# Patient Record
Sex: FEMALE | Race: WHITE | HISPANIC OR LATINO | Employment: OTHER | URBAN - METROPOLITAN AREA
[De-identification: names, ages, dates, MRNs, and addresses within clinical notes are randomized per-mention and may not be internally consistent; named-entity substitution may affect disease eponyms.]

---

## 2017-01-03 ENCOUNTER — TELEPHONE (OUTPATIENT)
Dept: INTERNAL MEDICINE | Facility: CLINIC | Age: 82
End: 2017-01-03

## 2017-01-03 ENCOUNTER — PATIENT MESSAGE (OUTPATIENT)
Dept: INTERNAL MEDICINE | Facility: CLINIC | Age: 82
End: 2017-01-03

## 2017-01-08 ENCOUNTER — PATIENT MESSAGE (OUTPATIENT)
Dept: INTERNAL MEDICINE | Facility: CLINIC | Age: 82
End: 2017-01-08

## 2017-01-09 ENCOUNTER — TELEPHONE (OUTPATIENT)
Dept: INTERNAL MEDICINE | Facility: CLINIC | Age: 82
End: 2017-01-09

## 2017-01-09 DIAGNOSIS — N18.4 CKD (CHRONIC KIDNEY DISEASE) STAGE 4, GFR 15-29 ML/MIN: ICD-10-CM

## 2017-01-09 DIAGNOSIS — D64.9 ANEMIA, UNSPECIFIED TYPE: Primary | ICD-10-CM

## 2017-01-09 DIAGNOSIS — R05.9 COUGH IN ADULT: ICD-10-CM

## 2017-01-09 NOTE — TELEPHONE ENCOUNTER
Spoke to patient on the phone.  Sick since 12-, nearly two weeks,  Worried she might have pneumonia.  Some sputum   She thinks she might be getting better.  No fever at any point.  Could be viral.  She has a BAD cough, but worrisome is the bubbles.    PLEASE CALL PATIENT TO SCHEDULE cxr AND LABS. i THINK SHE WANTS TO CHECK THIS TOMORROW 01- ABOUT 9AM.   I have a mandatory meeting tomorrow.I cannot see where she can be worked in for me to see tomorrow, maybe you can?  or maybe appointment with me weds or thurs?

## 2017-01-10 ENCOUNTER — PATIENT MESSAGE (OUTPATIENT)
Dept: INTERNAL MEDICINE | Facility: CLINIC | Age: 82
End: 2017-01-10

## 2017-01-10 ENCOUNTER — HOSPITAL ENCOUNTER (OUTPATIENT)
Dept: RADIOLOGY | Facility: HOSPITAL | Age: 82
Discharge: HOME | End: 2017-01-10
Attending: INTERNAL MEDICINE
Payer: MEDICARE

## 2017-01-10 ENCOUNTER — OFFICE VISIT (OUTPATIENT)
Dept: INTERNAL MEDICINE | Facility: CLINIC | Age: 82
End: 2017-01-10
Payer: MEDICARE

## 2017-01-10 VITALS
DIASTOLIC BLOOD PRESSURE: 78 MMHG | BODY MASS INDEX: 19.58 KG/M2 | WEIGHT: 117.5 LBS | SYSTOLIC BLOOD PRESSURE: 116 MMHG | HEIGHT: 65 IN | HEART RATE: 79 BPM

## 2017-01-10 DIAGNOSIS — N18.4 CKD (CHRONIC KIDNEY DISEASE) STAGE 4, GFR 15-29 ML/MIN: ICD-10-CM

## 2017-01-10 DIAGNOSIS — R09.81 NASAL CONGESTION: Primary | ICD-10-CM

## 2017-01-10 DIAGNOSIS — R05.9 COUGH: ICD-10-CM

## 2017-01-10 DIAGNOSIS — R05.9 COUGH IN ADULT: ICD-10-CM

## 2017-01-10 DIAGNOSIS — R74.8 ELEVATED LIVER ENZYMES: ICD-10-CM

## 2017-01-10 DIAGNOSIS — R09.81 SINUS CONGESTION: ICD-10-CM

## 2017-01-10 DIAGNOSIS — D64.9 ANEMIA, UNSPECIFIED TYPE: ICD-10-CM

## 2017-01-10 PROCEDURE — 3078F DIAST BP <80 MM HG: CPT | Mod: S$GLB,,, | Performed by: INTERNAL MEDICINE

## 2017-01-10 PROCEDURE — 1159F MED LIST DOCD IN RCRD: CPT | Mod: S$GLB,,, | Performed by: INTERNAL MEDICINE

## 2017-01-10 PROCEDURE — 1160F RVW MEDS BY RX/DR IN RCRD: CPT | Mod: S$GLB,,, | Performed by: INTERNAL MEDICINE

## 2017-01-10 PROCEDURE — 71020 XR CHEST PA AND LATERAL: CPT | Mod: 26,,, | Performed by: RADIOLOGY

## 2017-01-10 PROCEDURE — 3074F SYST BP LT 130 MM HG: CPT | Mod: S$GLB,,, | Performed by: INTERNAL MEDICINE

## 2017-01-10 PROCEDURE — 99999 PR PBB SHADOW E&M-EST. PATIENT-LVL III: CPT | Mod: PBBFAC,,, | Performed by: INTERNAL MEDICINE

## 2017-01-10 PROCEDURE — 1126F AMNT PAIN NOTED NONE PRSNT: CPT | Mod: S$GLB,,, | Performed by: INTERNAL MEDICINE

## 2017-01-10 PROCEDURE — 1157F ADVNC CARE PLAN IN RCRD: CPT | Mod: S$GLB,,, | Performed by: INTERNAL MEDICINE

## 2017-01-10 PROCEDURE — 99214 OFFICE O/P EST MOD 30 MIN: CPT | Mod: 25,S$GLB,, | Performed by: INTERNAL MEDICINE

## 2017-01-10 RX ORDER — FLUTICASONE PROPIONATE 50 MCG
2 SPRAY, SUSPENSION (ML) NASAL DAILY
Qty: 16 G | Refills: 12 | Status: SHIPPED | OUTPATIENT
Start: 2017-01-10 | End: 2017-02-09

## 2017-01-10 RX ORDER — CODEINE PHOSPHATE AND GUAIFENESIN 10; 100 MG/5ML; MG/5ML
5 SOLUTION ORAL 3 TIMES DAILY PRN
Qty: 180 ML | Refills: 0 | Status: SHIPPED | OUTPATIENT
Start: 2017-01-10 | End: 2017-01-20

## 2017-01-10 NOTE — MR AVS SNAPSHOT
[facility] Internal Medicine  [address]  [facility] [address]  Phone: [phone]  Fax: [phone]                  Karli Hameed   1/10/2017 11:00 AM   Office Visit    Description:  Female : 1931   Provider:  Katherine Hutchinson MD   Department:  [facility] - Internal Medicine           Reason for Visit     Cough           Diagnoses this Visit        Comments    Nasal congestion    -  Primary     Sinus congestion         Cough                To Do List           Future Appointments        Provider Department Dept Phone    1/10/2017 11:00 AM Katherine Hutchinson MD [facility] Internal Medicine [phone]    2017 11:20 AM Luis M Alonzo MD [facility] Nephrology [phone]    3/7/2017 8:20 AM Katherine Hutchinson MD [facility] Internal Medicine [phone]      Goals (5 Years of Data)     None       These Medications        Disp Refills Start End    fluticasone (FLONASE) 50 mcg/actuation nasal spray 16 g 12 1/10/2017 2017    2 sprays by Each Nare route once daily. - Each Nare    Pharmacy: [facility] - [facility] [address] Ph #: [phone]       guaifenesin-codeine 100-10 mg/5 ml (TUSSI-ORGANIDIN NR)  mg/5 mL syrup 180 mL 0 1/10/2017 2017    Take 5 mLs by mouth 3 (three) times daily as needed for Cough. - Oral    Pharmacy: [facility] - [facility] [address] Ph #: [phone]         [facility]     [facility] -  Assistance  Registered nurses in the [facility] On Call Center provide clinical advisement, health education, appointment booking, and other advisory services.  Call for this free service at [phone].             Medications           Message regarding Medications     Verify the changes and/or additions to your medication regime listed below are the same as discussed with your clinician today.  If any of these changes or additions are incorrect, please  "notify your healthcare provider.        START taking these NEW medications        Refills    fluticasone (FLONASE) 50 mcg/actuation nasal spray 12    Si sprays by Each Nare route once daily.    Class: Normal    Route: Each Nare    guaifenesin-codeine 100-10 mg/5 ml (TUSSI-ORGANIDIN NR)  mg/5 mL syrup 0    Sig: Take 5 mLs by mouth 3 (three) times daily as needed for Cough.    Class: Print    Route: Oral           Verify that the below list of medications is an accurate representation of the medications you are currently taking.  If none reported, the list may be blank. If incorrect, please contact your healthcare provider. Carry this list with you in case of emergency.           Current Medications     acetaminophen (TYLENOL) 325 MG tablet Take by mouth. 1 Tablet Oral .  Tylenol.To take as needed for arthritis pain.    amlodipine (NORVASC) 5 MG tablet Take 1 tablet (5 mg total) by mouth once daily.    cholecalciferol, vitamin D3, 1,000 unit capsule Take 2 capsules (2,000 Units total) by mouth once daily.    fluticasone (FLONASE) 50 mcg/actuation nasal spray 2 sprays by Each Nare route once daily.    guaifenesin-codeine 100-10 mg/5 ml (TUSSI-ORGANIDIN NR)  mg/5 mL syrup Take 5 mLs by mouth 3 (three) times daily as needed for Cough.    losartan (COZAAR) 50 MG tablet Take 1 tablet by mouth once daily.    magnesium hydroxide (MICHAEL CHEWS) 311 MG Chew Take 1 tablet (311 mg total) by mouth daily as needed.    naphazoline (NAPHCON) 0.012 % ophthalmic solution Inject 1 drop into the eye Use as needed.           Clinical Reference Information           Vital Signs - Last Recorded  Most recent update: 1/10/2017  9:17 AM by Javon Nava MA    BP Pulse Ht Wt BMI    116/78 79 5' 4.8" (1.646 m) 53.3 kg (117 lb 8.1 oz) 19.67 kg/m2      Blood Pressure          Most Recent Value    BP  116/78      Allergies as of 1/10/2017     Advil [Ibuprofen]    Parafon Forte    Calcitriol (Bulk)    Lisinopril    "   Immunizations Administered on Date of Encounter - 1/10/2017     None

## 2017-01-11 ENCOUNTER — PATIENT MESSAGE (OUTPATIENT)
Dept: INTERNAL MEDICINE | Facility: CLINIC | Age: 82
End: 2017-01-11

## 2017-01-11 NOTE — TELEPHONE ENCOUNTER
There was nothing available on 01/13, I scheduled her for 01/18 @ 2:45pm. Appointment letter mailed.

## 2017-01-12 NOTE — PROGRESS NOTES
Subjective:       Patient ID: Karli Hameed is a 85 y.o. female.    Chief Complaint: Cough   part to this visit she had a CBC, CMP and chest x-ray.  Her chest x-ray is clear.  She feels terrible and she looks terrible.  She is not toxic.  Her nose is very congested and she has been coughing.  She has been trying to keep her fluids up.  HPI  Review of Systems   Constitutional: Positive for activity change, fatigue and unexpected weight change. Negative for appetite change, chills and fever.   HENT: Positive for congestion, sinus pressure and voice change. Negative for hearing loss.    Eyes: Negative for visual disturbance.   Respiratory: Positive for cough. Negative for chest tightness, shortness of breath and wheezing.    Cardiovascular: Negative for chest pain, palpitations and leg swelling.   Gastrointestinal: Negative for abdominal pain, constipation, nausea and vomiting.   Genitourinary: Negative for dysuria, frequency and urgency.   Musculoskeletal: Negative for arthralgias, back pain, gait problem, joint swelling and myalgias.   Skin: Negative for rash.   Neurological: Negative for light-headedness and headaches.   Psychiatric/Behavioral: Negative for dysphoric mood and sleep disturbance. The patient is not nervous/anxious.        Objective:      Physical Exam   Constitutional: She is oriented to person, place, and time. She appears well-developed and well-nourished. No distress.   HENT:   Head: Normocephalic and atraumatic.   Right Ear: External ear normal.   Left Ear: External ear normal.   Nose: Nose normal.   Mouth/Throat: Oropharynx is clear and moist. No oropharyngeal exudate.   Eyes: Conjunctivae and EOM are normal. Pupils are equal, round, and reactive to light. Right eye exhibits no discharge. No scleral icterus.   Neck: Normal range of motion and full passive range of motion without pain. Neck supple. No spinous process tenderness and no muscular tenderness present. Carotid bruit is not present. No  thyromegaly present.   Cardiovascular: Normal rate, regular rhythm, S1 normal, S2 normal, normal heart sounds and intact distal pulses.  Exam reveals no gallop and no friction rub.    No murmur heard.  Pulmonary/Chest: Effort normal and breath sounds normal. No respiratory distress. She has no wheezes. She has no rales. She exhibits no tenderness.   Abdominal: Soft. Bowel sounds are normal. She exhibits no distension and no mass. There is no tenderness. There is no rebound and no guarding.   Genitourinary: Pelvic exam was performed with patient supine. Uterus is not deviated, not enlarged, not fixed and not tender. Cervix exhibits no motion tenderness, no discharge and no friability. Right adnexum displays no mass, no tenderness and no fullness. Left adnexum displays no mass, no tenderness and no fullness.   Musculoskeletal: Normal range of motion. She exhibits no edema or tenderness.   Lymphadenopathy:        Head (right side): No submental and no submandibular adenopathy present.        Head (left side): No submental and no submandibular adenopathy present.     She has no cervical adenopathy.        Right cervical: No superficial cervical, no deep cervical and no posterior cervical adenopathy present.       Left cervical: No superficial cervical, no deep cervical and no posterior cervical adenopathy present.        Right axillary: No pectoral and no lateral adenopathy present.        Left axillary: No pectoral and no lateral adenopathy present.       Right: No supraclavicular adenopathy present.        Left: No supraclavicular adenopathy present.   Neurological: She is alert and oriented to person, place, and time. She has normal reflexes. No cranial nerve deficit. She exhibits normal muscle tone. Coordination normal.   Skin: Skin is warm and dry. No rash noted.   Psychiatric: She has a normal mood and affect. Her behavior is normal. Her mood appears not anxious. Her speech is not rapid and/or pressured. She is  not agitated. She does not exhibit a depressed mood.   Normal behavior, thought content, insight and judgement.       Assessment:       1. Nasal congestion    2. Sinus congestion    3. Cough        Plan:   Karli was seen today for cough.    Diagnoses and all orders for this visit:    Nasal congestion    Sinus congestion    Cough    Other orders.  Await blood test results.  Follow closely.  -     fluticasone (FLONASE) 50 mcg/actuation nasal spray; 2 sprays by Each Nare route once daily.  -     guaifenesin-codeine 100-10 mg/5 ml (TUSSI-ORGANIDIN NR)  mg/5 mL syrup; Take 5 mLs by mouth 3 (three) times daily as needed for Cough.

## 2017-01-13 ENCOUNTER — OFFICE VISIT (OUTPATIENT)
Dept: NEPHROLOGY | Facility: CLINIC | Age: 82
End: 2017-01-13
Payer: MEDICARE

## 2017-01-13 VITALS
WEIGHT: 117.31 LBS | OXYGEN SATURATION: 96 % | HEIGHT: 64 IN | DIASTOLIC BLOOD PRESSURE: 80 MMHG | BODY MASS INDEX: 20.03 KG/M2 | HEART RATE: 76 BPM | SYSTOLIC BLOOD PRESSURE: 120 MMHG

## 2017-01-13 DIAGNOSIS — N39.0 UTI (URINARY TRACT INFECTION), UNCOMPLICATED: ICD-10-CM

## 2017-01-13 DIAGNOSIS — N17.8 ACUTE KIDNEY FAILURE NEC: Primary | ICD-10-CM

## 2017-01-13 PROCEDURE — 3074F SYST BP LT 130 MM HG: CPT | Mod: S$GLB,,, | Performed by: INTERNAL MEDICINE

## 2017-01-13 PROCEDURE — 1159F MED LIST DOCD IN RCRD: CPT | Mod: S$GLB,,, | Performed by: INTERNAL MEDICINE

## 2017-01-13 PROCEDURE — 99214 OFFICE O/P EST MOD 30 MIN: CPT | Mod: S$GLB,,, | Performed by: INTERNAL MEDICINE

## 2017-01-13 PROCEDURE — 1126F AMNT PAIN NOTED NONE PRSNT: CPT | Mod: S$GLB,,, | Performed by: INTERNAL MEDICINE

## 2017-01-13 PROCEDURE — 3079F DIAST BP 80-89 MM HG: CPT | Mod: S$GLB,,, | Performed by: INTERNAL MEDICINE

## 2017-01-13 PROCEDURE — 99499 UNLISTED E&M SERVICE: CPT | Mod: S$GLB,,, | Performed by: INTERNAL MEDICINE

## 2017-01-13 PROCEDURE — 1157F ADVNC CARE PLAN IN RCRD: CPT | Mod: S$GLB,,, | Performed by: INTERNAL MEDICINE

## 2017-01-13 PROCEDURE — 1160F RVW MEDS BY RX/DR IN RCRD: CPT | Mod: S$GLB,,, | Performed by: INTERNAL MEDICINE

## 2017-01-13 PROCEDURE — 99999 PR PBB SHADOW E&M-EST. PATIENT-LVL III: CPT | Mod: PBBFAC,,, | Performed by: INTERNAL MEDICINE

## 2017-01-13 NOTE — PROGRESS NOTES
Subjective:       Patient ID: Karli Hameed is a 85 y.o. White female who presents for follow-up evaluation of Chronic Kidney Disease    HPI    Ms. Hameed is an 85 year old woman with past medical history of hypertension, polycystic kidney disease presenting for follow up of chronic kidney disease.  Patient reports recent cough/cold/congestions.  She reports adequate fluid intake, denies any NSAID use.  She otherwise denies any fever, chest pain, shortness of breath, abdominal pain, diarrhea, dysuria/hematuria.     Review of Systems   Constitutional: Negative for appetite change, fatigue and fever.   HENT: Positive for congestion.    Respiratory: Positive for cough. Negative for chest tightness and shortness of breath.    Cardiovascular: Negative for chest pain and leg swelling.   Gastrointestinal: Negative for abdominal pain, constipation, diarrhea, nausea and vomiting.   Genitourinary: Negative for difficulty urinating, dysuria, flank pain, frequency, hematuria and urgency.   Musculoskeletal: Negative for arthralgias, joint swelling and myalgias.   Skin: Negative for rash and wound.   Neurological: Negative for dizziness, weakness and light-headedness.   All other systems reviewed and are negative.      Objective:      Physical Exam   Constitutional: She appears well-developed and well-nourished.   Cardiovascular: Normal rate, regular rhythm and normal heart sounds.  Exam reveals no gallop and no friction rub.    No murmur heard.  Pulmonary/Chest: Effort normal and breath sounds normal. No respiratory distress. She has no wheezes. She has no rales.   Abdominal: Soft. Bowel sounds are normal. There is no tenderness.   Musculoskeletal: She exhibits no edema.   Neurological: She is alert.   Skin: Skin is warm and dry. No rash noted. No erythema.   Psychiatric: She has a normal mood and affect.       Assessment:       1. Acute kidney failure NEC    2. UTI (urinary tract infection), uncomplicated        Plan:       Ms. Hameed is an 85 year old woman with past medical history of hypertension, polycystic kidney disease presenting for follow up of chronic kidney disease stage III.  Patient creatinine has been trending up since her last visit, possible acute kidney injury due to UTI, v. secondary to recent URI, will repeat renal panel, further recommendations pending results.  Encouraged fluid intake, stressed importance of blood pressure control to prevent any further progression of kidney disease, patient voiced understanding.   - Anemia: Hgb at goal, no indication for erythropoetin therapy  - Bone/mineral metabolism: patient with secondary hyperparathyroidism, PTH at goal for stage CKD    Return to clinic in 2-3 months pending renal panel 1.28.17, then with renal/heme panel, iron/TIBC/ferritin, urinalysis/culture, urine protein/creatinine ratio prior to next visit

## 2017-01-26 DIAGNOSIS — N18.4 CHRONIC KIDNEY DISEASE, STAGE IV (SEVERE): Primary | ICD-10-CM

## 2017-01-28 ENCOUNTER — HOSPITAL ENCOUNTER (OUTPATIENT)
Dept: RADIOLOGY | Facility: HOSPITAL | Age: 82
Discharge: HOME | End: 2017-01-28
Attending: INTERNAL MEDICINE
Payer: MEDICARE

## 2017-01-28 DIAGNOSIS — R74.8 ELEVATED LIVER ENZYMES: ICD-10-CM

## 2017-01-28 PROCEDURE — 76700 US EXAM ABDOM COMPLETE: CPT | Mod: 26,,, | Performed by: RADIOLOGY

## 2017-01-28 PROCEDURE — 76700 US EXAM ABDOM COMPLETE: CPT | Mod: TC

## 2017-01-30 ENCOUNTER — TELEPHONE (OUTPATIENT)
Dept: INTERNAL MEDICINE | Facility: CLINIC | Age: 82
End: 2017-01-30

## 2017-02-03 ENCOUNTER — LAB VISIT (OUTPATIENT)
Dept: LAB | Facility: HOSPITAL | Age: 82
End: 2017-02-03
Attending: INTERNAL MEDICINE
Payer: MEDICARE

## 2017-02-03 DIAGNOSIS — N18.4 CHRONIC KIDNEY DISEASE, STAGE IV (SEVERE): ICD-10-CM

## 2017-02-03 LAB
ALBUMIN SERPL BCP-MCNC: 3.8 G/DL
ANION GAP SERPL CALC-SCNC: 12 MMOL/L
BUN SERPL-MCNC: 42 MG/DL
CALCIUM SERPL-MCNC: 8.7 MG/DL
CHLORIDE SERPL-SCNC: 111 MMOL/L
CO2 SERPL-SCNC: 20 MMOL/L
CREAT SERPL-MCNC: 2.5 MG/DL
EST. GFR  (AFRICAN AMERICAN): 19.6 ML/MIN/1.73 M^2
EST. GFR  (NON AFRICAN AMERICAN): 17 ML/MIN/1.73 M^2
GLUCOSE SERPL-MCNC: 117 MG/DL
PHOSPHATE SERPL-MCNC: 3.9 MG/DL
POTASSIUM SERPL-SCNC: 3.7 MMOL/L
SODIUM SERPL-SCNC: 143 MMOL/L

## 2017-02-03 PROCEDURE — 80069 RENAL FUNCTION PANEL: CPT

## 2017-02-03 PROCEDURE — 36415 COLL VENOUS BLD VENIPUNCTURE: CPT

## 2017-02-22 ENCOUNTER — OFFICE VISIT (OUTPATIENT)
Dept: OPHTHALMOLOGY | Facility: CLINIC | Age: 82
End: 2017-02-22
Payer: MEDICARE

## 2017-02-22 DIAGNOSIS — H47.012 ISCHEMIC OPTIC NEUROPATHY, LEFT: Primary | ICD-10-CM

## 2017-02-22 DIAGNOSIS — Z96.1 PSEUDOPHAKIA OF BOTH EYES: ICD-10-CM

## 2017-02-22 PROCEDURE — 92014 COMPRE OPH EXAM EST PT 1/>: CPT | Mod: S$GLB,,,

## 2017-02-22 PROCEDURE — 99999 PR PBB SHADOW E&M-EST. PATIENT-LVL II: CPT | Mod: PBBFAC,,,

## 2017-02-22 NOTE — PROGRESS NOTES
HPI     Concerns About Ocular Health    Additional comments: overdue exam           Comments   DLS Dr Sahni 5/15/15  Broken glasses  Eyes itch burn and water, using naphcon prn  No flashes or floaters         s/p phaco w/IOL OS 4/9/15  s/p Phaco w/IOL OD February 12, 2015          H/o ischemic optic neuropathy OS             Last edited by Poly Massey on 2/22/2017  9:09 AM. (History)            Assessment /Plan     For exam results, see Encounter Report.    There are no diagnoses linked to this encounter.  I have reviewed the patient history,review of systems,and findings as recorded by the technician and resident and accept.  AION OS is old and stable. Needs new lens post sgy.

## 2017-02-22 NOTE — MR AVS SNAPSHOT
[facility] - Ophthalmology  [address]  [facility] [address]  Phone: [phone]  Fax: [phone]                  Karli Hameed   2017 9:15 AM   Office Visit    Description:  Female : 1931   Provider:  All Martinez MD   Department:  [facility] - Ophthalmology           Reason for Visit     Concerns About Ocular Health           Diagnoses this Visit        Comments    Ischemic optic neuropathy, left    -  Primary     Pseudophakia of both eyes                To Do List           Future Appointments        Provider Department Dept Phone    3/7/2017 8:20 AM Katherine Hutchinson MD [facility] - Internal Medicine [phone]    4/10/2017 9:15 AM LAB, SPECIMEN [facility] [phone]    4/10/2017 9:30 AM LAB, APPOINTMENT [facility] [phone]    2017 8:30 AM Jacob Max MD [facility] - Rheumatology [phone]      Goals (5 Years of Data)     None      Follow-Up and Disposition     Return in about 1 year (around 2018).      [facility] On Call     [facility] On [facility] Nurse Care Line -  Assistance  Registered nurses in the [facility] On Call Center provide clinical advisement, health education, appointment booking, and other advisory services.  Call for this free service at [phone].             Medications           Message regarding Medications     Verify the changes and/or additions to your medication regime listed below are the same as discussed with your clinician today.  If any of these changes or additions are incorrect, please notify your healthcare provider.             Verify that the below list of medications is an accurate representation of the medications you are currently taking.  If none reported, the list may be blank. If incorrect, please contact your healthcare provider. Carry this list with you in case of emergency.           Current Medications     acetaminophen (TYLENOL) 325 MG tablet Take  by mouth. 1 Tablet Oral .  Tylenol.To take as needed for arthritis pain.    amlodipine (NORVASC) 5 MG tablet Take 1 tablet (5 mg total) by mouth once daily.    cholecalciferol, vitamin D3, 1,000 unit capsule Take 2 capsules (2,000 Units total) by mouth once daily.    losartan (COZAAR) 50 MG tablet Take 1 tablet by mouth once daily.    magnesium hydroxide (MICHAEL CHEWS) 311 MG Chew Take 1 tablet (311 mg total) by mouth daily as needed.    naphazoline (NAPHCON) 0.012 % ophthalmic solution Inject 1 drop into the eye Use as needed.           Clinical Reference Information           Allergies as of 2/22/2017     Advil [Ibuprofen]    Parafon Forte    Calcitriol (Bulk)    Lisinopril      Immunizations Administered on Date of Encounter - 2/22/2017     None      Instructions    Get new left lens and repair frame if possible.       Language Assistance Services     ATTENTION: Language assistance services are available, free of charge. Please call [phone].      ATENCIÓN: Si habla jaimee, tiene a varma disposición servicios gratuitos de asistencia lingüística. Llame al [phone].     JOS Ý: N?u b?n nói Ti?ng Vi?t, có các d?ch v? h? tr? ngôn ng? mi?n phí dành cho b?n. G?i s? [phone].         [facility] complies with applicable Federal civil rights laws and does not discriminate on the basis of race, color, national origin, age, disability, or sex.

## 2017-03-03 DIAGNOSIS — M81.0 SENILE OSTEOPOROSIS: Primary | ICD-10-CM

## 2017-03-07 ENCOUNTER — OFFICE VISIT (OUTPATIENT)
Dept: INTERNAL MEDICINE | Facility: CLINIC | Age: 82
End: 2017-03-07
Payer: MEDICARE

## 2017-03-07 VITALS
WEIGHT: 116.38 LBS | BODY MASS INDEX: 19.87 KG/M2 | HEART RATE: 97 BPM | HEIGHT: 64 IN | DIASTOLIC BLOOD PRESSURE: 83 MMHG | SYSTOLIC BLOOD PRESSURE: 139 MMHG

## 2017-03-07 DIAGNOSIS — E21.3 HYPERPARATHYROIDISM: ICD-10-CM

## 2017-03-07 DIAGNOSIS — S52.202A CLOSED FRACTURE OF LEFT RADIUS AND ULNA, INITIAL ENCOUNTER: ICD-10-CM

## 2017-03-07 DIAGNOSIS — I10 ESSENTIAL HYPERTENSION: ICD-10-CM

## 2017-03-07 DIAGNOSIS — S52.92XA CLOSED FRACTURE OF LEFT RADIUS AND ULNA, INITIAL ENCOUNTER: ICD-10-CM

## 2017-03-07 DIAGNOSIS — N64.59 NIPPLE PROBLEM: ICD-10-CM

## 2017-03-07 DIAGNOSIS — N18.4 CKD (CHRONIC KIDNEY DISEASE) STAGE 4, GFR 15-29 ML/MIN: Primary | ICD-10-CM

## 2017-03-07 DIAGNOSIS — R74.8 ELEVATED LIVER ENZYMES: ICD-10-CM

## 2017-03-07 DIAGNOSIS — Z12.31 ENCOUNTER FOR SCREENING MAMMOGRAM FOR MALIGNANT NEOPLASM OF BREAST: ICD-10-CM

## 2017-03-07 PROCEDURE — 1159F MED LIST DOCD IN RCRD: CPT | Mod: S$GLB,,, | Performed by: INTERNAL MEDICINE

## 2017-03-07 PROCEDURE — 1157F ADVNC CARE PLAN IN RCRD: CPT | Mod: S$GLB,,, | Performed by: INTERNAL MEDICINE

## 2017-03-07 PROCEDURE — 99499 UNLISTED E&M SERVICE: CPT | Mod: S$GLB,,, | Performed by: INTERNAL MEDICINE

## 2017-03-07 PROCEDURE — 1126F AMNT PAIN NOTED NONE PRSNT: CPT | Mod: S$GLB,,, | Performed by: INTERNAL MEDICINE

## 2017-03-07 PROCEDURE — 99999 PR PBB SHADOW E&M-EST. PATIENT-LVL III: CPT | Mod: PBBFAC,,, | Performed by: INTERNAL MEDICINE

## 2017-03-07 PROCEDURE — 99214 OFFICE O/P EST MOD 30 MIN: CPT | Mod: S$GLB,,, | Performed by: INTERNAL MEDICINE

## 2017-03-07 PROCEDURE — 3075F SYST BP GE 130 - 139MM HG: CPT | Mod: S$GLB,,, | Performed by: INTERNAL MEDICINE

## 2017-03-07 PROCEDURE — 3079F DIAST BP 80-89 MM HG: CPT | Mod: S$GLB,,, | Performed by: INTERNAL MEDICINE

## 2017-03-07 PROCEDURE — 1160F RVW MEDS BY RX/DR IN RCRD: CPT | Mod: S$GLB,,, | Performed by: INTERNAL MEDICINE

## 2017-03-07 RX ORDER — AMLODIPINE BESYLATE 5 MG/1
5 TABLET ORAL DAILY
Qty: 90 TABLET | Refills: 3 | Status: SHIPPED | OUTPATIENT
Start: 2017-03-07 | End: 2018-02-26 | Stop reason: SDUPTHER

## 2017-03-07 NOTE — MR AVS SNAPSHOT
[facility] - Internal Medicine  [address]  [facility] [address]  Phone: [phone]  Fax: [phone]                  Karli Hameed   3/7/2017 8:20 AM   Office Visit    Description:  Female : 1931   Provider:  Katherine Hutchinson MD   Department:  [facility] - Internal Medicine           Reason for Visit     Follow-up           Diagnoses this Visit        Comments    CKD (chronic kidney disease) stage 4, GFR 15-29 ml/min    -  Primary     Hyperparathyroidism         Closed fracture of left radius and ulna, initial encounter         Nipple problem         Encounter for screening mammogram for malignant neoplasm of breast         Elevated liver enzymes         Essential hypertension                To Do List           Future Appointments        Provider Department Dept Phone    3/21/2017 8:30 AM [id number] DX [facility] [phone]    3/21/2017 9:20 AM [facility] [id number] US [facility] [phone]    4/10/2017 9:15 AM LAB, SPECIMEN [facility] [phone]    4/10/2017 9:30 AM LAB, APPOINTMENT [facility] [phone]    2017 8:30 AM EPO, INJECTION [facility] [phone]      Goals (5 Years of Data)     None      Follow-Up and Disposition     Return in about 3 months (around 2017).       These Medications        Disp Refills Start End    amlodipine (NORVASC) 5 MG tablet 90 tablet 3 3/7/2017 3/7/2018    Take 1 tablet (5 mg total) by mouth once daily. - Oral    Pharmacy: [facility] - [address] Ph #: [phone]         [facility] On Call     [facility] On Call Nurse Care Line -  Assistance  Registered nurses in the [facility] On Call Center provide clinical advisement, health education, appointment booking, and other advisory services.  Call for this free service at [phone].             Medications          "  Message regarding Medications     Verify the changes and/or additions to your medication regime listed below are the same as discussed with your clinician today.  If any of these changes or additions are incorrect, please notify your healthcare provider.             Verify that the below list of medications is an accurate representation of the medications you are currently taking.  If none reported, the list may be blank. If incorrect, please contact your healthcare provider. Carry this list with you in case of emergency.           Current Medications     acetaminophen (TYLENOL) 325 MG tablet Take by mouth. 1 Tablet Oral .  Tylenol.To take as needed for arthritis pain.    amlodipine (NORVASC) 5 MG tablet Take 1 tablet (5 mg total) by mouth once daily.    cholecalciferol, vitamin D3, 1,000 unit capsule Take 2 capsules (2,000 Units total) by mouth once daily.    losartan (COZAAR) 50 MG tablet Take 1 tablet by mouth once daily.    magnesium hydroxide (MICHAEL CHEWS) 311 MG Chew Take 1 tablet (311 mg total) by mouth daily as needed.    naphazoline (NAPHCON) 0.012 % ophthalmic solution Inject 1 drop into the eye Use as needed.           Clinical Reference Information           Your Vitals Were     BP Pulse Height Weight BMI    139/83 97 5' 4" (1.626 m) 52.8 kg (116 lb 6.5 oz) 19.98 kg/m2      Blood Pressure          Most Recent Value    BP  139/83      Allergies as of 3/7/2017     Advil [Ibuprofen]    Parafon Forte    Calcitriol (Bulk)    Lisinopril      Immunizations Administered on Date of Encounter - 3/7/2017     None      Orders Placed During Today's Visit     Future Labs/Procedures Expected by Expires    Mammo Digital Diagnostic Bilat with CAD  3/7/2017 6/5/2017    US Breast Left Complete  3/7/2017 5/7/2018    Hepatic function panel  4/10/2017 5/6/2018      Language Assistance Services     ATTENTION: Language assistance services are available, free of charge. Please call [phone].      ATENCIÓN: Si radha " español, tiene a varma disposición servicios gratuitos de asistencia lingüística. Barbara al [phone].     JOS Ý: N?u b?n nói Ti?ng Vi?t, có các d?ch v? h? tr? ngôn ng? mi?n phí dành cho b?n. G?i s? [phone].         [facility] complies with applicable Federal civil rights laws and does not discriminate on the basis of race, color, national origin, age, disability, or sex.

## 2017-03-12 NOTE — PROGRESS NOTES
Subjective:       Patient ID: Karli Hameed is a 85 y.o. female.    Chief Complaint: Follow-up   except for seasonal ALLERGIES with sneezing and clear watery drip with cough she is doing well.  HPI  Review of Systems   Constitutional: Negative for activity change, appetite change, chills, fatigue, fever and unexpected weight change.   HENT: Positive for postnasal drip and sneezing. Negative for hearing loss.    Eyes: Negative for visual disturbance.   Respiratory: Positive for cough. Negative for chest tightness, shortness of breath and wheezing.    Cardiovascular: Negative for chest pain, palpitations and leg swelling.   Gastrointestinal: Negative for abdominal pain, constipation, nausea and vomiting.   Genitourinary: Negative for dysuria, frequency and urgency.   Musculoskeletal: Negative for arthralgias, back pain, gait problem, joint swelling and myalgias.   Skin: Negative for rash.   Neurological: Negative for light-headedness and headaches.   Psychiatric/Behavioral: Negative for dysphoric mood and sleep disturbance. The patient is not nervous/anxious.        Objective:      Physical Exam   Constitutional: She is oriented to person, place, and time. She appears well-developed and well-nourished. No distress.   HENT:   Head: Normocephalic and atraumatic.   Right Ear: External ear normal.   Left Ear: External ear normal.   Nose: Nose normal.   Mouth/Throat: Oropharynx is clear and moist. No oropharyngeal exudate.   Eyes: Conjunctivae and EOM are normal. Pupils are equal, round, and reactive to light. Right eye exhibits no discharge. No scleral icterus.   Neck: Normal range of motion and full passive range of motion without pain. Neck supple. No spinous process tenderness and no muscular tenderness present. Carotid bruit is not present. No thyromegaly present.   Cardiovascular: Normal rate, regular rhythm, S1 normal, S2 normal, normal heart sounds and intact distal pulses.  Exam reveals no gallop and no friction  rub.    No murmur heard.  Pulmonary/Chest: Effort normal and breath sounds normal. No respiratory distress. She has no wheezes. She has no rales. She exhibits no tenderness.   Abdominal: Soft. Bowel sounds are normal. She exhibits no distension and no mass. There is no tenderness. There is no rebound and no guarding.   Genitourinary: Pelvic exam was performed with patient supine. Uterus is not deviated, not enlarged, not fixed and not tender. Cervix exhibits no motion tenderness, no discharge and no friability. Right adnexum displays no mass, no tenderness and no fullness. Left adnexum displays no mass, no tenderness and no fullness.   Genitourinary Comments: Breasts: there are no masses, tenderness, nipple discharge, suspicious skin changes or axillary lymph nodes felt.  The left nipple remains inverted with no palpable mass or skin changes that are suspicious.   Musculoskeletal: Normal range of motion. She exhibits no edema or tenderness.   Lymphadenopathy:        Head (right side): No submental and no submandibular adenopathy present.        Head (left side): No submental and no submandibular adenopathy present.     She has no cervical adenopathy.        Right cervical: No superficial cervical, no deep cervical and no posterior cervical adenopathy present.       Left cervical: No superficial cervical, no deep cervical and no posterior cervical adenopathy present.        Right axillary: No pectoral and no lateral adenopathy present.        Left axillary: No pectoral and no lateral adenopathy present.       Right: No supraclavicular adenopathy present.        Left: No supraclavicular adenopathy present.   Neurological: She is alert and oriented to person, place, and time. She has normal reflexes. No cranial nerve deficit. She exhibits normal muscle tone. Coordination normal.   Skin: Skin is warm and dry. No rash noted.   Psychiatric: She has a normal mood and affect. Her behavior is normal. Her mood appears not  anxious. Her speech is not rapid and/or pressured. She is not agitated. She does not exhibit a depressed mood.   Normal behavior, thought content, insight and judgement.       Assessment:       1. CKD (chronic kidney disease) stage 4, GFR 15-29 ml/min    2. Hyperparathyroidism    3. Closed fracture of left radius and ulna, initial encounter    4. Nipple problem, left only    5. Encounter for screening mammogram for malignant neoplasm of breast    6. Elevated liver enzymes    7. Essential hypertension        Plan:   Karli was seen today for follow-up.    Diagnoses and all orders for this visit:    CKD (chronic kidney disease) stage 4, GFR 15-29 ml/min.  No symptoms of uremia.    Hyperparathyroidism.  Followed in nephrology.    Closed fracture of left radius and ulna, initial encounter.  Happy with functional result and remaining active.    Nipple problem, left only  -     Mammo Digital Diagnostic Bilat with CAD; Future  -     US Breast Left Complete; Future    Encounter for screening mammogram for malignant neoplasm of breast  -     Mammo Digital Diagnostic Bilat with CAD; Future  -     US Breast Left Complete; Future    Elevated liver enzymes.  Her recent ultrasound in January was normal.  The liver function tests will be repeated.  -     Hepatic function panel; Future    Essential hypertension.  She reports that her blood pressure is doing well at home she is monitoring regularly.  It is usually around 130/80.    Other orders  -     amlodipine (NORVASC) 5 MG tablet; Take 1 tablet (5 mg total) by mouth once daily.    Return in about 3 months (around 6/7/2017).

## 2017-03-21 ENCOUNTER — HOSPITAL ENCOUNTER (OUTPATIENT)
Dept: RADIOLOGY | Facility: HOSPITAL | Age: 82
Discharge: HOME | End: 2017-03-21
Attending: INTERNAL MEDICINE
Payer: MEDICARE

## 2017-03-21 DIAGNOSIS — Z12.31 ENCOUNTER FOR SCREENING MAMMOGRAM FOR MALIGNANT NEOPLASM OF BREAST: ICD-10-CM

## 2017-03-21 DIAGNOSIS — N64.59 NIPPLE PROBLEM: ICD-10-CM

## 2017-03-21 PROCEDURE — 77062 BREAST TOMOSYNTHESIS BI: CPT | Mod: 26,,, | Performed by: RADIOLOGY

## 2017-03-21 PROCEDURE — 76642 ULTRASOUND BREAST LIMITED: CPT | Mod: 26,50,, | Performed by: RADIOLOGY

## 2017-03-21 PROCEDURE — 77066 DX MAMMO INCL CAD BI: CPT | Mod: TC

## 2017-03-21 PROCEDURE — 77066 DX MAMMO INCL CAD BI: CPT | Mod: 26,,, | Performed by: RADIOLOGY

## 2017-03-21 PROCEDURE — 76642 ULTRASOUND BREAST LIMITED: CPT | Mod: TC,50

## 2017-04-05 RX ORDER — LOSARTAN POTASSIUM 50 MG/1
TABLET ORAL
Qty: 90 TABLET | Refills: 3 | Status: SHIPPED | OUTPATIENT
Start: 2017-04-05 | End: 2018-04-16 | Stop reason: SDUPTHER

## 2017-04-10 ENCOUNTER — LAB VISIT (OUTPATIENT)
Dept: LAB | Facility: HOSPITAL | Age: 82
End: 2017-04-10
Attending: INTERNAL MEDICINE
Payer: MEDICARE

## 2017-04-10 DIAGNOSIS — R74.8 ELEVATED LIVER ENZYMES: ICD-10-CM

## 2017-04-10 DIAGNOSIS — N17.8 ACUTE KIDNEY FAILURE NEC: ICD-10-CM

## 2017-04-10 LAB
ALBUMIN SERPL BCP-MCNC: 3.7 G/DL
ALBUMIN SERPL BCP-MCNC: 3.7 G/DL
ALP SERPL-CCNC: 31 U/L
ALT SERPL W/O P-5'-P-CCNC: 10 U/L
ANION GAP SERPL CALC-SCNC: 13 MMOL/L
AST SERPL-CCNC: 22 U/L
BILIRUB DIRECT SERPL-MCNC: 0.2 MG/DL
BILIRUB SERPL-MCNC: 0.5 MG/DL
BUN SERPL-MCNC: 50 MG/DL
CALCIUM SERPL-MCNC: 8.8 MG/DL
CHLORIDE SERPL-SCNC: 109 MMOL/L
CO2 SERPL-SCNC: 22 MMOL/L
CREAT SERPL-MCNC: 2.6 MG/DL
EST. GFR  (AFRICAN AMERICAN): 18.7 ML/MIN/1.73 M^2
EST. GFR  (NON AFRICAN AMERICAN): 16.2 ML/MIN/1.73 M^2
GLUCOSE SERPL-MCNC: 99 MG/DL
PHOSPHATE SERPL-MCNC: 4.1 MG/DL
POTASSIUM SERPL-SCNC: 4.4 MMOL/L
PROT SERPL-MCNC: 6.9 G/DL
SODIUM SERPL-SCNC: 144 MMOL/L

## 2017-04-10 PROCEDURE — 80069 RENAL FUNCTION PANEL: CPT

## 2017-04-10 PROCEDURE — 80076 HEPATIC FUNCTION PANEL: CPT

## 2017-04-10 PROCEDURE — 36415 COLL VENOUS BLD VENIPUNCTURE: CPT

## 2017-04-27 ENCOUNTER — INFUSION (OUTPATIENT)
Dept: INFECTIOUS DISEASES | Facility: HOSPITAL | Age: 82
End: 2017-04-27
Attending: INTERNAL MEDICINE
Payer: MEDICARE

## 2017-04-27 VITALS — WEIGHT: 116 LBS | BODY MASS INDEX: 19.81 KG/M2 | HEIGHT: 64 IN

## 2017-04-27 DIAGNOSIS — M81.0 SENILE OSTEOPOROSIS: Primary | ICD-10-CM

## 2017-04-27 PROCEDURE — 63600175 PHARM REV CODE 636 W HCPCS: Performed by: INTERNAL MEDICINE

## 2017-04-27 PROCEDURE — 96401 CHEMO ANTI-NEOPL SQ/IM: CPT

## 2017-04-27 RX ADMIN — DENOSUMAB 60 MG: 60 INJECTION SUBCUTANEOUS at 08:04

## 2017-05-08 ENCOUNTER — TELEPHONE (OUTPATIENT)
Dept: NEPHROLOGY | Facility: CLINIC | Age: 82
End: 2017-05-08

## 2017-05-08 DIAGNOSIS — N18.4 CHRONIC KIDNEY DISEASE, STAGE IV (SEVERE): Primary | ICD-10-CM

## 2017-05-09 ENCOUNTER — PATIENT MESSAGE (OUTPATIENT)
Dept: INTERNAL MEDICINE | Facility: CLINIC | Age: 82
End: 2017-05-09

## 2017-05-18 ENCOUNTER — LAB VISIT (OUTPATIENT)
Dept: LAB | Facility: HOSPITAL | Age: 82
End: 2017-05-18
Attending: INTERNAL MEDICINE
Payer: MEDICARE

## 2017-05-18 ENCOUNTER — OFFICE VISIT (OUTPATIENT)
Dept: RHEUMATOLOGY | Facility: CLINIC | Age: 82
End: 2017-05-18
Payer: MEDICARE

## 2017-05-18 VITALS
HEART RATE: 86 BPM | SYSTOLIC BLOOD PRESSURE: 176 MMHG | WEIGHT: 115.38 LBS | BODY MASS INDEX: 19.22 KG/M2 | DIASTOLIC BLOOD PRESSURE: 93 MMHG | HEIGHT: 65 IN

## 2017-05-18 DIAGNOSIS — N18.4 CHRONIC KIDNEY DISEASE, STAGE IV (SEVERE): ICD-10-CM

## 2017-05-18 DIAGNOSIS — M17.11 PRIMARY OSTEOARTHRITIS OF RIGHT KNEE: ICD-10-CM

## 2017-05-18 DIAGNOSIS — M11.20 CHONDROCALCINOSIS ARTICULARIS: ICD-10-CM

## 2017-05-18 DIAGNOSIS — M81.0 OSTEOPOROSIS, UNSPECIFIED OSTEOPOROSIS TYPE, UNSPECIFIED PATHOLOGICAL FRACTURE PRESENCE: Primary | ICD-10-CM

## 2017-05-18 DIAGNOSIS — S52.202A CLOSED FRACTURE OF LEFT RADIUS AND ULNA, INITIAL ENCOUNTER: ICD-10-CM

## 2017-05-18 DIAGNOSIS — I10 ESSENTIAL HYPERTENSION: ICD-10-CM

## 2017-05-18 DIAGNOSIS — M19.042 OSTEOARTHRITIS OF BOTH HANDS, UNSPECIFIED OSTEOARTHRITIS TYPE: ICD-10-CM

## 2017-05-18 DIAGNOSIS — M19.041 OSTEOARTHRITIS OF BOTH HANDS, UNSPECIFIED OSTEOARTHRITIS TYPE: ICD-10-CM

## 2017-05-18 DIAGNOSIS — M75.01 ADHESIVE CAPSULITIS OF BOTH SHOULDERS: ICD-10-CM

## 2017-05-18 DIAGNOSIS — M75.02 ADHESIVE CAPSULITIS OF BOTH SHOULDERS: ICD-10-CM

## 2017-05-18 DIAGNOSIS — M85.80 OSTEOPENIA, UNSPECIFIED LOCATION: ICD-10-CM

## 2017-05-18 DIAGNOSIS — N18.4 CKD (CHRONIC KIDNEY DISEASE) STAGE 4, GFR 15-29 ML/MIN: ICD-10-CM

## 2017-05-18 DIAGNOSIS — S52.92XA CLOSED FRACTURE OF LEFT RADIUS AND ULNA, INITIAL ENCOUNTER: ICD-10-CM

## 2017-05-18 DIAGNOSIS — M75.41 SHOULDER IMPINGEMENT SYNDROME, RIGHT: ICD-10-CM

## 2017-05-18 DIAGNOSIS — E21.3 HYPERPARATHYROIDISM: ICD-10-CM

## 2017-05-18 LAB
25(OH)D3+25(OH)D2 SERPL-MCNC: 57 NG/ML
ALBUMIN SERPL BCP-MCNC: 3.9 G/DL
ANION GAP SERPL CALC-SCNC: 12 MMOL/L
BASOPHILS # BLD AUTO: 0.01 K/UL
BASOPHILS NFR BLD: 0.2 %
BUN SERPL-MCNC: 48 MG/DL
CALCIUM SERPL-MCNC: 9 MG/DL
CHLORIDE SERPL-SCNC: 109 MMOL/L
CO2 SERPL-SCNC: 23 MMOL/L
CREAT SERPL-MCNC: 2.8 MG/DL
DIFFERENTIAL METHOD: ABNORMAL
EOSINOPHIL # BLD AUTO: 0.1 K/UL
EOSINOPHIL NFR BLD: 1 %
ERYTHROCYTE [DISTWIDTH] IN BLOOD BY AUTOMATED COUNT: 13.8 %
EST. GFR  (AFRICAN AMERICAN): 17.1 ML/MIN/1.73 M^2
EST. GFR  (NON AFRICAN AMERICAN): 14.8 ML/MIN/1.73 M^2
FERRITIN SERPL-MCNC: 27 NG/ML
GLUCOSE SERPL-MCNC: 86 MG/DL
HCT VFR BLD AUTO: 36.6 %
HGB BLD-MCNC: 11.7 G/DL
IRON SERPL-MCNC: 110 UG/DL
LYMPHOCYTES # BLD AUTO: 1.2 K/UL
LYMPHOCYTES NFR BLD: 23.3 %
MCH RBC QN AUTO: 29.6 PG
MCHC RBC AUTO-ENTMCNC: 32 %
MCV RBC AUTO: 93 FL
MONOCYTES # BLD AUTO: 0.4 K/UL
MONOCYTES NFR BLD: 6.9 %
NEUTROPHILS # BLD AUTO: 3.5 K/UL
NEUTROPHILS NFR BLD: 68.4 %
PHOSPHATE SERPL-MCNC: 3.9 MG/DL
PLATELET # BLD AUTO: 140 K/UL
PMV BLD AUTO: 10.9 FL
POTASSIUM SERPL-SCNC: 4.3 MMOL/L
PTH-INTACT SERPL-MCNC: 380 PG/ML
RBC # BLD AUTO: 3.95 M/UL
SATURATED IRON: 30 %
SODIUM SERPL-SCNC: 144 MMOL/L
TOTAL IRON BINDING CAPACITY: 367 UG/DL
TRANSFERRIN SERPL-MCNC: 248 MG/DL
WBC # BLD AUTO: 5.1 K/UL

## 2017-05-18 PROCEDURE — 80069 RENAL FUNCTION PANEL: CPT

## 2017-05-18 PROCEDURE — 82306 VITAMIN D 25 HYDROXY: CPT

## 2017-05-18 PROCEDURE — 1160F RVW MEDS BY RX/DR IN RCRD: CPT | Mod: S$GLB,,, | Performed by: INTERNAL MEDICINE

## 2017-05-18 PROCEDURE — 83540 ASSAY OF IRON: CPT

## 2017-05-18 PROCEDURE — 1159F MED LIST DOCD IN RCRD: CPT | Mod: S$GLB,,, | Performed by: INTERNAL MEDICINE

## 2017-05-18 PROCEDURE — 99499 UNLISTED E&M SERVICE: CPT | Mod: S$GLB,,, | Performed by: INTERNAL MEDICINE

## 2017-05-18 PROCEDURE — 3077F SYST BP >= 140 MM HG: CPT | Mod: S$GLB,,, | Performed by: INTERNAL MEDICINE

## 2017-05-18 PROCEDURE — 36415 COLL VENOUS BLD VENIPUNCTURE: CPT

## 2017-05-18 PROCEDURE — 3080F DIAST BP >= 90 MM HG: CPT | Mod: S$GLB,,, | Performed by: INTERNAL MEDICINE

## 2017-05-18 PROCEDURE — 82728 ASSAY OF FERRITIN: CPT

## 2017-05-18 PROCEDURE — 83970 ASSAY OF PARATHORMONE: CPT

## 2017-05-18 PROCEDURE — 1126F AMNT PAIN NOTED NONE PRSNT: CPT | Mod: S$GLB,,, | Performed by: INTERNAL MEDICINE

## 2017-05-18 PROCEDURE — 85025 COMPLETE CBC W/AUTO DIFF WBC: CPT

## 2017-05-18 PROCEDURE — 99213 OFFICE O/P EST LOW 20 MIN: CPT | Mod: S$GLB,,, | Performed by: INTERNAL MEDICINE

## 2017-05-18 PROCEDURE — 99999 PR PBB SHADOW E&M-EST. PATIENT-LVL III: CPT | Mod: PBBFAC,,, | Performed by: INTERNAL MEDICINE

## 2017-05-18 ASSESSMENT — ROUTINE ASSESSMENT OF PATIENT INDEX DATA (RAPID3)
FATIGUE SCORE: 3
PATIENT GLOBAL ASSESSMENT SCORE: 3.5
PSYCHOLOGICAL DISTRESS SCORE: 0
MDHAQ FUNCTION SCORE: .1
AM STIFFNESS SCORE: 0, NO
PAIN SCORE: 3
TOTAL RAPID3 SCORE: 2.28

## 2017-05-18 NOTE — PROGRESS NOTES
Subjective:        Patient ID: Karli Hameed is a 85 y.o. female.     Chief Complaint: OA bilateral hands, knees. CPPD knees. Bilateral shoulder impingement syndrome, osteoporosis - denosumab therapy follow up     HPI   84 yo F with history of bilateral shoulder impingement L>R, bilateral OA of hands, stage IV CKD with secondary hyperparathyroidism, pseudogout and osteoporosis here today for follow up. She received her #4 denosumab treatment in April. She continues to exercise daily and stay active- she walks every morning with her dog. States her bilateral shoulder pain has improved. History of fracturing her left wrist. She has not had any CPPD knee flare ups, but states her knees are painful on and off and notes some swelling in both knees occasionally- she props them up on pillows at night which seems to help. Her hands also continue to bother her but she states she is using the Coban tape which helps a lot. She wears her wrist brace occasionally when the weather changes and her wrist hurts. Maintaining adequate calcium through diet. Continues to take vitamin D3 2,000U daily.      Review of Systems   Constitutional: Negative for fatigue, fever and unexpected weight change.   HENT: Negative for mouth sores and trouble swallowing. Negative for dry mouth. Negative for alopecia   Eyes: Negative for redness. Negative for dry eyes   Respiratory: Negative for cough and shortness of breath.   Cardiovascular: Negative for chest pain.   Gastrointestinal: Negative for constipation and diarrhea.   Genitourinary: Negative for dysuria and genital sores.   Musculoskeletal: Positive for arthralgias and back pain. Negative for joint swelling, myalgias and neck pain.   Skin: Negative for color change and rash.   Neurological: Negative for weakness, numbness and headaches.   Hematological: Negative for adenopathy. Does not bruise/bleed easily.      Current Outpatient Prescriptions on File Prior to Visit   Medication Sig Dispense  Refill    acetaminophen (TYLENOL) 325 MG tablet Take by mouth. 1 Tablet Oral .  Tylenol.To take as needed for arthritis pain.      amlodipine (NORVASC) 5 MG tablet Take 1 tablet (5 mg total) by mouth once daily. 90 tablet 3    cholecalciferol, vitamin D3, 1,000 unit capsule Take 2 capsules (2,000 Units total) by mouth once daily.  0    losartan (COZAAR) 50 MG tablet TAKE 1 TABLET ONE TIME DAILY 90 tablet 3    magnesium hydroxide (MICHAEL CHEWS) 311 MG Chew Take 1 tablet (311 mg total) by mouth daily as needed. 30 tablet 0    naphazoline (NAPHCON) 0.012 % ophthalmic solution Inject 1 drop into the eye Use as needed.       No current facility-administered medications on file prior to visit.           Objective:      Vitals:    05/18/17 0805   BP: (!) 176/93   Pulse: 86   States her BP is always high in the Dr office    Physical Exam   Constitutional: She is oriented to person, place, and time and well-developed, well-nourished, and in no distress. No distress.   HENT:   Head: Normocephalic and atraumatic.   Mouth/Throat: Oropharynx is clear and moist.   Eyes: Conjunctivae are normal.   Neck: Normal range of motion.   Cardiovascular: Normal rate, regular rhythm, normal heart sounds and intact distal pulses. Exam reveals no gallop and no friction rub.   No murmur heard.  Pulmonary/Chest: Effort normal and breath sounds normal. No respiratory distress. She has no wheezes.   Abdominal: Soft. She exhibits no distension. There is no tenderness.      Right Side Rheumatological Exam   Shoulder Exam   Tenderness Location: no tenderness     Range of Motion   The patient has normal right shoulder ROM.     Knee Exam   Range of Motion   The patient has normal right knee ROM.  Patellofemoral Crepitus: positive  Effusion: negative  Warmth: negative     Hip Exam   Tenderness Location: no tenderness     Range of Motion   The patient has normal right hip ROM.     Elbow/Wrist Exam   Tenderness Location: no elbow tenderness and no  wrist tenderness     Range of Motion   Elbow   The patient has normal right elbow ROM.     Range of Motion   Wrist   The patient has normal right wrist ROM.     Muscle Strength (0-5 scale):  Deltoid: 5  Biceps: 5/5   Triceps: 5  : 5/5   Iliopsoas: 5  Quadriceps: 5   Distal Lower Extremity: 5     Left Side Rheumatological Exam      Shoulder Exam   Tenderness Location: no tenderness     Range of Motion   The patient has normal left shoulder ROM.     Knee Exam      Range of Motion   The patient has normal left knee ROM.    Patellofemoral Crepitus: positive  Effusion: negative  Warmth: negative     Hip Exam   Tenderness Location: no tenderness     Range of Motion   The patient has normal left hip ROM.     Elbow/Wrist Exam   Tenderness Location: no elbow tenderness and no wrist tenderness     Range of Motion   Elbow   The patient has normal left elbow ROM.     Range of Motion   Wrist   The patient has normal left wrist ROM.     Muscle Strength (0-5 scale):  Deltoid: 5  Biceps: 5/5   Triceps: 5  : 5/5   Iliopsoas: 5  Quadriceps: 5   Distal Lower Extremity: 5     Neurological: She is alert and oriented to person, place, and time.   Skin: Skin is warm and dry. No rash noted.     Psychiatric: Mood and affect normal.   Musculoskeletal: Normal range of motion. She exhibits no edema but TTP of 2nd and 5th DIP. Enlarged R 2nd DIP and PIP, 5th DIP, 1st CMC. Enlarged L 5th DIP. No swelling noted. Minimally decreased L wrist ROM.       Results for NIKI TOLENTINO (MRN [id number]) as of 5/18/2017 08:12   Ref. Range 4/10/2017 08:07 4/10/2017 08:07   Sodium Latest Ref Range: 136 - 145 mmol/L 144    Potassium Latest Ref Range: 3.5 - 5.1 mmol/L 4.4    Chloride Latest Ref Range: 95 - 110 mmol/L 109    CO2 Latest Ref Range: 23 - 29 mmol/L 22 (L)    Anion Gap Latest Ref Range: 8 - 16 mmol/L 13    BUN, Bld Latest Ref Range: 8 - 23 mg/dL 50 (H)    Creatinine Latest Ref Range: 0.5 - 1.4 mg/dL 2.6 (H)    eGFR if non   Latest Ref Range: >60 mL/min/1.73 m^2 16.2 (A)    eGFR if African American Latest Ref Range: >60 mL/min/1.73 m^2 18.7 (A)    Glucose Latest Ref Range: 70 - 110 mg/dL 99    Calcium Latest Ref Range: 8.7 - 10.5 mg/dL 8.8    Phosphorus Latest Ref Range: 2.7 - 4.5 mg/dL 4.1    Alkaline Phosphatase Latest Ref Range: 55 - 135 U/L  31 (L)   Total Protein Latest Ref Range: 6.0 - 8.4 g/dL  6.9   Albumin Latest Ref Range: 3.5 - 5.2 g/dL 3.7 3.7   Total Bilirubin Latest Ref Range: 0.1 - 1.0 mg/dL  0.5   Bilirubin, Direct Latest Ref Range: 0.1 - 0.3 mg/dL  0.2   AST Latest Ref Range: 10 - 40 U/L  22   ALT Latest Ref Range: 10 - 44 U/L  10         Assessment:       1. Senile osteoporosis    2. Hyperparathyroidism    3. Arthritis of both knees    4. Chondrocalcinosis articularis    5. Chronic left shoulder pain    6. Chronic right-sided low back pain without sciatica    7. Osteoarthritis of carpometacarpal joint of thumb, unspecified laterality, unspecified osteoarthritis type    8. Primary osteoarthritis of right knee    9. CKD (chronic kidney disease) stage 4, GFR 15-29 ml/min          Plan:   1. Received #4 denosumab 60mg on 4/17. Tolerated well. #5 denosumab treatment in 6 months  2. Continue regular exercise  3. Cont Futuro wrist brace and Coban tape  4. Continue vitamin D 2000 units daily  5. OT referral for hand and wrist OA and strengthening  6. Vit D and CMP in October for Denosumab monitoring  7. Continue FU with PCP and Nephrology    RTC 6 months

## 2017-05-18 NOTE — MR AVS SNAPSHOT
[facility] - Rheumatology  [address] [address]  Phone: [phone]  Fax: [phone]                  Karli Hameed   2017 8:30 AM   Office Visit    Description:  Female : 1931   Provider:  Jacob Max MD   Department:  [facility]           Diagnoses this Visit        Comments    Osteoporosis, unspecified osteoporosis type, unspecified pathological fracture presence    -  Primary     CKD (chronic kidney disease) stage 4, GFR 15-29 ml/min                To Do List           Future Appointments        Provider Department Dept Phone    2017 9:30 AM LAB, APPOINTMENT [place] [facility] [phone]    2017 10:00 AM Luis M Alonzo MD [facility] - Nephrology [phone]    2017 9:00 AM Katherine Hutchinson MD [facility] - Internal Medicine [phone]      Goals (5 Years of Data)     None      [facility]     [facility] Nurse Care Line -  Assistance  Unless otherwise directed by your provider, please contact [facility], our nurse care line that is available for  assistance.     Registered nurses in the [facility] provide: appointment scheduling, clinical advisement, health education, and other advisory services.  Call: [phone] (toll free)               Medications           Message regarding Medications     Verify the changes and/or additions to your medication regime listed below are the same as discussed with your clinician today.  If any of these changes or additions are incorrect, please notify your healthcare provider.             Verify that the below list of medications is an accurate representation of the medications you are currently taking.  If none reported, the list may be blank. If incorrect, please contact your healthcare provider. Carry this list with you in case of emergency.           Current Medications     acetaminophen (TYLENOL) 325 MG tablet Take by mouth. 1  "Tablet Oral .  Tylenol.To take as needed for arthritis pain.    amlodipine (NORVASC) 5 MG tablet Take 1 tablet (5 mg total) by mouth once daily.    cholecalciferol, vitamin D3, 1,000 unit capsule Take 2 capsules (2,000 Units total) by mouth once daily.    losartan (COZAAR) 50 MG tablet TAKE 1 TABLET ONE TIME DAILY    magnesium hydroxide (MICHAEL CHEWS) 311 MG Chew Take 1 tablet (311 mg total) by mouth daily as needed.    naphazoline (NAPHCON) 0.012 % ophthalmic solution Inject 1 drop into the eye Use as needed.           Clinical Reference Information           Your Vitals Were     BP Pulse Height Weight BMI    176/93 86 5' 4.8" (1.646 m) 52.3 kg (115 lb 6.4 oz) 19.32 kg/m2      Blood Pressure          Most Recent Value    BP  (!)  176/93      Allergies as of 5/18/2017     Advil [Ibuprofen]    Parafon Forte    Calcitriol (Bulk)    Lisinopril      Immunizations Administered on Date of Encounter - 5/18/2017     None      Orders Placed During Today's Visit      Normal Orders This Visit    Ambulatory Referral to Physical/Occupational Therapy     Future Labs/Procedures Expected by Expires    COMPREHENSIVE METABOLIC PANEL  10/10/2017 7/17/2018    Vitamin D  10/10/2017 7/17/2018      Language Assistance Services     ATTENTION: Language assistance services are available, free of charge. Please call [phone].      ATENCIÓN: Si radha hermosillo, tiene a varma disposición servicios gratuitos de asistencia lingüística. Llame al [phone].     CHÚ Ý: N?u b?n nói Ti?ng Vi?t, có các d?ch v? h? tr? ngôn ng? mi?n phí dành cho b?n. G?i s? [phone].         [facility] complies with applicable Federal civil rights laws and does not discriminate on the basis of race, color, national origin, age, disability, or sex.        "

## 2017-05-18 NOTE — PROGRESS NOTES
I have personally taken the history and examined the patient and agree with the resident,s note as stated above     Exam 176/93(didn't 2nd a BP pill this am yet takes at 10 am)  Heberden's nodes dominguez right index, Ron's node dominguez right index, cmc squaring on right. Knees with bilateral bony enlargement, no effusion, warmth or erythema. Mild crepitus, flex limited 120 d.     Left> right shoulder impingement syndrome with secondary adhesive capsulitis. Left>right improved with OT initially, now hep  Chondrocalcinosis, h/o acute CPPD right knee 2015(crystal +), no recurrence  OA right knee, index pips and index and little dips, left wrist post wrist fracture  Low bone mass > NOF FRAX therapeutic threshold, on denosumab  CKD stage 4, secondary hyperparathyroidism.  Low back pain     Had denosumab #4  4/27/17  60mg sc  # 5 will be due 10/27/17  Continue HEP for shoulders and knees  Cont vitamin D 2000 units daily  Coban tape to affected Heberden's and Ron's  Futuro wrist brace left prn  Continue BP monitor, has appt with Dr. Alonzo in Nephrology tomorrow.  RTC 6 months with cmp, vitamin D just prior to  Next densomab 10/27/17. Call if acute CPPD

## 2017-05-19 ENCOUNTER — OFFICE VISIT (OUTPATIENT)
Dept: NEPHROLOGY | Facility: CLINIC | Age: 82
End: 2017-05-19
Payer: MEDICARE

## 2017-05-19 VITALS
HEART RATE: 88 BPM | SYSTOLIC BLOOD PRESSURE: 172 MMHG | DIASTOLIC BLOOD PRESSURE: 90 MMHG | HEIGHT: 64 IN | WEIGHT: 115.75 LBS | BODY MASS INDEX: 19.76 KG/M2 | OXYGEN SATURATION: 100 %

## 2017-05-19 DIAGNOSIS — N17.9 ACUTE KIDNEY INJURY (NONTRAUMATIC): Primary | ICD-10-CM

## 2017-05-19 DIAGNOSIS — N18.4 CHRONIC KIDNEY DISEASE, STAGE IV (SEVERE): ICD-10-CM

## 2017-05-19 PROCEDURE — 99499 UNLISTED E&M SERVICE: CPT | Mod: S$GLB,,, | Performed by: INTERNAL MEDICINE

## 2017-05-19 PROCEDURE — 99999 PR PBB SHADOW E&M-EST. PATIENT-LVL III: CPT | Mod: PBBFAC,,, | Performed by: INTERNAL MEDICINE

## 2017-05-19 PROCEDURE — 99213 OFFICE O/P EST LOW 20 MIN: CPT | Mod: S$GLB,,, | Performed by: INTERNAL MEDICINE

## 2017-05-19 PROCEDURE — 1160F RVW MEDS BY RX/DR IN RCRD: CPT | Mod: S$GLB,,, | Performed by: INTERNAL MEDICINE

## 2017-05-19 PROCEDURE — 3077F SYST BP >= 140 MM HG: CPT | Mod: S$GLB,,, | Performed by: INTERNAL MEDICINE

## 2017-05-19 PROCEDURE — 3080F DIAST BP >= 90 MM HG: CPT | Mod: S$GLB,,, | Performed by: INTERNAL MEDICINE

## 2017-05-19 PROCEDURE — 1126F AMNT PAIN NOTED NONE PRSNT: CPT | Mod: S$GLB,,, | Performed by: INTERNAL MEDICINE

## 2017-05-19 PROCEDURE — 1159F MED LIST DOCD IN RCRD: CPT | Mod: S$GLB,,, | Performed by: INTERNAL MEDICINE

## 2017-05-24 NOTE — PROGRESS NOTES
Subjective:       Patient ID: Karli Hameed is a 85 y.o. White female who presents for follow-up evaluation of Chronic Kidney Disease    HPI    Ms. Hameed is an 85 year old woman with past medical history of hypertension, polycystic kidney disease presenting for follow up of chronic kidney disease.  Patient reports adequate fluid intake, denies any NSAID use.  She otherwise denies any fever, chest pain, shortness of breath, abdominal pain, diarrhea, dysuria/hematuria.     Review of Systems   Constitutional: Negative for appetite change, fatigue and fever.   HENT: Negative for congestion.    Respiratory: Negative for cough, chest tightness and shortness of breath.    Cardiovascular: Negative for chest pain and leg swelling.   Gastrointestinal: Negative for abdominal pain, constipation, diarrhea, nausea and vomiting.   Genitourinary: Negative for difficulty urinating, dysuria, flank pain, frequency, hematuria and urgency.   Musculoskeletal: Negative for arthralgias, joint swelling and myalgias.   Skin: Negative for rash and wound.   Neurological: Negative for dizziness, weakness and light-headedness.   All other systems reviewed and are negative.      Objective:      Physical Exam   Constitutional: She appears well-developed and well-nourished.   Cardiovascular: Normal rate, regular rhythm and normal heart sounds.  Exam reveals no gallop and no friction rub.    No murmur heard.  Pulmonary/Chest: Effort normal and breath sounds normal. No respiratory distress. She has no wheezes. She has no rales.   Abdominal: Soft. Bowel sounds are normal. There is no tenderness.   Musculoskeletal: She exhibits no edema.   Neurological: She is alert.   Skin: Skin is warm and dry. No rash noted. No erythema.   Psychiatric: She has a normal mood and affect.       Assessment:       1. Acute kidney injury (nontraumatic)    2. Chronic kidney disease, stage IV (severe)        Plan:      Ms. Hameed is an 85 year old woman with past  medical history of hypertension, polycystic kidney disease presenting for follow up of chronic kidney disease stage III.  Patient creatinine initially improved since her last visit, previous acute kidney injury due to UTI, v. secondary to recent URI, again elevated, will repeat renal panel, further recommendations pending results.  Encouraged fluid intake, stressed importance of blood pressure control to prevent any further progression of kidney disease, patient voiced understanding.   - Anemia: Hgb at goal, no indication for erythropoetin therapy  - Bone/mineral metabolism: patient with secondary hyperparathyroidism, PTH at goal for stage CKD    Return to clinic in 2-3 months pending renal panel 6.7.17, then with renal/heme panel, iron/TIBC/ferritin, urinalysis/culture, urine protein/creatinine ratio prior to next visit

## 2017-06-05 ENCOUNTER — CLINICAL SUPPORT (OUTPATIENT)
Dept: REHABILITATION | Facility: HOSPITAL | Age: 82
End: 2017-06-05
Attending: STUDENT IN AN ORGANIZED HEALTH CARE EDUCATION/TRAINING PROGRAM
Payer: MEDICARE

## 2017-06-05 DIAGNOSIS — M79.641 BILATERAL HAND PAIN: Primary | ICD-10-CM

## 2017-06-05 DIAGNOSIS — M79.642 BILATERAL HAND PAIN: Primary | ICD-10-CM

## 2017-06-05 PROCEDURE — G8988 SELF CARE GOAL STATUS: HCPCS | Mod: CJ

## 2017-06-05 PROCEDURE — 97018 PARAFFIN BATH THERAPY: CPT

## 2017-06-05 PROCEDURE — 97110 THERAPEUTIC EXERCISES: CPT

## 2017-06-05 PROCEDURE — G8987 SELF CARE CURRENT STATUS: HCPCS | Mod: CJ

## 2017-06-05 PROCEDURE — 97165 OT EVAL LOW COMPLEX 30 MIN: CPT

## 2017-06-05 NOTE — PROGRESS NOTES
OCCUPATIONAL THERAPY INITIAL EVALUATION       DATE : 06/05/2017    Name: Karli Hameed  Referring Physician: gómez gamez   Allergies:   Review of patient's allergies indicates:   Allergen Reactions    Advil [ibuprofen] Nausea Only    Parafon forte      Other reaction(s): Hallucinations    Calcitriol (bulk) Nausea Only    Lisinopril      cough     Medical history:   Past Medical History:   Diagnosis Date    Allergy     Anxiety     Arthritis     Back pain     Basal cell carcinoma 6/2011    R nasal ala, excised via Mohs    Chronic kidney disease, stage II (mild) 8/25/2012    cyst since 1975    Flank pain 8/21/2012    HTN (hypertension) 8/21/2012    Inflammatory bowel disease     Joint pain     Kidney stone     left     Lactose disaccharidase deficiency     Senile osteoporosis 4/11/2016    Small bowel obstruction, 06-, resolved witjhout surgery 7/1/2014    Trace cataracts     Ulcer     hx    Urinary tract infection      Medication:    Current Outpatient Prescriptions on File Prior to Visit   Medication Sig Dispense Refill    acetaminophen (TYLENOL) 325 MG tablet Take by mouth. 1 Tablet Oral .  Tylenol.To take as needed for arthritis pain.      amlodipine (NORVASC) 5 MG tablet Take 1 tablet (5 mg total) by mouth once daily. 90 tablet 3    cholecalciferol, vitamin D3, 1,000 unit capsule Take 2 capsules (2,000 Units total) by mouth once daily.  0    losartan (COZAAR) 50 MG tablet TAKE 1 TABLET ONE TIME DAILY 90 tablet 3    magnesium hydroxide (MICHAEL CHEWS) 311 MG Chew Take 1 tablet (311 mg total) by mouth daily as needed. 30 tablet 0    naphazoline (NAPHCON) 0.012 % ophthalmic solution Inject 1 drop into the eye Use as needed.       No current facility-administered medications on file prior to visit.      Diagnosis:  Encounter Diagnosis   Name Primary?    Bilateral hand pain Yes     Occupational Profile level: low    Orders: Occupational Therapy evaluate and  treat    Precautions stated on order: none      Evaluation Date: 6/5/17  Visit #: 1    Plan of care Expiration: 7/30/17    SUBJECTIVE   Patient states:  Pt reports that she has some stiffness in her fingers , mostly when she wakes .   Pts goals for therapy:  To learn what to do to help her with hand pain   Pain Scale: Karli rates pain on a scale of 0-10 to be 3 at worst; 3 currently; 3 at best .  Onset: gradual  Date of Surgery:  None   Chief complaint:  Stiffness if fingers when she wakes and when she goes to sleep .  Radicular symptoms:  None   Aggravating factors:   None   Easing factors:  none  Prior Therapy:    History of Present Illness: hands feel sore at the end of the day   Prior functional status: limited with gripping at night it feels sore   Current functional status:  Active , just pain in hands   Psychosocial skills: lives alone very independent   Occupation:  Retired                        Job description includes:  Retired walks her dog , does exercises every day   Patients  structured exercise routine: see above   Exercise routine prior to onset: walks daily and goes to the gym   ADLS: Pt has a decrease ability to perform ADL's such as none     Imaging:   MRI: none          Xray: OA in hands         Hand Dominance: right                                   OBJECTIVE     Cognitive status : : no deficits     Posture Alignment :normal   Joint integrity: normal , right hand index finger has ulnar deviation To dip joint .   Skin integrity: normal   Edema: none     Sensation:   Light Touch: Intact       Proprioception:   Intact    Upper Extremity Range of Motion   Joint Evaluation AROM PROM    Left / Right  Left  / Right    Shoulder flex 0-180     Shoulder Abd 0-180     Shoulder ER 0-90     Shoulder IR 0-90     Shoulder Extension 0-80     Shoulder Horizontal adduction 0-90     Elbow flex/ext 0-150 WNL WNL   Wrist flex 0-80 WNL WNL   Wrist ext 0-70 WNL WNL   Supination 0-80 WNL WNL   Pronation 0-80 WNL WNL    UD WNL WNl   RD WNL WNL        left   AROM  Index  Middle  Ring  Small   MP 0/85 0/80 0/85 0/85   PIp 0/100 0/100 0/100 0/100   DIP  0/50 0/50 0/50 0/50          DUNCAN  235 230 235 235       Right   AROM  Index  Middle  Ring  Small   MP 0/80 0/90 0/90 0/90   PIp 0/80 0/80 0/90 0/90   DIP  0/25 0/40 0/40 0/40          DUNCAN  185 210 220 220     EDEMA: none   CM     SCAR: none       Upper Extremity Strength  (R) UE  (L) UE                                                    Wrist flexion: 4/5 Wrist flexion: 4/5   Wrist extension: 4/5 Wrist extension: 4/5    Strength - second setting 40#,40 50#,# : 40#,40#,40#                                   Treatment    Treatment time: 60  OT Evaluation Completed? Yes  Discussed Plan of Care with patient: Yes    Karli received 55 minutes of therapeutic exercises.paraffin, education       Karli received 1 eval    Written Home Exercises Provided:   Karli demo good understanding of the education provided. Patient demo good return demo of skill of exercises.     Treatment Received :        1. Pt performed and was provided with a written copy of exercises to perform as tolerated, including:   paraffin X 10 minutes , instruction how to use at home , care for hands , exercises make fisting , thumb opposition and energy conservation task for arthritis in hands .     2. Pt was provided educational information, including range of motion, strengthening   3. Pt educated to use ice for 10- 20 minutes to decrease swelling  and/ or pain as needed.       FOTO - Outcomes  And G Codes     FOTO survey     FOTO:   Self Care DATE SCORE GCODE MODIFIER   INITIAL 6/5/17      67  /100     5TH /100 10TH /100     Discharge           FOTO Goal :  modifier [id number]    Interpretation of FOTO Modifiers    TEST SCORE  Modifier  Impairment Limitation Restriction    0%  CH  0 % impaired, limited or restricted    1-19%  CI  @ least 1% but less than 20% impaired, limited or  restricted    20-39%  CJ  @ least 20%<40% impaired, limited or restricted    40-59%  CK  @ least 40%<60% impaired, limited or restricted    60-79%  CL  @ least 60% <80% impaired, limited or restricted    80-99%  CM  @ least 80%<100% impaired limited or restricted    100%  CN  100% impaired, limited or restricted       ASSESSMENT    Pt present to occupational therapy with an OT diagnosis of     Encounter Diagnosis   Name Primary?    Bilateral hand pain Yes       CLINICAL DECISION MAKING:    Analysis of data from eval:      Problem focused assessment, 1-3 performance deficits noted increased pain  Stiffness to fingers mainly at night         History Examination Decision Making Complexity Score   Occupational Profile: low year old , lives at home , pt has family that can assist pt was most task                             Medical and Therapy History:   Expanded                  Performance Deficits     Physical:  Decreased ability to perform task such as feeding/ cutting , use of fasteners , dressing         Cognitive:   WNL        Psychosocial:    WNl        Modification of task during  evaluation     *Modification/task , no assistance required        Level of complexity is low,     based on PMHX, co morbidities , data from assessments and functional level of assistance required with task and clinical presentation directly impacting  His/ her plan of care                Pt presents with the impairments as stated below in the impairments/problems list. Pt prognosis is Good..     Karli will benefit from skilled outpatient occupational therapy to address the above stated deficits, provide pt/family education and to maximize pt's level of independence in the home and community environment.     Discussed Plan of Care with patient: Yes    Medical necessity is demonstrated by the following IMPAIRMENTS/PROBLEMS:    2. pain in  joint / limb  Right / left hands   3. Decreased flexibility        Pt's spiritual, cultural and  educational needs considered and pt agreeable to plan of care and goals as stated below:     Anticipated Barriers for Occupational  therapy: anticipated none    GOALS: 6 weeks. Pt agrees with goals set.  1. Independent with HEP.  2.   3. Decreased pain to   0/10   With gripping toothbrush or water bottle   4. Pt to report that with use of home paraffin that the pain in hands is 0/10 after paraffin night use.    5.     PLAN   Outpatient occupational therapy 1-  2 time weekly to include:   pt ed, hep, therapeutic exercises,paraffin use   Treatment Certification Period:   6/5/17    To 8/30/17               .   Cont OT for  6 weeks.     I certify the need for these services furnished under this plan of treatment and while under my Care.    _________________________________,          _________________________  Physician        Date

## 2017-06-07 ENCOUNTER — LAB VISIT (OUTPATIENT)
Dept: LAB | Facility: HOSPITAL | Age: 82
End: 2017-06-07
Attending: INTERNAL MEDICINE
Payer: MEDICARE

## 2017-06-07 ENCOUNTER — TELEPHONE (OUTPATIENT)
Dept: INTERNAL MEDICINE | Facility: CLINIC | Age: 82
End: 2017-06-07

## 2017-06-07 ENCOUNTER — OFFICE VISIT (OUTPATIENT)
Dept: INTERNAL MEDICINE | Facility: CLINIC | Age: 82
End: 2017-06-07
Payer: MEDICARE

## 2017-06-07 VITALS
BODY MASS INDEX: 19.72 KG/M2 | HEART RATE: 66 BPM | SYSTOLIC BLOOD PRESSURE: 144 MMHG | HEIGHT: 64 IN | DIASTOLIC BLOOD PRESSURE: 70 MMHG | WEIGHT: 115.5 LBS

## 2017-06-07 DIAGNOSIS — N18.4 CKD (CHRONIC KIDNEY DISEASE) STAGE 4, GFR 15-29 ML/MIN: ICD-10-CM

## 2017-06-07 DIAGNOSIS — K40.90 INGUINAL HERNIA OF RIGHT SIDE WITHOUT OBSTRUCTION OR GANGRENE: Primary | ICD-10-CM

## 2017-06-07 DIAGNOSIS — I77.819 ECTATIC AORTA: ICD-10-CM

## 2017-06-07 DIAGNOSIS — N17.9 ACUTE KIDNEY INJURY (NONTRAUMATIC): ICD-10-CM

## 2017-06-07 DIAGNOSIS — I70.0 AORTIC ATHEROSCLEROSIS: ICD-10-CM

## 2017-06-07 DIAGNOSIS — I77.1 TORTUOUS AORTA: ICD-10-CM

## 2017-06-07 LAB
ALBUMIN SERPL BCP-MCNC: 3.7 G/DL
ANION GAP SERPL CALC-SCNC: 10 MMOL/L
BUN SERPL-MCNC: 48 MG/DL
CALCIUM SERPL-MCNC: 8.7 MG/DL
CHLORIDE SERPL-SCNC: 110 MMOL/L
CO2 SERPL-SCNC: 22 MMOL/L
CREAT SERPL-MCNC: 2.6 MG/DL
EST. GFR  (AFRICAN AMERICAN): 18.7 ML/MIN/1.73 M^2
EST. GFR  (NON AFRICAN AMERICAN): 16.2 ML/MIN/1.73 M^2
GLUCOSE SERPL-MCNC: 103 MG/DL
PHOSPHATE SERPL-MCNC: 3.8 MG/DL
POTASSIUM SERPL-SCNC: 4.5 MMOL/L
SODIUM SERPL-SCNC: 142 MMOL/L

## 2017-06-07 PROCEDURE — 1159F MED LIST DOCD IN RCRD: CPT | Mod: S$GLB,,, | Performed by: INTERNAL MEDICINE

## 2017-06-07 PROCEDURE — 1125F AMNT PAIN NOTED PAIN PRSNT: CPT | Mod: S$GLB,,, | Performed by: INTERNAL MEDICINE

## 2017-06-07 PROCEDURE — 99999 PR PBB SHADOW E&M-EST. PATIENT-LVL IV: CPT | Mod: PBBFAC,,, | Performed by: INTERNAL MEDICINE

## 2017-06-07 PROCEDURE — 99214 OFFICE O/P EST MOD 30 MIN: CPT | Mod: S$GLB,,, | Performed by: INTERNAL MEDICINE

## 2017-06-07 PROCEDURE — 99499 UNLISTED E&M SERVICE: CPT | Mod: S$GLB,,, | Performed by: INTERNAL MEDICINE

## 2017-06-07 NOTE — PATIENT INSTRUCTIONS
Hemodialysis    The job of the kidneys is to remove waste and extra water from the body. The kidneys also balance levels of minerals in the body, called electrolytes. Kidneys are essential for the health of the body. People with severe kidney disease are not able to perform these functions. This may be due to a temporary or a permanent kidney condition. Hemodialysis is a treatment that takes over the essential functions of the kidney until you recover from kidney disease, or get a kidney transplant. If you have end stage renal disease and are not eligible for a transplant, you will need to have hemodialysis for the rest of your life. Peritoneal dialysis is another type of dialysis but is not covered in this article.  Hemodialysis requires access to a strong blood flow. There are 3 ways to do this.   · Dialysis catheter. This is a plastic tube put into a large blood vessel, usually for temporary access. If there is no other option, it may be used permanently.  · AV fistula. Through a minor surgical procedure, an artery in your arm is joined directly to a vein. This creates an arteriovenous fistula or AV fistula. The pressure of the arterial blood flow slowly expands the size of the attached vein. It may take up to 4 to 6 weeks for the fistula to enlarge enough to be ready for dialysis. The AV fistula is the access of choice. This is because there are typically fewer problems and side effects. It also lasts longer than the other options.  · AV graft. This is an artificial implant that joins an artery and vein in people with small veins. It can also be used when an AV fistula did not work. It can be used for dialysis a few weeks after the surgery.  A connecting tube carries blood from the access point in the body to the dialysis machine where special filters called dialyzers filter and process the blood. The blood is then returned to the body through a separate tube and needle. This takes 3 to 4 hours and is usually  done 3 times a week. Home dialysis is an option for some patients.  Hemodialysis is lifesaving for people with kidney failure, but there are possible side effects. These include infection, low blood pressure, bleeding, electrolyte imbalance, anemia, and heart disease.  Home care  The following guidelines will help you care for yourself at home:  · Take any medicines as directed.  · Follow the special diet for kidney failure that your healthcare provider gave you.  · Dont wear any clothing or jewelry that could put pressure on the access site.  · Dont lie on the access site while sleeping.  · If the access is in your arm, have blood pressure readings and blood samples taken from the other arm.  · Check the access site after dialysis for swelling, bleeding, or signs of infection.  · If you have an AV fistula or graft, check the site regularly to be sure you can always feel the vibration (called the thrill) of blood flowing from artery to vein. Dont put lotions or other products on the access site.  · If you have an external dialysis catheter, avoid physical activities that could pull on the catheter.  Follow-up care  Follow up with your healthcare provider, or as advised.  Call 911  Call 911 if any of these occur:  · External catheter starts bleeding or opens to air  · Severe weakness, dizziness, fainting, drowsiness, or confusion  · Chest pain or shortness of breath  When to seek medical advice  Call your healthcare provider right away if any of these occur:  · Color of the blood in the external tubing changes from bright red to dark red  · You dont feel the thrill (vibration) in an AV fistula or graft  · Nausea or vomiting  · Unexpected weight gain or swelling in the legs, ankles, or around the eyes  · Decreased or absent urine output if you previously made urine  · Fever of 100.4ºF (38ºC) or higher, or as directed by your healthcare provider  Date Last Reviewed: 10/1/2016  © 1181-3404 [employer],  [employer]. [address] [place] [address]. All rights reserved. This information is not intended as a substitute for professional medical care. Always follow your healthcare professional's instructions.        Hemodialysis    Hemodialysis is a type of treatment for kidney failure (also called end-stage kidney disease or ESRD). It uses a machine that holds a filter called a dialyzer. As blood flows through the dialyzer, waste is removed and fluid and chemicals are balanced. Hemodialysis treatments are usually done at a special dialysis center. In some cases, treatments may be done at home. As the kidney failure is getting worse, your doctor may advise you to have an access placed by a surgeon into one of your arms ahead of time. This access may take several weeks to mature before it can be used for hemodialysis.  How hemodialysis is done  Two needles are inserted into a blood vessel (called an arteriovenous fistula or AV fistula) or arteriovenous graft (or AV graft), usually in your arm. Each needle is attached to a tube. One tube carries your blood into the dialyzer, where it is cleaned. Clean blood returns to your body through a second tube and needle. If this treatment has to be done as an emergency, a plastic tube (caltheter) is inserted into a large vein, typically in the neck or groin. This catheter helps carry blood to and from the dialysis machine.  Your experience  · Hemodialysis usually takes about 3 to 5 hours. It is usually done 3 times a week.  · Youll have a regular schedule for your hemodialysis. Many centers have evening and weekend hours as well as weekday hours to help you continue working.  · A trained nurse or technician connects you to the dialysis machine. He or she watches for problems and makes sure you are comfortable.  · During treatment, only a small amount of blood (about 1 cup) is out of your body at any one time.  · During or after your first few treatments, you may have a  "headache, muscle cramps, or feel nauseated. These should decrease as your body gets used to the treatments.  · Some people are able to learn to use a dialysis at home.  Problems to watch for  Call your nurse or dialysis technician if you have any of these symptoms during or after treatment:  · Chest pain  · Bleeding from the needle site  · Shortness of breath  · Fever or chills  · Headache or lightheadedness  · Nausea or vomiting  · Itching  · Muscle cramps  · Pain, warm or redness at your access site  · Inability to feel yoor fistulaur blood flow and pulse at your AV graft   Date Last Reviewed: 12/1/2014 © 2000-2016 [employer]. [address] [place] [address], [place] [address]. All rights reserved. This information is not intended as a substitute for professional medical care. Always follow your healthcare professional's instructions.        Peritoneal Dialysis (PD)  Peritoneal dialysis (PD) is a way to cleanse the blood to treat kidney failure. It uses a natural membrane inside your body and a special solution (dialysate). The solution needs to be changed several times a day. This can be done as part of your home or work routine. Or it can be done at night by a machine. Dialysate is put inside your belly (abdomen) through a plastic tube (catheter). This catheter is surgically placed into your abdomen. It takes the catheter about 3 to 4 weeks to "mature" and be available for use. Your doctor may tell you to have this catheter put in place before your kidneys fail completely.   How PD works  PD uses the natural lining inside your abdomen called the peritoneal membrane. The abdomen is filled with dialysate through the catheter and allowed to remain (dwell) for 3 to 5 hours. The membrane and dialysate then work to clean the blood. The dialysate needs to be changed every few hours. This is called an exchange.    Your experience  · PD is done at home.  · A nurse or technician will teach you how to do PD " exchanges and care for the PD tube.  · Your risk for infection in your abdomen (peritonitis) is high with this treatment unless you take much care.   · You will need to weigh yourself every day.   · You will need to follow a special diet.   There are 2 ways to do exchanges:  · Continuous ambulatory peritoneal dialysis (CAPD). With this, you do your own exchanges 3 to 5 times per day , each with a dwell time of about 4 to 6 hours (you can have a longer dwell time overnight). Each exchange takes about 30 to 40 minutes each.  · Cyclic peritoneal dialysis (CCPD). This uses a machine called a cycler. The cycler does most of your exchanges at night while you sleep. You have a long dwell time during the day. You may also do manual daytime exchanges, which take 30 to 40 minutes each.  Call your health care provider   Contact your health care provider right away if you have any of the following:  · Fever of 100.4°F (38°C) or higher  · Chills  · Dialysate that is cloudy or bloody when it drains from your body  · Pain in your abdomen or around your catheter  · Warm, red, or draining skin around your catheter  · Blocked flow into or out of your catheter  · Part of your belly appears to be bulging out. This is called a hernia.    Date Last Reviewed: 12/1/2014  © 7560-7208 [employer] [address] [place] [address]. All rights reserved. This information is not intended as a substitute for professional medical care. Always follow your healthcare professional's instructions.        PD Catheter Access: Placing the Catheter  Your kidneys filter and remove waste from your blood. When they fail, this work must be done some other way. Peritoneal dialysis (PD) is a treatment that can take over when your kidneys stop working. The peritoneum is the membrane lining the inside of the abdomen (belly). PD uses the lining of your abdomen as a filter for your blood. Before PD can be done, an opening into this lining (an  "access) must be made. The access for PD is a soft tube called a catheter placed into your abdomen.    Placing the catheter  · A nurse or anesthesiologist gives you medication so you dont feel pain during surgery.  · A small opening is made just below your navel. The catheter is placed through this opening.  · One end of the catheter sits in your abdomen. A few inches of the other end comes out an exit site in your skin. This end is clamped off and capped when its not being used.  · Typically, a portion of the catheter goes through a "tunnel" made underneath the skin before it enters your abdomen. This "tunnel" helps prevent infections from entering the abdomen, and it anchors the catheter to keep it from falling out.  Date Last Reviewed: 12/2/2014 © 2000-2016 [employer]. [address] [place] [address], [place] [address]. All rights reserved. This information is not intended as a substitute for professional medical care. Always follow your healthcare professional's instructions.        What Is a Hernia?    A hernia is when an organ or tissue pushes through a weak area in the belly (abdominal) wall. This weak area may be present at birth. Or it may be caused by abdominal strain over time. If not treated, a hernia can get worse with time and physical stress.  When a bulge forms  When there is a weak area in the abdominal wall, an organ or tissue can push outward. This often causes a bulge that you can see under your skin. The bulge may get bigger when you stand up. It may go away when you lie down. You may also feel some pressure or mild pain when lifting, coughing, urinating, or doing other activities.  Types of hernias  The type of hernia you have depends on its location. Most hernias form in the groin at or near the internal ring. This is the entrance to a canal between the abdomen and groin. Hernias can also occur in the abdomen, thigh, or genitals.  · An incisional hernia occurs at the site of a previous " surgical incision.  · An umbilical hernia occurs at the navel.  · An indirect inguinal hernia occurs in the groin at the internal ring.  · A direct inguinal hernia occurs in the groin near the internal ring.  · A femoral hernia occurs just below the groin.  · An epigastric hernia occurs in the upper abdomen at the midline.  Diagnosis  In most cases, your healthcare provider can diagnose a hernia by doing a physical exam.  In some cases it might not be clear why you have a swelling in the belly wall. Then your provider may order an imaging test such as an ultrasound. This can help with the diagnosis.  Surgery  A hernia will not heal on its own. Surgery is needed to fix the weak spot in the abdominal wall. If not treated, a hernia can get larger. It can also cause serious health problems. The good news is that hernia surgery can be done quickly and safely. In some cases, you can go home the same day as your surgery.   When to call your provider  Call your healthcare provider right away if the swelling around your hernia becomes larger, firmer, or more painful. These may be signs that your intestines are stuck in the abdominal wall. This is an emergency. The hernia must be repaired right away to avoid serious problems.  Date Last Reviewed: 7/1/2016  © 3117-7171 [employer], [employer]. [address] [place] [address], [place] [address]. All rights reserved. This information is not intended as a substitute for professional medical care. Always follow your healthcare professional's instructions.

## 2017-06-08 NOTE — PROGRESS NOTES
Subjective:       Patient ID: Karli Hameed is a 85 y.o. female.    Chief Complaint: Follow-up  She is very concerned about a lump that appears low on the right side of her abdomen and it is sometimes very painful.  She has a previous history of small bowel obstruction.      Her kidney function has deteriorated.  She is seeing her nephrologist regularly.  We have discussed hemodialysis and peritoneal dialysis in the past and we discussed this again today.  She is not on any particular diet.  She has a healthy diet in general.  She has never had a problem with her potassium being elevated. Her blood pressure is under good control.  It is higher in the office than it is when she checks it at home.  She monitors her blood pressure closely.  She has not noticed any swelling in her legs or anywhere in her body.  She has no uremic symptoms.  Her energy level is good, her appetite is good, she's had no weight loss, she walks her dog every day and she also does a television fitness program called sit and be fit.  She has an active social life and keeps up with her friends and neighbors.    Her right knee is doing okay but her hands are very painful.  She has arthritis involving small joints in her fingers both right and left hands.  HPI  Review of Systems   Constitutional: Negative for activity change, appetite change, chills, fatigue, fever and unexpected weight change.   HENT: Negative for hearing loss.    Eyes: Negative for visual disturbance.   Respiratory: Negative for cough, chest tightness, shortness of breath and wheezing.    Cardiovascular: Negative for chest pain, palpitations and leg swelling.   Gastrointestinal: Positive for abdominal pain. Negative for constipation, nausea and vomiting.   Genitourinary: Negative for dysuria, frequency and urgency.   Musculoskeletal: Negative for arthralgias, back pain, gait problem, joint swelling and myalgias.   Skin: Negative for rash.   Neurological: Negative for  light-headedness and headaches.   Psychiatric/Behavioral: Negative for dysphoric mood and sleep disturbance. The patient is not nervous/anxious.        Objective:      Physical Exam   Constitutional: She is oriented to person, place, and time. She appears well-developed and well-nourished. No distress.   HENT:   Head: Normocephalic and atraumatic.   Right Ear: External ear normal.   Left Ear: External ear normal.   Nose: Nose normal.   Mouth/Throat: Oropharynx is clear and moist. No oropharyngeal exudate.   Eyes: Conjunctivae and EOM are normal. Pupils are equal, round, and reactive to light. Right eye exhibits no discharge. No scleral icterus.   Neck: Normal range of motion and full passive range of motion without pain. Neck supple. No spinous process tenderness and no muscular tenderness present. Carotid bruit is not present. No thyromegaly present.   Cardiovascular: Normal rate, regular rhythm, S1 normal, S2 normal, normal heart sounds and intact distal pulses.  Exam reveals no gallop and no friction rub.    No murmur heard.  Pulmonary/Chest: Effort normal and breath sounds normal. No respiratory distress. She has no wheezes. She has no rales. She exhibits no tenderness.   Abdominal: Soft. Bowel sounds are normal. She exhibits no distension and no mass. There is no tenderness. There is no rebound and no guarding.   There appears to be a direct inguinal hernia.  It is reducible. There are no overlying skin changes.   Genitourinary: Pelvic exam was performed with patient supine. Uterus is not deviated, not enlarged, not fixed and not tender. Cervix exhibits no motion tenderness, no discharge and no friability. Right adnexum displays no mass, no tenderness and no fullness. Left adnexum displays no mass, no tenderness and no fullness.   Musculoskeletal: Normal range of motion. She exhibits no edema or tenderness.   Lymphadenopathy:        Head (right side): No submental and no submandibular adenopathy present.         Head (left side): No submental and no submandibular adenopathy present.     She has no cervical adenopathy.        Right cervical: No superficial cervical, no deep cervical and no posterior cervical adenopathy present.       Left cervical: No superficial cervical, no deep cervical and no posterior cervical adenopathy present.        Right axillary: No pectoral and no lateral adenopathy present.        Left axillary: No pectoral and no lateral adenopathy present.       Right: No supraclavicular adenopathy present.        Left: No supraclavicular adenopathy present.   Neurological: She is alert and oriented to person, place, and time. She has normal reflexes. No cranial nerve deficit. She exhibits normal muscle tone. Coordination normal.   Skin: Skin is warm and dry. No rash noted.   Psychiatric: She has a normal mood and affect. Her behavior is normal. Her mood appears not anxious. Her speech is not rapid and/or pressured. She is not agitated. She does not exhibit a depressed mood.   Normal behavior, thought content, insight and judgement.       Assessment:       1. Inguinal hernia of right side without obstruction or gangrene    2. Aortic atherosclerosis    3. CKD (chronic kidney disease) stage 4, GFR 15-29 ml/min    4. Tortuous aorta    5. Ectatic aorta        Plan:   Karli was seen today for follow-up.    Diagnoses and all orders for this visit:    Inguinal hernia of right side without obstruction or gangrene.  She requests surgical consultation.  She has seen Dr. Justice in the past.  She said she has had a small bowel obstruction, she would like to do everything possible to avoid a recurrent episode.  -     US Abdomen Limited; Future  -     Ambulatory Referral to General Surgery    Aortic atherosclerosisTortuous aorta, ectatic aorta.  Good blood pressure control.  Aspirin intolerant.    CKD (chronic kidney disease) stage 4, GFR 15-29 ml/min.  She would benefit from additional information, as above.

## 2017-06-08 NOTE — PROGRESS NOTES
Patient, Karli Hameed (MRN #[id number]), presented with a recent estimated Glumerular Filtration Rate (eGFR) less than 15 consistent with the definition of chronic kidney disease stage 5 (ICD10 - N18.5).    eGFR if non    Date Value Ref Range Status   06/07/2017 16.2 (A) >60 mL/min/1.73 m^2 Final     Comment:     Calculation used to obtain the estimated glomerular filtration  rate (eGFR) is the CKD-EPI equation. Since race is unknown   in our information system, the eGFR values for   -American and Non--American patients are given   for each creatinine result.         The patient's chronic kidney disease stage 5 was monitored, evaluated, addressed and/or treated. This addendum to the medical record is made on 06/08/2017.

## 2017-06-09 NOTE — TELEPHONE ENCOUNTER
Spoke with GI scheduling, states that patient would need to call to be removed from contact list. Spoke with patient, informed of above information. Patient verbalized understanding. GI # provided.

## 2017-06-13 ENCOUNTER — CLINICAL SUPPORT (OUTPATIENT)
Dept: REHABILITATION | Facility: HOSPITAL | Age: 82
End: 2017-06-13
Attending: STUDENT IN AN ORGANIZED HEALTH CARE EDUCATION/TRAINING PROGRAM
Payer: MEDICARE

## 2017-06-13 DIAGNOSIS — M79.642 BILATERAL HAND PAIN: Primary | ICD-10-CM

## 2017-06-13 DIAGNOSIS — M79.641 BILATERAL HAND PAIN: Primary | ICD-10-CM

## 2017-06-13 PROCEDURE — 97110 THERAPEUTIC EXERCISES: CPT

## 2017-06-13 PROCEDURE — 97018 PARAFFIN BATH THERAPY: CPT

## 2017-06-13 PROCEDURE — G8988 SELF CARE GOAL STATUS: HCPCS | Mod: CJ

## 2017-06-13 PROCEDURE — G8989 SELF CARE D/C STATUS: HCPCS | Mod: CJ

## 2017-06-13 NOTE — PROGRESS NOTES
OCCUPATIONAL THERAPY note / discharge       DATE : 06/13/2017    Name: Karli Hameed  Referring Physician: gómez gamez   Allergies:   Review of patient's allergies indicates:   Allergen Reactions    Advil [ibuprofen] Nausea Only    Parafon forte      Other reaction(s): Hallucinations    Calcitriol (bulk) Nausea Only    Lisinopril      cough     Medical history:   Past Medical History:   Diagnosis Date    Allergy     Anxiety     Arthritis     Back pain     Basal cell carcinoma 6/2011    R nasal ala, excised via Mohs    Chronic kidney disease, stage II (mild) 8/25/2012    cyst since 1975    Flank pain 8/21/2012    HTN (hypertension) 8/21/2012    Inflammatory bowel disease     Joint pain     Kidney stone     left     Lactose disaccharidase deficiency     Senile osteoporosis 4/11/2016    Small bowel obstruction, 06-, resolved witjhout surgery 7/1/2014    Trace cataracts     Ulcer     hx    Urinary tract infection      Medication:    Current Outpatient Prescriptions on File Prior to Visit   Medication Sig Dispense Refill    acetaminophen (TYLENOL) 325 MG tablet Take by mouth. 1 Tablet Oral .  Tylenol.To take as needed for arthritis pain.      amlodipine (NORVASC) 5 MG tablet Take 1 tablet (5 mg total) by mouth once daily. 90 tablet 3    cholecalciferol, vitamin D3, 1,000 unit capsule Take 2 capsules (2,000 Units total) by mouth once daily.  0    losartan (COZAAR) 50 MG tablet TAKE 1 TABLET ONE TIME DAILY 90 tablet 3    magnesium hydroxide (MICHAEL CHEWS) 311 MG Chew Take 1 tablet (311 mg total) by mouth daily as needed. 30 tablet 0    naphazoline (NAPHCON) 0.012 % ophthalmic solution Inject 1 drop into the eye Use as needed.       No current facility-administered medications on file prior to visit.      Diagnosis: hand arthritis   No diagnosis found.  Occupational Profile level: low    Orders: Occupational Therapy evaluate and treat    Precautions stated on order: none       Evaluation Date: 6/5/17  Visit #: 2    Plan of care Expiration: 7/30/17    SUBJECTIVE   Patient states:  Pt reports that she has purchased a paraffin wax unit and that her hands are feeling so much better . .   Pts goals for therapy:  To learn what to do to help her with hand pain   Pain Scale: Karli rates pain on a scale of 0-10 to be 3 at worst; 3 currently; 3 at best .  Onset: gradual  Date of Surgery:  None   Chief complaint:  Stiffness if fingers when she wakes and when she goes to sleep .  Radicular symptoms:  None   Aggravating factors:   None   Easing factors:  none  Prior Therapy:    History of Present Illness: hands feel sore at the end of the day   Prior functional status: limited with gripping at night it feels sore   Current functional status:  Active , just pain in hands   Psychosocial skills: lives alone very independent   Occupation:  Retired                        Job description includes:  Retired walks her dog , does exercises every day   Patients  structured exercise routine: see above   Exercise routine prior to onset: walks daily and goes to the gym   ADLS: Pt has a decrease ability to perform ADL's such as none     Imaging:   MRI: none          Xray: OA in hands         Hand Dominance: right                                   OBJECTIVE     Cognitive status : : no deficits     Posture Alignment :normal   Joint integrity: normal , right hand index finger has ulnar deviation To dip joint .   Skin integrity: normal   Edema: none     Sensation:   Light Touch: Intact       Proprioception:   Intact    Upper Extremity Range of Motion   Joint Evaluation AROM PROM    Left / Right  Left  / Right    Shoulder flex 0-180     Shoulder Abd 0-180     Shoulder ER 0-90     Shoulder IR 0-90     Shoulder Extension 0-80     Shoulder Horizontal adduction 0-90     Elbow flex/ext 0-150 WNL WNL   Wrist flex 0-80 WNL WNL   Wrist ext 0-70 WNL WNL   Supination 0-80 WNL WNL   Pronation 0-80 WNL WNL   UD WNL WNl   RD WNL  WNL        left   AROM  Index  Middle  Ring  Small   MP 0/85 0/80 0/85 0/85   PIp 0/100 0/100 0/100 0/100   DIP  0/50 0/50 0/50 0/50          DUNCAN  235 230 235 235       Right   AROM  Index  Middle  Ring  Small   MP 0/80 0/90 0/90 0/90   PIp 0/80 0/80 0/90 0/90   DIP  0/25 0/40 0/40 0/40          DUNCAN  185 210 220 220     EDEMA: none   CM     SCAR: none       Upper Extremity Strength  (R) UE  (L) UE                                                    Wrist flexion: 4/5 Wrist flexion: 4/5   Wrist extension: 4/5 Wrist extension: 4/5    Strength - second setting 40#,40 50#,# : 40#,40#,40#                                   Treatment          1.  Pt purchased her paraffin wax unit and that she can tell a difference already.      paraffin X 10 minutes , instruction how to use at home , care for hands , exercises make fisting , thumb opposition and energy conservation task for arthritis in hands .   Provided an educational booklet about do and  don'ts of arthritis . Pt demonstrates independence with joint protection and paraffin , there ex . ,    2. Pt was provided educational information, including range of motion, strengthening   3. Pt educated to use ice for 10- 20 minutes to decrease swelling  and/ or pain as needed.       FOTO - Outcomes  And G Codes     FOTO survey     FOTO:   Self Care DATE SCORE GCODE MODIFIER   INITIAL 6/5/17      67  /100     5TH /100 10TH /100     Discharge   6/13/17 55/100        FOTO Goal : cj modifier 8994    Interpretation of FOTO Modifiers    TEST SCORE  Modifier  Impairment Limitation Restriction    0%  CH  0 % impaired, limited or restricted    1-19%  CI  @ least 1% but less than 20% impaired, limited or restricted    20-39%  CJ  @ least 20%<40% impaired, limited or restricted    40-59%  CK  @ least 40%<60% impaired, limited or restricted    60-79%  CL  @ least 60% <80% impaired, limited or restricted    80-99%  CM  @ least 80%<100% impaired limited  or restricted    100%  CN  100% impaired, limited or restricted       ASSESSMENT    Pt present to occupational therapy with an OT diagnosis of     No diagnosis found.       Pt presents with the impairments as stated below in the impairments/problems list. Pt prognosis is Good..     Karli will benefit from skilled outpatient occupational therapy to address the above stated deficits, provide pt/family education and to maximize pt's level of independence in the home and community environment.     Discussed Plan of Care with patient: Yes    Medical necessity is demonstrated by the following IMPAIRMENTS/PROBLEMS:    2. pain in  joint / limb  Right / left hands   3. Decreased flexibility        Pt's spiritual, cultural and educational needs considered and pt agreeable to plan of care and goals as stated below:     Anticipated Barriers for Occupational  therapy: anticipated none    GOALS: 6 weeks. Pt agrees with goals set.  1. Independent with HEP. Goal met   2. Decreased pain to   0/10   With gripping toothbrush or water bottle , goal met   3. Pt to report that with use of home paraffin that the pain in hands is 0/10 after paraffin night use.  Goal met    4.     PLAN    Pt to be d/c  From  OT at this time

## 2017-06-20 ENCOUNTER — HOSPITAL ENCOUNTER (OUTPATIENT)
Dept: RADIOLOGY | Facility: HOSPITAL | Age: 82
Discharge: HOME | End: 2017-06-20
Attending: INTERNAL MEDICINE
Payer: MEDICARE

## 2017-06-20 ENCOUNTER — TELEPHONE (OUTPATIENT)
Dept: INTERNAL MEDICINE | Facility: CLINIC | Age: 82
End: 2017-06-20

## 2017-06-20 DIAGNOSIS — K40.90 INGUINAL HERNIA OF RIGHT SIDE WITHOUT OBSTRUCTION OR GANGRENE: ICD-10-CM

## 2017-06-20 PROCEDURE — 76705 ECHO EXAM OF ABDOMEN: CPT | Mod: 26,,, | Performed by: RADIOLOGY

## 2017-06-20 PROCEDURE — 76705 ECHO EXAM OF ABDOMEN: CPT | Mod: TC

## 2017-06-21 NOTE — TELEPHONE ENCOUNTER
Dear Dr. Alonzo,  Ms. Huff has a right inguinal hernia, which is causing discomfort, occassionally significant discomfort  and she has had a SBO in the past.   A have requested a surgical consult from Dr. Wm Justice.  Her kidney function is quite low, as you know.  Sincerely, Dr. Katherine Hutchinson

## 2017-06-27 ENCOUNTER — OFFICE VISIT (OUTPATIENT)
Dept: SURGERY | Facility: CLINIC | Age: 82
End: 2017-06-27
Payer: MEDICARE

## 2017-06-27 VITALS — BODY MASS INDEX: 20.98 KG/M2 | HEIGHT: 62 IN | TEMPERATURE: 98 F | WEIGHT: 114 LBS

## 2017-06-27 DIAGNOSIS — K40.90 INGUINAL HERNIA, RIGHT: ICD-10-CM

## 2017-06-27 PROCEDURE — 99499 UNLISTED E&M SERVICE: CPT | Mod: S$GLB,,, | Performed by: SURGERY

## 2017-06-27 PROCEDURE — 99213 OFFICE O/P EST LOW 20 MIN: CPT | Mod: S$GLB,,, | Performed by: SURGERY

## 2017-06-27 PROCEDURE — 1159F MED LIST DOCD IN RCRD: CPT | Mod: S$GLB,,, | Performed by: SURGERY

## 2017-06-27 PROCEDURE — 99999 PR PBB SHADOW E&M-EST. PATIENT-LVL III: CPT | Mod: PBBFAC,,, | Performed by: SURGERY

## 2017-06-27 PROCEDURE — 1126F AMNT PAIN NOTED NONE PRSNT: CPT | Mod: S$GLB,,, | Performed by: SURGERY

## 2017-06-27 NOTE — LETTER
[facility] - General Surgery  [address]  [place] [address]  Phone: [phone] June 27, 2017      Katherine Hutchinson MD  [address]  [place] [address]    Patient: Karli Hameed   MR Number: [id number]   YOB: 1931   Date of Visit: 6/27/2017     Dear Dr. Hutchinson:    Thank you for referring Karli Hameed to me for evaluation. Below are the relevant portions of my assessment and plan of care.    Patient presents with [facility].     PLAN:   - Open repair with mesh, 23 hour stay, anesthesia clearance (has stage 4 renal failure).    If you have questions, please do not hesitate to call me. I look forward to following Karli along with you.    Sincerely,      Lamont Justice MD   Section Head - General, Laparoscopic, Bariatric  Acute Care and Oncologic Surgery   - Surgical Weight Loss Program  [facility]    WSR/hcr    CC  Luis M Alonzo MD

## 2017-06-27 NOTE — PROGRESS NOTES
History & Physical    SUBJECTIVE:     History of Present Illness:  Patient is a 85 y.o. female referred from Dr. Hutchinson for right inguinal hernia as diagnosed on US. States 8 months ago she experienced lower abdominal pain all across her abdomen. Notices a painful right bulged. Pain randomly occurs. Nothing alleviates the pain and nothing makes it worse or provokes the pain. Pain sometimes lasts hours or just a few minutes. Pain is not reproducible. Patient has a history of SBO 2 years ago. Denies history of hernias. Denies nausea, vomiting, diarrhea, constipation or change in appetite.     Chief Complaint   Patient presents with    Consult     [facility]       Review of patient's allergies indicates:   Allergen Reactions    Advil [ibuprofen] Nausea Only    Parafon forte      Other reaction(s): Hallucinations    Calcitriol (bulk) Nausea Only    Lisinopril      cough       Current Outpatient Prescriptions   Medication Sig Dispense Refill    acetaminophen (TYLENOL) 325 MG tablet Take by mouth. 1 Tablet Oral .  Tylenol.To take as needed for arthritis pain.      amlodipine (NORVASC) 5 MG tablet Take 1 tablet (5 mg total) by mouth once daily. 90 tablet 3    cholecalciferol, vitamin D3, 1,000 unit capsule Take 2 capsules (2,000 Units total) by mouth once daily.  0    losartan (COZAAR) 50 MG tablet TAKE 1 TABLET ONE TIME DAILY 90 tablet 3    magnesium hydroxide (MICHAEL CHEWS) 311 MG Chew Take 1 tablet (311 mg total) by mouth daily as needed. 30 tablet 0    naphazoline (NAPHCON) 0.012 % ophthalmic solution Inject 1 drop into the eye Use as needed.       No current facility-administered medications for this visit.        Past Medical History:   Diagnosis Date    Allergy     Anxiety     Arthritis     Back pain     Basal cell carcinoma 6/2011    R nasal ala, excised via Mohs    Chronic kidney disease, stage II (mild) 8/25/2012    cyst since 1975    Flank pain 8/21/2012    HTN (hypertension) 8/21/2012     Inflammatory bowel disease     Joint pain     Kidney stone     left     Lactose disaccharidase deficiency     Senile osteoporosis 4/11/2016    Small bowel obstruction, 06-, resolved witjhout surgery 7/1/2014    Trace cataracts     Ulcer     hx    Urinary tract infection      Past Surgical History:   Procedure Laterality Date    ABDOMINAL SURGERY      APPENDECTOMY      CATARACT EXTRACTION W/  INTRAOCULAR LENS IMPLANT Right 02/12/2015    Dr. Sahni    CHOLECYSTECTOMY      EYE SURGERY      HYSTERECTOMY      KIDNEY SURGERY      drained cyst x 2    lysis of abdominal adhesions      OOPHORECTOMY      TONSILLECTOMY, ADENOIDECTOMY       Family History   Problem Relation Age of Onset    Cancer Father     Cataracts Father     Kidney disease Father     Cancer Paternal Grandmother     No Known Problems Mother     No Known Problems Sister     No Known Problems Brother     No Known Problems Maternal Aunt     No Known Problems Maternal Uncle     No Known Problems Paternal Aunt     No Known Problems Paternal Uncle     No Known Problems Maternal Grandmother     No Known Problems Maternal Grandfather     No Known Problems Paternal Grandfather     Cirrhosis Neg Hx     Celiac disease Neg Hx     Colon polyps Neg Hx     Crohn's disease Neg Hx     Esophageal cancer Neg Hx     Inflammatory bowel disease Neg Hx     Liver cancer Neg Hx     Liver disease Neg Hx     Rectal cancer Neg Hx     Stomach cancer Neg Hx     Ulcerative colitis Neg Hx     Amblyopia Neg Hx     Blindness Neg Hx     Diabetes Neg Hx     Glaucoma Neg Hx     Hypertension Neg Hx     Macular degeneration Neg Hx     Retinal detachment Neg Hx     Strabismus Neg Hx     Stroke Neg Hx     Thyroid disease Neg Hx     Melanoma Neg Hx      Social History   Substance Use Topics    Smoking status: Never Smoker    Smokeless tobacco: Never Used    Alcohol use Yes      Comment: social        Review of Systems:  Review of  "Systems   Constitutional: Negative for activity change, chills, fatigue and fever.   Respiratory: Negative for cough and shortness of breath.    Cardiovascular: Negative for chest pain.   Gastrointestinal: Positive for abdominal pain. Negative for constipation, diarrhea, nausea and vomiting.   Neurological: Negative for seizures and headaches.   Hematological: Does not bruise/bleed easily.       OBJECTIVE:     Vital Signs (Most Recent)  Temp: 98.3 °F (36.8 °C) (06/27/17 0921)  5' 2" (1.575 m)  51.7 kg (114 lb)     Physical Exam:  Physical Exam   Constitutional: She is oriented to person, place, and time. She appears well-developed and well-nourished.   Cardiovascular: Normal rate, regular rhythm and normal heart sounds.    Pulmonary/Chest: Effort normal and breath sounds normal.   Abdominal: Soft. Bowel sounds are normal. There is no rigidity and no guarding.       Neurological: She is alert and oriented to person, place, and time.   Skin: Skin is warm and dry.   Nursing note and vitals reviewed.      Laboratory  CBC: Reviewed  BMP: Reviewed    Diagnostic Results:  US: Reviewed    ASSESSMENT/PLAN:     [facility]    PLAN:Plan     Open repair with mesh, 23 hour stay, anesthesia clearance (has stage 4 renal failure).       "

## 2017-06-27 NOTE — MEDICAL/APP STUDENT
Subjective:       Patient ID: Karli Hameed is a 85 y.o. female.    Chief Complaint: Consult ([facility])    HPI Pt. Referred from Dr. Hutchinson for right inguinal hernia as diagnosed on US. States 8 months ago she experienced lower abdominal pain all across her abdomen. Notices a painful right bulged. Pain randomly occurs. Nothing alleviates the pain and nothing makes it worse or provokes the pain. Pain sometimes lasts hours or just a few minutes. Pain is not reproducible. Patient has a history of SBO 2 years ago. Denies history of hernias.     Review of Systems   Constitutional: Negative for appetite change, chills, fatigue and fever.   Respiratory: Negative for cough and shortness of breath.    Cardiovascular: Negative for chest pain.   Gastrointestinal: Positive for abdominal pain. Negative for constipation, diarrhea, nausea and vomiting.   Neurological: Negative for seizures and headaches.   Hematological: Does not bruise/bleed easily.       Objective:      Physical Exam   Constitutional: She is oriented to person, place, and time. She appears well-developed and well-nourished.   Cardiovascular: Normal rate, regular rhythm and normal heart sounds.    Pulmonary/Chest: Effort normal and breath sounds normal.   Abdominal: Soft. Bowel sounds are normal.   Neurological: She is alert and oriented to person, place, and time.   Skin: Skin is warm and dry.   Nursing note and vitals reviewed.      Assessment:       [facility]   Plan:     To OR for removal. Obtain consents.

## 2017-06-27 NOTE — Clinical Note
June 27, 2017      Katherine Hutchinson MD  [address]  [place] [address]           [facility] - General Surgery  [address]  [place] [address]  Phone: [phone]          Patient: Karli Hameed   MR Number: [id number]   YOB: 1931   Date of Visit: 6/27/2017       Dear Dr. Katherine Htuchinson:    Thank you for referring Karli Hameed to me for evaluation. Attached you will find relevant portions of my assessment and plan of care.    If you have questions, please do not hesitate to call me. I look forward to following Karli Hameed along with you.    Sincerely,    Lamont Justice MD    Enclosure  CC:  No Recipients    If you would like to receive this communication electronically, please contact [email or web address] or [phone] to request more information on [facility] access.    For providers and/or their staff who would like to refer a patient to [facility], please contact us through our one-stop-shop provider referral line, [facility] , at [phone].    If you feel you have received this communication in error or would no longer like to receive these types of communications, please e-mail [email or web address]

## 2017-07-07 ENCOUNTER — ANESTHESIA EVENT (OUTPATIENT)
Dept: SURGERY | Facility: HOSPITAL | Age: 82
End: 2017-07-07
Payer: MEDICARE

## 2017-07-07 ENCOUNTER — HOSPITAL ENCOUNTER (OUTPATIENT)
Dept: PREADMISSION TESTING | Facility: HOSPITAL | Age: 82
Discharge: HOME | End: 2017-07-07
Attending: ANESTHESIOLOGY
Payer: MEDICARE

## 2017-07-07 VITALS
DIASTOLIC BLOOD PRESSURE: 87 MMHG | HEART RATE: 91 BPM | WEIGHT: 114.63 LBS | SYSTOLIC BLOOD PRESSURE: 166 MMHG | HEIGHT: 64 IN | OXYGEN SATURATION: 99 % | TEMPERATURE: 99 F | RESPIRATION RATE: 20 BRPM | BODY MASS INDEX: 19.57 KG/M2

## 2017-07-07 NOTE — DISCHARGE INSTRUCTIONS
Your surgery has been scheduled for:__________________________________________    You should report to:  ____[facility], located on the [place] side of the first floor of the           [facility] ([phone])  ____[facility], located on the [facility] side of the            Second floor of the [facility] ([phone])  ____3rd Floor [facility] located on the [facility] side of the [facility] [phone]  Please Note   - Tell your doctor if you take Aspirin, products containing Aspirin, herbal medications  or blood thinners, such as Coumadin, Ticlid, or Plavix.  (Consult your provider regarding holding or stopping before surgery).  - Arrange for someone to drive you home following surgery.  You will not be allowed to leave the surgical facility alone or drive yourself home following sedation and anesthesia.  Before Surgery  - Stop taking all herbal medications 14days prior to surgery  - No Motrin/Advil (Ibuprofen) 7 days before surgery  - No Aleve (Naproxen) 7 days before surgery  - Stop Taking Asprin, products containing Asprin _____days before surgery  - Stop taking blood thinners_______days before surgery  - Refrain from drinking alcoholic beverages for 24hours before and after surgery  - Stop or limit smoking _________days before surgery  Night before Surgery  - DO NOT EAT OR DRINK ANYTHING AFTER MIDNIGHT, INCLUDING GUM, HARD CANDY, MINTS, OR CHEWING TOBACCO.  - Take a shower or bath (shower is recommended).  Bathe with Hibiclens soap or an antibacterial soap from the neck down.  If not supplied by your surgeon, hibiclens soap will need to be purchased over the counter in pharmacy.  Rinse soap off thoroughly.  - Shampoo your hair with your regular shampoo  The Day of Surgery  - Take another bath or shower with hibiclens or any antibacterial soap, to reduce the chance of infection.  - Take heart and blood  pressure medications with a small sip of water, as advised by the perioperative team.  - Do not take fluid pills  - You may brush your teeth and rinse your mouth, but do not swall any additional water.   - Do not apply perfumes, powder, body lotions or deodorant on the day of surgery.  - Nail polish should be removed.  - Do not wear makeup or moisturizer  - Wear comfortable clothes, such as a button front shirt and loose fitting pants.  - Leave all jewelry, including body piercings, and valuables at home.    - Bring any devices you will neeed after surgery such as crutches or canes.  - If you have sleep apnea, please bring your CPAP machine  In the event that your physical condition changes including the onset of a cold or respiratory illness, or if you have to delay or cancel your surgery, please notify your surgeon.Anesthesia: General Anesthesia  Youre due to have surgery. During surgery, youll be given medication called anesthesia. (It is also called anesthetic.) This will keep you comfortable and pain-free. Your anesthesia provider will use general anesthesia. This sheet tells you more about it.  What is general anesthesia?     You are watched continuously during your procedure by the anesthesia provider   General anesthesia puts you into a state like deep sleep. It goes into the bloodstream (IV anesthetics), into the lungs (gas anesthetics), or both. You feel nothing during the procedure. You will not remember it. During the procedure, the anesthesia provider monitors you continuously. He or she checks your heart rate and rhythm, blood pressure, breathing, and blood oxygen.  · IV Anesthetics. IV anesthetics are given through an IV line in your arm. Theyre often given first. This is so you are asleep before a gas anesthetic is started. Some kinds of IV anesthetics relieve pain. Others relax you. Your doctor will decide which kind is best in your case.  · Gas Anesthetics. Gas anesthetics are breathed into the  lungs. They are often used to keep you asleep. They can be given through a facemask or a tube placed in your larynx or trachea (breathing tube).  ? If you have a facemask, your anesthesia provider will most likely place it over your nose and mouth while youre still awake. Youll breathe oxygen through the mask as your IV anesthetic is started. Gas anesthetic may be added through the mask.  ? If you have a tube in the larynx or trachea, it will be inserted into your throat after youre asleep.  Anesthesia tools and medications  You will likely have:  · IV anesthetics. These are put into an IV line into your bloodstream.  · Gas anesthetics. You breathe these anesthetics into your lungs, where they pass into your bloodstream.  · Pulse oximeter. This is a small clip that is attached to the end of your finger. This measures your blood oxygen level.  · Electrocardiography leads (electrodes). These are small sticky pads that are placed on your chest. They record your heart rate and rhythm.  · Blood pressure cuff. This reads your blood pressure.  Risks and possible complications  General anesthesia has some risks. These include:  · Breathing problems  · Nausea and vomiting  · Sore throat or hoarseness (usually temporary)  · Allergic reaction to the anesthetic  · Irregular heartbeat (rare)  · Cardiac arrest (rare)   Anesthesia safety  · Follow all instructions you are given for how long not to eat or drink before your procedure.  · Be sure your doctor knows what medications and drugs you take. This includes over-the-counter medications, herbs, supplements, alcohol or other drugs. You will be asked when those were last taken.  · Have an adult family member or friend drive you home after the procedure.  · For the first 24 hours after your surgery:  ? Do not drive or use heavy equipment.  ? Have a trusted family member or spouse make important decisions or sign documents.  ? Avoid alcohol.  ? Have a responsible adult stay with  you. He or she can watch for problems and help keep you safe.  Date Last Reviewed: 10/16/2014  © 0638-5887 [employer]. [address] [place] [address], [place] [address]. All rights reserved. This information is not intended as a substitute for professional medical care. Always follow your healthcare professional's instructions.

## 2017-07-07 NOTE — ANESTHESIA PREPROCEDURE EVALUATION
Pre-operative evaluation for Procedure(s) (LRB):  REPAIR-HERNIA-INGUINAL-INITIAL-RIGHT-OPEN w/ mesh (Right)    Karli Hameed is a 85 y.o. female with hx of HTN, CKD4, thrombocytopenia, IBD, PUD, hx of multiple abdominal surgeries, and now with right inguinal hernia for which she presents for the above procedure.     LDA:   N/A    Prev airway:   None on file     Drips:   N/A    Patient Active Problem List   Diagnosis    Nipple problem, left only    Nuclear sclerosis - Both Eyes    Ischemic optic neuropathy - Left Eye    GERD (gastroesophageal reflux disease)    Low back pain    History of skin cancer    Hyperuricemia     Anemia    Lacrimal gland inflammation, right    Arthritis of both knees    Osteoarthritis of right knee    Osteoarthritis of hands, bilateral    CKD (chronic kidney disease) stage 4, GFR 15-29 ml/min    Senile nuclear sclerosis    Inconclusive mammogram due to dense breasts    Hyperparathyroidism    Closed fracture of left radius and ulna, displaced 10-    Range of motion deficit    Chondrocalcinosis articularis    Shoulder impingement syndrome    Adhesive capsulitis of both shoulders    Pain of left upper extremity    Aortic atherosclerosis    Thrombocytopenia    Left shoulder pain    Essential hypertension    Renal cysts, congenital, bilateral    Cough in adult    Elevated liver enzymes    Encounter for screening mammogram for malignant neoplasm of breast    Osteopenia    Bilateral hand pain    Tortuous aorta    Ectatic aorta    Inguinal hernia, right       Review of patient's allergies indicates:   Allergen Reactions    Advil [ibuprofen] Nausea Only    Parafon forte      Other reaction(s): Hallucinations    Calcitriol (bulk) Nausea Only    Lisinopril      cough        No current facility-administered medications on file prior to encounter.      Current Outpatient Prescriptions on File Prior to Encounter   Medication Sig Dispense Refill     acetaminophen (TYLENOL) 325 MG tablet Take by mouth. 1 Tablet Oral .  Tylenol.To take as needed for arthritis pain.      amlodipine (NORVASC) 5 MG tablet Take 1 tablet (5 mg total) by mouth once daily. (Patient taking differently: Take 5 mg by mouth every morning. ) 90 tablet 3    cholecalciferol, vitamin D3, 1,000 unit capsule Take 2 capsules (2,000 Units total) by mouth once daily.  0    losartan (COZAAR) 50 MG tablet TAKE 1 TABLET ONE TIME DAILY 90 tablet 3    magnesium hydroxide (MICHAEL CHEWS) 311 MG Chew Take 1 tablet (311 mg total) by mouth daily as needed. 30 tablet 0    naphazoline (NAPHCON) 0.012 % ophthalmic solution Inject 1 drop into the eye Use as needed.         Past Surgical History:   Procedure Laterality Date    ABDOMINAL SURGERY      APPENDECTOMY      CATARACT EXTRACTION W/  INTRAOCULAR LENS IMPLANT Right 02/12/2015    Dr. Sahni    CHOLECYSTECTOMY      EYE SURGERY      HYSTERECTOMY      KIDNEY SURGERY      drained cyst x 2    lysis of abdominal adhesions      OOPHORECTOMY      TONSILLECTOMY, ADENOIDECTOMY         Social History     Social History    Marital status: Single     Spouse name: N/A    Number of children: N/A    Years of education: N/A     Occupational History    Not on file.     Social History Main Topics    Smoking status: Never Smoker    Smokeless tobacco: Never Used    Alcohol use Yes      Comment: social    Drug use: No    Sexual activity: No     Other Topics Concern    Are You Pregnant Or Think You May Be? No    Breast-Feeding No     Social History Narrative    Lives alone.    Great neighbors.    Many friends.    Walks the neighborhood daily with her dog.    Active social life.    Has her own home, takes care of everything and all of her affairs.          Vital Signs Range (Last 24H):         CBC: No results for input(s): WBC, RBC, HGB, HCT, PLT, MCV, MCH, MCHC in the last 72 hours.    CMP: No results for input(s): NA, K, CL, CO2, BUN, CREATININE, GLU,  MG, PHOS, CALCIUM, ALBUMIN, PROT, ALKPHOS, ALT, AST, BILITOT in the last 72 hours.    INR  No results for input(s): INR, PROTIME, APTT in the last 72 hours.    Invalid input(s): PT        Diagnostic Studies:  17 Abdominal US  Ultrasound of both inguinal areas was performed.  There is a right inguinal hernia.  No evidence of a left inguinal hernia.    EK14  Normal sinus rhythm with sinus arrhythmia  Cannot exclude Inferior Infarct - Age indeterminate, or probably old  Abnormal ECG  Since previous tracing : rate is faster, and no specific ST-T changes are no longer present.  Confirmed by fellow Adam GARCIA (Unofficial Report), Sil ([id number]) on 2014 10:19:13 AM  Confirmed by Elizabeth Taylor MD ([id number]) on 2014 10:46:05 AM      2D Echo:  12    1 - Normal left ventricular function (EF 55%).     2 - Diastolic dysfunction.       Anesthesia Evaluation    I have reviewed the Patient Summary Reports.     I have reviewed the Medications.     Review of Systems  Anesthesia Hx:  No problems with previous Anesthesia  History of prior surgery of interest to airway management or planning: Previous anesthesia: MAC  PHACO 2015 with MAC.  Procedure performed at an [facility]. Denies Family Hx of Anesthesia complications.   Denies Personal Hx of Anesthesia complications.   Social:  No Alcohol Use, Non-Smoker    Hematology/Oncology:     Oncology Normal    -- Anemia: Hematology Comments: Thrombocytopenia    EENT/Dental:   Reading glasses   Cardiovascular:   Hypertension  Functional Capacity good / => 4 METS, climbs stairs in home, walk dog 1 mile daily, sweeps, mops daily; denies CP, SOB    Pulmonary:   Denies Asthma.  Denies Sleep Apnea.    Renal/:   Chronic Renal Disease (CKD 4 followed by nephrology, Dr Alonzo; polycystic kidney ds), CRI renal calculi    Hepatic/GI:   GERD (no medication), well controlled    Musculoskeletal:   Arthritis     Neurological:   Denies CVA. Denies Seizures.  Pain ,  onset is acute , location of right lower abdomen , quality of aching/dull, sharp , severity is a 6    Endocrine:   Denies Diabetes.  Parathyroid Disease, Hyperparathyroidism    Psych:   anxiety          Physical Exam  General:  Well nourished    Airway/Jaw/Neck:  Airway Findings: Mouth Opening: Normal Tongue: Normal  General Airway Assessment: Adult  Mallampati: IV  TM Distance: 4 - 6 cm  Jaw/Neck Findings:     Neck ROM: Extension Decreased, Mild      Dental:  Dental Findings: In tact    Chest/Lungs:  Chest/Lungs Findings: Clear to auscultation, Normal Respiratory Rate     Heart/Vascular:  Heart Findings: Rate: Normal  Rhythm: Regular Rhythm  Sounds: Normal        Mental Status:  Mental Status Findings:  Cooperative, Alert and Oriented       Pt was seen in POC 7/7/17; findings discussed with Dr Webb/Zandra Quiros RN    ADDENDUM 7/18/17 0825: cleared by nephrologist, Dr Alonzo from renal viewpoint (received in basket message)/Zandra Quiros RN        Anesthesia Plan  Type of Anesthesia, risks & benefits discussed:  Anesthesia Type:  general  Patient's Preference:   Intra-op Monitoring Plan: standard ASA monitors  Intra-op Monitoring Plan Comments:   Post Op Pain Control Plan: multimodal analgesia  Post Op Pain Control Plan Comments:   Induction:   IV  Beta Blocker:  Patient is not currently on a Beta-Blocker (No further documentation required).       Informed Consent: Patient understands risks and agrees with Anesthesia plan.  Questions answered. Anesthesia consent signed with patient.  ASA Score: 2     Day of Surgery Review of History & Physical:    H&P update referred to the surgeon.         Ready For Surgery From Anesthesia Perspective.

## 2017-07-11 ENCOUNTER — PATIENT MESSAGE (OUTPATIENT)
Dept: INTERNAL MEDICINE | Facility: CLINIC | Age: 82
End: 2017-07-11

## 2017-07-14 ENCOUNTER — PATIENT MESSAGE (OUTPATIENT)
Dept: INTERNAL MEDICINE | Facility: CLINIC | Age: 82
End: 2017-07-14

## 2017-07-18 ENCOUNTER — TELEPHONE (OUTPATIENT)
Dept: NEPHROLOGY | Facility: CLINIC | Age: 82
End: 2017-07-18

## 2017-07-18 ENCOUNTER — TELEPHONE (OUTPATIENT)
Dept: SURGERY | Facility: CLINIC | Age: 82
End: 2017-07-18

## 2017-07-18 NOTE — TELEPHONE ENCOUNTER
----- Message from Luis M Alonzo MD sent at 7/17/2017  6:47 PM CDT -----  Patient clear from a renal standpoint to undergo procedure per Dr. Justice (would give IVF's perioperatively, would avoid any/all NSAID's).  Thank you.     ----- Message -----  From: Kristan Felton LPN  Sent: 7/10/2017  10:14 AM  To: Luis M Alonzo MD        ----- Message -----  From: Yahaira Josue MA  Sent: 7/7/2017   1:03 PM  To: Kristan Felton LPN        ----- Message -----  From: Zandra Quiros RN  Sent: 7/7/2017  10:08 AM  To: Clinton HANNON Staff    Pt is having right inguinal hernia repair with Dr Justice on 7/19/17. Anesthesia is requesting a renal statement of optimization and any perioperative recommendation for this surgery.    Thank you,  Zandra Quiros RN  Clinical Coordinator  Pre-op Center  Ext. [phone]  7/7/17 1013

## 2017-07-19 ENCOUNTER — HOSPITAL ENCOUNTER (OUTPATIENT)
Facility: HOSPITAL | Age: 82
Discharge: HOME | End: 2017-07-20
Attending: SURGERY | Admitting: SURGERY
Payer: MEDICARE

## 2017-07-19 ENCOUNTER — SURGERY (OUTPATIENT)
Age: 82
End: 2017-07-19

## 2017-07-19 ENCOUNTER — ANESTHESIA (OUTPATIENT)
Dept: SURGERY | Facility: HOSPITAL | Age: 82
End: 2017-07-19
Payer: MEDICARE

## 2017-07-19 DIAGNOSIS — K40.90 INGUINAL HERNIA UNILATERAL, NON-RECURRENT: ICD-10-CM

## 2017-07-19 DIAGNOSIS — K40.90 INGUINAL HERNIA, RIGHT: Primary | ICD-10-CM

## 2017-07-19 PROCEDURE — 27000221 HC OXYGEN, UP TO 24 HOURS

## 2017-07-19 PROCEDURE — 63600175 PHARM REV CODE 636 W HCPCS: Performed by: SURGERY

## 2017-07-19 PROCEDURE — 36000707: Performed by: SURGERY

## 2017-07-19 PROCEDURE — 63600175 PHARM REV CODE 636 W HCPCS

## 2017-07-19 PROCEDURE — 71000039 HC RECOVERY, EACH ADD'L HOUR: Performed by: SURGERY

## 2017-07-19 PROCEDURE — 71000033 HC RECOVERY, INTIAL HOUR: Performed by: SURGERY

## 2017-07-19 PROCEDURE — C1781 MESH (IMPLANTABLE): HCPCS | Performed by: SURGERY

## 2017-07-19 PROCEDURE — 25000003 PHARM REV CODE 250: Performed by: SURGERY

## 2017-07-19 PROCEDURE — 37000009 HC ANESTHESIA EA ADD 15 MINS: Performed by: SURGERY

## 2017-07-19 PROCEDURE — 63600175 PHARM REV CODE 636 W HCPCS: Performed by: STUDENT IN AN ORGANIZED HEALTH CARE EDUCATION/TRAINING PROGRAM

## 2017-07-19 PROCEDURE — 36000706: Performed by: SURGERY

## 2017-07-19 PROCEDURE — 94761 N-INVAS EAR/PLS OXIMETRY MLT: CPT

## 2017-07-19 PROCEDURE — D9220A PRA ANESTHESIA: Mod: ,,, | Performed by: ANESTHESIOLOGY

## 2017-07-19 PROCEDURE — 49505 PRP I/HERN INIT REDUC >5 YR: CPT | Mod: GC,,, | Performed by: SURGERY

## 2017-07-19 PROCEDURE — 25000003 PHARM REV CODE 250: Performed by: STUDENT IN AN ORGANIZED HEALTH CARE EDUCATION/TRAINING PROGRAM

## 2017-07-19 PROCEDURE — 37000008 HC ANESTHESIA 1ST 15 MINUTES: Performed by: SURGERY

## 2017-07-19 DEVICE — MESH PROLENE 3INX6IN 6/BX: Type: IMPLANTABLE DEVICE | Site: INGUINAL | Status: FUNCTIONAL

## 2017-07-19 RX ORDER — FENTANYL CITRATE 50 UG/ML
25 INJECTION, SOLUTION INTRAMUSCULAR; INTRAVENOUS EVERY 5 MIN PRN
Status: COMPLETED | OUTPATIENT
Start: 2017-07-19 | End: 2017-07-19

## 2017-07-19 RX ORDER — CISATRACURIUM BESYLATE 10 MG/ML
INJECTION, SOLUTION INTRAVENOUS
Status: DISCONTINUED | OUTPATIENT
Start: 2017-07-19 | End: 2017-07-19

## 2017-07-19 RX ORDER — HYDROCODONE BITARTRATE AND ACETAMINOPHEN 5; 325 MG/1; MG/1
1 TABLET ORAL EVERY 4 HOURS PRN
Status: DISCONTINUED | OUTPATIENT
Start: 2017-07-19 | End: 2017-07-20 | Stop reason: HOSPADM

## 2017-07-19 RX ORDER — HYDRALAZINE HYDROCHLORIDE 25 MG/1
25 TABLET, FILM COATED ORAL EVERY 8 HOURS PRN
Status: DISCONTINUED | OUTPATIENT
Start: 2017-07-19 | End: 2017-07-19

## 2017-07-19 RX ORDER — SODIUM CHLORIDE 0.9 % (FLUSH) 0.9 %
3 SYRINGE (ML) INJECTION
Status: DISCONTINUED | OUTPATIENT
Start: 2017-07-19 | End: 2017-07-19 | Stop reason: HOSPADM

## 2017-07-19 RX ORDER — NEOSTIGMINE METHYLSULFATE 1 MG/ML
INJECTION, SOLUTION INTRAVENOUS
Status: DISCONTINUED | OUTPATIENT
Start: 2017-07-19 | End: 2017-07-19

## 2017-07-19 RX ORDER — LORAZEPAM 2 MG/ML
0.5 INJECTION INTRAMUSCULAR ONCE
Status: COMPLETED | OUTPATIENT
Start: 2017-07-19 | End: 2017-07-19

## 2017-07-19 RX ORDER — LIDOCAINE HYDROCHLORIDE 10 MG/ML
1 INJECTION, SOLUTION EPIDURAL; INFILTRATION; INTRACAUDAL; PERINEURAL ONCE
Status: COMPLETED | OUTPATIENT
Start: 2017-07-19 | End: 2017-07-19

## 2017-07-19 RX ORDER — PROPOFOL 10 MG/ML
VIAL (ML) INTRAVENOUS
Status: DISCONTINUED | OUTPATIENT
Start: 2017-07-19 | End: 2017-07-19

## 2017-07-19 RX ORDER — HYDROCODONE BITARTRATE AND ACETAMINOPHEN 10; 325 MG/1; MG/1
1 TABLET ORAL EVERY 4 HOURS PRN
Status: DISCONTINUED | OUTPATIENT
Start: 2017-07-19 | End: 2017-07-20 | Stop reason: HOSPADM

## 2017-07-19 RX ORDER — SODIUM CHLORIDE 9 MG/ML
INJECTION, SOLUTION INTRAVENOUS CONTINUOUS
Status: DISCONTINUED | OUTPATIENT
Start: 2017-07-19 | End: 2017-07-19

## 2017-07-19 RX ORDER — LABETALOL HYDROCHLORIDE 5 MG/ML
10 INJECTION, SOLUTION INTRAVENOUS EVERY 4 HOURS PRN
Status: DISCONTINUED | OUTPATIENT
Start: 2017-07-19 | End: 2017-07-20 | Stop reason: HOSPADM

## 2017-07-19 RX ORDER — LORAZEPAM 2 MG/ML
INJECTION INTRAMUSCULAR
Status: COMPLETED
Start: 2017-07-19 | End: 2017-07-19

## 2017-07-19 RX ORDER — PROMETHAZINE HYDROCHLORIDE 25 MG/ML
6.25 INJECTION, SOLUTION INTRAMUSCULAR; INTRAVENOUS
Status: DISCONTINUED | OUTPATIENT
Start: 2017-07-19 | End: 2017-07-19

## 2017-07-19 RX ORDER — HYDROCODONE BITARTRATE AND ACETAMINOPHEN 5; 325 MG/1; MG/1
1-2 TABLET ORAL EVERY 4 HOURS PRN
Qty: 41 TABLET | Refills: 0 | Status: SHIPPED | OUTPATIENT
Start: 2017-07-19 | End: 2018-08-22

## 2017-07-19 RX ORDER — PHENYLEPHRINE HYDROCHLORIDE 10 MG/ML
INJECTION INTRAVENOUS
Status: DISCONTINUED | OUTPATIENT
Start: 2017-07-19 | End: 2017-07-19

## 2017-07-19 RX ORDER — ONDANSETRON 2 MG/ML
4 INJECTION INTRAMUSCULAR; INTRAVENOUS DAILY PRN
Status: DISCONTINUED | OUTPATIENT
Start: 2017-07-19 | End: 2017-07-19 | Stop reason: HOSPADM

## 2017-07-19 RX ORDER — LIDOCAINE HCL/PF 100 MG/5ML
SYRINGE (ML) INTRAVENOUS
Status: DISCONTINUED | OUTPATIENT
Start: 2017-07-19 | End: 2017-07-19

## 2017-07-19 RX ORDER — AMLODIPINE BESYLATE 5 MG/1
5 TABLET ORAL EVERY MORNING
Status: DISCONTINUED | OUTPATIENT
Start: 2017-07-20 | End: 2017-07-20 | Stop reason: HOSPADM

## 2017-07-19 RX ORDER — FENTANYL CITRATE 50 UG/ML
INJECTION, SOLUTION INTRAMUSCULAR; INTRAVENOUS
Status: DISCONTINUED | OUTPATIENT
Start: 2017-07-19 | End: 2017-07-19

## 2017-07-19 RX ORDER — GLYCOPYRROLATE 0.2 MG/ML
INJECTION INTRAMUSCULAR; INTRAVENOUS
Status: DISCONTINUED | OUTPATIENT
Start: 2017-07-19 | End: 2017-07-19

## 2017-07-19 RX ORDER — HYDRALAZINE HYDROCHLORIDE 25 MG/1
50 TABLET, FILM COATED ORAL EVERY 8 HOURS PRN
Status: DISCONTINUED | OUTPATIENT
Start: 2017-07-19 | End: 2017-07-20 | Stop reason: HOSPADM

## 2017-07-19 RX ORDER — LOSARTAN POTASSIUM 50 MG/1
50 TABLET ORAL DAILY
Status: DISCONTINUED | OUTPATIENT
Start: 2017-07-19 | End: 2017-07-20 | Stop reason: HOSPADM

## 2017-07-19 RX ORDER — ACETAMINOPHEN 10 MG/ML
INJECTION, SOLUTION INTRAVENOUS
Status: DISCONTINUED | OUTPATIENT
Start: 2017-07-19 | End: 2017-07-19

## 2017-07-19 RX ORDER — ONDANSETRON 2 MG/ML
4 INJECTION INTRAMUSCULAR; INTRAVENOUS EVERY 12 HOURS PRN
Status: DISCONTINUED | OUTPATIENT
Start: 2017-07-19 | End: 2017-07-20 | Stop reason: HOSPADM

## 2017-07-19 RX ORDER — ONDANSETRON HYDROCHLORIDE 2 MG/ML
INJECTION, SOLUTION INTRAMUSCULAR; INTRAVENOUS
Status: DISCONTINUED | OUTPATIENT
Start: 2017-07-19 | End: 2017-07-19

## 2017-07-19 RX ORDER — BACITRACIN 50000 [IU]/1
INJECTION, POWDER, FOR SOLUTION INTRAMUSCULAR
Status: DISCONTINUED | OUTPATIENT
Start: 2017-07-19 | End: 2017-07-19

## 2017-07-19 RX ORDER — BUPIVACAINE HYDROCHLORIDE 2.5 MG/ML
INJECTION, SOLUTION EPIDURAL; INFILTRATION; INTRACAUDAL
Status: DISCONTINUED | OUTPATIENT
Start: 2017-07-19 | End: 2017-07-19

## 2017-07-19 RX ADMIN — ONDANSETRON 4 MG: 2 INJECTION INTRAMUSCULAR; INTRAVENOUS at 03:07

## 2017-07-19 RX ADMIN — HYDRALAZINE HYDROCHLORIDE 25 MG: 25 TABLET, FILM COATED ORAL at 10:07

## 2017-07-19 RX ADMIN — FENTANYL CITRATE 25 MCG: 50 INJECTION INTRAMUSCULAR; INTRAVENOUS at 09:07

## 2017-07-19 RX ADMIN — Medication 2 G: at 08:07

## 2017-07-19 RX ADMIN — LIDOCAINE HYDROCHLORIDE 60 MG: 20 INJECTION, SOLUTION INTRAVENOUS at 08:07

## 2017-07-19 RX ADMIN — LORAZEPAM 0.5 MG: 2 INJECTION INTRAMUSCULAR at 11:07

## 2017-07-19 RX ADMIN — PHENYLEPHRINE HYDROCHLORIDE 100 MCG: 10 INJECTION INTRAVENOUS at 08:07

## 2017-07-19 RX ADMIN — PROMETHAZINE HYDROCHLORIDE 6.25 MG: 25 INJECTION INTRAMUSCULAR; INTRAVENOUS at 10:07

## 2017-07-19 RX ADMIN — NEOSTIGMINE METHYLSULFATE 5 MG: 1 INJECTION INTRAVENOUS at 08:07

## 2017-07-19 RX ADMIN — HYDROCODONE BITARTRATE AND ACETAMINOPHEN 1 TABLET: 10; 325 TABLET ORAL at 09:07

## 2017-07-19 RX ADMIN — SODIUM CHLORIDE: 0.9 INJECTION, SOLUTION INTRAVENOUS at 09:07

## 2017-07-19 RX ADMIN — SODIUM CHLORIDE: 0.9 INJECTION, SOLUTION INTRAVENOUS at 07:07

## 2017-07-19 RX ADMIN — LOSARTAN POTASSIUM 50 MG: 50 TABLET, FILM COATED ORAL at 09:07

## 2017-07-19 RX ADMIN — FENTANYL CITRATE 100 MCG: 50 INJECTION, SOLUTION INTRAMUSCULAR; INTRAVENOUS at 08:07

## 2017-07-19 RX ADMIN — LORAZEPAM 0.5 MG: 2 INJECTION INTRAMUSCULAR; INTRAVENOUS at 11:07

## 2017-07-19 RX ADMIN — HYDRALAZINE HYDROCHLORIDE 50 MG: 25 TABLET, FILM COATED ORAL at 04:07

## 2017-07-19 RX ADMIN — LIDOCAINE HYDROCHLORIDE 10 MG: 10 INJECTION, SOLUTION EPIDURAL; INFILTRATION; INTRACAUDAL; PERINEURAL at 07:07

## 2017-07-19 RX ADMIN — CISATRACURIUM BESYLATE 10 MG: 10 INJECTION INTRAVENOUS at 08:07

## 2017-07-19 RX ADMIN — PROPOFOL 90 MG: 10 INJECTION, EMULSION INTRAVENOUS at 08:07

## 2017-07-19 RX ADMIN — ACETAMINOPHEN 1000 MG: 10 INJECTION, SOLUTION INTRAVENOUS at 08:07

## 2017-07-19 RX ADMIN — BACITRACIN 50000 UNITS: 50000 INJECTION, POWDER, LYOPHILIZED, FOR SOLUTION INTRAMUSCULAR at 08:07

## 2017-07-19 RX ADMIN — BUPIVACAINE HYDROCHLORIDE 10 ML: 2.5 INJECTION, SOLUTION EPIDURAL; INFILTRATION; INTRACAUDAL; PERINEURAL at 08:07

## 2017-07-19 RX ADMIN — ONDANSETRON 4 MG: 2 INJECTION INTRAMUSCULAR; INTRAVENOUS at 09:07

## 2017-07-19 RX ADMIN — GLYCOPYRROLATE 0.6 MG: 0.2 INJECTION, SOLUTION INTRAMUSCULAR; INTRAVENOUS at 08:07

## 2017-07-19 RX ADMIN — ONDANSETRON 4 MG: 2 INJECTION, SOLUTION INTRAMUSCULAR; INTRAVENOUS at 08:07

## 2017-07-19 NOTE — OP NOTE
[facility]  Surgery Department  Operative Note    SUMMARY     Date of Procedure: 7/19/2017     Procedure: Procedure(s) (LRB):  REPAIR-HERNIA-INGUINAL-INITIAL-RIGHT-OPEN w/ mesh (Right)     Surgeon(s) and Role:     * Salvatore Vargas MD - Resident - Assisting     * Lamont Justice MD - Primary        Pre-Operative Diagnosis: Inguinal hernia, right [K40.90]    Post-Operative Diagnosis: Post-Op Diagnosis Codes:     * Inguinal hernia, right [K40.90]    Anesthesia: General    Technical Procedures Used: After informed consent, the pt was brought to the OR, placed supine on the table where GETA was induced.  The Right groin was prepped and draped in the usual fashion.  Local anesthetic was iniltrated into the dermis and an ilioinguinal nerve block was performed.  A transverse incision was made 2cm above the pubic tubercle.  Deep tissues were divided using electrocautery until the external oblique was visualized.  This was opened sharply in parallel with its fibers down to the opening of the external ring.  The ilioinguinal nerve was identified and care was taken to preserve the nerve.  The contents of the inguinal canal were encircled bluntly noting no bowel.  The round ligament was divided and a chord lipoma was removed from the internal ring.  The internal ring was imbricated.  A piece of prolene mesh was chosen and cut to fit the canal.  It was secured with two, 0 prolene sutures at the pubic tubercle and the mesh was secured to the shelving edge of the inguinal ligament inferiorly and the conjoint tendon medially in a running fashion.  The mesh was placed in an onlay fashion over the internal ring imbrication stitch and both prolene tails were secured together completing the repair.  The ilioinguinal nerve was inspected, noting it to be free from the repair.  The wound was irrigated.  The external oblique was reapproximated with a running 2-0 vicryl, oliver's fascia was closed with a running  3-0 vicryl, and skin was closed with a running 4-0 moncryl.  Dressings were applied. All instrument and sponge counts were correct.  Pt was transported to PACU in stable condition without apparent complication    Description of the Findings of the Procedure: Indirect hernia with chord lipoma, repaired with mesh    Significant Surgical Tasks Conducted by the Assistant(s), if Applicable: Assit    Complications: No    Estimated Blood Loss (EBL): minimal           Implants:   Implant Name Type Inv. Item Serial No.  Lot No. LRB No. Used   MESH PROLENE [id number] 6/BX - [id number]   MESH PROLENE [id number] 6/BX   [employer] [id number] Right 1       Specimens:   Specimen (12h ago through future)    None                  Condition: Good    Disposition: PACU - hemodynamically stable.    Attestation: I was present and scrubbed for the entire procedure.

## 2017-07-19 NOTE — PLAN OF CARE
Problem: Patient Care Overview  Goal: Plan of Care Review  Outcome: Ongoing (interventions implemented as appropriate)  Patient AAOx4. Safety maintained. Bed locked in low with 2 side rails up. Call light within reach. RLQ dressing clean, dry, and intact. PRN Zofran given for nausea. IV fluids infusing at 125 ml/hr. Patient voiding per bedpan.

## 2017-07-19 NOTE — PROGRESS NOTES
Patient arrived to OBS # 4 via stretcher from [facility]. Patient ambulated well from stretcher to bed. Patient placed on bedpan where she voided 400 ml of clear, yellow urine. IV fluids infusing at 125 ml/hr to LFA IV site. RLQ dressing clean, dry, and intact. No complaints of pain. Bed locked in low with 2 side rails up. Call light within reach.

## 2017-07-19 NOTE — BRIEF OP NOTE
[facility]  Brief Operative Note    SUMMARY     Surgery Date: 7/19/2017     Surgeon(s) and Role:     * Salvatore Vargas MD - Resident - Assisting     * Lamont Justice MD - Primary        Pre-op Diagnosis:  Inguinal hernia, right [K40.90]    Post-op Diagnosis:  Post-Op Diagnosis Codes:     * Inguinal hernia, right [K40.90]    Procedure(s) (LRB):  REPAIR-HERNIA-INGUINAL-INITIAL-RIGHT-OPEN w/ mesh (Right)    Anesthesia: General    Description of Procedure: Op right inguinal hernia repaired with mesh    Description of the findings of the procedure: Indirect hernia    Estimated Blood Loss: Minimal         Specimens:   Specimen (12h ago through future)    None

## 2017-07-19 NOTE — NURSING TRANSFER
Nursing Transfer Note      7/19/2017     Transfer :obs 4    Transfer via pct    Transfer wit cardiac monitor per tele tech    Transported by pct    Medicines sent:no    Chart send with patient:yes    Notified: friend roverto    Patient reassessed at:7/19/17@1232hrs

## 2017-07-19 NOTE — INTERVAL H&P NOTE
The patient has been examined and the H&P has been reviewed:    I concur with the findings and no changes have occurred since H&P was written.    Anesthesia/Surgery risks, benefits and alternative options discussed and understood by patient/family.          Active Hospital Problems    Diagnosis  POA    Inguinal hernia unilateral, non-recurrent [K40.90]  Yes      Resolved Hospital Problems    Diagnosis Date Resolved POA   No resolved problems to display.

## 2017-07-19 NOTE — H&P (VIEW-ONLY)
History & Physical    SUBJECTIVE:     History of Present Illness:  Patient is a 85 y.o. female referred from Dr. Hutchinson for right inguinal hernia as diagnosed on US. States 8 months ago she experienced lower abdominal pain all across her abdomen. Notices a painful right bulged. Pain randomly occurs. Nothing alleviates the pain and nothing makes it worse or provokes the pain. Pain sometimes lasts hours or just a few minutes. Pain is not reproducible. Patient has a history of SBO 2 years ago. Denies history of hernias. Denies nausea, vomiting, diarrhea, constipation or change in appetite.     Chief Complaint   Patient presents with    Consult     [facility]       Review of patient's allergies indicates:   Allergen Reactions    Advil [ibuprofen] Nausea Only    Parafon forte      Other reaction(s): Hallucinations    Calcitriol (bulk) Nausea Only    Lisinopril      cough       Current Outpatient Prescriptions   Medication Sig Dispense Refill    acetaminophen (TYLENOL) 325 MG tablet Take by mouth. 1 Tablet Oral .  Tylenol.To take as needed for arthritis pain.      amlodipine (NORVASC) 5 MG tablet Take 1 tablet (5 mg total) by mouth once daily. 90 tablet 3    cholecalciferol, vitamin D3, 1,000 unit capsule Take 2 capsules (2,000 Units total) by mouth once daily.  0    losartan (COZAAR) 50 MG tablet TAKE 1 TABLET ONE TIME DAILY 90 tablet 3    magnesium hydroxide (MICHAEL CHEWS) 311 MG Chew Take 1 tablet (311 mg total) by mouth daily as needed. 30 tablet 0    naphazoline (NAPHCON) 0.012 % ophthalmic solution Inject 1 drop into the eye Use as needed.       No current facility-administered medications for this visit.        Past Medical History:   Diagnosis Date    Allergy     Anxiety     Arthritis     Back pain     Basal cell carcinoma 6/2011    R nasal ala, excised via Mohs    Chronic kidney disease, stage II (mild) 8/25/2012    cyst since 1975    Flank pain 8/21/2012    HTN (hypertension) 8/21/2012     Inflammatory bowel disease     Joint pain     Kidney stone     left     Lactose disaccharidase deficiency     Senile osteoporosis 4/11/2016    Small bowel obstruction, 06-, resolved witjhout surgery 7/1/2014    Trace cataracts     Ulcer     hx    Urinary tract infection      Past Surgical History:   Procedure Laterality Date    ABDOMINAL SURGERY      APPENDECTOMY      CATARACT EXTRACTION W/  INTRAOCULAR LENS IMPLANT Right 02/12/2015    Dr. Sahni    CHOLECYSTECTOMY      EYE SURGERY      HYSTERECTOMY      KIDNEY SURGERY      drained cyst x 2    lysis of abdominal adhesions      OOPHORECTOMY      TONSILLECTOMY, ADENOIDECTOMY       Family History   Problem Relation Age of Onset    Cancer Father     Cataracts Father     Kidney disease Father     Cancer Paternal Grandmother     No Known Problems Mother     No Known Problems Sister     No Known Problems Brother     No Known Problems Maternal Aunt     No Known Problems Maternal Uncle     No Known Problems Paternal Aunt     No Known Problems Paternal Uncle     No Known Problems Maternal Grandmother     No Known Problems Maternal Grandfather     No Known Problems Paternal Grandfather     Cirrhosis Neg Hx     Celiac disease Neg Hx     Colon polyps Neg Hx     Crohn's disease Neg Hx     Esophageal cancer Neg Hx     Inflammatory bowel disease Neg Hx     Liver cancer Neg Hx     Liver disease Neg Hx     Rectal cancer Neg Hx     Stomach cancer Neg Hx     Ulcerative colitis Neg Hx     Amblyopia Neg Hx     Blindness Neg Hx     Diabetes Neg Hx     Glaucoma Neg Hx     Hypertension Neg Hx     Macular degeneration Neg Hx     Retinal detachment Neg Hx     Strabismus Neg Hx     Stroke Neg Hx     Thyroid disease Neg Hx     Melanoma Neg Hx      Social History   Substance Use Topics    Smoking status: Never Smoker    Smokeless tobacco: Never Used    Alcohol use Yes      Comment: social        Review of Systems:  Review of  "Systems   Constitutional: Negative for activity change, chills, fatigue and fever.   Respiratory: Negative for cough and shortness of breath.    Cardiovascular: Negative for chest pain.   Gastrointestinal: Positive for abdominal pain. Negative for constipation, diarrhea, nausea and vomiting.   Neurological: Negative for seizures and headaches.   Hematological: Does not bruise/bleed easily.       OBJECTIVE:     Vital Signs (Most Recent)  Temp: 98.3 °F (36.8 °C) (06/27/17 0921)  5' 2" (1.575 m)  51.7 kg (114 lb)     Physical Exam:  Physical Exam   Constitutional: She is oriented to person, place, and time. She appears well-developed and well-nourished.   Cardiovascular: Normal rate, regular rhythm and normal heart sounds.    Pulmonary/Chest: Effort normal and breath sounds normal.   Abdominal: Soft. Bowel sounds are normal. There is no rigidity and no guarding.       Neurological: She is alert and oriented to person, place, and time.   Skin: Skin is warm and dry.   Nursing note and vitals reviewed.      Laboratory  CBC: Reviewed  BMP: Reviewed    Diagnostic Results:  US: Reviewed    ASSESSMENT/PLAN:     [facility]    PLAN:Plan     Open repair with mesh, 23 hour stay, anesthesia clearance (has stage 4 renal failure).       "

## 2017-07-19 NOTE — TRANSFER OF CARE
"Anesthesia Transfer of Care Note    Patient: Karli Hameed    Procedure(s) Performed: Procedure(s) (LRB):  REPAIR-HERNIA-INGUINAL-INITIAL-RIGHT-OPEN w/ mesh (Right)    Patient location: PACU    Anesthesia Type: general    Transport from OR: Transported from OR on 6-10 L/min O2 by face mask with adequate spontaneous ventilation    Post pain: adequate analgesia    Post assessment: no apparent anesthetic complications    Post vital signs: stable    Level of consciousness: awake    Nausea/Vomiting: no nausea/vomiting    Complications: none    Transfer of care protocol was followed      Last vitals:   Visit Vitals  BP (!) 146/73 (BP Location: Left arm, Patient Position: Lying, BP Method: Automatic)   Pulse 87   Temp 36.7 °C (98 °F) (Oral)   Resp 16   Ht 5' 4" (1.626 m)   Wt 50.8 kg (112 lb)   SpO2 99%   Breastfeeding? No   BMI 19.22 kg/m²     "

## 2017-07-19 NOTE — PROGRESS NOTES
Per telemetry patient sustained a 7 beat run of v-tach. /92 HR 85 SpO2 97%. Called and spoke to the MD on call. He stated that he will be placing an order for IV labetalol. Will carry out new orders.

## 2017-07-19 NOTE — BRIEF OP NOTE
Operative Note       Surgery Date: 7/19/2017     Surgeon(s) and Role:     * Salvatore Vargas MD - Resident - Assisting     * Lamont Justice MD - Primary    Pre-op Diagnosis:  Inguinal hernia, right [K40.90]    Post-op Diagnosis:  Inguinal hernia, right [K40.90]    Procedure(s) (LRB):  REPAIR-HERNIA-INGUINAL-INITIAL-RIGHT-OPEN w/ mesh (Right)    Anesthesia: General    Procedure in Detail/Findings:  Lipoma of round ligament.     Estimated Blood Loss: Minimal           Specimens     None        Implants:   Implant Name Type Inv. Item Serial No.  Lot No. LRB No. Used   MESH PROLENE [id number] 6/BX - [id number]   MESH PROLENE [id number] 6/BX   [employer] [id number] Right 1              Disposition: PACU - hemodynamically stable.           Condition: Good    Attestation:  I was present and scrubbed for the entire procedure.

## 2017-07-20 VITALS
BODY MASS INDEX: 19.12 KG/M2 | WEIGHT: 112 LBS | TEMPERATURE: 97 F | DIASTOLIC BLOOD PRESSURE: 92 MMHG | SYSTOLIC BLOOD PRESSURE: 143 MMHG | HEIGHT: 64 IN | RESPIRATION RATE: 18 BRPM | OXYGEN SATURATION: 98 % | HEART RATE: 90 BPM

## 2017-07-20 PROCEDURE — 25000003 PHARM REV CODE 250: Performed by: STUDENT IN AN ORGANIZED HEALTH CARE EDUCATION/TRAINING PROGRAM

## 2017-07-20 RX ORDER — DOCUSATE SODIUM 100 MG/1
100 CAPSULE, LIQUID FILLED ORAL 2 TIMES DAILY
Refills: 0 | COMMUNITY
Start: 2017-07-20 | End: 2019-06-26

## 2017-07-20 RX ADMIN — LOSARTAN POTASSIUM 50 MG: 50 TABLET, FILM COATED ORAL at 09:07

## 2017-07-20 RX ADMIN — AMLODIPINE BESYLATE 5 MG: 5 TABLET ORAL at 05:07

## 2017-07-20 NOTE — DISCHARGE SUMMARY
[facility]  General Surgery  Discharge Summary      Patient Name: Karli Hameed  MRN: [id number]  Admission Date: 7/19/2017  Hospital Length of Stay: 0 days  Discharge Date and Time:  07/20/2017 7:25 AM  Attending Physician: Lamont Justice MD   Discharging Provider: Salvatore Vargas MD  Primary Care Provider: Katherine Linn MD     HPI: Elective inguinal hernia    Procedure(s) (LRB):  REPAIR-HERNIA-INGUINAL-INITIAL-RIGHT-OPEN w/ mesh (Right)     Hospital Course: Uneventful.  Kept overnight for obs.  Doing well this morning.  Pain controlled.  tolerating diet    Consults:     Significant Diagnostic Studies: n/a    Pending Diagnostic Studies:     None        Final Active Diagnoses:    Diagnosis Date Noted POA    PRINCIPAL PROBLEM:  Inguinal hernia unilateral, non-recurrent [K40.90] 07/19/2017 Yes      Problems Resolved During this Admission:    Diagnosis Date Noted Date Resolved POA      Discharged Condition: good    Disposition: Home or Self Care    Follow Up:  Follow-up Information     Lamont Justice MD. Schedule an appointment as soon as possible for a visit in 2 weeks.    Specialties:  General Surgery, Bariatrics  Contact information:  [address] [place] [id number]  [phone]                 Patient Instructions:     Diet general     Lifting restrictions   Order Comments: No more than 10 lbs.  Ok to shower tomorrow.  Ok to remove outer dressing tomorrow.  Steri strips stay on for 2 weeks.  No soaking for 2 weeks     Call MD for:  increased confusion or weakness     Call MD for:  persistent dizziness, light-headedness, or visual disturbances     Call MD for:  worsening rash     Call MD for:  severe persistent headache     Call MD for:  difficulty breathing or increased cough     Call MD for:  redness, tenderness, or signs of infection (pain, swelling, redness, odor or green/yellow discharge around incision site)     Call MD for:  severe uncontrolled pain     Call MD  for:  persistent nausea and vomiting or diarrhea     Call MD for:  temperature >100.4     Remove dressing in 24 hours       Medications:  Reconciled Home Medications:   Current Discharge Medication List      START taking these medications    Details   hydrocodone-acetaminophen 5-325mg (NORCO) 5-325 mg per tablet Take 1-2 tablets by mouth every 4 (four) hours as needed for Pain.  Qty: 41 tablet, Refills: 0         CONTINUE these medications which have NOT CHANGED    Details   acetaminophen (TYLENOL) 325 MG tablet Take by mouth. 1 Tablet Oral .  Tylenol.To take as needed for arthritis pain.      amlodipine (NORVASC) 5 MG tablet Take 1 tablet (5 mg total) by mouth once daily.  Qty: 90 tablet, Refills: 3      losartan (COZAAR) 50 MG tablet TAKE 1 TABLET ONE TIME DAILY  Qty: 90 tablet, Refills: 3      cholecalciferol, vitamin D3, 1,000 unit capsule Take 2 capsules (2,000 Units total) by mouth once daily.  Refills: 0    Associated Diagnoses: Hyperparathyroidism      magnesium hydroxide (MICHAEL CHEWS) 311 MG Chew Take 1 tablet (311 mg total) by mouth daily as needed.  Qty: 30 tablet, Refills: 0      naphazoline (NAPHCON) 0.012 % ophthalmic solution Inject 1 drop into the eye Use as needed.             Salvatore Vargas MD  General Surgery  [facility]

## 2017-07-20 NOTE — PLAN OF CARE
Problem: Patient Care Overview  Goal: Plan of Care Review  Outcome: Ongoing (interventions implemented as appropriate)  Rounding every 2 hours and call light within reach to reduce risk of falls. Maintain intact dressing to right lower quadrant. Explain all medications and procedures to patient to reduce anxiety level. Administer nausea medications per MD order to reduce nausea and vomiting.

## 2017-07-20 NOTE — PLAN OF CARE
Problem: Patient Care Overview  Goal: Plan of Care Review  Outcome: Ongoing (interventions implemented as appropriate)  Pt aaox3, vss, no distress noted.  Safety precautions maintained, pt remains free of falls.  No bleeding noted from incision.  Pt denies n/v.  Call light within reach.  Discharge today.

## 2017-07-20 NOTE — ANESTHESIA POSTPROCEDURE EVALUATION
"Anesthesia Post Evaluation    Patient: Karli Hameed    Procedure(s) Performed: Procedure(s) (LRB):  REPAIR-HERNIA-INGUINAL-INITIAL-RIGHT-OPEN w/ mesh (Right)    Final Anesthesia Type: general  Patient location during evaluation: PACU  Patient participation: Yes- Able to Participate  Level of consciousness: awake and alert and oriented  Pain management: adequate  Airway patency: patent  PONV status at discharge: No PONV  Anesthetic complications: no      Cardiovascular status: blood pressure returned to baseline and hemodynamically stable  Respiratory status: unassisted  Hydration status: euvolemic  Follow-up not needed.        Visit Vitals  /73 (BP Location: Left arm, Patient Position: Lying, BP Method: Automatic)   Pulse 80   Temp 36.9 °C (98.5 °F) (Oral)   Resp 18   Ht 5' 4" (1.626 m)   Wt 50.8 kg (112 lb)   SpO2 97%   Breastfeeding? No   BMI 19.22 kg/m²       Pain/Marguerite Score: Pain Assessment Performed: Yes (7/20/2017  6:00 AM)  Presence of Pain: non-verbal indicators absent (7/20/2017  6:00 AM)  Pain Rating Prior to Med Admin: 4 (7/19/2017  9:50 AM)  Marguerite Score: 10 (7/19/2017 12:32 PM)      "

## 2017-07-20 NOTE — SUBJECTIVE & OBJECTIVE
Interval History: No events overnight.  Some nausea.  Doing well this morning    Medications:  Continuous Infusions:   Scheduled Meds:   amlodipine  5 mg Oral QAM    losartan  50 mg Oral Daily     PRN Meds:hydrALAZINE, hydrocodone-acetaminophen 10-325mg, hydrocodone-acetaminophen 5-325mg, labetalol, ondansetron, promethazine (PHENERGAN) IVPB     Review of patient's allergies indicates:   Allergen Reactions    Advil [ibuprofen] Nausea Only    Parafon forte      Other reaction(s): Hallucinations    Calcitriol (bulk) Nausea Only    Lisinopril      cough     Objective:     Vital Signs (Most Recent):  Temp: 98.5 °F (36.9 °C) (07/20/17 0410)  Pulse: 80 (07/20/17 0700)  Resp: 18 (07/20/17 0410)  BP: 124/73 (07/20/17 0410)  SpO2: 97 % (07/20/17 0410) Vital Signs (24h Range):  Temp:  [97.3 °F (36.3 °C)-98.5 °F (36.9 °C)] 98.5 °F (36.9 °C)  Pulse:  [64-97] 80  Resp:  [11-19] 18  SpO2:  [96 %-100 %] 97 %  BP: (113-196)/() 124/73     Weight: 50.8 kg (112 lb)  Body mass index is 19.22 kg/m².    Intake/Output - Last 3 Shifts       07/18 0700 - 07/19 0659 07/19 0700 - 07/20 0659 07/20 0700 - 07/21 0659    IV Piggyback  50     Total Intake(mL/kg)  50 (1)     Urine (mL/kg/hr)  1245 (1)     Stool  0 (0)     Total Output   1245      Net   -1195             Urine Occurrence  2 x     Stool Occurrence  0 x           Physical Exam   Gen: NAD  CV: RRR  Pulm: Unlabored  Groin: Soft, minimal TTP.  Dressing CDI

## 2017-07-20 NOTE — PROGRESS NOTES
Pt discharged from unit.  Pt aaox3, vss, no s/s of distress noted.  Pt denies pain.  Discharge instructions given to pt and she verbalized understanding.  Home meds and f/u appts reviewed as well.  IV x 2 removed w/ both catheters intact, no redness or swelling noted to area.  Pt left unit via w/c and was accompanied by friend.

## 2017-07-20 NOTE — PROGRESS NOTES
[facility]  General Surgery  Progress Note    Subjective:     History of Present Illness:  Elective right inguinal hernia, open    Post-Op Info:  Procedure(s) (LRB):  REPAIR-HERNIA-INGUINAL-INITIAL-RIGHT-OPEN w/ mesh (Right)   1 Day Post-Op     Interval History: No events overnight.  Some nausea.  Doing well this morning    Medications:  Continuous Infusions:   Scheduled Meds:   amlodipine  5 mg Oral QAM    losartan  50 mg Oral Daily     PRN Meds:hydrALAZINE, hydrocodone-acetaminophen 10-325mg, hydrocodone-acetaminophen 5-325mg, labetalol, ondansetron, promethazine (PHENERGAN) IVPB     Review of patient's allergies indicates:   Allergen Reactions    Advil [ibuprofen] Nausea Only    Parafon forte      Other reaction(s): Hallucinations    Calcitriol (bulk) Nausea Only    Lisinopril      cough     Objective:     Vital Signs (Most Recent):  Temp: 98.5 °F (36.9 °C) (07/20/17 0410)  Pulse: 80 (07/20/17 0700)  Resp: 18 (07/20/17 0410)  BP: 124/73 (07/20/17 0410)  SpO2: 97 % (07/20/17 0410) Vital Signs (24h Range):  Temp:  [97.3 °F (36.3 °C)-98.5 °F (36.9 °C)] 98.5 °F (36.9 °C)  Pulse:  [64-97] 80  Resp:  [11-19] 18  SpO2:  [96 %-100 %] 97 %  BP: (113-196)/() 124/73     Weight: 50.8 kg (112 lb)  Body mass index is 19.22 kg/m².    Intake/Output - Last 3 Shifts       07/18 0700 - 07/19 0659 07/19 0700 - 07/20 0659 07/20 0700 - 07/21 0659    IV Piggyback  50     Total Intake(mL/kg)  50 (1)     Urine (mL/kg/hr)  1245 (1)     Stool  0 (0)     Total Output   1245      Net   -1195             Urine Occurrence  2 x     Stool Occurrence  0 x           Physical Exam   Gen: NAD  CV: RRR  Pulm: Unlabored  Groin: Soft, minimal TTP.  Dressing CDI      Assessment/Plan:     * Inguinal hernia unilateral, non-recurrent    Doing well  Tolerating diet  Pain controlled  OOB    Stable for DC            Salvatore Vargas MD  General Surgery  [facility]

## 2017-08-03 ENCOUNTER — OFFICE VISIT (OUTPATIENT)
Dept: SURGERY | Facility: CLINIC | Age: 82
End: 2017-08-03
Payer: MEDICARE

## 2017-08-03 VITALS
HEART RATE: 88 BPM | WEIGHT: 112 LBS | BODY MASS INDEX: 19.12 KG/M2 | DIASTOLIC BLOOD PRESSURE: 84 MMHG | TEMPERATURE: 99 F | SYSTOLIC BLOOD PRESSURE: 161 MMHG | HEIGHT: 64 IN

## 2017-08-03 DIAGNOSIS — Z09 POSTOP CHECK: Primary | ICD-10-CM

## 2017-08-03 PROBLEM — K40.90 INGUINAL HERNIA, RIGHT: Status: RESOLVED | Noted: 2017-06-27 | Resolved: 2017-08-03

## 2017-08-03 PROBLEM — K40.90 INGUINAL HERNIA UNILATERAL, NON-RECURRENT: Status: RESOLVED | Noted: 2017-07-19 | Resolved: 2017-08-03

## 2017-08-03 PROCEDURE — 99024 POSTOP FOLLOW-UP VISIT: CPT | Mod: S$GLB,,, | Performed by: SURGERY

## 2017-08-03 PROCEDURE — 99999 PR PBB SHADOW E&M-EST. PATIENT-LVL III: CPT | Mod: PBBFAC,,, | Performed by: SURGERY

## 2017-08-03 NOTE — PROGRESS NOTES
"Karli Hameed is a 85 y.o. female patient.   1. Postop check      Past Medical History:   Diagnosis Date    Allergy     Anxiety     Arthritis     Back pain     Basal cell carcinoma 6/2011    R nasal ala, excised via Mohs    Chronic kidney disease, stage II (mild) 8/25/2012    cyst since 1975    Flank pain 8/21/2012    HTN (hypertension) 8/21/2012    Inflammatory bowel disease     Joint pain     Kidney stone     left     Lactose disaccharidase deficiency     Senile osteoporosis 4/11/2016    Small bowel obstruction, 06-, resolved witjhout surgery 7/1/2014    Trace cataracts     Ulcer     hx    Urinary tract infection      Past Surgical History Pertinent Negatives:   Procedure Date Noted    CORNEAL TRANSPLANT 12/12/2012    RETINAL DETACHMENT SURGERY 12/12/2012    STRABISMUS SURGERY 12/12/2012     Scheduled Meds:  Continuous Infusions:  PRN Meds:    Review of patient's allergies indicates:   Allergen Reactions    Advil [ibuprofen] Nausea Only    Parafon forte      Other reaction(s): Hallucinations    Calcitriol (bulk) Nausea Only    Lisinopril      cough     There are no hospital problems to display for this patient.    Blood pressure (!) 161/84, pulse 88, temperature 98.6 °F (37 °C), height 5' 4" (1.626 m), weight 50.8 kg (112 lb).    Subjective S/p open rih with mesh 7/19/17.  She had pain for about a week but has no pain now.  She still has some fatigue but that is improving.  She has no other complaints.  Objective Wound clear with normal postop swelling.  No hernias.   Assessment & Plan She is happy with her procedure.  Light duty one more month and follow up prn.       Lamont Justice MD  8/3/2017  "

## 2017-08-08 ENCOUNTER — DOCUMENTATION ONLY (OUTPATIENT)
Dept: REHABILITATION | Facility: HOSPITAL | Age: 82
End: 2017-08-08

## 2017-08-08 NOTE — PROGRESS NOTES
OCCUPATIONAL THERAPY note / discharge       DATE : 08/08/2017    Name: Karli Hameed  Referring Physician: gómez gamez   Allergies:   Review of patient's allergies indicates:   Allergen Reactions    Advil [ibuprofen] Nausea Only    Parafon forte      Other reaction(s): Hallucinations    Calcitriol (bulk) Nausea Only    Lisinopril      cough     Medical history:   Past Medical History:   Diagnosis Date    Allergy     Anxiety     Arthritis     Back pain     Basal cell carcinoma 6/2011    R nasal ala, excised via Mohs    Chronic kidney disease, stage II (mild) 8/25/2012    cyst since 1975    Flank pain 8/21/2012    HTN (hypertension) 8/21/2012    Inflammatory bowel disease     Joint pain     Kidney stone     left     Lactose disaccharidase deficiency     Senile osteoporosis 4/11/2016    Small bowel obstruction, 06-, resolved witjhout surgery 7/1/2014    Trace cataracts     Ulcer     hx    Urinary tract infection      Medication:    Current Outpatient Prescriptions on File Prior to Visit   Medication Sig Dispense Refill    acetaminophen (TYLENOL) 325 MG tablet Take by mouth. 1 Tablet Oral .  Tylenol.To take as needed for arthritis pain.      amlodipine (NORVASC) 5 MG tablet Take 1 tablet (5 mg total) by mouth once daily. (Patient taking differently: Take 5 mg by mouth every morning. ) 90 tablet 3    cholecalciferol, vitamin D3, 1,000 unit capsule Take 2 capsules (2,000 Units total) by mouth once daily.  0    docusate sodium (COLACE) 100 MG capsule Take 1 capsule (100 mg total) by mouth 2 (two) times daily.  0    hydrocodone-acetaminophen 5-325mg (NORCO) 5-325 mg per tablet Take 1-2 tablets by mouth every 4 (four) hours as needed for Pain. 41 tablet 0    losartan (COZAAR) 50 MG tablet TAKE 1 TABLET ONE TIME DAILY 90 tablet 3    magnesium hydroxide (MICHAEL CHEWS) 311 MG Chew Take 1 tablet (311 mg total) by mouth daily as needed. 30 tablet 0    naphazoline (NAPHCON) 0.012 %  ophthalmic solution Inject 1 drop into the eye Use as needed.       No current facility-administered medications on file prior to visit.      Diagnosis: hand arthritis   No diagnosis found.  Occupational Profile level: low    Orders: Occupational Therapy evaluate and treat    Precautions stated on order: none      Evaluation Date: 6/5/17  Visit #: 2  Pt only seen for 2 visits       Plan of care Expiration: 7/30/17    SUBJECTIVE   Patient states:  Pt reports that she has purchased a paraffin wax unit and that her hands are feeling so much better . .   Pts goals for therapy:  To learn what to do to help her with hand pain   Pain Scale: Karli rates pain on a scale of 0-10 to be 3 at worst; 3 currently; 3 at best .  Onset: gradual  Date of Surgery:  None   Chief complaint:  Stiffness if fingers when she wakes and when she goes to sleep .  Radicular symptoms:  None   Aggravating factors:   None   Easing factors:  none  Prior Therapy:    History of Present Illness: hands feel sore at the end of the day   Prior functional status: limited with gripping at night it feels sore   Current functional status:  Active , just pain in hands   Psychosocial skills: lives alone very independent   Occupation:  Retired                        Job description includes:  Retired walks her dog , does exercises every day   Patients  structured exercise routine: see above   Exercise routine prior to onset: walks daily and goes to the gym   ADLS: Pt has a decrease ability to perform ADL's such as none     Imaging:   MRI: none          Xray: OA in hands         Hand Dominance: right                                   OBJECTIVE     Cognitive status : : no deficits     Posture Alignment :normal   Joint integrity: normal , right hand index finger has ulnar deviation To dip joint .   Skin integrity: normal   Edema: none     Sensation:   Light Touch: Intact       Proprioception:   Intact    Upper Extremity Range of Motion   Joint Evaluation AROM  PROM    Left / Right  Left  / Right    Shoulder flex 0-180     Shoulder Abd 0-180     Shoulder ER 0-90     Shoulder IR 0-90     Shoulder Extension 0-80     Shoulder Horizontal adduction 0-90     Elbow flex/ext 0-150 WNL WNL   Wrist flex 0-80 WNL WNL   Wrist ext 0-70 WNL WNL   Supination 0-80 WNL WNL   Pronation 0-80 WNL WNL   UD WNL WNl   RD WNL WNL        left   AROM  Index  Middle  Ring  Small   MP 0/85 0/80 0/85 0/85   PIp 0/100 0/100 0/100 0/100   DIP  0/50 0/50 0/50 0/50          DUNCAN  235 230 235 235       Right   AROM  Index  Middle  Ring  Small   MP 0/80 0/90 0/90 0/90   PIp 0/80 0/80 0/90 0/90   DIP  0/25 0/40 0/40 0/40          DUNCAN  185 210 220 220     EDEMA: none   CM     SCAR: none       Upper Extremity Strength  (R) UE  (L) UE                                                    Wrist flexion: 4/5 Wrist flexion: 4/5   Wrist extension: 4/5 Wrist extension: 4/5    Strength - second setting 40#,40 50#,# : 40#,40#,40#                                   Treatment          1.  Pt purchased her paraffin wax unit and that she can tell a difference already.      paraffin X 10 minutes , instruction how to use at home , care for hands , exercises make fisting , thumb opposition and energy conservation task for arthritis in hands .   Provided an educational booklet about do and  don'ts of arthritis . Pt demonstrates independence with joint protection and paraffin , there ex . ,    2. Pt was provided educational information, including range of motion, strengthening   3. Pt educated to use ice for 10- 20 minutes to decrease swelling  and/ or pain as needed.       FOTO - Outcomes  And G Codes     FOTO survey     FOTO:   Self Care DATE SCORE GCODE MODIFIER   INITIAL 6/5/17      67  /100     5TH /100 10TH /100     Discharge   6/13/17 55/100        FOTO Goal :  modifier [id number]    Interpretation of FOTO Modifiers    TEST SCORE  Modifier  Impairment Limitation Restriction    0%  CH  0  % impaired, limited or restricted    1-19%  CI  @ least 1% but less than 20% impaired, limited or restricted    20-39%  CJ  @ least 20%<40% impaired, limited or restricted    40-59%  CK  @ least 40%<60% impaired, limited or restricted    60-79%  CL  @ least 60% <80% impaired, limited or restricted    80-99%  CM  @ least 80%<100% impaired limited or restricted    100%  CN  100% impaired, limited or restricted       ASSESSMENT    Pt present to occupational therapy with an OT diagnosis of     No diagnosis found.       Pt presents with the impairments as stated below in the impairments/problems list. Pt prognosis is Good..     Karli will benefit from skilled outpatient occupational therapy to address the above stated deficits, provide pt/family education and to maximize pt's level of independence in the home and community environment.     Discussed Plan of Care with patient: Yes    Medical necessity is demonstrated by the following IMPAIRMENTS/PROBLEMS:    2. pain in  joint / limb  Right / left hands   3. Decreased flexibility        Pt's spiritual, cultural and educational needs considered and pt agreeable to plan of care and goals as stated below:     Anticipated Barriers for Occupational  therapy: anticipated none    GOALS: 6 weeks. Pt agrees with goals set.  1. Independent with HEP. Goal met   2. Decreased pain to   0/10   With gripping toothbrush or water bottle , goal met   3. Pt to report that with use of home paraffin that the pain in hands is 0/10 after paraffin night use.  Goal met    4.     PLAN    Pt to be d/c  From  OT at this time

## 2017-08-14 DIAGNOSIS — N18.4 CHRONIC KIDNEY DISEASE, STAGE IV (SEVERE): Primary | ICD-10-CM

## 2017-08-20 ENCOUNTER — PATIENT MESSAGE (OUTPATIENT)
Dept: DERMATOLOGY | Facility: CLINIC | Age: 82
End: 2017-08-20

## 2017-08-20 DIAGNOSIS — L72.0 MILIA: Primary | ICD-10-CM

## 2017-08-23 RX ORDER — TRETINOIN 0.25 MG/G
GEL TOPICAL
Qty: 45 G | Refills: 3 | Status: SHIPPED | OUTPATIENT
Start: 2017-08-23 | End: 2019-07-23

## 2017-08-25 ENCOUNTER — PATIENT MESSAGE (OUTPATIENT)
Dept: DERMATOLOGY | Facility: CLINIC | Age: 82
End: 2017-08-25

## 2017-08-30 ENCOUNTER — TELEPHONE (OUTPATIENT)
Dept: DERMATOLOGY | Facility: CLINIC | Age: 82
End: 2017-08-30

## 2017-08-31 ENCOUNTER — PATIENT MESSAGE (OUTPATIENT)
Dept: DERMATOLOGY | Facility: CLINIC | Age: 82
End: 2017-08-31

## 2017-09-08 ENCOUNTER — LAB VISIT (OUTPATIENT)
Dept: LAB | Facility: HOSPITAL | Age: 82
End: 2017-09-08
Attending: INTERNAL MEDICINE
Payer: MEDICARE

## 2017-09-08 DIAGNOSIS — N18.4 CHRONIC KIDNEY DISEASE, STAGE IV (SEVERE): ICD-10-CM

## 2017-09-08 LAB
ALBUMIN SERPL BCP-MCNC: 3.6 G/DL
ANION GAP SERPL CALC-SCNC: 9 MMOL/L
BUN SERPL-MCNC: 41 MG/DL
CALCIUM SERPL-MCNC: 9 MG/DL
CHLORIDE SERPL-SCNC: 110 MMOL/L
CO2 SERPL-SCNC: 24 MMOL/L
CREAT SERPL-MCNC: 2.8 MG/DL
EST. GFR  (AFRICAN AMERICAN): 17.1 ML/MIN/1.73 M^2
EST. GFR  (NON AFRICAN AMERICAN): 14.8 ML/MIN/1.73 M^2
GLUCOSE SERPL-MCNC: 95 MG/DL
PHOSPHATE SERPL-MCNC: 3.9 MG/DL
POTASSIUM SERPL-SCNC: 4.1 MMOL/L
SODIUM SERPL-SCNC: 143 MMOL/L

## 2017-09-08 PROCEDURE — 36415 COLL VENOUS BLD VENIPUNCTURE: CPT

## 2017-09-08 PROCEDURE — 80069 RENAL FUNCTION PANEL: CPT

## 2017-09-15 ENCOUNTER — OFFICE VISIT (OUTPATIENT)
Dept: NEPHROLOGY | Facility: CLINIC | Age: 82
End: 2017-09-15
Payer: MEDICARE

## 2017-09-15 VITALS
WEIGHT: 112.88 LBS | HEART RATE: 103 BPM | BODY MASS INDEX: 19.27 KG/M2 | DIASTOLIC BLOOD PRESSURE: 90 MMHG | SYSTOLIC BLOOD PRESSURE: 160 MMHG | OXYGEN SATURATION: 98 % | HEIGHT: 64 IN

## 2017-09-15 DIAGNOSIS — N18.4 CHRONIC KIDNEY DISEASE, STAGE IV (SEVERE): Primary | ICD-10-CM

## 2017-09-15 PROCEDURE — 99999 PR PBB SHADOW E&M-EST. PATIENT-LVL III: CPT | Mod: PBBFAC,,, | Performed by: INTERNAL MEDICINE

## 2017-09-15 PROCEDURE — 1159F MED LIST DOCD IN RCRD: CPT | Mod: S$GLB,,, | Performed by: INTERNAL MEDICINE

## 2017-09-15 PROCEDURE — 3077F SYST BP >= 140 MM HG: CPT | Mod: S$GLB,,, | Performed by: INTERNAL MEDICINE

## 2017-09-15 PROCEDURE — 99499 UNLISTED E&M SERVICE: CPT | Mod: S$GLB,,, | Performed by: INTERNAL MEDICINE

## 2017-09-15 PROCEDURE — 3008F BODY MASS INDEX DOCD: CPT | Mod: S$GLB,,, | Performed by: INTERNAL MEDICINE

## 2017-09-15 PROCEDURE — 1126F AMNT PAIN NOTED NONE PRSNT: CPT | Mod: S$GLB,,, | Performed by: INTERNAL MEDICINE

## 2017-09-15 PROCEDURE — 3080F DIAST BP >= 90 MM HG: CPT | Mod: S$GLB,,, | Performed by: INTERNAL MEDICINE

## 2017-09-15 PROCEDURE — 99213 OFFICE O/P EST LOW 20 MIN: CPT | Mod: S$GLB,,, | Performed by: INTERNAL MEDICINE

## 2017-09-18 NOTE — PROGRESS NOTES
Subjective:       Patient ID: Karli Hameed is a 85 y.o. White female who presents for follow-up evaluation of Chronic Kidney Disease    HPI    Ms. Hameed is an 85 year old woman with past medical history of hypertension, polycystic kidney disease presenting for follow up of chronic kidney disease.  Patient reports adequate fluid intake, denies any NSAID use.  She otherwise denies any fever, chest pain, shortness of breath, abdominal pain, diarrhea, dysuria/hematuria.     Review of Systems   Constitutional: Negative for appetite change, fatigue and fever.   HENT: Negative for congestion.    Respiratory: Negative for cough, chest tightness and shortness of breath.    Cardiovascular: Negative for chest pain and leg swelling.   Gastrointestinal: Negative for abdominal pain, constipation, diarrhea, nausea and vomiting.   Genitourinary: Negative for difficulty urinating, dysuria, flank pain, frequency, hematuria and urgency.   Musculoskeletal: Negative for arthralgias, joint swelling and myalgias.   Skin: Negative for rash and wound.   Neurological: Negative for dizziness, weakness and light-headedness.   All other systems reviewed and are negative.      Objective:      Physical Exam   Constitutional: She appears well-developed and well-nourished.   Cardiovascular: Normal rate, regular rhythm and normal heart sounds.  Exam reveals no gallop and no friction rub.    No murmur heard.  Pulmonary/Chest: Effort normal and breath sounds normal. No respiratory distress. She has no wheezes. She has no rales.   Abdominal: Soft. Bowel sounds are normal. There is no tenderness.   Musculoskeletal: She exhibits no edema.   Neurological: She is alert.   Skin: Skin is warm and dry. No rash noted. No erythema.   Psychiatric: She has a normal mood and affect.       Assessment:       1. Chronic kidney disease, stage IV (severe)        Plan:      Ms. Hameed is an 85 year old woman with past medical history of hypertension, polycystic  kidney disease presenting for follow up of chronic kidney disease stage III.  Patient creatinine relatively stable, previous acute kidney injury due to UTI, v. secondary to recent URI, will repeat renal panel to trend, further recommendations pending results.  Encouraged fluid intake, stressed importance of blood pressure control to prevent any further progression of kidney disease, patient voiced understanding.   - Anemia: Hgb at goal, no indication for erythropoetin therapy  - Bone/mineral metabolism: patient with secondary hyperparathyroidism, PTH at goal for stage CKD    Return to clinic in 3-4 months pending CMP 10.5.17, then with renal/heme panel, iron/TIBC/ferritin, urinalysis/culture, urine protein/creatinine ratio prior to next visit

## 2017-09-23 ENCOUNTER — PATIENT MESSAGE (OUTPATIENT)
Dept: INTERNAL MEDICINE | Facility: CLINIC | Age: 82
End: 2017-09-23

## 2017-09-25 ENCOUNTER — PATIENT MESSAGE (OUTPATIENT)
Dept: INTERNAL MEDICINE | Facility: CLINIC | Age: 82
End: 2017-09-25

## 2017-09-25 RX ORDER — CLOTRIMAZOLE AND BETAMETHASONE DIPROPIONATE 10; .64 MG/G; MG/G
CREAM TOPICAL 2 TIMES DAILY
Qty: 45 G | Refills: 2 | Status: SHIPPED | OUTPATIENT
Start: 2017-09-25 | End: 2019-02-26

## 2017-10-05 ENCOUNTER — LAB VISIT (OUTPATIENT)
Dept: LAB | Facility: HOSPITAL | Age: 82
End: 2017-10-05
Attending: INTERNAL MEDICINE
Payer: MEDICARE

## 2017-10-05 ENCOUNTER — TELEPHONE (OUTPATIENT)
Dept: RHEUMATOLOGY | Facility: CLINIC | Age: 82
End: 2017-10-05

## 2017-10-05 DIAGNOSIS — N18.4 CKD (CHRONIC KIDNEY DISEASE) STAGE 4, GFR 15-29 ML/MIN: ICD-10-CM

## 2017-10-05 DIAGNOSIS — M81.0 OSTEOPOROSIS, UNSPECIFIED OSTEOPOROSIS TYPE, UNSPECIFIED PATHOLOGICAL FRACTURE PRESENCE: ICD-10-CM

## 2017-10-05 LAB
25(OH)D3+25(OH)D2 SERPL-MCNC: 48 NG/ML
ALBUMIN SERPL BCP-MCNC: 3.7 G/DL
ALP SERPL-CCNC: 37 U/L
ALT SERPL W/O P-5'-P-CCNC: 10 U/L
ANION GAP SERPL CALC-SCNC: 10 MMOL/L
AST SERPL-CCNC: 21 U/L
BILIRUB SERPL-MCNC: 0.6 MG/DL
BUN SERPL-MCNC: 48 MG/DL
CALCIUM SERPL-MCNC: 9.3 MG/DL
CHLORIDE SERPL-SCNC: 107 MMOL/L
CO2 SERPL-SCNC: 25 MMOL/L
CREAT SERPL-MCNC: 2.7 MG/DL
EST. GFR  (AFRICAN AMERICAN): 17.9 ML/MIN/1.73 M^2
EST. GFR  (NON AFRICAN AMERICAN): 15.5 ML/MIN/1.73 M^2
GLUCOSE SERPL-MCNC: 91 MG/DL
POTASSIUM SERPL-SCNC: 4.5 MMOL/L
PROT SERPL-MCNC: 7.2 G/DL
SODIUM SERPL-SCNC: 142 MMOL/L

## 2017-10-05 PROCEDURE — 36415 COLL VENOUS BLD VENIPUNCTURE: CPT

## 2017-10-05 PROCEDURE — 80053 COMPREHEN METABOLIC PANEL: CPT

## 2017-10-05 PROCEDURE — 82306 VITAMIN D 25 HYDROXY: CPT

## 2017-10-05 NOTE — TELEPHONE ENCOUNTER
----- Message from Jacob Max MD sent at 10/5/2017 11:50 AM CDT -----  Talia please tell pt the vitamin D and calcium are normal. And she can proceed with Prolia which is due 10/27/17, please be sure it's scheduled then. Thanks ARMANI

## 2017-10-30 ENCOUNTER — INFUSION (OUTPATIENT)
Dept: INFECTIOUS DISEASES | Facility: HOSPITAL | Age: 82
End: 2017-10-30
Attending: INTERNAL MEDICINE
Payer: MEDICARE

## 2017-10-30 VITALS — BODY MASS INDEX: 19.27 KG/M2 | WEIGHT: 112.88 LBS | HEIGHT: 64 IN

## 2017-10-30 DIAGNOSIS — M85.80 OSTEOPENIA, UNSPECIFIED LOCATION: Primary | ICD-10-CM

## 2017-10-30 PROCEDURE — 96372 THER/PROPH/DIAG INJ SC/IM: CPT

## 2017-10-30 PROCEDURE — 63600175 PHARM REV CODE 636 W HCPCS: Performed by: INTERNAL MEDICINE

## 2017-10-30 RX ADMIN — DENOSUMAB 60 MG: 60 INJECTION SUBCUTANEOUS at 08:10

## 2017-11-09 ENCOUNTER — PATIENT MESSAGE (OUTPATIENT)
Dept: SURGERY | Facility: CLINIC | Age: 82
End: 2017-11-09

## 2017-11-20 ENCOUNTER — OFFICE VISIT (OUTPATIENT)
Dept: RHEUMATOLOGY | Facility: CLINIC | Age: 82
End: 2017-11-20
Payer: MEDICARE

## 2017-11-20 ENCOUNTER — TELEPHONE (OUTPATIENT)
Dept: INTERNAL MEDICINE | Facility: CLINIC | Age: 82
End: 2017-11-20

## 2017-11-20 VITALS
HEIGHT: 64 IN | BODY MASS INDEX: 19.74 KG/M2 | DIASTOLIC BLOOD PRESSURE: 92 MMHG | HEART RATE: 99 BPM | WEIGHT: 115.63 LBS | SYSTOLIC BLOOD PRESSURE: 152 MMHG

## 2017-11-20 DIAGNOSIS — M81.0 AGE-RELATED OSTEOPOROSIS WITHOUT CURRENT PATHOLOGICAL FRACTURE: ICD-10-CM

## 2017-11-20 DIAGNOSIS — M11.20 CHONDROCALCINOSIS ARTICULARIS: ICD-10-CM

## 2017-11-20 DIAGNOSIS — M81.0 OSTEOPOROSIS, UNSPECIFIED OSTEOPOROSIS TYPE, UNSPECIFIED PATHOLOGICAL FRACTURE PRESENCE: ICD-10-CM

## 2017-11-20 PROCEDURE — 99213 OFFICE O/P EST LOW 20 MIN: CPT | Mod: S$GLB,,, | Performed by: INTERNAL MEDICINE

## 2017-11-20 PROCEDURE — 99499 UNLISTED E&M SERVICE: CPT | Mod: S$GLB,,, | Performed by: INTERNAL MEDICINE

## 2017-11-20 PROCEDURE — 99999 PR PBB SHADOW E&M-EST. PATIENT-LVL III: CPT | Mod: PBBFAC,,, | Performed by: INTERNAL MEDICINE

## 2017-11-20 ASSESSMENT — ROUTINE ASSESSMENT OF PATIENT INDEX DATA (RAPID3)
PSYCHOLOGICAL DISTRESS SCORE: 1.1
TOTAL RAPID3 SCORE: 1.33
MDHAQ FUNCTION SCORE: 0
PATIENT GLOBAL ASSESSMENT SCORE: 0
PAIN SCORE: 4
FATIGUE SCORE: 4
AM STIFFNESS SCORE: 0, NO

## 2017-11-20 NOTE — PROGRESS NOTES
"Subjective:       Patient ID: Karli Hameed is a 86 y.o. female.    Chief Complaint: Osteoporosis, chondrocalcinosis/OA knees    HPI Had right inguinal hernia repair 7/19 by , next day developed bilateral knee pain and swelling, no transportation, iced had to ambulate with walker. Knees improved now. Avoids kneeling in [employer]. Has occ hand, leg and neck cramps. Occ left shoulder pain. Walks dog every day. Had flu shot.  Review of Systems   Constitutional: Negative for appetite change, fatigue, fever and unexpected weight change.   HENT: Negative for mouth sores.    Eyes: Negative for visual disturbance.   Respiratory: Negative for cough, shortness of breath and wheezing.    Cardiovascular: Negative for chest pain and palpitations.   Gastrointestinal: Negative for abdominal pain, anal bleeding, blood in stool, constipation, diarrhea, nausea and vomiting.   Genitourinary: Negative for dysuria, frequency and urgency.   Musculoskeletal: Negative for arthralgias, back pain, gait problem, joint swelling, myalgias, neck pain and neck stiffness.   Skin: Negative for rash.   Neurological: Negative for weakness, numbness and headaches.   Hematological: Negative for adenopathy. Does not bruise/bleed easily.   Psychiatric/Behavioral: Negative for sleep disturbance. The patient is not nervous/anxious.          Objective:   BP (!) 152/92 (BP Location: Right arm, Patient Position: Sitting)   Pulse 99   Ht 5' 4" (1.626 m)   Wt 52.4 kg (115 lb 9.6 oz)   BMI 19.84 kg/m²      Physical Exam   Constitutional: She is oriented to person, place, and time and well-developed, well-nourished, and in no distress.   HENT:   Head: Normocephalic and atraumatic.   Mouth/Throat: Oropharynx is clear and moist.   Eyes: Conjunctivae and EOM are normal.   Neck: Normal range of motion. Neck supple. No thyromegaly present.   Cardiovascular: Normal rate, regular rhythm, normal heart sounds and intact distal pulses.  Exam reveals no " gallop and no friction rub.    No murmur heard.  Pulmonary/Chest: Breath sounds normal. She has no wheezes. She has no rales. She exhibits no tenderness.   Abdominal: Soft. She exhibits no distension and no mass. There is no tenderness.       Right Side Rheumatological Exam     Examination finds the shoulder, elbow, wrist, 1st PIP, 1st MCP, 2nd PIP, 2nd MCP, 3rd PIP, 3rd MCP, 4th PIP, 4th MCP, 5th PIP and 5th MCP normal.    The patient has an enlarged knee, 1st CMC and 2nd DIP    Left Side Rheumatological Exam     Examination finds the shoulder, elbow, wrist, 1st PIP, 1st MCP, 2nd PIP, 2nd MCP, 3rd PIP, 3rd MCP, 4th PIP, 4th MCP, 5th PIP and 5th MCP normal.    The patient has an enlarged knee, 1st CMC and 2nd DIP.      Lymphadenopathy:     She has no cervical adenopathy.   Neurological: She is alert and oriented to person, place, and time. She displays normal reflexes. Gait normal.   Nl motor strength UE and LE bilateral   Musculoskeletal: She exhibits no edema.           Assessment/Plan         Problem List Items Addressed This Visit     Chondrocalcinosis articularis    Current Assessment & Plan     S/p acute CPPD by history post right inguinal hernia repair 7/20/17, now resolved.         Age-related osteoporosis without current pathological fracture    Overview     Patient Result Comments     Viewed by Karli Hameed on 7/1/2015 11:20 AM   Written by Jacob Max MD on 6/26/2015  6:38 PM   The bone density shows osteoporosis which should be treated, most appropriately with Prolia injections twice a year, given your reduced kidney function this would really be the only option. Talia will mail you information about Prolia and will make appt to discuss. [email or web address]            Current Assessment & Plan     Received denosumab #5 10/301/7  Continue vitamin D3 2000 units daily  DXA  RTC 5 months with cmp            Other Visit Diagnoses     Osteoporosis, unspecified osteoporosis type, unspecified pathological fracture  presence        Relevant Orders    Comprehensive metabolic panel    DXA Bone Density Spine And Hip

## 2017-11-20 NOTE — TELEPHONE ENCOUNTER
----- Message from Fabian Villar sent at 11/20/2017  3:08 PM CST -----  Contact: self/ [phone] home  Type: Returning a call    Who left a message? Claudia    When did the practice call? 11/20/2017    Comments: Pt returned call and is waiting on a call back.  Please call and advise.    Thank you

## 2018-01-15 ENCOUNTER — PATIENT MESSAGE (OUTPATIENT)
Dept: NEPHROLOGY | Facility: CLINIC | Age: 83
End: 2018-01-15

## 2018-01-25 ENCOUNTER — PES CALL (OUTPATIENT)
Dept: ADMINISTRATIVE | Facility: CLINIC | Age: 83
End: 2018-01-25

## 2018-01-25 ENCOUNTER — OFFICE VISIT (OUTPATIENT)
Dept: INTERNAL MEDICINE | Facility: CLINIC | Age: 83
End: 2018-01-25
Payer: MEDICARE

## 2018-01-25 VITALS
BODY MASS INDEX: 19.46 KG/M2 | OXYGEN SATURATION: 97 % | SYSTOLIC BLOOD PRESSURE: 143 MMHG | WEIGHT: 114 LBS | HEIGHT: 64 IN | DIASTOLIC BLOOD PRESSURE: 79 MMHG | HEART RATE: 88 BPM

## 2018-01-25 DIAGNOSIS — N18.4 CKD (CHRONIC KIDNEY DISEASE) STAGE 4, GFR 15-29 ML/MIN: ICD-10-CM

## 2018-01-25 DIAGNOSIS — I77.1 TORTUOUS AORTA: ICD-10-CM

## 2018-01-25 DIAGNOSIS — I10 ESSENTIAL HYPERTENSION: ICD-10-CM

## 2018-01-25 DIAGNOSIS — D69.6 THROMBOCYTOPENIA: ICD-10-CM

## 2018-01-25 DIAGNOSIS — R25.2 MUSCLE CRAMPS: ICD-10-CM

## 2018-01-25 DIAGNOSIS — Z00.00 ANNUAL PHYSICAL EXAM: Primary | ICD-10-CM

## 2018-01-25 DIAGNOSIS — I70.0 AORTIC ATHEROSCLEROSIS: ICD-10-CM

## 2018-01-25 DIAGNOSIS — E79.0 HYPERURICEMIA WITHOUT SIGNS OF INFLAMMATORY ARTHRITIS AND TOPHACEOUS DISEASE: ICD-10-CM

## 2018-01-25 DIAGNOSIS — E21.3 HYPERPARATHYROIDISM: ICD-10-CM

## 2018-01-25 DIAGNOSIS — I77.819 ECTATIC AORTA: ICD-10-CM

## 2018-01-25 PROCEDURE — 99999 PR PBB SHADOW E&M-EST. PATIENT-LVL IV: CPT | Mod: PBBFAC,,, | Performed by: INTERNAL MEDICINE

## 2018-01-25 PROCEDURE — 99499 UNLISTED E&M SERVICE: CPT | Mod: S$GLB,,, | Performed by: INTERNAL MEDICINE

## 2018-01-25 PROCEDURE — 99397 PER PM REEVAL EST PAT 65+ YR: CPT | Mod: S$GLB,,, | Performed by: INTERNAL MEDICINE

## 2018-01-25 RX ORDER — KETOCONAZOLE 20 MG/ML
SHAMPOO, SUSPENSION TOPICAL
Qty: 120 ML | Refills: 4 | Status: SHIPPED | OUTPATIENT
Start: 2018-01-25 | End: 2019-02-26

## 2018-01-28 NOTE — PROGRESS NOTES
Subjective:       Patient ID: Karli Hameed is a 86 y.o. female.    Chief Complaint: Annual Exam (yearly check up.)   wellness  She's been having a difficult time with muscle spasms  At night and also throughout the day occasionally she gets painful cramps in her hands and in her feet, arms and legs.  It has been going on and seems to be getting worse.    She has not had any nausea, chest discomfort, itching or loss of energy.  Her energy level is good.    She's concerned about hair loss Nizoral shampoo and biotin  HPI  Review of Systems   Constitutional: Negative for activity change, appetite change, chills, fatigue, fever and unexpected weight change.   HENT: Negative for dental problem, hearing loss, tinnitus, trouble swallowing and voice change.    Eyes: Negative for visual disturbance.   Respiratory: Negative for cough, chest tightness, shortness of breath and wheezing.    Cardiovascular: Negative for chest pain, palpitations and leg swelling.   Gastrointestinal: Negative for abdominal pain, blood in stool, constipation, diarrhea and nausea.   Genitourinary: Negative for difficulty urinating, dysuria, frequency and urgency.   Musculoskeletal: Negative for arthralgias, back pain, gait problem, joint swelling, myalgias, neck pain and neck stiffness.   Skin: Negative for rash.   Neurological: Negative for dizziness, tremors, speech difficulty, weakness, light-headedness and headaches.   Psychiatric/Behavioral: Negative for dysphoric mood and sleep disturbance. The patient is not nervous/anxious.        Objective:      Physical Exam   Constitutional: She is oriented to person, place, and time. She appears well-developed and well-nourished. No distress.   HENT:   Head: Normocephalic and atraumatic.   Right Ear: External ear normal.   Left Ear: External ear normal.   Nose: Nose normal.   Mouth/Throat: Oropharynx is clear and moist. No oropharyngeal exudate.   Eyes: Conjunctivae and EOM are normal. Pupils are equal,  round, and reactive to light. Right eye exhibits no discharge. No scleral icterus.   Neck: Normal range of motion and full passive range of motion without pain. Neck supple. No spinous process tenderness and no muscular tenderness present. Carotid bruit is not present. No thyromegaly present.   Cardiovascular: Normal rate, regular rhythm, S1 normal, S2 normal, normal heart sounds and intact distal pulses.  Exam reveals no gallop and no friction rub.    No murmur heard.  Pulmonary/Chest: Effort normal and breath sounds normal. No respiratory distress. She has no wheezes. She has no rales. She exhibits no tenderness.   Abdominal: Soft. Bowel sounds are normal. She exhibits no distension and no mass. There is no tenderness. There is no rebound and no guarding.   Genitourinary: Pelvic exam was performed with patient supine. Uterus is not deviated, not enlarged, not fixed and not tender. Cervix exhibits no motion tenderness, no discharge and no friability. Right adnexum displays no mass, no tenderness and no fullness. Left adnexum displays no mass, no tenderness and no fullness.   Musculoskeletal: Normal range of motion. She exhibits no edema or tenderness.   Lymphadenopathy:        Head (right side): No submental and no submandibular adenopathy present.        Head (left side): No submental and no submandibular adenopathy present.     She has no cervical adenopathy.        Right cervical: No superficial cervical, no deep cervical and no posterior cervical adenopathy present.       Left cervical: No superficial cervical, no deep cervical and no posterior cervical adenopathy present.        Right axillary: No pectoral and no lateral adenopathy present.        Left axillary: No pectoral and no lateral adenopathy present.       Right: No supraclavicular adenopathy present.        Left: No supraclavicular adenopathy present.   Neurological: She is alert and oriented to person, place, and time. She has normal reflexes. No  cranial nerve deficit. She exhibits normal muscle tone. Coordination normal.   Skin: Skin is warm and dry. No rash noted.   Psychiatric: She has a normal mood and affect. Her behavior is normal. Her mood appears not anxious. Her speech is not rapid and/or pressured. She is not agitated. She does not exhibit a depressed mood.   Normal behavior, thought content, insight and judgement.   Nursing note and vitals reviewed.      Assessment:       1. Annual physical exam    2. Essential hypertension    3. Ectatic aorta    4. Tortuous aorta    5. Aortic atherosclerosis    6. CKD (chronic kidney disease) stage 4, GFR 15-29 ml/min    7. Thrombocytopenia    8. Hyperparathyroidism    9. Hyperuricemia     10. Muscle cramps        Plan:   Karli was seen today for annual exam.    Diagnoses and all orders for this visit:    Annual physical exam.  It is worrisome that she has developed this new problem with muscle spasms.  Hopefully this is not a sign of impending uremia.    Essential hypertension, she reports good control at home.    Ectatic aorta  Tortuous aorta  Aortic atherosclerosis.  Healthy diet and stays active    CKD (chronic kidney disease) stage 4, GFR 15-29 ml/min  -     Zinc; Future  -     Uric acid; Future  -     CBC auto differential; Future  -     PTH, intact; Future    Thrombocytopenia, monitor.    Hyperparathyroidism  -     PTH, intact; Future    Hyperuricemia   -     Uric acid; Future    Muscle cramps.  Try vitamin D 200 international units 3 times a day.  We thought about branched-chain amino acids 4 g granules 3 times a day, but she is going to ask her nephrologist about this.  -     Zinc; Future  -     PTH, intact; Future    Other orders  -     vitamin E 200 UNIT capsule; Take 1 capsule (200 Units total) by mouth 3 (three) times daily as needed (muscle cramps).  -     biotin 300 mcg Tab; Take 1 tablet by mouth once daily. For Hair loss  -     ketoconazole (NIZORAL) 2 % shampoo; Apply topically twice a  week.    Follow-up in about 4 months (around 5/25/2018).

## 2018-02-06 ENCOUNTER — PATIENT MESSAGE (OUTPATIENT)
Dept: INTERNAL MEDICINE | Facility: CLINIC | Age: 83
End: 2018-02-06

## 2018-02-06 DIAGNOSIS — R11.0 NAUSEA: Primary | ICD-10-CM

## 2018-02-06 DIAGNOSIS — N18.4 CKD (CHRONIC KIDNEY DISEASE) STAGE 4, GFR 15-29 ML/MIN: ICD-10-CM

## 2018-02-07 RX ORDER — ONDANSETRON 4 MG/1
4 TABLET, FILM COATED ORAL EVERY 12 HOURS PRN
Qty: 20 TABLET | Refills: 1 | Status: SHIPPED | OUTPATIENT
Start: 2018-02-07 | End: 2019-02-26

## 2018-02-07 NOTE — TELEPHONE ENCOUNTER
Please call patient schedule BMP today here and [facility]

## 2018-02-21 ENCOUNTER — PATIENT MESSAGE (OUTPATIENT)
Dept: INTERNAL MEDICINE | Facility: CLINIC | Age: 83
End: 2018-02-21

## 2018-02-21 DIAGNOSIS — H81.13 BENIGN PAROXYSMAL POSITIONAL VERTIGO DUE TO BILATERAL VESTIBULAR DISORDER: Primary | ICD-10-CM

## 2018-02-21 RX ORDER — DIAZEPAM 2 MG/1
2 TABLET ORAL EVERY 12 HOURS PRN
Qty: 20 TABLET | Refills: 0 | Status: SHIPPED | OUTPATIENT
Start: 2018-02-21 | End: 2018-05-01

## 2018-02-21 RX ORDER — MECLIZINE HCL 12.5 MG 12.5 MG/1
12.5 TABLET ORAL 3 TIMES DAILY PRN
Qty: 30 TABLET | Refills: 0 | Status: SHIPPED | OUTPATIENT
Start: 2018-02-21 | End: 2018-02-21 | Stop reason: CLARIF

## 2018-02-21 RX ORDER — DIAZEPAM 2 MG/1
2 TABLET ORAL EVERY 12 HOURS PRN
Qty: 20 TABLET | Refills: 0 | Status: SHIPPED | OUTPATIENT
Start: 2018-02-21 | End: 2018-02-21 | Stop reason: SDUPTHER

## 2018-02-22 ENCOUNTER — OFFICE VISIT (OUTPATIENT)
Dept: INTERNAL MEDICINE | Facility: CLINIC | Age: 83
End: 2018-02-22
Payer: MEDICARE

## 2018-02-22 ENCOUNTER — HOSPITAL ENCOUNTER (OUTPATIENT)
Dept: RADIOLOGY | Facility: HOSPITAL | Age: 83
Discharge: HOME | End: 2018-02-22
Attending: INTERNAL MEDICINE
Payer: MEDICARE

## 2018-02-22 ENCOUNTER — TELEPHONE (OUTPATIENT)
Dept: INTERNAL MEDICINE | Facility: CLINIC | Age: 83
End: 2018-02-22

## 2018-02-22 VITALS
HEART RATE: 88 BPM | SYSTOLIC BLOOD PRESSURE: 165 MMHG | BODY MASS INDEX: 19.68 KG/M2 | WEIGHT: 115.31 LBS | DIASTOLIC BLOOD PRESSURE: 90 MMHG | HEIGHT: 64 IN

## 2018-02-22 DIAGNOSIS — R63.0 ANOREXIA: ICD-10-CM

## 2018-02-22 DIAGNOSIS — R25.2 MUSCLE CRAMPS: ICD-10-CM

## 2018-02-22 DIAGNOSIS — R11.0 NAUSEA: ICD-10-CM

## 2018-02-22 DIAGNOSIS — R42 DIZZINESS: ICD-10-CM

## 2018-02-22 DIAGNOSIS — R19.8 BORBORYGMI: ICD-10-CM

## 2018-02-22 DIAGNOSIS — Z87.19 H/O SMALL BOWEL OBSTRUCTION: ICD-10-CM

## 2018-02-22 DIAGNOSIS — L85.3 DRY SKIN: ICD-10-CM

## 2018-02-22 DIAGNOSIS — L65.9 HAIR LOSS: ICD-10-CM

## 2018-02-22 DIAGNOSIS — N18.4 CKD (CHRONIC KIDNEY DISEASE) STAGE 4, GFR 15-29 ML/MIN: ICD-10-CM

## 2018-02-22 DIAGNOSIS — R42 DIZZINESS: Primary | ICD-10-CM

## 2018-02-22 DIAGNOSIS — R11.0 NAUSEA: Primary | ICD-10-CM

## 2018-02-22 PROCEDURE — 99499 UNLISTED E&M SERVICE: CPT | Mod: S$GLB,,, | Performed by: INTERNAL MEDICINE

## 2018-02-22 PROCEDURE — 99999 PR PBB SHADOW E&M-EST. PATIENT-LVL III: CPT | Mod: PBBFAC,,, | Performed by: INTERNAL MEDICINE

## 2018-02-22 PROCEDURE — 99215 OFFICE O/P EST HI 40 MIN: CPT | Mod: S$GLB,,, | Performed by: INTERNAL MEDICINE

## 2018-02-22 PROCEDURE — 74019 RADEX ABDOMEN 2 VIEWS: CPT | Mod: TC

## 2018-02-22 PROCEDURE — 1159F MED LIST DOCD IN RCRD: CPT | Mod: S$GLB,,, | Performed by: INTERNAL MEDICINE

## 2018-02-22 PROCEDURE — 3008F BODY MASS INDEX DOCD: CPT | Mod: S$GLB,,, | Performed by: INTERNAL MEDICINE

## 2018-02-22 PROCEDURE — 1126F AMNT PAIN NOTED NONE PRSNT: CPT | Mod: S$GLB,,, | Performed by: INTERNAL MEDICINE

## 2018-02-22 PROCEDURE — 74019 RADEX ABDOMEN 2 VIEWS: CPT | Mod: 26,,, | Performed by: RADIOLOGY

## 2018-02-22 NOTE — TELEPHONE ENCOUNTER
URGENT   Patient is sick, see visit dx and order list  Please schedule x-rays and labs notice STAT orders  Add to schedule  Anywhere  I have instructed the patient to come to the [facility]  ceck in have x-rays  Labs then check in for appointment

## 2018-02-23 NOTE — PROGRESS NOTES
Subjective:       Patient ID: Karli Hameed is a 86 y.o. female.    Chief Complaint: Dizziness   URGENT VISIT  This note was created with the use of Dragon dictation    This 86-year-old woman has marginal kidney function.  For several weeks she has been experiencing intermittent nausea.  Last night, she noticed that she was having audible bowel sounds.  She has not vomited and she has had a normal bowel movement today.  Despite her nausea she has never vomited, and she has not lost any weight.  Her appetite is poor for the past couple of weeks, which is quite unusual.  She has a past history of small bowel obstruction.  She underwent an inguinal hernia repair, to prevent recurrent small bowel obstruction and unfortunately she had a very prolonged, painful and difficult recovery from the inguinal hernia repair.    Additionally she is concerned about muscle cramps, dry skin and hair loss.  HPI  Review of Systems   Constitutional: Positive for appetite change. Negative for activity change, chills, fatigue, fever and unexpected weight change.   HENT: Negative for dental problem, hearing loss, tinnitus, trouble swallowing and voice change.    Eyes: Negative for visual disturbance.   Respiratory: Negative for cough, chest tightness, shortness of breath and wheezing.    Cardiovascular: Negative for chest pain, palpitations and leg swelling.   Gastrointestinal: Positive for nausea. Negative for abdominal pain, blood in stool, constipation and diarrhea.   Genitourinary: Negative for difficulty urinating, dysuria, frequency and urgency.   Musculoskeletal: Positive for myalgias. Negative for arthralgias, back pain, gait problem, joint swelling, neck pain and neck stiffness.   Skin: Negative for rash.        Dry skin and hair loss   Neurological: Positive for dizziness. Negative for tremors, speech difficulty, weakness, light-headedness and headaches.   Psychiatric/Behavioral: Negative for dysphoric mood and sleep  disturbance. The patient is not nervous/anxious.        Objective:      Physical Exam   Constitutional: She is oriented to person, place, and time. She appears well-developed and well-nourished. No distress.   HENT:   Head: Normocephalic and atraumatic.   Right Ear: External ear normal.   Left Ear: External ear normal.   Nose: Nose normal.   Mouth/Throat: Oropharynx is clear and moist. No oropharyngeal exudate.   Eyes: Conjunctivae and EOM are normal. Pupils are equal, round, and reactive to light. Right eye exhibits no discharge. No scleral icterus.   Neck: Normal range of motion and full passive range of motion without pain. Neck supple. No spinous process tenderness and no muscular tenderness present. Carotid bruit is not present. No thyromegaly present.   Cardiovascular: Normal rate, regular rhythm, S1 normal, S2 normal, normal heart sounds and intact distal pulses.  Exam reveals no gallop and no friction rub.    No murmur heard.  Pulmonary/Chest: Effort normal and breath sounds normal. No respiratory distress. She has no wheezes. She has no rales. She exhibits no tenderness.   Abdominal: Soft. Bowel sounds are normal. She exhibits no distension and no mass. There is no tenderness. There is no rebound and no guarding.   Genitourinary: Pelvic exam was performed with patient supine. Uterus is not deviated, not enlarged, not fixed and not tender. Cervix exhibits no motion tenderness, no discharge and no friability. Right adnexum displays no mass, no tenderness and no fullness. Left adnexum displays no mass, no tenderness and no fullness.   Musculoskeletal: Normal range of motion. She exhibits no edema or tenderness.   Lymphadenopathy:        Head (right side): No submental and no submandibular adenopathy present.        Head (left side): No submental and no submandibular adenopathy present.     She has no cervical adenopathy.        Right cervical: No superficial cervical, no deep cervical and no posterior cervical  adenopathy present.       Left cervical: No superficial cervical, no deep cervical and no posterior cervical adenopathy present.        Right axillary: No pectoral and no lateral adenopathy present.        Left axillary: No pectoral and no lateral adenopathy present.       Right: No supraclavicular adenopathy present.        Left: No supraclavicular adenopathy present.   Neurological: She is alert and oriented to person, place, and time. She has normal reflexes. No cranial nerve deficit. She exhibits normal muscle tone. Coordination normal.   Skin: Skin is warm and dry. No rash noted.   Psychiatric: She has a normal mood and affect. Her behavior is normal. Her mood appears not anxious. Her speech is not rapid and/or pressured. She is not agitated. She does not exhibit a depressed mood.   Normal behavior, thought content, insight and judgement.   Nursing note and vitals reviewed.      Assessment:       1. Nausea    2. Borborygmi    3. H/O small bowel obstruction    4. Dizziness    5. Anorexia    6. CKD (chronic kidney disease) stage 4, GFR 15-29 ml/min    7. Muscle cramps    8. Dry skin    9. Hair loss        Plan:   Karli was seen today for dizziness.    Diagnoses and all orders for this visit:    Nausea, very worrisome symptom.  However, she has not lost weight.    Borborygmi and H/O small bowel obstruction.  Fortunately today her flat and upright abdominal films do not show any evidence suggestive of partial small bowel obstruction.    Dizziness.  She reports that 5 years ago on this exact same date this year,  she had the onset of dizziness occurred.  She thinks is just recurrent benign positional vertigo.  She had requested a prescription for meclizine but I think that low dose diazepam 1 mg taken as needed at bedtime for vertiginous symptoms would be safer at this reduced level of her kidney function.  She is also cautioned to not take any over-the-counter Benadryl.    Anorexia    CKD (chronic kidney disease)  stage 5.  She does not have any peripheral edema.  She seems to be developing slight periorbital edema.  I do not hear a pericardial friction rub.  She has had no vomiting.  It is terribly worrisome that these recent symptoms of nausea, anorexia and  muscle cramps could be indicative of impending uremia.    Muscle cramps She's been having a difficult time with muscle spasms  At night and also throughout the day occasionally she gets painful cramps in her hands and in her feet, arms and legs.  It has been going on and seems to be getting worse.      Dry skin and Hair loss.  She has noticed a peculiar dryness occurring in her skin, with some itchiness.  Also she has noticed significant hair loss, fortunately she has been endowed by nature with an extraordinarily beautiful thick head of hair.    She will be seeing her nephrologist, Dr. Alonzo on Friday, March 9.  Her kidney function has worsened and she has developed mild hyperkalemia.  She is given lists of foods that are low in potassium and instructions that she is to build her diet exclusively from these lists.  She is very intelligent, has a healthy diet and lifestyle and is willing to forego all foods high in potassium.  Results for orders placed or performed in visit on 02/22/18   CBC auto differential   Result Value Ref Range    WBC 4.20 3.90 - 12.70 K/uL    RBC 3.64 (L) 4.00 - 5.40 M/uL    Hemoglobin 10.5 (L) 12.0 - 16.0 g/dL    Hematocrit 33.0 (L) 37.0 - 48.5 %    MCV 91 82 - 98 fL    MCH 28.8 27.0 - 31.0 pg    MCHC 31.8 (L) 32.0 - 36.0 g/dL    RDW 13.8 11.5 - 14.5 %    Platelets 145 (L) 150 - 350 K/uL    MPV 10.6 9.2 - 12.9 fL    Gran # (ANC) 2.9 1.8 - 7.7 K/uL    Lymph # 1.0 1.0 - 4.8 K/uL    Mono # 0.3 0.3 - 1.0 K/uL    Eos # 0.1 0.0 - 0.5 K/uL    Baso # 0.01 0.00 - 0.20 K/uL    nRBC 0 0 /100 WBC    Gran% 68.4 38.0 - 73.0 %    Lymph% 23.1 18.0 - 48.0 %    Mono% 6.4 4.0 - 15.0 %    Eosinophil% 1.9 0.0 - 8.0 %    Basophil% 0.2 0.0 - 1.9 %    Differential  Method Automated    Magnesium   Result Value Ref Range    Magnesium 2.1 1.6 - 2.6 mg/dL   PHOSPHORUS   Result Value Ref Range    Phosphorus 4.7 (H) 2.7 - 4.5 mg/dL   Basic metabolic panel   Result Value Ref Range    Sodium 142 136 - 145 mmol/L    Potassium 4.9 3.5 - 5.1 mmol/L    Chloride 108 95 - 110 mmol/L    CO2 23 23 - 29 mmol/L    Glucose 118 (H) 70 - 110 mg/dL    BUN, Bld 47 (H) 8 - 23 mg/dL    Creatinine 3.2 (H) 0.5 - 1.4 mg/dL    Calcium 9.1 8.7 - 10.5 mg/dL    Anion Gap 11 8 - 16 mmol/L    eGFR if African American 14.4 (A) >60 mL/min/1.73 m^2    eGFR if non African American 12.5 (A) >60 mL/min/1.73 m^2

## 2018-02-23 NOTE — PROGRESS NOTES
Patient, Karli Hameed (MRN #[id number]), presented with a recent estimated Glumerular Filtration Rate (eGFR) less than 15 consistent with the definition of chronic kidney disease stage 5 (ICD10 - N18.5).    eGFR if non    Date Value Ref Range Status   02/22/2018 12.5 (A) >60 mL/min/1.73 m^2 Final     Comment:     Calculation used to obtain the estimated glomerular filtration  rate (eGFR) is the CKD-EPI equation.          The patient's chronic kidney disease stage 5 was monitored, evaluated, addressed and/or treated. This addendum to the medical record is made on 02/23/2018.

## 2018-02-26 ENCOUNTER — TELEPHONE (OUTPATIENT)
Dept: RHEUMATOLOGY | Facility: CLINIC | Age: 83
End: 2018-02-26

## 2018-02-26 ENCOUNTER — PATIENT MESSAGE (OUTPATIENT)
Dept: NEUROSURGERY | Facility: CLINIC | Age: 83
End: 2018-02-26

## 2018-02-26 ENCOUNTER — PATIENT MESSAGE (OUTPATIENT)
Dept: INTERNAL MEDICINE | Facility: CLINIC | Age: 83
End: 2018-02-26

## 2018-02-26 RX ORDER — AMLODIPINE BESYLATE 5 MG/1
5 TABLET ORAL EVERY MORNING
Qty: 90 TABLET | Refills: 3 | Status: SHIPPED | OUTPATIENT
Start: 2018-02-26 | End: 2019-03-26 | Stop reason: SDUPTHER

## 2018-03-21 ENCOUNTER — HOSPITAL ENCOUNTER (OUTPATIENT)
Dept: RADIOLOGY | Facility: CLINIC | Age: 83
Discharge: HOME | End: 2018-03-21
Attending: INTERNAL MEDICINE
Payer: MEDICARE

## 2018-03-21 DIAGNOSIS — M81.0 OSTEOPOROSIS, UNSPECIFIED OSTEOPOROSIS TYPE, UNSPECIFIED PATHOLOGICAL FRACTURE PRESENCE: ICD-10-CM

## 2018-03-21 PROCEDURE — 77080 DXA BONE DENSITY AXIAL: CPT | Mod: TC

## 2018-03-21 PROCEDURE — 77080 DXA BONE DENSITY AXIAL: CPT | Mod: 26,,, | Performed by: INTERNAL MEDICINE

## 2018-04-06 ENCOUNTER — OFFICE VISIT (OUTPATIENT)
Dept: NEPHROLOGY | Facility: CLINIC | Age: 83
End: 2018-04-06
Payer: MEDICARE

## 2018-04-06 VITALS
HEART RATE: 88 BPM | BODY MASS INDEX: 19.5 KG/M2 | DIASTOLIC BLOOD PRESSURE: 80 MMHG | HEIGHT: 64 IN | OXYGEN SATURATION: 99 % | SYSTOLIC BLOOD PRESSURE: 180 MMHG | WEIGHT: 114.19 LBS

## 2018-04-06 DIAGNOSIS — N18.4 CHRONIC KIDNEY DISEASE, STAGE IV (SEVERE): Primary | ICD-10-CM

## 2018-04-06 PROCEDURE — 99499 UNLISTED E&M SERVICE: CPT | Mod: S$GLB,,, | Performed by: INTERNAL MEDICINE

## 2018-04-06 PROCEDURE — 99214 OFFICE O/P EST MOD 30 MIN: CPT | Mod: S$GLB,,, | Performed by: INTERNAL MEDICINE

## 2018-04-06 PROCEDURE — 99999 PR PBB SHADOW E&M-EST. PATIENT-LVL III: CPT | Mod: PBBFAC,,, | Performed by: INTERNAL MEDICINE

## 2018-04-09 ENCOUNTER — LAB VISIT (OUTPATIENT)
Dept: LAB | Facility: HOSPITAL | Age: 83
End: 2018-04-09
Attending: INTERNAL MEDICINE
Payer: MEDICARE

## 2018-04-09 DIAGNOSIS — N18.4 CKD (CHRONIC KIDNEY DISEASE) STAGE 4, GFR 15-29 ML/MIN: ICD-10-CM

## 2018-04-09 DIAGNOSIS — R25.2 MUSCLE CRAMPS: ICD-10-CM

## 2018-04-09 DIAGNOSIS — E79.0 HYPERURICEMIA WITHOUT SIGNS OF INFLAMMATORY ARTHRITIS AND TOPHACEOUS DISEASE: ICD-10-CM

## 2018-04-09 DIAGNOSIS — E21.3 HYPERPARATHYROIDISM: ICD-10-CM

## 2018-04-09 DIAGNOSIS — M81.0 OSTEOPOROSIS, UNSPECIFIED OSTEOPOROSIS TYPE, UNSPECIFIED PATHOLOGICAL FRACTURE PRESENCE: ICD-10-CM

## 2018-04-09 LAB
ALBUMIN SERPL BCP-MCNC: 4 G/DL
ALP SERPL-CCNC: 35 U/L
ALT SERPL W/O P-5'-P-CCNC: 12 U/L
ANION GAP SERPL CALC-SCNC: 10 MMOL/L
AST SERPL-CCNC: 20 U/L
BASOPHILS # BLD AUTO: 0.01 K/UL
BASOPHILS NFR BLD: 0.2 %
BILIRUB SERPL-MCNC: 0.4 MG/DL
BUN SERPL-MCNC: 54 MG/DL
CALCIUM SERPL-MCNC: 9.1 MG/DL
CHLORIDE SERPL-SCNC: 110 MMOL/L
CO2 SERPL-SCNC: 23 MMOL/L
CREAT SERPL-MCNC: 3.1 MG/DL
DIFFERENTIAL METHOD: ABNORMAL
EOSINOPHIL # BLD AUTO: 0.1 K/UL
EOSINOPHIL NFR BLD: 1.9 %
ERYTHROCYTE [DISTWIDTH] IN BLOOD BY AUTOMATED COUNT: 13.8 %
EST. GFR  (AFRICAN AMERICAN): 15 ML/MIN/1.73 M^2
EST. GFR  (NON AFRICAN AMERICAN): 13 ML/MIN/1.73 M^2
GLUCOSE SERPL-MCNC: 99 MG/DL
HCT VFR BLD AUTO: 34.2 %
HGB BLD-MCNC: 10.7 G/DL
IMM GRANULOCYTES # BLD AUTO: 0.01 K/UL
IMM GRANULOCYTES NFR BLD AUTO: 0.2 %
LYMPHOCYTES # BLD AUTO: 0.8 K/UL
LYMPHOCYTES NFR BLD: 18.9 %
MCH RBC QN AUTO: 29.3 PG
MCHC RBC AUTO-ENTMCNC: 31.3 G/DL
MCV RBC AUTO: 94 FL
MONOCYTES # BLD AUTO: 0.2 K/UL
MONOCYTES NFR BLD: 5.5 %
NEUTROPHILS # BLD AUTO: 3.1 K/UL
NEUTROPHILS NFR BLD: 73.3 %
NRBC BLD-RTO: 0 /100 WBC
PLATELET # BLD AUTO: 136 K/UL
PMV BLD AUTO: 10.6 FL
POTASSIUM SERPL-SCNC: 3.9 MMOL/L
PROT SERPL-MCNC: 7 G/DL
PTH-INTACT SERPL-MCNC: 390 PG/ML
RBC # BLD AUTO: 3.65 M/UL
SODIUM SERPL-SCNC: 143 MMOL/L
URATE SERPL-MCNC: 7.1 MG/DL
WBC # BLD AUTO: 4.19 K/UL

## 2018-04-09 PROCEDURE — 85025 COMPLETE CBC W/AUTO DIFF WBC: CPT

## 2018-04-09 PROCEDURE — 84630 ASSAY OF ZINC: CPT

## 2018-04-09 PROCEDURE — 36415 COLL VENOUS BLD VENIPUNCTURE: CPT

## 2018-04-09 PROCEDURE — 84550 ASSAY OF BLOOD/URIC ACID: CPT

## 2018-04-09 PROCEDURE — 80053 COMPREHEN METABOLIC PANEL: CPT

## 2018-04-09 PROCEDURE — 83970 ASSAY OF PARATHORMONE: CPT

## 2018-04-09 NOTE — PROGRESS NOTES
Subjective:       Patient ID: Karli Hameed is a 86 y.o. White female who presents for follow-up evaluation of Chronic Kidney Disease    HPI    Ms. Hameed is an 86 year old woman with past medical history of hypertension, polycystic kidney disease presenting for follow up of chronic kidney disease.  Patient reports adequate fluid intake, denies any NSAID use.  She otherwise denies any fever, chest pain, shortness of breath, abdominal pain, diarrhea, dysuria/hematuria.     Review of Systems   Constitutional: Negative for appetite change, fatigue and fever.   HENT: Negative for congestion.    Respiratory: Negative for cough, chest tightness and shortness of breath.    Cardiovascular: Negative for chest pain and leg swelling.   Gastrointestinal: Negative for abdominal pain, constipation, diarrhea, nausea and vomiting.   Genitourinary: Negative for difficulty urinating, dysuria, flank pain, frequency, hematuria and urgency.   Musculoskeletal: Negative for arthralgias, joint swelling and myalgias.   Skin: Negative for rash and wound.   Neurological: Negative for dizziness, weakness and light-headedness.   All other systems reviewed and are negative.      Objective:      Physical Exam   Constitutional: She appears well-developed and well-nourished.   Cardiovascular: Normal rate, regular rhythm and normal heart sounds.  Exam reveals no gallop and no friction rub.    No murmur heard.  Pulmonary/Chest: Effort normal and breath sounds normal. No respiratory distress. She has no wheezes. She has no rales.   Abdominal: Soft. Bowel sounds are normal. There is no tenderness.   Musculoskeletal: She exhibits no edema.   Neurological: She is alert.   Skin: Skin is warm and dry. No rash noted. No erythema.   Psychiatric: She has a normal mood and affect.       Assessment:       1. Chronic kidney disease, stage IV (severe)        Plan:      Ms. Hameed is an 86 year old woman with past medical history of hypertension, polycystic  kidney disease presenting for follow up of chronic kidney disease stage IV.  Patient creatinine slightly above baseline, previous acute kidney injury due to UTI, v. secondary to prior URI, will repeat renal panel to trend, further recommendations pending results.  Encouraged fluid intake, stressed importance of blood pressure control to prevent any further progression of kidney disease, patient voiced understanding.   - Anemia: Hgb at goal, no indication for erythropoetin therapy, will trend CBC/iron studies  - Bone/mineral metabolism: patient with secondary hyperparathyroidism, PTH previously at goal for stage CKD    Return to clinic in 3-4 months pending CMP 4.9.18, then with renal/heme panel, iron/TIBC/ferritin, urinalysis/culture, urine protein/creatinine ratio prior to next visit

## 2018-04-10 ENCOUNTER — PES CALL (OUTPATIENT)
Dept: ADMINISTRATIVE | Facility: CLINIC | Age: 83
End: 2018-04-10

## 2018-04-10 LAB — ZINC SERPL-MCNC: 78 UG/DL (ref 60–130)

## 2018-04-16 ENCOUNTER — PATIENT MESSAGE (OUTPATIENT)
Dept: INTERNAL MEDICINE | Facility: CLINIC | Age: 83
End: 2018-04-16

## 2018-04-17 RX ORDER — LOSARTAN POTASSIUM 50 MG/1
50 TABLET ORAL DAILY
Qty: 90 TABLET | Refills: 3 | Status: SHIPPED | OUTPATIENT
Start: 2018-04-17 | End: 2019-05-06 | Stop reason: SDUPTHER

## 2018-05-01 ENCOUNTER — INFUSION (OUTPATIENT)
Dept: INFECTIOUS DISEASES | Facility: HOSPITAL | Age: 83
End: 2018-05-01
Attending: INTERNAL MEDICINE
Payer: MEDICARE

## 2018-05-01 ENCOUNTER — OFFICE VISIT (OUTPATIENT)
Dept: RHEUMATOLOGY | Facility: CLINIC | Age: 83
End: 2018-05-01
Payer: MEDICARE

## 2018-05-01 VITALS — WEIGHT: 114.63 LBS | BODY MASS INDEX: 19.1 KG/M2 | HEIGHT: 65 IN

## 2018-05-01 VITALS
WEIGHT: 114.63 LBS | HEART RATE: 80 BPM | HEIGHT: 65 IN | BODY MASS INDEX: 19.1 KG/M2 | DIASTOLIC BLOOD PRESSURE: 108 MMHG | SYSTOLIC BLOOD PRESSURE: 179 MMHG

## 2018-05-01 DIAGNOSIS — M85.80 OSTEOPENIA, UNSPECIFIED LOCATION: ICD-10-CM

## 2018-05-01 DIAGNOSIS — M19.042 OSTEOARTHRITIS OF FINGERS OF BOTH HANDS: ICD-10-CM

## 2018-05-01 DIAGNOSIS — M85.80 OSTEOPENIA, UNSPECIFIED LOCATION: Primary | ICD-10-CM

## 2018-05-01 DIAGNOSIS — M19.041 OSTEOARTHRITIS OF FINGERS OF BOTH HANDS: ICD-10-CM

## 2018-05-01 DIAGNOSIS — D64.9 ANEMIA, UNSPECIFIED TYPE: ICD-10-CM

## 2018-05-01 DIAGNOSIS — M11.20 CHONDROCALCINOSIS ARTICULARIS: ICD-10-CM

## 2018-05-01 DIAGNOSIS — R25.2 CRAMPS, MUSCLE, GENERAL: Primary | ICD-10-CM

## 2018-05-01 DIAGNOSIS — M81.0 SENILE OSTEOPOROSIS: ICD-10-CM

## 2018-05-01 PROCEDURE — 99999 PR PBB SHADOW E&M-EST. PATIENT-LVL III: CPT | Mod: PBBFAC,,, | Performed by: INTERNAL MEDICINE

## 2018-05-01 PROCEDURE — 63600175 PHARM REV CODE 636 W HCPCS: Mod: JG | Performed by: INTERNAL MEDICINE

## 2018-05-01 PROCEDURE — 96372 THER/PROPH/DIAG INJ SC/IM: CPT

## 2018-05-01 PROCEDURE — 99213 OFFICE O/P EST LOW 20 MIN: CPT | Mod: S$GLB,,, | Performed by: INTERNAL MEDICINE

## 2018-05-01 RX ADMIN — DENOSUMAB 60 MG: 60 INJECTION SUBCUTANEOUS at 10:05

## 2018-05-01 ASSESSMENT — ROUTINE ASSESSMENT OF PATIENT INDEX DATA (RAPID3)
FATIGUE SCORE: 5
TOTAL RAPID3 SCORE: 2
MDHAQ FUNCTION SCORE: .3
PSYCHOLOGICAL DISTRESS SCORE: 1.1
AM STIFFNESS SCORE: 0, NO
PATIENT GLOBAL ASSESSMENT SCORE: 5
PAIN SCORE: 0

## 2018-05-01 NOTE — PROGRESS NOTES
I have personally taken the history and examined the patient and agree with the resident,s note as stated above Has migratory arthralgia, continue symptoms OA hands, and migratory cramps. Also itching without rash. Just due yesterday for denosumab    Cont OT hand exercises  Coban tape prn  Schedule denosumab this week  RTC 6 months

## 2018-05-01 NOTE — ASSESSMENT & PLAN NOTE
Results for NIKI TOLENTINO (MRN [id number]) as of 5/1/2018 09:19   Ref. Range 4/9/2018 08:26   Sodium Latest Ref Range: 136 - 145 mmol/L 143   Potassium Latest Ref Range: 3.5 - 5.1 mmol/L 3.9   Chloride Latest Ref Range: 95 - 110 mmol/L 110   CO2 Latest Ref Range: 23 - 29 mmol/L 23   Anion Gap Latest Ref Range: 8 - 16 mmol/L 10   BUN, Bld Latest Ref Range: 8 - 23 mg/dL 54 (H)   Creatinine Latest Ref Range: 0.5 - 1.4 mg/dL 3.1 (H)   eGFR if non African American Latest Ref Range: >60 mL/min/1.73 m^2 13.0 (A)   eGFR if African American Latest Ref Range: >60 mL/min/1.73 m^2 15.0 (A)   Glucose Latest Ref Range: 70 - 110 mg/dL 99   Calcium Latest Ref Range: 8.7 - 10.5 mg/dL 9.1     denosumab 60mg sc q 6 mo due now  RTC 6 months

## 2018-05-01 NOTE — PATIENT INSTRUCTIONS
Please apply coban to finger joints as needed for pain.     Please follow-up with Dr. Hutchinson regarding itching and cramps.     Please have labs ordered drawn (Magnesium, Iron TIBC, TSH).     Return to clinic in 6 months.

## 2018-05-01 NOTE — PLAN OF CARE
Problem: Health Knowledge, Opportunity to Enhance (Adult,NICU,[place],Obstetrics,Pediatric)  Goal: Knowledgeable about Health Subject/Topic  Patient will demonstrate the desired outcomes by discharge/transition of care.  Outcome: Ongoing (interventions implemented as appropriate)  PATIENT EDUCATED ON HAND WASHING AND INFECTION CONTROL. PATIENT VERBALIZED UNDERSTANDING

## 2018-05-01 NOTE — PROGRESS NOTES
Subjective:       Patient ID: Karli Hameed is a 86 y.o. female.    Chief Complaint: Osteoporosis, chondrocalcinosis/OA knees  HPI   Ms. Hameed is an 85 year old woman with past medical history of hypertension and chronic kidney disease, s/p acute CPPD by history post right inguinal hernia repair 7/20/17, now resolved, here for follow-up of osteoporosis, chondrocalcinosis/OA knees. Last seen in clinic 11/20/2017.  Last denosumab (#5) on 10/30/17. She was due for her next injection yesterday.     Overall, Ms. Hameed reports she is doing well- there have been no flares of her knees since last visit and they are not painful.  She does reports itching and bumps and bothersome cramps in her hands, feet,  and neck. These are ongoing for the past few years and is becoming progressively worse. Pain and cramps in legs can prevent sleep. She takes tylenol and uses a topical OTC pain reliever. Pain is deep.     She keeps active and walks her dog every day and tries to eat a healthy balanced diet. Blood pressure medicine makes her tired so she waits to take her BP medicine until she is done driving. Blood pressure is high today and she reports that she has not yet taken her BP medication.     Recent DEXA scan shows osteopenia with elevated fracture risk with 8% improvement in the hip bone density compared with 2015.   Recent CMP shows ongoing low GFR and normal blood calcium.     Review of Systems   Constitutional: Positive for fatigue. Negative for activity change, appetite change, chills, fever and unexpected weight change.   HENT: Negative for congestion, sore throat and trouble swallowing.    Eyes: Negative for photophobia and pain.   Respiratory: Negative for chest tightness, shortness of breath and wheezing.    Cardiovascular: Negative for chest pain, palpitations and leg swelling.   Gastrointestinal: Positive for diarrhea. Negative for abdominal pain, constipation, nausea and vomiting.   Musculoskeletal: Positive for  "arthralgias and myalgias.   Skin: Positive for rash.        + pruritis      Neurological: Negative for tremors, seizures, numbness and headaches.     No recent falls.       Objective:   BP (!) 179/108   Pulse 80   Ht 5' 4.8" (1.646 m)   Wt 52 kg (114 lb 9.6 oz)   BMI 19.19 kg/m²      Physical Exam   Constitutional: She is oriented to person, place, and time and well-developed, well-nourished, and in no distress.   HENT:   Head: Normocephalic and atraumatic.   Mouth/Throat: Oropharynx is clear and moist.   Eyes: Conjunctivae and EOM are normal. Pupils are equal, round, and reactive to light.   Neck: Neck supple.   Cardiovascular: Normal heart sounds and intact distal pulses.    Pulmonary/Chest: Effort normal and breath sounds normal.   Abdominal: Soft. Bowel sounds are normal.       Right Side Rheumatological Exam     Examination finds the shoulder, elbow, wrist, 1st PIP, 1st MCP, 2nd PIP, 2nd MCP, 3rd PIP, 3rd MCP, 4th PIP, 4th MCP, 5th PIP and 5th MCP normal.    The patient has an enlarged knee, 1st CMC, 2nd DIP and 5th DIP    Shoulder Exam   Tenderness Location: no tenderness  Sensation: normal    Knee Exam     Range of Motion   The patient has normal right knee ROM.  Effusion: negative  Sensation: normal    Elbow/Wrist Exam   Sensation: normal    Left Side Rheumatological Exam     Examination finds the shoulder, elbow, wrist, 1st PIP, 1st MCP, 2nd PIP, 2nd MCP, 3rd PIP, 3rd MCP, 4th PIP, 4th MCP, 5th PIP, 5th MCP, 1st CMC and 2nd DIP normal.    The patient has an enlarged knee.    Shoulder Exam   Tenderness Location: no tenderness  Sensation: normal    Knee Exam     Range of Motion   The patient has normal left knee ROM.  Effusion: negative  Sensation: normal    Elbow/Wrist Exam   Sensation: normal      Neurological: She is alert and oriented to person, place, and time. She displays normal reflexes. She exhibits normal muscle tone.   Reflex Scores:       Tricep reflexes are 1+ on the right side and 1+ on the " left side.       Bicep reflexes are 1+ on the right side and 1+ on the left side.       Patellar reflexes are 2+ on the right side and 2+ on the left side.       Achilles reflexes are 1+ on the right side and 1+ on the left side.  Skin: Skin is warm and dry.     Psychiatric: Affect normal.   Musculoskeletal: She exhibits no edema.        Assessment/Plan:     Age-related osteoporosis without current pathological fracture  -Calcium wnl.   -Continue Prolia due yesterday- to be scheduled this week.   -Continue Vit D3 2000 units daily    Bilateral OA of hands and fingers  -Continue OT hand exercises  -May use coban tape as needed to finger joints  -May continue to use night splint as needed.     Chrondrocalcinosis articularis  -s/p acute CPPD by history, now resolved. Denies recent flares to knees.     Migratory arthralgia, fatigue, and pruritis  -Likely due to CKD. Follow-up with Dr. Hutchinson.  -Labs ordered: Magnesium for cramps, Fe TIBC for fatigue, and TSH for fatigue and cramps

## 2018-05-21 PROBLEM — N18.5 STAGE 5 CHRONIC KIDNEY DISEASE NOT ON CHRONIC DIALYSIS: Status: ACTIVE | Noted: 2018-05-21

## 2018-05-21 PROBLEM — N25.81 SECONDARY HYPERPARATHYROIDISM OF RENAL ORIGIN: Status: ACTIVE | Noted: 2018-05-21

## 2018-05-22 ENCOUNTER — OFFICE VISIT (OUTPATIENT)
Dept: INTERNAL MEDICINE | Facility: CLINIC | Age: 83
End: 2018-05-22
Payer: MEDICARE

## 2018-05-22 VITALS
DIASTOLIC BLOOD PRESSURE: 80 MMHG | HEART RATE: 90 BPM | HEIGHT: 64 IN | BODY MASS INDEX: 19.39 KG/M2 | OXYGEN SATURATION: 97 % | SYSTOLIC BLOOD PRESSURE: 110 MMHG | WEIGHT: 113.56 LBS

## 2018-05-22 DIAGNOSIS — D64.9 ANEMIA, UNSPECIFIED TYPE: Primary | ICD-10-CM

## 2018-05-22 DIAGNOSIS — E79.0 HYPERURICEMIA WITHOUT SIGNS OF INFLAMMATORY ARTHRITIS AND TOPHACEOUS DISEASE: ICD-10-CM

## 2018-05-22 DIAGNOSIS — R25.2 MUSCLE CRAMPS: ICD-10-CM

## 2018-05-22 DIAGNOSIS — D69.6 THROMBOCYTOPENIA: ICD-10-CM

## 2018-05-22 DIAGNOSIS — M85.80 OSTEOPENIA, UNSPECIFIED LOCATION: ICD-10-CM

## 2018-05-22 DIAGNOSIS — N25.81 SECONDARY HYPERPARATHYROIDISM OF RENAL ORIGIN: ICD-10-CM

## 2018-05-22 DIAGNOSIS — N18.5 STAGE 5 CHRONIC KIDNEY DISEASE NOT ON CHRONIC DIALYSIS: ICD-10-CM

## 2018-05-22 DIAGNOSIS — I10 ESSENTIAL HYPERTENSION: ICD-10-CM

## 2018-05-22 PROCEDURE — 99214 OFFICE O/P EST MOD 30 MIN: CPT | Mod: S$GLB,,, | Performed by: INTERNAL MEDICINE

## 2018-05-22 PROCEDURE — 99499 UNLISTED E&M SERVICE: CPT | Mod: S$GLB,,, | Performed by: INTERNAL MEDICINE

## 2018-05-22 PROCEDURE — 99999 PR PBB SHADOW E&M-EST. PATIENT-LVL III: CPT | Mod: PBBFAC,,, | Performed by: INTERNAL MEDICINE

## 2018-05-22 NOTE — PATIENT INSTRUCTIONS
Do not take any over the counter products containing Benadyl (diphenhydramine).    Please schedule your lab one week before seeing Dr. Alonzo.

## 2018-05-25 NOTE — PROGRESS NOTES
Subjective:       Patient ID: Karli Hameed is a 86 y.o. female.    Chief Complaint: Follow-up (cramps throughout body, periodically )   She is much better than last seen 3 months ago.  She is no longer having nausea.  She has picked up a little weight.  She looks well.  She still continues to complain of awakening with bizarre muscle cramps in her lower legs and feet.    All labs are reviewed and will be monitored.  HPI  Review of Systems   Constitutional: Negative for activity change, appetite change, chills, fatigue, fever and unexpected weight change.   HENT: Negative for hearing loss.    Eyes: Negative for visual disturbance.   Respiratory: Negative for cough, chest tightness, shortness of breath and wheezing.    Cardiovascular: Negative for chest pain, palpitations and leg swelling.   Gastrointestinal: Negative for abdominal pain, constipation, nausea and vomiting.   Genitourinary: Negative for dysuria, frequency and urgency.   Musculoskeletal: Negative for arthralgias, back pain, gait problem, joint swelling and myalgias.   Skin: Negative for rash.   Neurological: Negative for light-headedness and headaches.   Psychiatric/Behavioral: Negative for dysphoric mood and sleep disturbance. The patient is not nervous/anxious.        Objective:      Physical Exam   Constitutional: She is oriented to person, place, and time. She appears well-developed and well-nourished. No distress.   HENT:   Head: Normocephalic and atraumatic.   Right Ear: External ear normal.   Left Ear: External ear normal.   Nose: Nose normal.   Mouth/Throat: Oropharynx is clear and moist. No oropharyngeal exudate.   Eyes: Conjunctivae and EOM are normal. Pupils are equal, round, and reactive to light. Right eye exhibits no discharge. No scleral icterus.   Neck: Normal range of motion and full passive range of motion without pain. Neck supple. No spinous process tenderness and no muscular tenderness present. Carotid bruit is not present. No  thyromegaly present.   Cardiovascular: Normal rate, regular rhythm, S1 normal, S2 normal, normal heart sounds and intact distal pulses.  Exam reveals no gallop and no friction rub.    No murmur heard.  Pulmonary/Chest: Effort normal and breath sounds normal. No respiratory distress. She has no wheezes. She has no rales. She exhibits no tenderness.   Abdominal: Soft. Bowel sounds are normal. She exhibits no distension and no mass. There is no tenderness. There is no rebound and no guarding.   Genitourinary: Pelvic exam was performed with patient supine. Uterus is not deviated, not enlarged, not fixed and not tender. Cervix exhibits no motion tenderness, no discharge and no friability. Right adnexum displays no mass, no tenderness and no fullness. Left adnexum displays no mass, no tenderness and no fullness.   Musculoskeletal: Normal range of motion. She exhibits no edema or tenderness.   Lymphadenopathy:        Head (right side): No submental and no submandibular adenopathy present.        Head (left side): No submental and no submandibular adenopathy present.     She has no cervical adenopathy.        Right cervical: No superficial cervical, no deep cervical and no posterior cervical adenopathy present.       Left cervical: No superficial cervical, no deep cervical and no posterior cervical adenopathy present.        Right axillary: No pectoral and no lateral adenopathy present.        Left axillary: No pectoral and no lateral adenopathy present.       Right: No supraclavicular adenopathy present.        Left: No supraclavicular adenopathy present.   Neurological: She is alert and oriented to person, place, and time. She has normal reflexes. No cranial nerve deficit. She exhibits normal muscle tone. Coordination normal.   Skin: Skin is warm and dry. No rash noted.   Psychiatric: She has a normal mood and affect. Her behavior is normal. Her mood appears not anxious. Her speech is not rapid and/or pressured. She is  not agitated. She does not exhibit a depressed mood.   Normal behavior, thought content, insight and judgement.       Assessment:       1. Anemia, unspecified type    2. Stage 5 chronic kidney disease not on chronic dialysis    3. Secondary hyperparathyroidism of renal origin    4. Osteopenia, unspecified location    5. Thrombocytopenia    6. Hyperuricemia     7. Essential hypertension    8. Muscle cramps        Plan:   Karli was seen today for follow-up.    Diagnoses and all orders for this visit:    Anemia, unspecified type.  Anemia is very minor.  Continue to monitor.  -     CBC auto differential; Future  -     Iron and TIBC; Future    Stage 5 chronic kidney disease not on chronic dialysis.  No uremic symptoms appreciated unless the muscle cramping has something to do.  Will check CMP, magnesium, PTH, TSH, CBC, iron and TIBC.  Phosphorus.  She will be seeing her nephrologist, Dr. Alonzo on June 29th.  We have discussed hemodialysis on many different occasions.  She is not a candidate for peritoneal dialysis.  She will ask her nephrologist if he thinks that hemodialysis would be more harmful than helpful in her particular situation.  -     CBC auto differential; Future  -     Comprehensive metabolic panel; Future  -     PTH, intact; Future  -     Magnesium; Future  -     PHOSPHORUS; Future  -     Iron and TIBC; Future    Secondary hyperparathyroidism of renal origin  -     PTH, intact; Future    Osteopenia, unspecified location, on active treatment with Prolia.    Thrombocytopenia  -     CBC auto differential; Future    Hyperuricemia     Essential hypertension    Muscle cramps  Do not take any over the counter products containing Benadyl (diphenhydramine).    Please schedule your lab one week before seeing Dr. Alonzo.  -     TSH; Future    Follow-up in about 3 months (around 8/22/2018).  The

## 2018-06-22 ENCOUNTER — LAB VISIT (OUTPATIENT)
Dept: LAB | Facility: HOSPITAL | Age: 83
End: 2018-06-22
Attending: INTERNAL MEDICINE
Payer: MEDICARE

## 2018-06-22 DIAGNOSIS — N25.81 SECONDARY HYPERPARATHYROIDISM OF RENAL ORIGIN: ICD-10-CM

## 2018-06-22 DIAGNOSIS — R25.2 MUSCLE CRAMPS: ICD-10-CM

## 2018-06-22 DIAGNOSIS — D69.6 THROMBOCYTOPENIA: ICD-10-CM

## 2018-06-22 DIAGNOSIS — D64.9 ANEMIA, UNSPECIFIED TYPE: ICD-10-CM

## 2018-06-22 DIAGNOSIS — N18.5 STAGE 5 CHRONIC KIDNEY DISEASE NOT ON CHRONIC DIALYSIS: ICD-10-CM

## 2018-06-22 LAB
ALBUMIN SERPL BCP-MCNC: 4 G/DL
ALP SERPL-CCNC: 32 U/L
ALT SERPL W/O P-5'-P-CCNC: 13 U/L
ANION GAP SERPL CALC-SCNC: 9 MMOL/L
AST SERPL-CCNC: 21 U/L
BASOPHILS # BLD AUTO: 0 K/UL
BASOPHILS NFR BLD: 0 %
BILIRUB SERPL-MCNC: 0.6 MG/DL
BUN SERPL-MCNC: 45 MG/DL
CALCIUM SERPL-MCNC: 9.2 MG/DL
CHLORIDE SERPL-SCNC: 110 MMOL/L
CO2 SERPL-SCNC: 25 MMOL/L
CREAT SERPL-MCNC: 3.1 MG/DL
DIFFERENTIAL METHOD: ABNORMAL
EOSINOPHIL # BLD AUTO: 0.1 K/UL
EOSINOPHIL NFR BLD: 1.7 %
ERYTHROCYTE [DISTWIDTH] IN BLOOD BY AUTOMATED COUNT: 13.5 %
EST. GFR  (AFRICAN AMERICAN): 15 ML/MIN/1.73 M^2
EST. GFR  (NON AFRICAN AMERICAN): 13 ML/MIN/1.73 M^2
GLUCOSE SERPL-MCNC: 96 MG/DL
HCT VFR BLD AUTO: 33.8 %
HGB BLD-MCNC: 10.8 G/DL
IRON SERPL-MCNC: 92 UG/DL
LYMPHOCYTES # BLD AUTO: 1 K/UL
LYMPHOCYTES NFR BLD: 21 %
MAGNESIUM SERPL-MCNC: 2.2 MG/DL
MCH RBC QN AUTO: 29.3 PG
MCHC RBC AUTO-ENTMCNC: 32 G/DL
MCV RBC AUTO: 92 FL
MONOCYTES # BLD AUTO: 0.3 K/UL
MONOCYTES NFR BLD: 5.7 %
NEUTROPHILS # BLD AUTO: 3.4 K/UL
NEUTROPHILS NFR BLD: 71.6 %
PHOSPHATE SERPL-MCNC: 4.4 MG/DL
PLATELET # BLD AUTO: 144 K/UL
PMV BLD AUTO: 10.5 FL
POTASSIUM SERPL-SCNC: 4.2 MMOL/L
PROT SERPL-MCNC: 6.8 G/DL
PTH-INTACT SERPL-MCNC: 392 PG/ML
RBC # BLD AUTO: 3.68 M/UL
SATURATED IRON: 27 %
SODIUM SERPL-SCNC: 144 MMOL/L
TOTAL IRON BINDING CAPACITY: 342 UG/DL
TRANSFERRIN SERPL-MCNC: 231 MG/DL
TSH SERPL DL<=0.005 MIU/L-ACNC: 2.17 UIU/ML
WBC # BLD AUTO: 4.71 K/UL

## 2018-06-22 PROCEDURE — 84100 ASSAY OF PHOSPHORUS: CPT

## 2018-06-22 PROCEDURE — 83540 ASSAY OF IRON: CPT

## 2018-06-22 PROCEDURE — 83970 ASSAY OF PARATHORMONE: CPT

## 2018-06-22 PROCEDURE — 80053 COMPREHEN METABOLIC PANEL: CPT

## 2018-06-22 PROCEDURE — 36415 COLL VENOUS BLD VENIPUNCTURE: CPT

## 2018-06-22 PROCEDURE — 85025 COMPLETE CBC W/AUTO DIFF WBC: CPT

## 2018-06-22 PROCEDURE — 84443 ASSAY THYROID STIM HORMONE: CPT

## 2018-06-22 PROCEDURE — 83735 ASSAY OF MAGNESIUM: CPT

## 2018-06-29 ENCOUNTER — OFFICE VISIT (OUTPATIENT)
Dept: NEPHROLOGY | Facility: CLINIC | Age: 83
End: 2018-06-29
Payer: MEDICARE

## 2018-06-29 VITALS
BODY MASS INDEX: 18.78 KG/M2 | SYSTOLIC BLOOD PRESSURE: 138 MMHG | HEART RATE: 83 BPM | HEIGHT: 64 IN | DIASTOLIC BLOOD PRESSURE: 70 MMHG | OXYGEN SATURATION: 99 % | WEIGHT: 110 LBS

## 2018-06-29 DIAGNOSIS — N18.4 CHRONIC KIDNEY DISEASE, STAGE IV (SEVERE): Primary | ICD-10-CM

## 2018-06-29 PROCEDURE — 99499 UNLISTED E&M SERVICE: CPT | Mod: S$GLB,,, | Performed by: INTERNAL MEDICINE

## 2018-06-29 PROCEDURE — 99999 PR PBB SHADOW E&M-EST. PATIENT-LVL III: CPT | Mod: PBBFAC,,, | Performed by: INTERNAL MEDICINE

## 2018-06-29 PROCEDURE — 99214 OFFICE O/P EST MOD 30 MIN: CPT | Mod: S$GLB,,, | Performed by: INTERNAL MEDICINE

## 2018-06-29 NOTE — PROGRESS NOTES
Subjective:       Patient ID: Karli Hameed is a 86 y.o. White female who presents for follow-up evaluation of Chronic Kidney Disease    HPI    Ms. Hameed is an 86 year old woman with past medical history of hypertension, polycystic kidney disease presenting for follow up of chronic kidney disease.  Patient reports adequate fluid intake, denies any NSAID use.  She otherwise denies any anorexia, fatigue, fever, chest pain, shortness of breath, abdominal pain, diarrhea, dysuria/hematuria.     Review of Systems   Constitutional: Negative for appetite change, fatigue and fever.   HENT: Negative for congestion.    Respiratory: Negative for cough, chest tightness and shortness of breath.    Cardiovascular: Negative for chest pain and leg swelling.   Gastrointestinal: Negative for abdominal pain, constipation, diarrhea, nausea and vomiting.   Genitourinary: Negative for difficulty urinating, dysuria, flank pain, frequency, hematuria and urgency.   Musculoskeletal: Negative for arthralgias, joint swelling and myalgias.   Skin: Negative for rash and wound.   Neurological: Negative for dizziness, weakness and light-headedness.   All other systems reviewed and are negative.      Objective:      Physical Exam   Constitutional: She appears well-developed and well-nourished.   Cardiovascular: Normal rate, regular rhythm and normal heart sounds.  Exam reveals no gallop and no friction rub.    No murmur heard.  Pulmonary/Chest: Effort normal and breath sounds normal. No respiratory distress. She has no wheezes. She has no rales.   Abdominal: Soft. Bowel sounds are normal. There is no tenderness.   Musculoskeletal: She exhibits no edema.   Neurological: She is alert.   Skin: Skin is warm and dry. No rash noted. No erythema.   Psychiatric: She has a normal mood and affect.       Assessment:       1. Chronic kidney disease, stage IV (severe)        Plan:      Ms. Hameed is an 86 year old woman with past medical history of  hypertension, polycystic kidney disease presenting for follow up of chronic kidney disease stage IV.  Patient creatinine slightly above baseline, though stable (previous acute kidney injury due to UTI, v. secondary to prior URI), will repeat renal panel to trend, will obtain renal US with doppler to rule out obstructive/vascular etiology, further recommendations pending results.  Encouraged fluid intake, stressed importance of blood pressure control to prevent any further progression of kidney disease, patient voiced understanding.  Discussed signs/symptoms of uremia, along with indications for renal replacement therapy; will have patient attend [facility] education on dialysis modalities (reluctant to discuss access at this time).  - Anemia: Hgb at goal, no indication for erythropoetin therapy, will trend CBC/iron studies  - Bone/mineral metabolism: patient with secondary hyperparathyroidism, PTH at goal for stage CKD    Return to clinic in 3-4 months with renal/heme panel, iron/TIBC/ferritin, urinalysis/culture, urine protein/creatinine ratio prior to next visit

## 2018-07-09 ENCOUNTER — HOSPITAL ENCOUNTER (OUTPATIENT)
Dept: RADIOLOGY | Facility: HOSPITAL | Age: 83
Discharge: HOME | End: 2018-07-09
Attending: INTERNAL MEDICINE
Payer: MEDICARE

## 2018-07-09 DIAGNOSIS — N18.4 CHRONIC KIDNEY DISEASE, STAGE IV (SEVERE): ICD-10-CM

## 2018-07-09 PROCEDURE — 93975 VASCULAR STUDY: CPT | Mod: 26,,, | Performed by: RADIOLOGY

## 2018-07-09 PROCEDURE — 76770 US EXAM ABDO BACK WALL COMP: CPT | Mod: 26,XS,, | Performed by: RADIOLOGY

## 2018-07-09 PROCEDURE — 76770 US EXAM ABDO BACK WALL COMP: CPT | Mod: TC

## 2018-08-17 ENCOUNTER — PATIENT MESSAGE (OUTPATIENT)
Dept: INTERNAL MEDICINE | Facility: CLINIC | Age: 83
End: 2018-08-17

## 2018-08-17 ENCOUNTER — TELEPHONE (OUTPATIENT)
Dept: INTERNAL MEDICINE | Facility: CLINIC | Age: 83
End: 2018-08-17

## 2018-08-17 NOTE — TELEPHONE ENCOUNTER
----- Message from Kaylan Luu sent at 8/17/2018 10:52 AM CDT -----  Contact: self /[phone]   Type: Returning a call    Who left a message?Molly    When did the practice call?Yesterday    Comments:Please advise.          Thanks

## 2018-08-22 ENCOUNTER — HOSPITAL ENCOUNTER (OUTPATIENT)
Dept: RADIOLOGY | Facility: HOSPITAL | Age: 83
Discharge: HOME | End: 2018-08-22
Attending: INTERNAL MEDICINE
Payer: MEDICARE

## 2018-08-22 ENCOUNTER — OFFICE VISIT (OUTPATIENT)
Dept: INTERNAL MEDICINE | Facility: CLINIC | Age: 83
End: 2018-08-22
Payer: MEDICARE

## 2018-08-22 VITALS
DIASTOLIC BLOOD PRESSURE: 76 MMHG | OXYGEN SATURATION: 98 % | HEIGHT: 64 IN | BODY MASS INDEX: 19.08 KG/M2 | SYSTOLIC BLOOD PRESSURE: 122 MMHG | WEIGHT: 111.75 LBS | HEART RATE: 109 BPM

## 2018-08-22 DIAGNOSIS — M75.02 ADHESIVE CAPSULITIS OF BOTH SHOULDERS: ICD-10-CM

## 2018-08-22 DIAGNOSIS — Z12.39 SCREENING FOR BREAST CANCER: ICD-10-CM

## 2018-08-22 DIAGNOSIS — R25.2 CRAMPS, MUSCLE, GENERAL: ICD-10-CM

## 2018-08-22 DIAGNOSIS — M75.01 ADHESIVE CAPSULITIS OF BOTH SHOULDERS: ICD-10-CM

## 2018-08-22 DIAGNOSIS — N64.59 NIPPLE PROBLEM: ICD-10-CM

## 2018-08-22 DIAGNOSIS — N18.5 STAGE 5 CHRONIC KIDNEY DISEASE NOT ON CHRONIC DIALYSIS: Primary | ICD-10-CM

## 2018-08-22 DIAGNOSIS — H81.13 BENIGN PAROXYSMAL POSITIONAL VERTIGO DUE TO BILATERAL VESTIBULAR DISORDER: ICD-10-CM

## 2018-08-22 DIAGNOSIS — Z12.39 BREAST CANCER SCREENING: ICD-10-CM

## 2018-08-22 PROCEDURE — 73030 X-RAY EXAM OF SHOULDER: CPT | Mod: TC,50

## 2018-08-22 PROCEDURE — 99213 OFFICE O/P EST LOW 20 MIN: CPT | Mod: S$GLB,,, | Performed by: INTERNAL MEDICINE

## 2018-08-22 PROCEDURE — 73030 X-RAY EXAM OF SHOULDER: CPT | Mod: 26,50,, | Performed by: RADIOLOGY

## 2018-08-22 PROCEDURE — 99999 PR PBB SHADOW E&M-EST. PATIENT-LVL V: CPT | Mod: PBBFAC,,, | Performed by: INTERNAL MEDICINE

## 2018-08-22 PROCEDURE — 99499 UNLISTED E&M SERVICE: CPT | Mod: S$GLB,,, | Performed by: INTERNAL MEDICINE

## 2018-08-23 ENCOUNTER — TELEPHONE (OUTPATIENT)
Dept: RHEUMATOLOGY | Facility: CLINIC | Age: 83
End: 2018-08-23

## 2018-08-23 NOTE — PROGRESS NOTES
Subjective:       Patient ID: Karli Hameed is a 86 y.o. female.    Chief Complaint: Follow-up (3 month follow up, sore throat, burning eyes.) and Shoulder Pain   She is gradually declining  She does not complain much  The generalized muscle cramps that she was having are not as severe because she has decided to sleep on the sofa in the downstairs of her home  She has a 2 story house with all of her bedrooms up stairs.    She describes times of pain throughout her entire body    She is particularly having trouble with both of her shoulders being painful and stiff    She has been monitoring her weight and blood pressure daily and her weight is stable her blood pressure has been perfect    She is following a low phosphorus diet and brought me a handout so I could share this with other patients  HPI  Review of Systems   Constitutional: Positive for fatigue. Negative for activity change, appetite change, chills, fever and unexpected weight change.   HENT: Negative for hearing loss.    Eyes: Negative for visual disturbance.   Respiratory: Negative for cough, chest tightness, shortness of breath and wheezing.    Cardiovascular: Negative for chest pain, palpitations and leg swelling.   Gastrointestinal: Negative for abdominal pain, constipation, nausea and vomiting.   Genitourinary: Negative for dysuria, frequency and urgency.   Musculoskeletal: Positive for arthralgias and myalgias. Negative for back pain, gait problem and joint swelling.   Skin: Negative for rash.   Neurological: Negative for light-headedness and headaches.   Psychiatric/Behavioral: Positive for sleep disturbance. Negative for dysphoric mood. The patient is not nervous/anxious.        Objective:      Physical Exam   Constitutional: She is oriented to person, place, and time. She appears well-nourished.   HENT:   Head: Atraumatic.   Robina orbital edema   Eyes: Conjunctivae are normal. No scleral icterus.   Neck: Neck supple.   Cardiovascular: Normal rate  and regular rhythm.   No pericardial friction rub   Pulmonary/Chest: Effort normal and breath sounds normal.   Abdominal: Soft. There is no tenderness.   Musculoskeletal: She exhibits no edema.   Lymphadenopathy:     She has no cervical adenopathy.   Neurological: She is alert and oriented to person, place, and time. She displays normal reflexes. She exhibits normal muscle tone. Coordination normal.   Skin: Skin is warm and dry.   Psychiatric: She has a normal mood and affect. Her behavior is normal.   Nursing note and vitals reviewed.      Assessment:       1. Stage 5 chronic kidney disease not on chronic dialysis    2. Nipple problem, left only    3. Benign paroxysmal positional vertigo due to bilateral vestibular disorder    4. Cramps, muscle, general    5. Breast cancer screening    6. Adhesive capsulitis of both shoulders    7. Screening for breast cancer        Plan:   Karli was seen today for follow-up and shoulder pain.    Diagnoses and all orders for this visit:    Stage 5 chronic kidney disease not on chronic dialysis.  She is such a nice lady, it is hard to see her declining.  We need to discuss advanced directives again and will do so at the next visit.  She tells me her nephrologist has not discussed dialysis with her.  I am not sure that hemodialysis would offer her an improved quality of life.  Because of her prior abdominal surgeries she might not be a candidate for in home peritoneal dialysis.      Nipple problem, left only  -     Mammo Digital Screening Bilat with Tomosynthesis CAD; Future    Benign paroxysmal positional vertigo due to bilateral vestibular disorder    Cramps, muscle, general    Breast cancer screening    Adhesive capsulitis of both shoulders  -     X-Ray Shoulder Trauma 3 view Left; Future  -     Cancel: X-Ray Shoulder Trauma 3 view Right; Future  -     X-Ray Shoulder Trauma 3 View Bilateral; Future    Screening for breast cancer  -     Mammo Digital Screening Bilat with  Tomosynthesis CAD; Future     I wonder if there is something that she could place on her skin topically to give her some shoulder pain relief

## 2018-08-23 NOTE — TELEPHONE ENCOUNTER
Talia, please schedule f/u with me with or without fellow next week for shoulder injections. Thanks ARMANI

## 2018-08-28 ENCOUNTER — HOSPITAL ENCOUNTER (OUTPATIENT)
Dept: RADIOLOGY | Facility: HOSPITAL | Age: 83
Discharge: HOME | End: 2018-08-28
Attending: INTERNAL MEDICINE
Payer: MEDICARE

## 2018-08-28 DIAGNOSIS — N64.59 NIPPLE PROBLEM: ICD-10-CM

## 2018-08-28 DIAGNOSIS — Z12.39 SCREENING FOR BREAST CANCER: ICD-10-CM

## 2018-08-28 PROCEDURE — 77067 SCR MAMMO BI INCL CAD: CPT | Mod: 26,,, | Performed by: RADIOLOGY

## 2018-08-28 PROCEDURE — 77063 BREAST TOMOSYNTHESIS BI: CPT | Mod: TC

## 2018-08-28 PROCEDURE — 77063 BREAST TOMOSYNTHESIS BI: CPT | Mod: 26,,, | Performed by: RADIOLOGY

## 2018-09-08 ENCOUNTER — PATIENT MESSAGE (OUTPATIENT)
Dept: INTERNAL MEDICINE | Facility: CLINIC | Age: 83
End: 2018-09-08

## 2018-09-10 ENCOUNTER — CLINICAL SUPPORT (OUTPATIENT)
Dept: RHEUMATOLOGY | Facility: CLINIC | Age: 83
End: 2018-09-10
Payer: MEDICARE

## 2018-09-10 VITALS
DIASTOLIC BLOOD PRESSURE: 90 MMHG | SYSTOLIC BLOOD PRESSURE: 138 MMHG | BODY MASS INDEX: 19.36 KG/M2 | HEART RATE: 77 BPM | WEIGHT: 112.81 LBS

## 2018-09-10 DIAGNOSIS — M25.511 ACUTE PAIN OF RIGHT SHOULDER: Primary | ICD-10-CM

## 2018-09-10 PROCEDURE — 20610 DRAIN/INJ JOINT/BURSA W/O US: CPT | Mod: PBBFAC | Performed by: INTERNAL MEDICINE

## 2018-09-10 PROCEDURE — 99999 PR PBB SHADOW E&M-EST. PATIENT-LVL III: CPT | Mod: PBBFAC,GC,, | Performed by: INTERNAL MEDICINE

## 2018-09-10 PROCEDURE — 99215 OFFICE O/P EST HI 40 MIN: CPT | Mod: S$PBB,25,GC, | Performed by: INTERNAL MEDICINE

## 2018-09-10 PROCEDURE — 99213 OFFICE O/P EST LOW 20 MIN: CPT | Mod: PBBFAC,25 | Performed by: INTERNAL MEDICINE

## 2018-09-10 RX ORDER — TRIAMCINOLONE ACETONIDE 40 MG/ML
40 INJECTION, SUSPENSION INTRA-ARTICULAR; INTRAMUSCULAR
Status: DISCONTINUED | OUTPATIENT
Start: 2018-09-10 | End: 2018-09-10 | Stop reason: HOSPADM

## 2018-09-10 RX ADMIN — TRIAMCINOLONE ACETONIDE 40 MG: 40 INJECTION, SUSPENSION INTRA-ARTICULAR; INTRAMUSCULAR at 03:09

## 2018-09-10 NOTE — PROGRESS NOTES
I  Have personally take the history and examined the patient and agree with fellow's note as stated above.The right shoulder injection performed by Dr. Figueroa personally observed and examination findings also confirmed. ARMANI

## 2018-09-10 NOTE — PROCEDURES
Large Joint Aspiration/Injection: R subacromial bursa  Date/Time: 9/10/2018 3:29 PM  Performed by: Janki Figueroa MD  Authorized by: Jacob Max MD     Consent Done?:  Yes (Verbal)  Indications:  Pain  Procedure site marked: Yes      Location:  Shoulder  Site:  R subacromial bursa  Prep: Patient was prepped and draped in usual sterile fashion    Needle size:  25 G  Approach:  Lateral  Medications:  40 mg triamcinolone acetonide 40 mg/mL  Patient tolerance:  Patient tolerated the procedure well with no immediate complications

## 2018-09-10 NOTE — PROGRESS NOTES
Subjective:       Patient ID: Karli Hameed is a 86 y.o. female.    Chief Complaint: No chief complaint on file.    HPI 86F with HTN,chondrocalcinosis/OA knees, s/p acute CPPD by history, Osteoporosis on Prolia ( last dose 05/01/18) presenting with R shoulder pain that started about 3 months ago which has been progressively gotten worse with difficulties with ADLs. Pt reports that there was associated swelling of the R shoulder mainly in the deltoid region which resolved spontaneously. Pt reports that bra straps aggravated the pain in the R shoulder and had to maneuver to certain positions. Xray shoulder obtained by PCP showed osteoarthritic changes     Pt denies fever, chills, CP, SOB, abd pain, changes in bowel/bladder habits, other joint pains/swelling.       Review of Systems   Constitutional: Negative for chills and fever.   HENT: Negative for mouth sores and trouble swallowing.    Eyes: Negative for visual disturbance.   Respiratory: Negative for chest tightness and shortness of breath.    Cardiovascular: Negative for chest pain.   Gastrointestinal: Negative for abdominal pain, constipation, nausea and vomiting.   Genitourinary: Negative for flank pain and hematuria.   Musculoskeletal: Positive for arthralgias and joint swelling. Negative for gait problem and neck pain.   Skin: Negative for color change and rash.   Neurological: Negative for dizziness, weakness and numbness.   Psychiatric/Behavioral: Negative for behavioral problems and sleep disturbance. The patient is not nervous/anxious.          Objective:   BP (!) 138/90   Pulse 77   Wt 51.2 kg (112 lb 12.8 oz)   BMI 19.36 kg/m²      Physical Exam   Constitutional: She is oriented to person, place, and time and well-developed, well-nourished, and in no distress.   HENT:   Head: Normocephalic and atraumatic.   Eyes: EOM are normal. Pupils are equal, round, and reactive to light.   Neck: Normal range of motion. Neck supple.   Cardiovascular: Normal  rate and regular rhythm.    Pulmonary/Chest: Effort normal and breath sounds normal.   Abdominal: Soft. Bowel sounds are normal.       Right Side Rheumatological Exam     The patient is tender to palpation of the shoulder    Shoulder Exam   Tenderness Location: subacromion and biceps tendon    Range of Motion   The patient has normal right shoulder ROM.    Crepitus: positive  Swelling: negative    Left Side Rheumatological Exam     Examination finds the shoulder normal.    Shoulder Exam   Tenderness Location: biceps tendon    Range of Motion   The patient has normal left shoulder ROM.    Crepitus: negative  Swelling: negative      Lymphadenopathy:     She has no cervical adenopathy.   Neurological: She is alert and oriented to person, place, and time.   Skin: Skin is warm and dry.     Psychiatric: Affect normal.   Musculoskeletal: She exhibits tenderness. She exhibits no edema or deformity.   + Mace  Neer sign  + impingement sign on R shoulder  R Shoulder Pain/discomfort with resisted supination         Xray shoulder  FINDINGS:  Right shoulder: There is mild DJD of the acromioclavicular joint.  No acute fractures or dislocations or osteoblastic or lytic lesions.  Soft tissues are unremarkable.    Left shoulder: There is also mild osteoarthritic changes of the acromioclavicular joint.  Glenohumeral joint demonstrates also Mild DJD.  No acute fractures or dislocations or osteoblastic or lytic lesions.          Assessment:       1. Acute pain of right shoulder            Plan:         Diagnoses and all orders for this visit:    Acute pain of right shoulder  Likely 2/2 rotator cuff tendinopathy and/vs OA. Xray shoulder shows OA changes.  S/p IA steroid injection this visit which pt tolerated well  Continue PT  Tylenol prn  -     Large Joint Aspiration/Injection: R subacromial bursa

## 2018-09-16 ENCOUNTER — PATIENT MESSAGE (OUTPATIENT)
Dept: INTERNAL MEDICINE | Facility: CLINIC | Age: 83
End: 2018-09-16

## 2018-09-17 NOTE — TELEPHONE ENCOUNTER
Dear Dr. Hutchinson:  Since Thursday I have been having problem with my head.  I feel like I have a cold, lots of sneezing, running nose, some , mucus and light headed.  (all in my head) I will appreciated if you can prescribe anything that can help me out. Please use [facility].     Thanks,     Karli Hameed             Please advise  Thank you

## 2018-09-19 ENCOUNTER — PATIENT MESSAGE (OUTPATIENT)
Dept: INTERNAL MEDICINE | Facility: CLINIC | Age: 83
End: 2018-09-19

## 2018-09-21 ENCOUNTER — PATIENT MESSAGE (OUTPATIENT)
Dept: INTERNAL MEDICINE | Facility: CLINIC | Age: 83
End: 2018-09-21

## 2018-10-10 ENCOUNTER — PES CALL (OUTPATIENT)
Dept: ADMINISTRATIVE | Facility: CLINIC | Age: 83
End: 2018-10-10

## 2018-10-12 ENCOUNTER — OFFICE VISIT (OUTPATIENT)
Dept: NEPHROLOGY | Facility: CLINIC | Age: 83
End: 2018-10-12
Payer: MEDICARE

## 2018-10-12 VITALS
SYSTOLIC BLOOD PRESSURE: 130 MMHG | WEIGHT: 111.13 LBS | BODY MASS INDEX: 18.97 KG/M2 | DIASTOLIC BLOOD PRESSURE: 80 MMHG | HEIGHT: 64 IN | HEART RATE: 96 BPM | OXYGEN SATURATION: 97 %

## 2018-10-12 DIAGNOSIS — N18.4 CHRONIC KIDNEY DISEASE, STAGE IV (SEVERE): Primary | ICD-10-CM

## 2018-10-12 PROCEDURE — 99999 PR PBB SHADOW E&M-EST. PATIENT-LVL III: CPT | Mod: PBBFAC,,, | Performed by: INTERNAL MEDICINE

## 2018-10-12 PROCEDURE — 99214 OFFICE O/P EST MOD 30 MIN: CPT | Mod: S$PBB,,, | Performed by: INTERNAL MEDICINE

## 2018-10-12 PROCEDURE — 1101F PT FALLS ASSESS-DOCD LE1/YR: CPT | Mod: CPTII,,, | Performed by: INTERNAL MEDICINE

## 2018-10-12 PROCEDURE — 99213 OFFICE O/P EST LOW 20 MIN: CPT | Mod: PBBFAC | Performed by: INTERNAL MEDICINE

## 2018-10-14 NOTE — PROGRESS NOTES
Subjective:       Patient ID: Karli Hameed is a 86 y.o. White female who presents for follow-up evaluation of Chronic Kidney Disease    HPI    Ms. Hameed is an 86 year old woman with past medical history of hypertension, polycystic kidney disease presenting for follow up of chronic kidney disease.  Patient reports adequate fluid intake, denies any NSAID use.  She otherwise denies any anorexia, fatigue, fever, chest pain, shortness of breath, abdominal pain, diarrhea, dysuria/hematuria.     Review of Systems   Constitutional: Negative for appetite change, fatigue and fever.   HENT: Negative for congestion.    Respiratory: Negative for cough, chest tightness and shortness of breath.    Cardiovascular: Negative for chest pain and leg swelling.   Gastrointestinal: Negative for abdominal pain, constipation, diarrhea, nausea and vomiting.   Genitourinary: Negative for difficulty urinating, dysuria, flank pain, frequency, hematuria and urgency.   Musculoskeletal: Negative for arthralgias, joint swelling and myalgias.   Skin: Negative for rash and wound.   Neurological: Negative for dizziness, weakness and light-headedness.   All other systems reviewed and are negative.      Objective:      Physical Exam   Constitutional: She appears well-developed and well-nourished.   Cardiovascular: Normal rate, regular rhythm and normal heart sounds. Exam reveals no gallop and no friction rub.   No murmur heard.  Pulmonary/Chest: Effort normal and breath sounds normal. No respiratory distress. She has no wheezes. She has no rales.   Abdominal: Soft. Bowel sounds are normal. There is no tenderness.   Musculoskeletal: She exhibits no edema.   Neurological: She is alert.   Skin: Skin is warm and dry. No rash noted. No erythema.   Psychiatric: She has a normal mood and affect.       Assessment:       1. Chronic kidney disease, stage IV (severe)        Plan:      Ms. Hameed is an 86 year old woman with past medical history of  hypertension, polycystic kidney disease presenting for follow up of chronic kidney disease stage IV.  Patient creatinine slightly above baseline, though stable for the past several months (previous acute kidney injury due to UTI, v. secondary to prior URI), will repeat renal panel to trend, reviewed renal US 7.18 with doppler to rule out obstructive/vascular etiology, further recommendations pending results.  Encouraged fluid intake, stressed importance of blood pressure control to prevent any further progression of kidney disease, patient voiced understanding.  Discussed signs/symptoms of uremia, along with indications for renal replacement therapy; will have patient attend \[facility]\"" education on dialysis modalities (reluctant to discuss access at this time).  - Anemia: Hgb at goal, no indication for erythropoetin therapy, will trend CBC/iron studies  - Bone/mineral metabolism: patient with secondary hyperparathyroidism, PTH at goal for stage CKD    Return to clinic in 2-4 months pending renal panel, then with renal/heme panel, iron/TIBC/ferritin, urinalysis/culture, urine protein/creatinine ratio prior to next visit

## 2018-10-15 ENCOUNTER — TELEPHONE (OUTPATIENT)
Dept: NEPHROLOGY | Facility: CLINIC | Age: 83
End: 2018-10-15

## 2018-10-15 NOTE — TELEPHONE ENCOUNTER
----- Message from Luis M Alonzo MD sent at 10/15/2018  9:13 AM CDT -----  Please call to let her know her kidney function is stable, thank you.

## 2018-10-29 ENCOUNTER — PATIENT MESSAGE (OUTPATIENT)
Dept: INTERNAL MEDICINE | Facility: CLINIC | Age: 83
End: 2018-10-29

## 2018-10-30 ENCOUNTER — PATIENT MESSAGE (OUTPATIENT)
Dept: INTERNAL MEDICINE | Facility: CLINIC | Age: 83
End: 2018-10-30

## 2018-10-31 ENCOUNTER — OFFICE VISIT (OUTPATIENT)
Dept: RHEUMATOLOGY | Facility: CLINIC | Age: 83
End: 2018-10-31
Payer: MEDICARE

## 2018-10-31 ENCOUNTER — LAB VISIT (OUTPATIENT)
Dept: LAB | Facility: HOSPITAL | Age: 83
End: 2018-10-31
Attending: INTERNAL MEDICINE
Payer: MEDICARE

## 2018-10-31 VITALS
HEART RATE: 70 BPM | HEIGHT: 64 IN | SYSTOLIC BLOOD PRESSURE: 134 MMHG | BODY MASS INDEX: 18.78 KG/M2 | DIASTOLIC BLOOD PRESSURE: 72 MMHG | WEIGHT: 110 LBS

## 2018-10-31 DIAGNOSIS — M11.20 CHONDROCALCINOSIS ARTICULARIS: ICD-10-CM

## 2018-10-31 DIAGNOSIS — N18.4 CHRONIC KIDNEY DISEASE, STAGE IV (SEVERE): ICD-10-CM

## 2018-10-31 DIAGNOSIS — M54.2 NECK PAIN: ICD-10-CM

## 2018-10-31 DIAGNOSIS — M81.0 OSTEOPOROSIS, UNSPECIFIED OSTEOPOROSIS TYPE, UNSPECIFIED PATHOLOGICAL FRACTURE PRESENCE: Primary | ICD-10-CM

## 2018-10-31 PROBLEM — R11.0 NAUSEA: Status: RESOLVED | Noted: 2018-02-22 | Resolved: 2018-10-31

## 2018-10-31 LAB
BACTERIA #/AREA URNS AUTO: NORMAL /HPF
BILIRUB UR QL STRIP: NEGATIVE
CLARITY UR REFRACT.AUTO: CLEAR
COLOR UR AUTO: YELLOW
CREAT UR-MCNC: 71 MG/DL
GLUCOSE UR QL STRIP: NEGATIVE
HGB UR QL STRIP: NEGATIVE
HYALINE CASTS UR QL AUTO: 0 /LPF
KETONES UR QL STRIP: NEGATIVE
LEUKOCYTE ESTERASE UR QL STRIP: NEGATIVE
MICROSCOPIC COMMENT: NORMAL
NITRITE UR QL STRIP: NEGATIVE
PH UR STRIP: 6 [PH] (ref 5–8)
PROT UR QL STRIP: ABNORMAL
PROT UR-MCNC: 43 MG/DL
PROT/CREAT UR: 0.61 MG/G{CREAT}
RBC #/AREA URNS AUTO: 0 /HPF (ref 0–4)
SP GR UR STRIP: 1.01 (ref 1–1.03)
URN SPEC COLLECT METH UR: ABNORMAL
WBC #/AREA URNS AUTO: 1 /HPF (ref 0–5)

## 2018-10-31 PROCEDURE — 1101F PT FALLS ASSESS-DOCD LE1/YR: CPT | Mod: CPTII,HCNC,, | Performed by: INTERNAL MEDICINE

## 2018-10-31 PROCEDURE — 99213 OFFICE O/P EST LOW 20 MIN: CPT | Mod: S$PBB,HCNC,, | Performed by: INTERNAL MEDICINE

## 2018-10-31 PROCEDURE — 99214 OFFICE O/P EST MOD 30 MIN: CPT | Mod: PBBFAC,HCNC | Performed by: INTERNAL MEDICINE

## 2018-10-31 PROCEDURE — 82570 ASSAY OF URINE CREATININE: CPT | Mod: HCNC

## 2018-10-31 PROCEDURE — 99999 PR PBB SHADOW E&M-EST. PATIENT-LVL IV: CPT | Mod: PBBFAC,HCNC,, | Performed by: INTERNAL MEDICINE

## 2018-10-31 PROCEDURE — 81001 URINALYSIS AUTO W/SCOPE: CPT | Mod: HCNC

## 2018-10-31 RX ORDER — METHYLPREDNISOLONE 4 MG/1
TABLET ORAL
Qty: 1 PACKAGE | Refills: 0 | Status: SHIPPED | OUTPATIENT
Start: 2018-10-31 | End: 2018-11-21

## 2018-10-31 ASSESSMENT — ROUTINE ASSESSMENT OF PATIENT INDEX DATA (RAPID3)
PAIN SCORE: 9
PATIENT GLOBAL ASSESSMENT SCORE: 5
AM STIFFNESS SCORE: 0, NO
FATIGUE SCORE: 5
TOTAL RAPID3 SCORE: 5.33
MDHAQ FUNCTION SCORE: .6
PSYCHOLOGICAL DISTRESS SCORE: 2.2

## 2018-10-31 NOTE — PATIENT INSTRUCTIONS
Please get thermaphore ([facility] or local pharmacy) and apply as needed for neck pain   Please use Tylenol as needed for neck pain  Please get Prolia Injection this Friday  Please continue home exercise and physical therapy

## 2018-10-31 NOTE — PROGRESS NOTES
I have personally taken the history and examined the patient and agree with the resident,s note as stated above Woke up 1 wk ago with severe neck stiffness and pain painful with all rom. No radiation, no neurologic symptoms    Exam: paracervical and midline cervical muscle tenderness, markedly limited range of motion. Neck held stiffly      Results for NIKI TOLENTINO (MRN [id number]) as of 10/31/2018 09:28   Ref. Range 6/22/2018 07:35 10/12/2018 09:50   Sodium Latest Ref Range: 136 - 145 mmol/L 144 143   Potassium Latest Ref Range: 3.5 - 5.1 mmol/L 4.2 4.3   Chloride Latest Ref Range: 95 - 110 mmol/L 110 109   CO2 Latest Ref Range: 23 - 29 mmol/L 25 25   Anion Gap Latest Ref Range: 8 - 16 mmol/L 9 9   BUN, Bld Latest Ref Range: 8 - 23 mg/dL 45 (H) 48 (H)   Creatinine Latest Ref Range: 0.5 - 1.4 mg/dL 3.1 (H) 3.1 (H)   eGFR if non African American Latest Ref Range: >60 mL/min/1.73 m^2 13 (A) 13.0 (A)   eGFR if African American Latest Ref Range: >60 mL/min/1.73 m^2 15 (A) 15.0 (A)   Glucose Latest Ref Range: 70 - 110 mg/dL 96 75   Calcium Latest Ref Range: 8.7 - 10.5 mg/dL 9.2 9.0   Phosphorus Latest Ref Range: 2.7 - 4.5 mg/dL 4.4 4.3   Magnesium Latest Ref Range: 1.6 - 2.6 mg/dL 2.2    Alkaline Phosphatase Latest Ref Range: 55 - 135 U/L 32 (L)    Total Protein Latest Ref Range: 6.0 - 8.4 g/dL 6.8    Albumin Latest Ref Range: 3.5 - 5.2 g/dL 4.0 4.1   Total Bilirubin Latest Ref Range: 0.1 - 1.0 mg/dL 0.6    AST Latest Ref Range: 10 - 40 U/L 21    ALT Latest Ref Range: 10 - 44 U/L 13    Vit D, 25-Hydroxy Latest Ref Range: 30 - 96 ng/mL  47   TSH Latest Ref Range: 0.400 - 4.000 uIU/mL 2.170    PTH Latest Ref Range: 9.0 - 77.0 pg/mL 392.0 (H)            Severe Neck pain and stiffness, strain v. Acute CPDD  Chondrocalcinosis, h/o acute CPDD right knee  Gen OA, hands and knees, AC joints, left glenohumeral  Acute right shoulder pain 9/10/18  With signs of impingement, rotator cuff tendinitis, bicipital tendinitis and received  right subacromial bursal injection. Resolved.   CKD stage 4-5  Hypertension, currently controlled      Medrol Dospak(no oral or topical NSAIDS with CKD stage 4-5  Monitor BP daily  Ref to PT for neck may cancel if resolves with Medrol  X-ray neck ? Chondrocalcinosis ? Crowned dens  Local moist heat(Thermophore)  *Shingrix when available  Denosumab 60mg sc Fri  RTC 6 months with cmp

## 2018-10-31 NOTE — PROGRESS NOTES
Subjective:       Patient ID: Karli Hameed is a 86 y.o. female.    Chief Complaint: No chief complaint on file.    HTN,chondrocalcinosis/OA knees, s/p acute CPPD by history, Osteoporosis on Prolia (last dose 05/01/18) and CKD Stage 5 w/secondary hyperparathyroidism who presents for 6 month f/u. Today pt c/o neck pain of one week's duration. She says that she suddenly woke up with it one morning, and that it has been fairly persistent since. She describes the pain as tight and achy, associated with stiffness, and says that extension and lateral bending of her neck make it worse. She has taken Tylenol and applied a heating pad, which have provided mild relief. She denies any trauma to the area and denies any radicular symptoms such as weakness, paresthesias or sensory loss.    Otherwise pt reports doing very well and denies any recent falls, traumas or fractures. Her ROM and pain in the R shoulder are greatly improved since her R shoulder injection in September.          Review of Systems   Musculoskeletal: Positive for neck pain and neck stiffness.         Objective:   There were no vitals taken for this visit.     Physical Exam   Nursing note and vitals reviewed.  Constitutional: She is oriented to person, place, and time and well-developed, well-nourished, and in no distress.   HENT:   Head: Normocephalic and atraumatic.   Eyes: Conjunctivae and EOM are normal.   Neck: Normal range of motion. Neck supple.   Cardiovascular: Normal rate.    Rhythm is irregular   Pulmonary/Chest: Effort normal and breath sounds normal.   Abdominal: Soft. Bowel sounds are normal.       Right Side Rheumatological Exam     Examination finds the shoulder, elbow, wrist, knee, 1st PIP, 1st MCP, 2nd MCP, 3rd MCP, 4th PIP, 4th MCP, 5th PIP and 5th MCP normal.    The patient has an enlarged 2nd PIP, 3rd PIP, 2nd DIP, 3rd DIP and 5th DIP    Left Side Rheumatological Exam     Examination finds the shoulder, elbow, wrist, knee, 1st PIP, 1st  MCP, 2nd PIP, 2nd MCP, 3rd PIP, 3rd MCP, 4th PIP, 4th MCP, 5th PIP and 5th MCP normal.    The patient has an enlarged 5th DIP.      Neurological: She is alert and oriented to person, place, and time. She has normal reflexes. Gait normal. GCS score is 15.   Skin: Skin is warm.     Psychiatric: Mood, memory, affect and judgment normal.   Musculoskeletal: She exhibits no edema.   Reduced ROM to lateral bend b/l, flexion and extension in neck  TTP in trapezius and cervical paraspinals  Tenderness in R AC joint  Trace fluid R knee                  HISTORY: 87 y/o female with no hx of fractures. She had surgical menopause at 50 y/o. Pts Mother had a hip fx.  Pt is taking Calcium, Vit D and Prolia. She has a hx of Hyperparathyroidism. She exercises daily and does not smoke.    TECHNIQUE: Bone Mineral Density performed using [employer] Horizon A (S/N [id number]) reveals good positioning of lumbar spine and hip.    BONE MINERAL DENSITY RESULTS:  Lumbar Spine: Lumbar bone mineral density L1-L4 is 0.928g/cm2, which is a t-score of -1.1. The z-score is .    Total Hip: The total hip bone mineral density is 0.679g/cm2.  The t-score is -2.2, and the z-score is .  Femoral neck BMD is 0.622g/cm2 and the t-score is -2.0.    COMPARISONS:  Date Location BMD T-score  06/24/15 L-spine 0.861 -1.7  Total Hip 0.663 -2.3      Impression       Osteopenia of spine and hip. FRAX calculation does support treatment for osteoporosis. Total hip BMD unchanged since 2015 and lumbar spine BMD increased 8% since 2015.    Recommendations:  1) Adequate calcium and Vitamin D therapy  2) Appropriate exercise  3) Consider continuing Prolia  4) Consider repeat BMD in 2 years       Assessment:       1. Osteoporosis, unspecified osteoporosis type, unspecified pathological fracture presence    2. Chondrocalcinosis articularis      Neck pain likely myofascial in nature, but given history of chondrocalcinosis and CPPD reasonable to get x-ray of c-spine  Cr 3.1 (near  baseline) BUN 48 eGFR 13  Mild anemia (11.3/35.2) with normal iron studies  Plan:   1. Prolia injection 11/2/18  2. Continue Vit D3 2,000 units daily  3. DEXA around 3/21/2020  4. Shingrix  5. C-Spine x-ray AP and lateral   6. PT referral for neck pain  7. Medrol dose pack  8. Recommended Thermophore and Tylenol PRN for neck pain    RTC in 6 months with CMP    Raffi Thrasher MD  PGY-1 [employer] [facility]

## 2018-11-02 ENCOUNTER — HOSPITAL ENCOUNTER (OUTPATIENT)
Dept: RADIOLOGY | Facility: HOSPITAL | Age: 83
Discharge: HOME | End: 2018-11-02
Attending: STUDENT IN AN ORGANIZED HEALTH CARE EDUCATION/TRAINING PROGRAM
Payer: MEDICARE

## 2018-11-02 ENCOUNTER — TELEPHONE (OUTPATIENT)
Dept: RHEUMATOLOGY | Facility: CLINIC | Age: 83
End: 2018-11-02

## 2018-11-02 ENCOUNTER — INFUSION (OUTPATIENT)
Dept: INFECTIOUS DISEASES | Facility: HOSPITAL | Age: 83
End: 2018-11-02
Attending: INTERNAL MEDICINE
Payer: MEDICARE

## 2018-11-02 VITALS — BODY MASS INDEX: 18.78 KG/M2 | WEIGHT: 110 LBS | HEIGHT: 64 IN

## 2018-11-02 DIAGNOSIS — M11.20 CHONDROCALCINOSIS ARTICULARIS: ICD-10-CM

## 2018-11-02 DIAGNOSIS — M85.80 OSTEOPENIA, UNSPECIFIED LOCATION: Primary | ICD-10-CM

## 2018-11-02 PROCEDURE — 72040 X-RAY EXAM NECK SPINE 2-3 VW: CPT | Mod: TC,HCNC

## 2018-11-02 PROCEDURE — 72040 X-RAY EXAM NECK SPINE 2-3 VW: CPT | Mod: 26,HCNC,, | Performed by: RADIOLOGY

## 2018-11-02 PROCEDURE — 96372 THER/PROPH/DIAG INJ SC/IM: CPT | Mod: HCNC

## 2018-11-02 PROCEDURE — 63600175 PHARM REV CODE 636 W HCPCS: Mod: JG,HCNC | Performed by: INTERNAL MEDICINE

## 2018-11-02 RX ADMIN — DENOSUMAB 60 MG: 60 INJECTION SUBCUTANEOUS at 08:11

## 2018-11-13 ENCOUNTER — CLINICAL SUPPORT (OUTPATIENT)
Dept: REHABILITATION | Facility: HOSPITAL | Age: 83
End: 2018-11-13
Attending: INTERNAL MEDICINE
Payer: MEDICARE

## 2018-11-13 DIAGNOSIS — R29.898 DECREASED ROM OF NECK: ICD-10-CM

## 2018-11-13 DIAGNOSIS — M54.2 NECK PAIN: Primary | ICD-10-CM

## 2018-11-13 PROCEDURE — G8979 MOBILITY GOAL STATUS: HCPCS | Mod: CJ,HCNC

## 2018-11-13 PROCEDURE — G8978 MOBILITY CURRENT STATUS: HCPCS | Mod: CJ,HCNC

## 2018-11-13 PROCEDURE — 97140 MANUAL THERAPY 1/> REGIONS: CPT | Mod: HCNC

## 2018-11-13 PROCEDURE — 97161 PT EVAL LOW COMPLEX 20 MIN: CPT | Mod: HCNC

## 2018-11-13 NOTE — PLAN OF CARE
[facility]   PATIENT EVALUATION    Name: Karli Hameed  Clinic Number: [id number]    Diagnosis:   Encounter Diagnoses   Name Primary?    Neck pain Yes    Decreased ROM of neck      Physician: Jacob Max MD  Treatment Orders: PT Eval and Treat    History     Past Medical History:   Diagnosis Date    Allergy     Anxiety     Arthritis     Back pain     Basal cell carcinoma 6/2011    R nasal ala, excised via Mohs    Chronic kidney disease, stage II (mild) 8/25/2012    cyst since 1975    Flank pain 8/21/2012    HTN (hypertension) 8/21/2012    Inflammatory bowel disease     Joint pain     Kidney stone     left     Lactose disaccharidase deficiency     Senile osteoporosis 4/11/2016    Small bowel obstruction, 06-, resolved witjhout surgery 7/1/2014    Trace cataracts     Ulcer     hx    Urinary tract infection      Current Outpatient Medications   Medication Sig    acetaminophen (TYLENOL) 325 MG tablet Take by mouth. 1 Tablet Oral .  Tylenol.To take as needed for arthritis pain.    amLODIPine (NORVASC) 5 MG tablet Take 1 tablet (5 mg total) by mouth every morning.    cholecalciferol, vitamin D3, 1,000 unit capsule Take 2 capsules (2,000 Units total) by mouth once daily.    clotrimazole-betamethasone 1-0.05% (LOTRISONE) cream Apply topically 2 (two) times daily.    docusate sodium (COLACE) 100 MG capsule Take 1 capsule (100 mg total) by mouth 2 (two) times daily.    influenza (FLUZONE HIGH-DOSE) 180 mcg/0.5 mL vaccine Inject into the muscle.    ketoconazole (NIZORAL) 2 % shampoo Apply topically twice a week.    losartan (COZAAR) 50 MG tablet Take 1 tablet (50 mg total) by mouth once daily.    methylPREDNISolone (MEDROL DOSEPACK) 4 mg tablet use as directed    naphazoline (NAPHCON) 0.012 % ophthalmic solution Inject 1 drop into the eye Use as needed.    ondansetron (ZOFRAN) 4 MG tablet Take 1 tablet (4 mg total) by mouth every 12 (twelve) hours  "as needed for Nausea (try half tablet first).    tretinoin (RETIN-A) 0.025 % gel Apply small amount to entire face qhs. Wash off qam and apply sunscreen.     No current facility-administered medications for this visit.      Review of patient's allergies indicates:   Allergen Reactions    Advil [ibuprofen] Nausea Only    Parafon forte      Other reaction(s): Hallucinations    Calcitriol (bulk) Nausea Only    Lisinopril      cough       Precautions: standard    Evaluation Date: 11/13/18  Start Time: 903  Stop Time: 950  Visit # authorized: 1/20  Authorization period: 12/31/18  Plan of care expiration: 1/1/19  MD referral: osteoporosis    Hx of present illness: About a month ago pain started in L shoulder and it traveled to both shoulders and neck. Her rheumatologist gave her some pills which helped until the cold weather came. On July 25, 2018, she woke up with R shoulder/arm black and blue which she told her doctors. Pt has had neck pain that comes and goes in the past. Pt has been to OT for her wrist fracture.    X-ray c-spine 10/31/18: "Cervical vertebra are intact with good alignment.  The  degenerative changes are present at the disc spaces with narrowing and marginal osteophytes throughout the cervical disc spaces with  relative sparing at   C4-5.  Prevertebral soft tissues are unremarkable.  Visualized thorax shows aortic atherosclerosis."  Subjective     Karli THOMAS Hameed states she has L sided neck and shoulder pain that feels tight. She reports increased pain when looking over the left shoulder and driving. Pt reports she used to have headaches but since her pills she doesn't have headache. Pt reports no issues lifting or cleaning the house. Pt reports her pain woke her up 2 nights ago and she is having trouble finding a comfortable position to sleep. Pt sleeps on either side or her back. She uses 3 pillows, but only one behind her head.    Exercise: walks every day 1 mile with her dog    Pain:  Location: " "L sided neck and shoulder  Description: sore, stiff, achey  Activities Which Increase Pain: see above  Activities Which Decrease Pain: heating pad, tylenol  Pain Scale: 7/10 now 7/10 at worst 0/10 at best      Vertebral artery symptoms: denies    Red flags:  Pt. denies bowel/bladder symptoms. Recent weight loss? denies Constant/Night pain that is unchanging with change of position? confirms PMH of CA? denies      Physical Therapy Goals: feel better    Objective     Observation: Pt ambulated into clinic with no AD or gait abnormalities    Posture: rounded shoulders, depressed L scapula    Cervical Spine Special Tests:  Compression: negative  Distraction: positive, decreased pain  Spurling's A: negative  Deep Cervical Flexors: weak    Sensation: Dermatomes: Intact     Cervical ROM: (measured in degrees)    Degrees   Extension 40   Flexion 70   Right Rotation 44   Left Rotation 50   Right SB 15   Left SB 15     Shoulder Active/ Passive ROM: (measured in degrees)   Shoulder Right UE Left UE   Flexion WNL WNL   Abduction WNL WNL   ER WNL WNL   IR WNL WNL       Upper Extremity Strength: (grading 1-5 out of 5)      Right UE Left UE   Shoulder Flexion: 4-/5 4+/5   Shoulder Abduction: 4-/5 4-/5   Shoulder ER: 4/5 4+/5   Shoulder IR: 5/5 5/5   Elbow Flexion: 5/5 5/5   Elbow Extension: 4/5 4+/5       Palpation for condition: increased TTP B levator scap insertion, L cervical extensors and suboccipitals    Joint Mobility:  Decreased L PA joint mobility C3-C7    PT reviewed FOTO scores for Karli Hameed on 11/13/18  FOTO score: 39% limited    Functional Limitations Reports - G Codes  Category: mobility  Tool: FOTO      Current ():    Goal ():       TREATMENT:  Therapeutic exercise: Karli received therapeutic exercises to develop strength and endurance, flexibility for 5 minutes including:     Chin tucks 20x5"  Upper trap stretch B x30"  Levator scap stretch x30" B    Manual therapy: Karli  received the following " manual therapy techniques x 15 min    STM  L suboccipitals, cervical paraspinals  R first rib release  B levator scap contract-release  Suboccipital release with manual distraction    Pt. Education: Instructed pt. regarding: HEP including proper form/technique; body mechanics, and posture re-education. Pt demonstrated and verbalized good understanding of the education provided. Karli demonstrated good return demonstration of activities.  · No cultural, environmental, or spiritual barriers identified to treatment or learning.      Assessment   Patient is a 86 y.o. female referred to outpatient physical therapy who presents with a PT diagnosis of neck pain demonstrating joint dysfunction and functional limitation as described below. Level of complexity is low;  based on patient's past medical history including the below co-morbidities and personal factors; functional limitations, and clinical presentation directly impacting his/her plan of care. Pt demonstrates excellent rehab potential. Pt will benefit from physcial therapy services in order to maximize pain free functional mobility. The following goals were discussed with the patient and patient is in agreement with them as to be addressed in the treatment plan. Pt was given a HEP consisting of chin tucks, levator scap and upper trap stretch. Pt verbally understood the instructions as they were given and demonstrated proper form and technique during therapy. Pt was advised to perform these exercises free of pain, and to stop performing them if pain occurs.         History  Co-morbidities and personal factors that may impact the plan of care Examination  Body Structures and Functions, activity limitations and participation restrictions that may impact the plan of care Clinical Presentation   Decision Making/ Complexity Score   Co-morbidities:   Osteoporosis, CKD stage 5          Personal Factors:   age Body Regions: neck, B shoulders    Body Systems:  musculoskeletal      Activity limitations: turning head      Participation Restrictions: driving, sleeping       stable   low       Medical necessity is demonstrated by the following IMPAIRMENTS/PROBLEM LIST:   1) Pain limiting function   2) Postural dysfunction   3) Longus colli/suboccipital tightness   4) Upper trapezius/levator scapula tightness   5) Longus colli/scapula stabilizer weakness    6) Decreased cervical ROM    7) Decreased cervical joint mobility   8) Decreased UT/levator scapula/scalenes soft tissue extensibility   9) Lack of HEP      GOALS:   Short Term Goals: 2 weeks  1. Patient will be proficient and compliant in HEP  2. Decrease neck pain at worst to no greater than 5/10.  3. Pt will report sleeping through the night without waking up due to neck pain.    Long Term Goals: 7 weeks  1. Decrease neck pain at worst to no greater than 3/10  2. Improve B cervical rotation to >/=60 degrees in order to improve safety while driving.  3. Pt will be </= 31% limited in mobility according to FOTO.      Plan     Pt will be treated by physical therapy 2 times a week for 7 weeks for pt. education, HEP, therapeutic exercises, neuromuscular re-education, joint/soft tissue mobilizations, and modalities prn to achieve established goals. Karli may at times be seen by a PTA as part of the Rehab Team.     I certify the need for these services furnished under this plan of treatment and while under my care.  ______________________________ Physician/Referring Practitioner  Date of Signature      Criselda Pickett, PT, DPT

## 2018-11-13 NOTE — PROGRESS NOTES
[facility]   PATIENT EVALUATION    Name: Karli Hameed  Clinic Number: [id number]    Diagnosis:   Encounter Diagnoses   Name Primary?    Neck pain Yes    Decreased ROM of neck      Physician: Jacob Max MD  Treatment Orders: PT Eval and Treat    History     Past Medical History:   Diagnosis Date    Allergy     Anxiety     Arthritis     Back pain     Basal cell carcinoma 6/2011    R nasal ala, excised via Mohs    Chronic kidney disease, stage II (mild) 8/25/2012    cyst since 1975    Flank pain 8/21/2012    HTN (hypertension) 8/21/2012    Inflammatory bowel disease     Joint pain     Kidney stone     left     Lactose disaccharidase deficiency     Senile osteoporosis 4/11/2016    Small bowel obstruction, 06-, resolved witjhout surgery 7/1/2014    Trace cataracts     Ulcer     hx    Urinary tract infection      Current Outpatient Medications   Medication Sig    acetaminophen (TYLENOL) 325 MG tablet Take by mouth. 1 Tablet Oral .  Tylenol.To take as needed for arthritis pain.    amLODIPine (NORVASC) 5 MG tablet Take 1 tablet (5 mg total) by mouth every morning.    cholecalciferol, vitamin D3, 1,000 unit capsule Take 2 capsules (2,000 Units total) by mouth once daily.    clotrimazole-betamethasone 1-0.05% (LOTRISONE) cream Apply topically 2 (two) times daily.    docusate sodium (COLACE) 100 MG capsule Take 1 capsule (100 mg total) by mouth 2 (two) times daily.    influenza (FLUZONE HIGH-DOSE) 180 mcg/0.5 mL vaccine Inject into the muscle.    ketoconazole (NIZORAL) 2 % shampoo Apply topically twice a week.    losartan (COZAAR) 50 MG tablet Take 1 tablet (50 mg total) by mouth once daily.    methylPREDNISolone (MEDROL DOSEPACK) 4 mg tablet use as directed    naphazoline (NAPHCON) 0.012 % ophthalmic solution Inject 1 drop into the eye Use as needed.    ondansetron (ZOFRAN) 4 MG tablet Take 1 tablet (4 mg total) by mouth every 12 (twelve) hours  "as needed for Nausea (try half tablet first).    tretinoin (RETIN-A) 0.025 % gel Apply small amount to entire face qhs. Wash off qam and apply sunscreen.     No current facility-administered medications for this visit.      Review of patient's allergies indicates:   Allergen Reactions    Advil [ibuprofen] Nausea Only    Parafon forte      Other reaction(s): Hallucinations    Calcitriol (bulk) Nausea Only    Lisinopril      cough       Precautions: standard    Evaluation Date: 11/13/18  Start Time: 903  Stop Time: 950  Visit # authorized: 1/20  Authorization period: 12/31/18  Plan of care expiration: 1/1/19  MD referral: osteoporosis    Hx of present illness: About a month ago pain started in L shoulder and it traveled to both shoulders and neck. Her rheumatologist gave her some pills which helped until the cold weather came. On July 25, 2018, she woke up with R shoulder/arm black and blue which she told her doctors. Pt has had neck pain that comes and goes in the past. Pt has been to OT for her wrist fracture.    X-ray c-spine 10/31/18: "Cervical vertebra are intact with good alignment.  The  degenerative changes are present at the disc spaces with narrowing and marginal osteophytes throughout the cervical disc spaces with  relative sparing at   C4-5.  Prevertebral soft tissues are unremarkable.  Visualized thorax shows aortic atherosclerosis."  Subjective     Karli THOMAS Hameed states she has L sided neck and shoulder pain that feels tight. She reports increased pain when looking over the left shoulder and driving. Pt reports she used to have headaches but since her pills she doesn't have headache. Pt reports no issues lifting or cleaning the house. Pt reports her pain woke her up 2 nights ago and she is having trouble finding a comfortable position to sleep. Pt sleeps on either side or her back. She uses 3 pillows, but only one behind her head.    Exercise: walks every day 1 mile with her dog    Pain:  Location: " "L sided neck and shoulder  Description: sore, stiff, achey  Activities Which Increase Pain: see above  Activities Which Decrease Pain: heating pad, tylenol  Pain Scale: 7/10 now 7/10 at worst 0/10 at best      Vertebral artery symptoms: denies    Red flags:  Pt. denies bowel/bladder symptoms. Recent weight loss? denies Constant/Night pain that is unchanging with change of position? confirms PMH of CA? denies      Physical Therapy Goals: feel better    Objective     Observation: Pt ambulated into clinic with no AD or gait abnormalities    Posture: rounded shoulders, depressed L scapula    Cervical Spine Special Tests:  Compression: negative  Distraction: positive, decreased pain  Spurling's A: negative  Deep Cervical Flexors: weak    Sensation: Dermatomes: Intact     Cervical ROM: (measured in degrees)    Degrees   Extension 40   Flexion 70   Right Rotation 44   Left Rotation 50   Right SB 15   Left SB 15     Shoulder Active/ Passive ROM: (measured in degrees)   Shoulder Right UE Left UE   Flexion WNL WNL   Abduction WNL WNL   ER WNL WNL   IR WNL WNL       Upper Extremity Strength: (grading 1-5 out of 5)      Right UE Left UE   Shoulder Flexion: 4-/5 4+/5   Shoulder Abduction: 4-/5 4-/5   Shoulder ER: 4/5 4+/5   Shoulder IR: 5/5 5/5   Elbow Flexion: 5/5 5/5   Elbow Extension: 4/5 4+/5       Palpation for condition: increased TTP B levator scap insertion, L cervical extensors and suboccipitals    Joint Mobility:  Decreased L PA joint mobility C3-C7    PT reviewed FOTO scores for Karli Hameed on 11/13/18  FOTO score: 39% limited    Functional Limitations Reports - G Codes  Category: mobility  Tool: FOTO      Current ():    Goal ():       TREATMENT:  Therapeutic exercise: Karli received therapeutic exercises to develop strength and endurance, flexibility for 5 minutes including:     Chin tucks 20x5"  Upper trap stretch B x30"  Levator scap stretch x30" B    Manual therapy: Karli  received the following " manual therapy techniques x 15 min    STM  L suboccipitals, cervical paraspinals  R first rib release  B levator scap contract-release  Suboccipital release with manual distraction    Pt. Education: Instructed pt. regarding: HEP including proper form/technique; body mechanics, and posture re-education. Pt demonstrated and verbalized good understanding of the education provided. Karli demonstrated good return demonstration of activities.  · No cultural, environmental, or spiritual barriers identified to treatment or learning.      Assessment   Patient is a 86 y.o. female referred to outpatient physical therapy who presents with a PT diagnosis of neck pain demonstrating joint dysfunction and functional limitation as described below. Level of complexity is low;  based on patient's past medical history including the below co-morbidities and personal factors; functional limitations, and clinical presentation directly impacting his/her plan of care. Pt demonstrates excellent rehab potential. Pt will benefit from physcial therapy services in order to maximize pain free functional mobility. The following goals were discussed with the patient and patient is in agreement with them as to be addressed in the treatment plan. Pt was given a HEP consisting of chin tucks, levator scap and upper trap stretch. Pt verbally understood the instructions as they were given and demonstrated proper form and technique during therapy. Pt was advised to perform these exercises free of pain, and to stop performing them if pain occurs.         History  Co-morbidities and personal factors that may impact the plan of care Examination  Body Structures and Functions, activity limitations and participation restrictions that may impact the plan of care Clinical Presentation   Decision Making/ Complexity Score   Co-morbidities:   Osteoporosis, CKD stage 5          Personal Factors:   age Body Regions: neck, B shoulders    Body Systems:  musculoskeletal      Activity limitations: turning head      Participation Restrictions: driving, sleeping       stable   low       Medical necessity is demonstrated by the following IMPAIRMENTS/PROBLEM LIST:   1) Pain limiting function   2) Postural dysfunction   3) Longus colli/suboccipital tightness   4) Upper trapezius/levator scapula tightness   5) Longus colli/scapula stabilizer weakness    6) Decreased cervical ROM    7) Decreased cervical joint mobility   8) Decreased UT/levator scapula/scalenes soft tissue extensibility   9) Lack of HEP      GOALS:   Short Term Goals: 2 weeks  1. Patient will be proficient and compliant in HEP  2. Decrease neck pain at worst to no greater than 5/10.  3. Pt will report sleeping through the night without waking up due to neck pain.    Long Term Goals: 7 weeks  1. Decrease neck pain at worst to no greater than 3/10  2. Improve B cervical rotation to >/=60 degrees in order to improve safety while driving.  3. Pt will be </= 31% limited in mobility according to FOTO.      Plan     Pt will be treated by physical therapy 2 times a week for 7 weeks for pt. education, HEP, therapeutic exercises, neuromuscular re-education, joint/soft tissue mobilizations, and modalities prn to achieve established goals. Karli may at times be seen by a PTA as part of the Rehab Team.     I certify the need for these services furnished under this plan of treatment and while under my care.  ______________________________ Physician/Referring Practitioner  Date of Signature      Criselda Pickett, PT, DPT

## 2018-11-19 ENCOUNTER — CLINICAL SUPPORT (OUTPATIENT)
Dept: REHABILITATION | Facility: HOSPITAL | Age: 83
End: 2018-11-19
Attending: INTERNAL MEDICINE
Payer: MEDICARE

## 2018-11-19 DIAGNOSIS — M54.2 NECK PAIN: Primary | ICD-10-CM

## 2018-11-19 DIAGNOSIS — R29.898 DECREASED ROM OF NECK: ICD-10-CM

## 2018-11-19 PROCEDURE — 97110 THERAPEUTIC EXERCISES: CPT | Mod: HCNC

## 2018-11-19 NOTE — PROGRESS NOTES
"                                                  Physical Therapy Daily Note     Date: 11/19/2018  Name: Karli Hameed  Clinic Number: [id number]  Diagnosis:   Encounter Diagnoses   Name Primary?    Decreased ROM of neck     Neck pain Yes     Physician: Jacob Max MD    Precautions: standard    Evaluation Date: 11/13/18  Start Time: 930  Stop Time: 1024  Visit # authorized: 2/20  Authorization period: 12/31/18  Plan of care expiration: 1/1/19  MD referral: osteoporosis    Hx of present illness: About a month ago pain started in L shoulder and it traveled to both shoulders and neck. Her rheumatologist gave her some pills which helped until the cold weather came. On July 25, 2018, she woke up with R shoulder/arm black and blue which she told her doctors. Pt has had neck pain that comes and goes in the past. Pt has been to OT for her wrist fracture.    X-ray c-spine 10/31/18: "Cervical vertebra are intact with good alignment.  The  degenerative changes are present at the disc spaces with narrowing and marginal osteophytes throughout the cervical disc spaces with  relative sparing at   C4-5.  Prevertebral soft tissues are unremarkable.  Visualized thorax shows aortic atherosclerosis."    Subjective     Pt reports 7/10 neck pain pre-tx-she thinks she slept wrong and got a kink in her neck. Reports that she has done the exercises provided for HEP.    Objective     Patient received individual therapy to increase strength, endurance, ROM, flexibility, posture and core stabilization with activities as follows:     Therapeutic exercise: Karli received therapeutic exercises to develop strength and endurance, flexibility for 9 minutes including:     UBE 2'/2'  Chin tucks 20x5"  Upper trap stretch B 2x30"  Levator scap stretch 2x30" B  Supine wand flexion 2x10  Supine wand serratus punch 3x10  Supine horizontal abduction OTB 2x10    Manual therapy: Karli  received the following manual therapy techniques x 18 min    STM  " L suboccipitals, cervical paraspinals with TPR  R first rib release  R levator scap contract-release  Suboccipital release with manual distraction      Written Home Exercises Provided: no new exercises provided this session  Pt demo good understanding of the education provided. Karli demonstrated good return demonstration of activities.     Education provided: RAMIRO Calvillo verbalized good understanding of education provided.   No spiritual or educational barriers to learning identified.    Assessment     Pt tolerated tx well. Added various therex above to improve postural stability and strength. Pt required max verbal and tactile cues with all new exercises. Pt presented with increased muscle tightness cervical paraspinals, levator scap, and suboccipitals R>L. After manual therapy, patient with decreased muscle tightness and decreased reports of stiffness and pain. Pt will continue to benefit from skilled PT in order to decrease pain, increase cervical ROM and deep cervical flexor strength, improve postural stability, and improve functional mobility.    Medical necessity is demonstrated by the following IMPAIRMENTS/PROBLEM LIST:   1) Pain limiting function   2) Postural dysfunction   3) Longus colli/suboccipital tightness   4) Upper trapezius/levator scapula tightness   5) Longus colli/scapula stabilizer weakness    6) Decreased cervical ROM    7) Decreased cervical joint mobility   8) Decreased UT/levator scapula/scalenes soft tissue extensibility   9) Lack of HEP      GOALS:   Short Term Goals: 2 weeks  1. Patient will be proficient and compliant in HEP  2. Decrease neck pain at worst to no greater than 5/10.  3. Pt will report sleeping through the night without waking up due to neck pain.    Long Term Goals: 7 weeks  1. Decrease neck pain at worst to no greater than 3/10  2. Improve B cervical rotation to >/=60 degrees in order to improve safety while driving.  3. Pt will be </= 31% limited in mobility according  to FOTO.       Plan   Continue with established Plan of Care towards PT goals.      Therapist: Criselda Pickett, PT, DPT

## 2018-11-26 ENCOUNTER — CLINICAL SUPPORT (OUTPATIENT)
Dept: REHABILITATION | Facility: HOSPITAL | Age: 83
End: 2018-11-26
Attending: INTERNAL MEDICINE
Payer: MEDICARE

## 2018-11-26 DIAGNOSIS — M54.2 NECK PAIN: Primary | ICD-10-CM

## 2018-11-26 DIAGNOSIS — R29.898 DECREASED ROM OF NECK: ICD-10-CM

## 2018-11-26 PROCEDURE — 97140 MANUAL THERAPY 1/> REGIONS: CPT

## 2018-11-26 PROCEDURE — 97110 THERAPEUTIC EXERCISES: CPT

## 2018-11-26 NOTE — PROGRESS NOTES
"                                                  Physical Therapy Daily Note     Date: 11/26/2018  Name: Karli Hameed  Clinic Number: [id number]  Diagnosis:   Encounter Diagnoses   Name Primary?    Decreased ROM of neck     Neck pain Yes     Physician: Jacob Max MD    Precautions: standard    Evaluation Date: 11/13/18  Start Time: 932  Stop Time: 1022  Visit # authorized: 3/20  Authorization period: 12/31/18  Plan of care expiration: 1/1/19  MD referral: osteoporosis    Hx of present illness: About a month ago pain started in L shoulder and it traveled to both shoulders and neck. Her rheumatologist gave her some pills which helped until the cold weather came. On July 25, 2018, she woke up with R shoulder/arm black and blue which she told her doctors. Pt has had neck pain that comes and goes in the past. Pt has been to OT for her wrist fracture.    X-ray c-spine 10/31/18: "Cervical vertebra are intact with good alignment.  The  degenerative changes are present at the disc spaces with narrowing and marginal osteophytes throughout the cervical disc spaces with  relative sparing at   C4-5.  Prevertebral soft tissues are unremarkable.  Visualized thorax shows aortic atherosclerosis."    Subjective     Pt reports 4/10 neck pain pre-tx. Reports she is sleeping with a really low pillow so she doesn't wake up with a kink in her neck anymore.    Objective     Patient received individual therapy to increase strength, endurance, ROM, flexibility, posture and core stabilization with activities as follows:     Therapeutic exercise: Karli received therapeutic exercises to develop strength and endurance, flexibility for 23 minutes including:     UBE 2'/2'  Chin tucks 20x5"  Upper trap stretch B 3x30"  Levator scap stretch 3x30" B  Supine wand flexion 2x10  Supine wand serratus punch 3x10  Supine horizontal abduction OTB 2x10  Supine ER 2x10 OTB  Standing shoulder extension 3x10 OTB  Rows OTB 3x10    Manual therapy: Karli "  received the following manual therapy techniques x 11 min    STM  L suboccipitals, cervical paraspinals with TPR  R first rib release  R levator scap contract-release  Suboccipital release with manual distraction      Written Home Exercises Provided: no new exercises provided this session  Pt demo good understanding of the education provided. Karli demonstrated good return demonstration of activities.     Education provided: RAMIRO Calvillo verbalized good understanding of education provided.   No spiritual or educational barriers to learning identified.    Assessment     Pt tolerated tx well. Added rows and standing shoulder extension to progress UE strength. Pt required verbal cues to keep shoulders away from ears. Pt with increased muscle tightness and TTP R cervical paraspinals, suboccipitals, and levator scap insertion-decreased with manual therapy techniques. Pt making good progress toward goals. Pt will continue to benefit from skilled PT in order to decrease pain, increase cervical ROM and deep cervical flexor strength, improve postural stability, and improve functional mobility.    Medical necessity is demonstrated by the following IMPAIRMENTS/PROBLEM LIST:   1) Pain limiting function   2) Postural dysfunction   3) Longus colli/suboccipital tightness   4) Upper trapezius/levator scapula tightness   5) Longus colli/scapula stabilizer weakness    6) Decreased cervical ROM    7) Decreased cervical joint mobility   8) Decreased UT/levator scapula/scalenes soft tissue extensibility   9) Lack of HEP      GOALS:   Short Term Goals: 2 weeks  1. Patient will be proficient and compliant in HEP  2. Decrease neck pain at worst to no greater than 5/10.  3. Pt will report sleeping through the night without waking up due to neck pain.    Long Term Goals: 7 weeks  1. Decrease neck pain at worst to no greater than 3/10  2. Improve B cervical rotation to >/=60 degrees in order to improve safety while driving.  3. Pt will be </=  31% limited in mobility according to FOTO.       Plan   Continue with established Plan of Care towards PT goals.      Therapist: Criselda Pickett, PT, DPT

## 2018-11-27 ENCOUNTER — OFFICE VISIT (OUTPATIENT)
Dept: INTERNAL MEDICINE | Facility: CLINIC | Age: 83
End: 2018-11-27
Payer: MEDICARE

## 2018-11-27 VITALS
WEIGHT: 112.19 LBS | OXYGEN SATURATION: 99 % | DIASTOLIC BLOOD PRESSURE: 64 MMHG | SYSTOLIC BLOOD PRESSURE: 110 MMHG | HEART RATE: 109 BPM | HEIGHT: 64 IN | BODY MASS INDEX: 19.15 KG/M2

## 2018-11-27 DIAGNOSIS — N18.4 STAGE 4 CHRONIC KIDNEY DISEASE: Primary | ICD-10-CM

## 2018-11-27 DIAGNOSIS — I10 ESSENTIAL HYPERTENSION: ICD-10-CM

## 2018-11-27 DIAGNOSIS — H25.13 NUCLEAR SCLEROSIS OF BOTH EYES: ICD-10-CM

## 2018-11-27 PROCEDURE — 1101F PT FALLS ASSESS-DOCD LE1/YR: CPT | Mod: CPTII,S$GLB,, | Performed by: INTERNAL MEDICINE

## 2018-11-27 PROCEDURE — 99999 PR PBB SHADOW E&M-EST. PATIENT-LVL III: CPT | Mod: PBBFAC,,, | Performed by: INTERNAL MEDICINE

## 2018-11-27 PROCEDURE — 99214 OFFICE O/P EST MOD 30 MIN: CPT | Mod: S$GLB,,, | Performed by: INTERNAL MEDICINE

## 2018-12-01 PROBLEM — N18.4 STAGE 4 CHRONIC KIDNEY DISEASE: Status: ACTIVE | Noted: 2018-05-21

## 2018-12-01 NOTE — PROGRESS NOTES
Subjective:       Patient ID: Karli Hameed is a 87 y.o. female.    Chief Complaint: Follow-up  This dictation was performed using using M[employer].    Since last seen in August, the main challenge that she has been dealing with is pain in her neck and both shoulders.  Dr. Max gave her a cortisone shot in her right shoulder and she has started physical therapy.  Yesterday she had her 3rd physical therapy visit and it is helping.  She is also doing the exercises at home.  She is very motivated to improve.  She is back driving.  When this all came on, she was so stiff and painful that she could not drive.    She has [employer].  Talked about CAB services.      We talked about advanced directives.  She has a trusted friend who has helped her but we do not have any records in her epic chart and she is not registered with [facility].    Immunizations, recommend shingles.  Recommend routine eye exam.    She might consider looking at independent living apartments in [facility] like [facility].  HPI  Review of Systems   Constitutional: Negative for activity change, appetite change, chills, fatigue, fever and unexpected weight change.   HENT: Negative for hearing loss.    Eyes: Negative for visual disturbance.   Respiratory: Negative for cough, chest tightness, shortness of breath and wheezing.    Cardiovascular: Negative for chest pain, palpitations and leg swelling.   Gastrointestinal: Negative for abdominal pain, constipation, nausea and vomiting.   Genitourinary: Negative for dysuria, frequency and urgency.   Musculoskeletal: Positive for arthralgias, neck pain and neck stiffness. Negative for back pain, gait problem, joint swelling and myalgias.   Skin: Negative for rash.   Neurological: Negative for light-headedness and headaches.   Psychiatric/Behavioral: Negative for dysphoric mood and sleep disturbance. The patient is not nervous/anxious.        Objective:      Physical Exam   Constitutional: She is  oriented to person, place, and time. She appears well-developed and well-nourished. No distress.   HENT:   Head: Normocephalic and atraumatic.   Right Ear: External ear normal.   Left Ear: External ear normal.   Nose: Nose normal.   Mouth/Throat: Oropharynx is clear and moist. No oropharyngeal exudate.   Eyes: Conjunctivae and EOM are normal. Pupils are equal, round, and reactive to light. Right eye exhibits no discharge. No scleral icterus.   Neck: Normal range of motion and full passive range of motion without pain. Neck supple. No spinous process tenderness and no muscular tenderness present. Carotid bruit is not present. No thyromegaly present.   Cardiovascular: Normal rate, regular rhythm, S1 normal, S2 normal, normal heart sounds and intact distal pulses. Exam reveals no gallop and no friction rub.   No murmur heard.  Pulmonary/Chest: Effort normal and breath sounds normal. No respiratory distress. She has no wheezes. She has no rales. She exhibits no tenderness.   Abdominal: Soft. Bowel sounds are normal. She exhibits no distension and no mass. There is no tenderness. There is no rebound and no guarding.   Genitourinary: Pelvic exam was performed with patient supine. Uterus is not deviated, not enlarged, not fixed and not tender. Cervix exhibits no motion tenderness, no discharge and no friability. Right adnexum displays no mass, no tenderness and no fullness. Left adnexum displays no mass, no tenderness and no fullness.   Musculoskeletal: Normal range of motion. She exhibits no edema or tenderness.   Lymphadenopathy:        Head (right side): No submental and no submandibular adenopathy present.        Head (left side): No submental and no submandibular adenopathy present.     She has no cervical adenopathy.        Right cervical: No superficial cervical, no deep cervical and no posterior cervical adenopathy present.       Left cervical: No superficial cervical, no deep cervical and no posterior cervical  adenopathy present.        Right axillary: No pectoral and no lateral adenopathy present.        Left axillary: No pectoral and no lateral adenopathy present.       Right: No supraclavicular adenopathy present.        Left: No supraclavicular adenopathy present.   Neurological: She is alert and oriented to person, place, and time. She has normal reflexes. No cranial nerve deficit. She exhibits normal muscle tone. Coordination normal.   Skin: Skin is warm and dry. No rash noted.   Psychiatric: She has a normal mood and affect. Her behavior is normal. Her mood appears not anxious. Her speech is not rapid and/or pressured. She is not agitated. She does not exhibit a depressed mood.   Normal behavior, thought content, insight and judgement.   Nursing note and vitals reviewed.      Assessment:       1. Stage 4 chronic kidney disease    2. Nuclear sclerosis of both eyes    3. Essential hypertension        Plan:   Karli was seen today for follow-up.    Diagnoses and all orders for this visit:    Stage 4 chronic kidney disease    Nuclear sclerosis of both eyes  -     Ambulatory referral to Optometry    Essential hypertension    Follow-up in about 3 months (around 2/27/2019).

## 2018-12-03 ENCOUNTER — CLINICAL SUPPORT (OUTPATIENT)
Dept: REHABILITATION | Facility: HOSPITAL | Age: 83
End: 2018-12-03
Attending: INTERNAL MEDICINE
Payer: MEDICARE

## 2018-12-03 DIAGNOSIS — R29.898 DECREASED ROM OF NECK: ICD-10-CM

## 2018-12-03 DIAGNOSIS — M54.2 NECK PAIN: ICD-10-CM

## 2018-12-03 PROCEDURE — 97140 MANUAL THERAPY 1/> REGIONS: CPT

## 2018-12-03 PROCEDURE — 97110 THERAPEUTIC EXERCISES: CPT

## 2018-12-03 NOTE — PROGRESS NOTES
"                                                  Physical Therapy Daily Note     Date: 12/03/2018  Name: Karli Hameed  Clinic Number: [id number]  Diagnosis:   Encounter Diagnoses   Name Primary?    Decreased ROM of neck     Neck pain      Physician: Jacob Max MD    Precautions: standard    Evaluation Date: 11/13/18  Start Time: 0930  Stop Time: 1020  Visit # authorized: 4/20  Authorization period: 12/31/18  Plan of care expiration: 1/1/19  MD referral: osteoporosis    Hx of present illness: About a month ago pain started in L shoulder and it traveled to both shoulders and neck. Her rheumatologist gave her some pills which helped until the cold weather came. On July 25, 2018, she woke up with R shoulder/arm black and blue which she told her doctors. Pt has had neck pain that comes and goes in the past. Pt has been to OT for her wrist fracture.    X-ray c-spine 10/31/18: "Cervical vertebra are intact with good alignment.  The  degenerative changes are present at the disc spaces with narrowing and marginal osteophytes throughout the cervical disc spaces with  relative sparing at   C4-5.  Prevertebral soft tissues are unremarkable.  Visualized thorax shows aortic atherosclerosis."    Subjective     Pt reports 2/10 neck pain pre-tx.  Relays that last 2 times of doing arm bike have increased pain level and requests no UBE today.    Objective     Patient received individual therapy to increase strength, endurance, ROM, flexibility, posture and core stabilization with activities as follows:     Therapeutic exercise: Karli received therapeutic exercises to develop strength and endurance, flexibility for 35 minutes including:     UBE 2'/2' NP  Chin tucks 20x5"  Upper trap stretch B 3x30"  Levator scap stretch 3x30" B  Supine wand flexion 2x10  Supine wand serratus punch 3x10  Supine horizontal abduction OTB 2x10  Supine ER 2x10 OTB  Standing shoulder extension 3x10 OTB  Rows OTB 3x10    Manual therapy: Karli  " received the following manual therapy techniques x 15 min    STM  L suboccipitals, cervical paraspinals with TPR  R first rib release  R levator scap contract-release  Suboccipital release with manual distraction      Written Home Exercises Provided: no new exercises provided this session  Pt demo good understanding of the education provided. Karli demonstrated good return demonstration of activities.     Education provided: RAMIRO Calvillo verbalized good understanding of education provided.   No spiritual or educational barriers to learning identified.    Assessment     Pt tolerated tx well.  Pt required verbal cues to keep shoulders away from ears. Pt with increased muscle tightness and TTP R cervical paraspinals, suboccipitals, and levator scap insertion-decreased with manual therapy techniques. Pt making good progress toward goals. Pt will continue to benefit from skilled PT in order to decrease pain, increase cervical ROM and deep cervical flexor strength, improve postural stability, and improve functional mobility.    Medical necessity is demonstrated by the following IMPAIRMENTS/PROBLEM LIST:   1) Pain limiting function   2) Postural dysfunction   3) Longus colli/suboccipital tightness   4) Upper trapezius/levator scapula tightness   5) Longus colli/scapula stabilizer weakness    6) Decreased cervical ROM    7) Decreased cervical joint mobility   8) Decreased UT/levator scapula/scalenes soft tissue extensibility   9) Lack of HEP      GOALS:   Short Term Goals: 2 weeks  1. Patient will be proficient and compliant in HEP  2. Decrease neck pain at worst to no greater than 5/10.  3. Pt will report sleeping through the night without waking up due to neck pain.    Long Term Goals: 7 weeks  1. Decrease neck pain at worst to no greater than 3/10  2. Improve B cervical rotation to >/=60 degrees in order to improve safety while driving.  3. Pt will be </= 31% limited in mobility according to FOTO.       Plan   Continue  with established Plan of Care towards PT goals.      Therapist: Rosy Da Silva, PTA

## 2018-12-17 ENCOUNTER — CLINICAL SUPPORT (OUTPATIENT)
Dept: REHABILITATION | Facility: HOSPITAL | Age: 83
End: 2018-12-17
Attending: INTERNAL MEDICINE
Payer: MEDICARE

## 2018-12-17 DIAGNOSIS — R29.898 DECREASED ROM OF NECK: ICD-10-CM

## 2018-12-17 DIAGNOSIS — M54.2 NECK PAIN: ICD-10-CM

## 2018-12-17 PROCEDURE — 97140 MANUAL THERAPY 1/> REGIONS: CPT | Mod: HCNC

## 2018-12-17 PROCEDURE — 97110 THERAPEUTIC EXERCISES: CPT | Mod: HCNC

## 2018-12-17 NOTE — PROGRESS NOTES
"                                                  Physical Therapy Daily Note     Date: 12/17/2018  Name: Karli Hameed  Clinic Number: [id number]  Diagnosis:   Encounter Diagnoses   Name Primary?    Decreased ROM of neck     Neck pain      Physician: Jacob Max MD    Precautions: standard    Evaluation Date: 11/13/18  Start Time: 0825  Stop Time: 0920  Visit # authorized: 5/20  Authorization period: 12/31/18  Plan of care expiration: 1/1/19  MD referral: osteoporosis    Hx of present illness: About a month ago pain started in L shoulder and it traveled to both shoulders and neck. Her rheumatologist gave her some pills which helped until the cold weather came. On July 25, 2018, she woke up with R shoulder/arm black and blue which she told her doctors. Pt has had neck pain that comes and goes in the past. Pt has been to OT for her wrist fracture.    X-ray c-spine 10/31/18: "Cervical vertebra are intact with good alignment.  The  degenerative changes are present at the disc spaces with narrowing and marginal osteophytes throughout the cervical disc spaces with  relative sparing at   C4-5.  Prevertebral soft tissues are unremarkable.  Visualized thorax shows aortic atherosclerosis."    Subjective     Pt reports 5/10 neck pain into R arm.  Pt relays she is compliant with HEP.  Missed therapy last week 2/2 insurance issues.    Objective     Patient received individual therapy to increase strength, endurance, ROM, flexibility, posture and core stabilization with activities as follows:     Therapeutic exercise: Karli received therapeutic exercises to develop strength and endurance, flexibility for 35 minutes including:     UBE 2'/2' NP  Chin tucks 20x5"  Upper trap stretch B 3x30"  Levator scap stretch 3x30" B  Supine wand flexion 2x10  Supine wand serratus punch 3x10  Supine horizontal abduction OTB 2x10  Supine ER 2x10 OTB  Standing shoulder extension 3x10 OTB  Rows OTB 3x10    Manual therapy: Karli  received the " following manual therapy techniques x 10 min    STM  L suboccipitals, cervical paraspinals   R first rib release NP  R levator scap contract-release  Suboccipital release with manual distraction    Heat pack x 10 min to cervical area      Written Home Exercises Provided: no new exercises provided this session  Pt demo good understanding of the education provided. Karli demonstrated good return demonstration of activities.     Education provided: RAMIRO Calvillo verbalized good understanding of education provided.   No spiritual or educational barriers to learning identified.    Assessment     Pt tolerated tx well.  No new therex added this date 2/2 pt not having been in therapy for 2 weeks.  Pt required verbal cues to keep shoulders away from ears. Pt making good progress toward goals. Pt will continue to benefit from skilled PT in order to decrease pain, increase cervical ROM and deep cervical flexor strength, improve postural stability, and improve functional mobility.    Medical necessity is demonstrated by the following IMPAIRMENTS/PROBLEM LIST:   1) Pain limiting function   2) Postural dysfunction   3) Longus colli/suboccipital tightness   4) Upper trapezius/levator scapula tightness   5) Longus colli/scapula stabilizer weakness    6) Decreased cervical ROM    7) Decreased cervical joint mobility   8) Decreased UT/levator scapula/scalenes soft tissue extensibility   9) Lack of HEP      GOALS:   Short Term Goals: 2 weeks  1. Patient will be proficient and compliant in HEP  2. Decrease neck pain at worst to no greater than 5/10.  3. Pt will report sleeping through the night without waking up due to neck pain.    Long Term Goals: 7 weeks  1. Decrease neck pain at worst to no greater than 3/10  2. Improve B cervical rotation to >/=60 degrees in order to improve safety while driving.  3. Pt will be </= 31% limited in mobility according to FOTO.       Plan   Continue with established Plan of Care towards PT  goals.      Therapist: Rosy D aSilva PTA

## 2018-12-19 ENCOUNTER — PATIENT MESSAGE (OUTPATIENT)
Dept: OPTOMETRY | Facility: CLINIC | Age: 83
End: 2018-12-19

## 2018-12-19 ENCOUNTER — OFFICE VISIT (OUTPATIENT)
Dept: OPTOMETRY | Facility: CLINIC | Age: 83
End: 2018-12-19
Payer: MEDICARE

## 2018-12-19 DIAGNOSIS — H52.223 REGULAR ASTIGMATISM WITH PRESBYOPIA, BILATERAL: ICD-10-CM

## 2018-12-19 DIAGNOSIS — Z96.1 PSEUDOPHAKIA OF BOTH EYES: ICD-10-CM

## 2018-12-19 DIAGNOSIS — H52.4 REGULAR ASTIGMATISM WITH PRESBYOPIA, BILATERAL: ICD-10-CM

## 2018-12-19 DIAGNOSIS — H47.20 OPTIC DISC ATROPHY, LEFT: ICD-10-CM

## 2018-12-19 DIAGNOSIS — H04.123 BILATERAL DRY EYES: Primary | ICD-10-CM

## 2018-12-19 PROCEDURE — 92014 COMPRE OPH EXAM EST PT 1/>: CPT | Mod: HCNC,S$GLB,, | Performed by: OPTOMETRIST

## 2018-12-19 PROCEDURE — 99999 PR PBB SHADOW E&M-EST. PATIENT-LVL III: CPT | Mod: PBBFAC,HCNC,, | Performed by: OPTOMETRIST

## 2018-12-19 PROCEDURE — 92015 DETERMINE REFRACTIVE STATE: CPT | Mod: HCNC,S$GLB,, | Performed by: OPTOMETRIST

## 2018-12-19 NOTE — LETTER
December 19, 2018      Katherine Hutchinson MD  [address]  [facility] [address]           [facility]  [address]  [facility] [address]  Phone: [phone]          Patient: Karli Hameed   MR Number: [id number]   YOB: 1931   Date of Visit: 12/19/2018       Dear Dr. Katherine Hutchinson:    Thank you for referring Karli Hameed to me for evaluation. Attached you will find relevant portions of my assessment and plan of care.    If you have questions, please do not hesitate to call me. I look forward to following Karli Hameed along with you.    Sincerely,    Joie Villegas, OD    Enclosure  CC:  No Recipients    If you would like to receive this communication electronically, please contact [email or web address] or [phone] to request more information on [employer] access.    For providers and/or their staff who would like to refer a patient to [facility], please contact us through our one-stop-shop provider referral line, [facility] , at [phone].    If you feel you have received this communication in error or would no longer like to receive these types of communications, please e-mail [email or web address]

## 2018-12-19 NOTE — Clinical Note
Dear Dr. Hutchinson,Thank you for referring Ms. Hameed for an eye examination. She is doing well; just has some dry eyes for which I recommended artificial tears. I will check her again in 1 year. Please let me know if you have any questions.Sincerely,Joie Villegas, JOSAFAT

## 2019-01-16 ENCOUNTER — TELEPHONE (OUTPATIENT)
Dept: NEPHROLOGY | Facility: CLINIC | Age: 84
End: 2019-01-16

## 2019-01-16 NOTE — TELEPHONE ENCOUNTER
----- Message from Luis M Alonzo MD sent at 1/16/2019  2:50 PM CST -----  Please make sure has EP appt, can see when I'm on ESRD, thank you.     appt scheduled and mailed

## 2019-02-08 ENCOUNTER — OFFICE VISIT (OUTPATIENT)
Dept: NEPHROLOGY | Facility: CLINIC | Age: 84
End: 2019-02-08
Payer: MEDICARE

## 2019-02-08 VITALS
SYSTOLIC BLOOD PRESSURE: 150 MMHG | DIASTOLIC BLOOD PRESSURE: 80 MMHG | BODY MASS INDEX: 19 KG/M2 | OXYGEN SATURATION: 99 % | WEIGHT: 111.31 LBS | HEART RATE: 93 BPM | HEIGHT: 64 IN

## 2019-02-08 DIAGNOSIS — N18.4 CHRONIC KIDNEY DISEASE, STAGE IV (SEVERE): Primary | ICD-10-CM

## 2019-02-08 PROCEDURE — 99214 PR OFFICE/OUTPT VISIT, EST, LEVL IV, 30-39 MIN: ICD-10-PCS | Mod: HCNC,S$GLB,, | Performed by: INTERNAL MEDICINE

## 2019-02-08 PROCEDURE — 99214 OFFICE O/P EST MOD 30 MIN: CPT | Mod: HCNC,S$GLB,, | Performed by: INTERNAL MEDICINE

## 2019-02-08 PROCEDURE — 99999 PR PBB SHADOW E&M-EST. PATIENT-LVL III: ICD-10-PCS | Mod: PBBFAC,HCNC,, | Performed by: INTERNAL MEDICINE

## 2019-02-08 PROCEDURE — 99999 PR PBB SHADOW E&M-EST. PATIENT-LVL III: CPT | Mod: PBBFAC,HCNC,, | Performed by: INTERNAL MEDICINE

## 2019-02-08 PROCEDURE — 1101F PR PT FALLS ASSESS DOC 0-1 FALLS W/OUT INJ PAST YR: ICD-10-PCS | Mod: HCNC,CPTII,S$GLB, | Performed by: INTERNAL MEDICINE

## 2019-02-08 PROCEDURE — 1101F PT FALLS ASSESS-DOCD LE1/YR: CPT | Mod: HCNC,CPTII,S$GLB, | Performed by: INTERNAL MEDICINE

## 2019-02-14 NOTE — PROGRESS NOTES
Subjective:       Patient ID: Karli Hameed is a 87 y.o. White female who presents for follow-up evaluation of Chronic Kidney Disease    HPI    Ms. Hameed is an 87 year old woman with past medical history of hypertension, polycystic kidney disease presenting for follow up of chronic kidney disease.  Patient reports adequate fluid intake, denies any NSAID use.  She otherwise denies any anorexia, fatigue, fever, chest pain, shortness of breath, abdominal pain, diarrhea, dysuria/hematuria.     Review of Systems   Constitutional: Negative for appetite change, fatigue and fever.   HENT: Negative for congestion.    Respiratory: Negative for cough, chest tightness and shortness of breath.    Cardiovascular: Negative for chest pain and leg swelling.   Gastrointestinal: Negative for abdominal pain, constipation, diarrhea, nausea and vomiting.   Genitourinary: Negative for difficulty urinating, dysuria, flank pain, frequency, hematuria and urgency.   Musculoskeletal: Negative for arthralgias, joint swelling and myalgias.   Skin: Negative for rash and wound.   Neurological: Negative for dizziness, weakness and light-headedness.   All other systems reviewed and are negative.      Objective:      Physical Exam   Constitutional: She appears well-developed and well-nourished.   Cardiovascular: Normal rate, regular rhythm and normal heart sounds. Exam reveals no gallop and no friction rub.   No murmur heard.  Pulmonary/Chest: Effort normal and breath sounds normal. No respiratory distress. She has no wheezes. She has no rales.   Abdominal: Soft. Bowel sounds are normal. There is no tenderness.   Musculoskeletal: She exhibits no edema.   Neurological: She is alert.   Skin: Skin is warm and dry. No rash noted. No erythema.   Psychiatric: She has a normal mood and affect.       Assessment:       1. Chronic kidney disease, stage IV (severe)        Plan:      Ms. Hameed is an 87 year old woman with past medical history of  hypertension, polycystic kidney disease presenting for follow up of chronic kidney disease stage IV.  Patient creatinine slightly above baseline, though stable since February 2018 (previous acute kidney injury due to UTI, v. secondary to prior URI), will repeat renal panel to trend, reviewed renal US 7.18 with doppler to rule out obstructive/vascular etiology, further recommendations pending results.  Encouraged fluid intake, stressed importance of blood pressure control to prevent any further progression of kidney disease, patient voiced understanding.  Discussed signs/symptoms of uremia, along with indications for renal replacement therapy; will have patient attend [facility] education on dialysis modalities (reluctant to discuss access at this time).  - Anemia: Hgb at goal, no indication for erythropoetin therapy, will trend CBC/iron studies  - Bone/mineral metabolism: patient with secondary hyperparathyroidism, PTH previously at goal for stage CKD, will trend    Return to clinic in 2-4 months pending renal panel next month, then with renal/heme panel, iron/TIBC/ferritin, urinalysis/culture, urine protein/creatinine ratio prior to next visit

## 2019-02-26 ENCOUNTER — OFFICE VISIT (OUTPATIENT)
Dept: INTERNAL MEDICINE | Facility: CLINIC | Age: 84
End: 2019-02-26
Payer: MEDICARE

## 2019-02-26 VITALS
DIASTOLIC BLOOD PRESSURE: 80 MMHG | HEIGHT: 64 IN | BODY MASS INDEX: 19.23 KG/M2 | OXYGEN SATURATION: 97 % | WEIGHT: 112.63 LBS | SYSTOLIC BLOOD PRESSURE: 130 MMHG | HEART RATE: 100 BPM

## 2019-02-26 DIAGNOSIS — I10 ESSENTIAL HYPERTENSION: ICD-10-CM

## 2019-02-26 DIAGNOSIS — I70.0 AORTIC ATHEROSCLEROSIS: ICD-10-CM

## 2019-02-26 DIAGNOSIS — N18.5 STAGE 5 CHRONIC KIDNEY DISEASE NOT ON CHRONIC DIALYSIS: Primary | ICD-10-CM

## 2019-02-26 DIAGNOSIS — R25.2 CRAMPS, MUSCLE, GENERAL: ICD-10-CM

## 2019-02-26 DIAGNOSIS — Z13.83 SCREENING FOR RESPIRATORY CONDITION: ICD-10-CM

## 2019-02-26 DIAGNOSIS — N25.81 SECONDARY HYPERPARATHYROIDISM OF RENAL ORIGIN: ICD-10-CM

## 2019-02-26 PROBLEM — N18.4 STAGE 4 CHRONIC KIDNEY DISEASE: Status: RESOLVED | Noted: 2018-05-21 | Resolved: 2019-02-26

## 2019-02-26 PROBLEM — R05.9 COUGH IN ADULT: Status: RESOLVED | Noted: 2017-01-09 | Resolved: 2019-02-26

## 2019-02-26 PROBLEM — M54.2 NECK PAIN: Status: RESOLVED | Noted: 2018-10-31 | Resolved: 2019-02-26

## 2019-02-26 PROBLEM — R29.898 DECREASED ROM OF NECK: Status: RESOLVED | Noted: 2018-11-13 | Resolved: 2019-02-26

## 2019-02-26 LAB
POST FEV1 FVC: NORMAL
POST FEV1: NORMAL
POST FVC: NORMAL
PRE FEV1 FVC: 77.53
PRE FEV1: 1.9
PRE FVC: 2.45
PREDICTED FEV1: 106
PREDICTED FVC: 104

## 2019-02-26 PROCEDURE — 99214 PR OFFICE/OUTPT VISIT, EST, LEVL IV, 30-39 MIN: ICD-10-PCS | Mod: HCNC,S$GLB,, | Performed by: INTERNAL MEDICINE

## 2019-02-26 PROCEDURE — 1101F PT FALLS ASSESS-DOCD LE1/YR: CPT | Mod: HCNC,CPTII,S$GLB, | Performed by: INTERNAL MEDICINE

## 2019-02-26 PROCEDURE — 99999 PR PBB SHADOW E&M-EST. PATIENT-LVL III: ICD-10-PCS | Mod: PBBFAC,HCNC,, | Performed by: INTERNAL MEDICINE

## 2019-02-26 PROCEDURE — 1101F PR PT FALLS ASSESS DOC 0-1 FALLS W/OUT INJ PAST YR: ICD-10-PCS | Mod: HCNC,CPTII,S$GLB, | Performed by: INTERNAL MEDICINE

## 2019-02-26 PROCEDURE — 99214 OFFICE O/P EST MOD 30 MIN: CPT | Mod: HCNC,S$GLB,, | Performed by: INTERNAL MEDICINE

## 2019-02-26 PROCEDURE — 99999 PR PBB SHADOW E&M-EST. PATIENT-LVL III: CPT | Mod: PBBFAC,HCNC,, | Performed by: INTERNAL MEDICINE

## 2019-02-27 NOTE — PROGRESS NOTES
Subjective:       Patient ID: Karli Hameed is a 87 y.o. female.    Chief Complaint: Follow-up (3 month f/u)   This very pleasant woman presents to the office today  I did not order spirometry, it is an initiative that identified her, it was performed and normal.    She does not have any uremic symptoms although the muscle cramps that she complains of could be.  Her muscle cramps are not as severe as they have been in the past.    Overall, she is getting along just fine.      Of note, her dog (who is a neighborhood favorite), is not doing well, is elderly and has live disease.  HPI  Review of Systems   Constitutional: Negative for activity change, appetite change, chills, fatigue, fever and unexpected weight change.   HENT: Negative for hearing loss.    Eyes: Negative for visual disturbance.   Respiratory: Negative for cough, chest tightness, shortness of breath and wheezing.    Cardiovascular: Negative for chest pain, palpitations and leg swelling.   Gastrointestinal: Negative for abdominal pain, constipation, nausea and vomiting.   Genitourinary: Negative for dysuria, frequency and urgency.   Musculoskeletal: Positive for myalgias. Negative for arthralgias, back pain, gait problem and joint swelling.   Skin: Negative for rash.   Neurological: Negative for light-headedness and headaches.   Psychiatric/Behavioral: Negative for dysphoric mood and sleep disturbance. The patient is not nervous/anxious.        Objective:      Physical Exam   Constitutional: She appears well-nourished.   HENT:   Head: Atraumatic.   Eyes: Conjunctivae are normal. No scleral icterus.   Neck: Neck supple.   Cardiovascular: Normal rate, regular rhythm and normal heart sounds. Exam reveals no gallop and no friction rub.   No murmur heard.  Pulmonary/Chest: Effort normal and breath sounds normal.   Abdominal: Soft. Bowel sounds are normal. She exhibits no distension and no mass. There is no tenderness. There is no rebound and no guarding.    Musculoskeletal: She exhibits no edema.   Lymphadenopathy:     She has no cervical adenopathy.   Neurological: She is alert.   Skin: Skin is warm and dry.   Psychiatric: She has a normal mood and affect. Her behavior is normal.   Nursing note and vitals reviewed.      Assessment:       1. Stage 5 chronic kidney disease not on chronic dialysis    2. Screening for respiratory condition    3. Cramps, muscle, general    4. Essential hypertension    5. Secondary hyperparathyroidism of renal origin    6. Aortic atherosclerosis        Plan:   Karli was seen today for follow-up and abdominal pain.    Diagnoses and all orders for this visit:    Stage 5 chronic kidney disease not on chronic dialysis    Screening for respiratory condition  -     Mobile Spirometry With/Without Bronchodilator - [facility] Only    Cramps, muscle, general    Essential hypertension    Secondary hyperparathyroidism of renal origin    Aortic atherosclerosis      Medication List with Changes/Refills   New Medications    VARICELLA-ZOSTER GE-AS01B, PF, (SHINGRIX, PF,) 50 MCG/0.5 ML INJECTION    Inject into the muscle.   Current Medications    ACETAMINOPHEN (TYLENOL) 325 MG TABLET    Take by mouth. 1 Tablet Oral .  Tylenol.To take as needed for arthritis pain.    AMLODIPINE (NORVASC) 5 MG TABLET    Take 1 tablet (5 mg total) by mouth every morning.    CHOLECALCIFEROL, VITAMIN D3, 1,000 UNIT CAPSULE    Take 2 capsules (2,000 Units total) by mouth once daily.    DOCUSATE SODIUM (COLACE) 100 MG CAPSULE    Take 1 capsule (100 mg total) by mouth 2 (two) times daily.    INFLUENZA (FLUZONE HIGH-DOSE) 180 MCG/0.5 ML VACCINE    Inject into the muscle.    LOSARTAN (COZAAR) 50 MG TABLET    Take 1 tablet (50 mg total) by mouth once daily.    NAPHAZOLINE (NAPHCON) 0.012 % OPHTHALMIC SOLUTION    Inject 1 drop into the eye Use as needed.    TRETINOIN (RETIN-A) 0.025 % GEL    Apply small amount to entire face qhs. Wash off qam and apply sunscreen.   Discontinued  Medications    CLOTRIMAZOLE-BETAMETHASONE 1-0.05% (LOTRISONE) CREAM    Apply topically 2 (two) times daily.    KETOCONAZOLE (NIZORAL) 2 % SHAMPOO    Apply topically twice a week.    ONDANSETRON (ZOFRAN) 4 MG TABLET    Take 1 tablet (4 mg total) by mouth every 12 (twelve) hours as needed for Nausea (try half tablet first).     Follow-up in about 6 months (around 8/26/2019).

## 2019-03-26 ENCOUNTER — PATIENT MESSAGE (OUTPATIENT)
Dept: INTERNAL MEDICINE | Facility: CLINIC | Age: 84
End: 2019-03-26

## 2019-03-26 RX ORDER — AMLODIPINE BESYLATE 5 MG/1
5 TABLET ORAL EVERY MORNING
Qty: 90 TABLET | Refills: 3 | Status: SHIPPED | OUTPATIENT
Start: 2019-03-26 | End: 2020-03-11 | Stop reason: SDUPTHER

## 2019-03-29 ENCOUNTER — LAB VISIT (OUTPATIENT)
Dept: LAB | Facility: HOSPITAL | Age: 84
End: 2019-03-29
Attending: INTERNAL MEDICINE
Payer: MEDICARE

## 2019-03-29 DIAGNOSIS — M81.0 OSTEOPOROSIS, UNSPECIFIED OSTEOPOROSIS TYPE, UNSPECIFIED PATHOLOGICAL FRACTURE PRESENCE: ICD-10-CM

## 2019-03-29 DIAGNOSIS — N18.4 CHRONIC KIDNEY DISEASE, STAGE IV (SEVERE): ICD-10-CM

## 2019-03-29 LAB
ALBUMIN SERPL BCP-MCNC: 3.9 G/DL (ref 3.5–5.2)
ALBUMIN SERPL BCP-MCNC: 3.9 G/DL (ref 3.5–5.2)
ALP SERPL-CCNC: 34 U/L (ref 55–135)
ALT SERPL W/O P-5'-P-CCNC: 10 U/L (ref 10–44)
ANION GAP SERPL CALC-SCNC: 13 MMOL/L (ref 8–16)
ANION GAP SERPL CALC-SCNC: 13 MMOL/L (ref 8–16)
AST SERPL-CCNC: 20 U/L (ref 10–40)
BASOPHILS # BLD AUTO: 0.02 K/UL (ref 0–0.2)
BASOPHILS NFR BLD: 0.4 % (ref 0–1.9)
BILIRUB SERPL-MCNC: 0.4 MG/DL (ref 0.1–1)
BUN SERPL-MCNC: 59 MG/DL (ref 8–23)
BUN SERPL-MCNC: 59 MG/DL (ref 8–23)
CALCIUM SERPL-MCNC: 9 MG/DL (ref 8.7–10.5)
CALCIUM SERPL-MCNC: 9 MG/DL (ref 8.7–10.5)
CHLORIDE SERPL-SCNC: 108 MMOL/L (ref 95–110)
CHLORIDE SERPL-SCNC: 108 MMOL/L (ref 95–110)
CO2 SERPL-SCNC: 21 MMOL/L (ref 23–29)
CO2 SERPL-SCNC: 21 MMOL/L (ref 23–29)
CREAT SERPL-MCNC: 3.7 MG/DL (ref 0.5–1.4)
CREAT SERPL-MCNC: 3.7 MG/DL (ref 0.5–1.4)
DIFFERENTIAL METHOD: ABNORMAL
EOSINOPHIL # BLD AUTO: 0.1 K/UL (ref 0–0.5)
EOSINOPHIL NFR BLD: 1.4 % (ref 0–8)
ERYTHROCYTE [DISTWIDTH] IN BLOOD BY AUTOMATED COUNT: 13.6 % (ref 11.5–14.5)
EST. GFR  (AFRICAN AMERICAN): 12 ML/MIN/1.73 M^2
EST. GFR  (AFRICAN AMERICAN): 12 ML/MIN/1.73 M^2
EST. GFR  (NON AFRICAN AMERICAN): 10.4 ML/MIN/1.73 M^2
EST. GFR  (NON AFRICAN AMERICAN): 10.4 ML/MIN/1.73 M^2
FERRITIN SERPL-MCNC: 27 NG/ML (ref 20–300)
GLUCOSE SERPL-MCNC: 86 MG/DL (ref 70–110)
GLUCOSE SERPL-MCNC: 86 MG/DL (ref 70–110)
HCT VFR BLD AUTO: 33.6 % (ref 37–48.5)
HGB BLD-MCNC: 10.5 G/DL (ref 12–16)
IMM GRANULOCYTES # BLD AUTO: 0.01 K/UL (ref 0–0.04)
IMM GRANULOCYTES NFR BLD AUTO: 0.2 % (ref 0–0.5)
IRON SERPL-MCNC: 65 UG/DL (ref 30–160)
LYMPHOCYTES # BLD AUTO: 0.7 K/UL (ref 1–4.8)
LYMPHOCYTES NFR BLD: 14.4 % (ref 18–48)
MAGNESIUM SERPL-MCNC: 2.3 MG/DL (ref 1.6–2.6)
MCH RBC QN AUTO: 29.7 PG (ref 27–31)
MCHC RBC AUTO-ENTMCNC: 31.3 G/DL (ref 32–36)
MCV RBC AUTO: 95 FL (ref 82–98)
MONOCYTES # BLD AUTO: 0.4 K/UL (ref 0.3–1)
MONOCYTES NFR BLD: 7.3 % (ref 4–15)
NEUTROPHILS # BLD AUTO: 3.9 K/UL (ref 1.8–7.7)
NEUTROPHILS NFR BLD: 76.3 % (ref 38–73)
NRBC BLD-RTO: 0 /100 WBC
PHOSPHATE SERPL-MCNC: 5.2 MG/DL (ref 2.7–4.5)
PLATELET # BLD AUTO: 135 K/UL (ref 150–350)
PMV BLD AUTO: 10.8 FL (ref 9.2–12.9)
POTASSIUM SERPL-SCNC: 4.4 MMOL/L (ref 3.5–5.1)
POTASSIUM SERPL-SCNC: 4.4 MMOL/L (ref 3.5–5.1)
PROT SERPL-MCNC: 6.7 G/DL (ref 6–8.4)
PTH-INTACT SERPL-MCNC: 600 PG/ML (ref 9–77)
RBC # BLD AUTO: 3.54 M/UL (ref 4–5.4)
SATURATED IRON: 20 % (ref 20–50)
SODIUM SERPL-SCNC: 142 MMOL/L (ref 136–145)
SODIUM SERPL-SCNC: 142 MMOL/L (ref 136–145)
TOTAL IRON BINDING CAPACITY: 329 UG/DL (ref 250–450)
TRANSFERRIN SERPL-MCNC: 222 MG/DL (ref 200–375)
WBC # BLD AUTO: 5.08 K/UL (ref 3.9–12.7)

## 2019-03-29 PROCEDURE — 80069 RENAL FUNCTION PANEL: CPT | Mod: HCNC

## 2019-03-29 PROCEDURE — 80053 COMPREHEN METABOLIC PANEL: CPT | Mod: HCNC

## 2019-03-29 PROCEDURE — 83970 ASSAY OF PARATHORMONE: CPT | Mod: HCNC

## 2019-03-29 PROCEDURE — 83735 ASSAY OF MAGNESIUM: CPT | Mod: HCNC

## 2019-03-29 PROCEDURE — 83540 ASSAY OF IRON: CPT | Mod: HCNC

## 2019-03-29 PROCEDURE — 36415 COLL VENOUS BLD VENIPUNCTURE: CPT | Mod: HCNC

## 2019-03-29 PROCEDURE — 82728 ASSAY OF FERRITIN: CPT | Mod: HCNC

## 2019-03-29 PROCEDURE — 85025 COMPLETE CBC W/AUTO DIFF WBC: CPT | Mod: HCNC

## 2019-04-15 ENCOUNTER — PATIENT MESSAGE (OUTPATIENT)
Dept: INTERNAL MEDICINE | Facility: CLINIC | Age: 84
End: 2019-04-15

## 2019-04-15 NOTE — TELEPHONE ENCOUNTER
Sent: 4/15/2019  7:58 AM CDT       To: Katherine Hutchinson MD  Subject: Non-Urgent Medical    Dear Dr. Hutchinson:                                           It have been about four days that I noticed I was passing spotty blood from the rectum, it bother me..      a little.  Probably is a hemorrhoidal pop out.  Please, if you advice me  to get medicine, please call  [facility] in [place]. I gave the phone number to your nurse last time I coming to see you.    Thanks you so much for your help  Karli Hameed

## 2019-04-16 ENCOUNTER — OFFICE VISIT (OUTPATIENT)
Dept: INTERNAL MEDICINE | Facility: CLINIC | Age: 84
End: 2019-04-16
Payer: MEDICARE

## 2019-04-16 VITALS
HEART RATE: 106 BPM | DIASTOLIC BLOOD PRESSURE: 72 MMHG | WEIGHT: 111.56 LBS | BODY MASS INDEX: 19.04 KG/M2 | HEIGHT: 64 IN | SYSTOLIC BLOOD PRESSURE: 138 MMHG

## 2019-04-16 DIAGNOSIS — K62.5 BRBPR (BRIGHT RED BLOOD PER RECTUM): Primary | ICD-10-CM

## 2019-04-16 PROCEDURE — 99213 PR OFFICE/OUTPT VISIT, EST, LEVL III, 20-29 MIN: ICD-10-PCS | Mod: HCNC,S$GLB,, | Performed by: PHYSICIAN ASSISTANT

## 2019-04-16 PROCEDURE — 99213 OFFICE O/P EST LOW 20 MIN: CPT | Mod: HCNC,S$GLB,, | Performed by: PHYSICIAN ASSISTANT

## 2019-04-16 PROCEDURE — 1101F PT FALLS ASSESS-DOCD LE1/YR: CPT | Mod: HCNC,CPTII,S$GLB, | Performed by: PHYSICIAN ASSISTANT

## 2019-04-16 PROCEDURE — 99999 PR PBB SHADOW E&M-EST. PATIENT-LVL IV: ICD-10-PCS | Mod: PBBFAC,HCNC,, | Performed by: PHYSICIAN ASSISTANT

## 2019-04-16 PROCEDURE — 99999 PR PBB SHADOW E&M-EST. PATIENT-LVL IV: CPT | Mod: PBBFAC,HCNC,, | Performed by: PHYSICIAN ASSISTANT

## 2019-04-16 PROCEDURE — 1101F PR PT FALLS ASSESS DOC 0-1 FALLS W/OUT INJ PAST YR: ICD-10-PCS | Mod: HCNC,CPTII,S$GLB, | Performed by: PHYSICIAN ASSISTANT

## 2019-04-16 NOTE — PROGRESS NOTES
Subjective:       Patient ID: Karli Hameed is a 87 y.o. female.        Chief Complaint: Rectal Bleeding and Hemorrhoids    Karli Hameed is an established patient of Katherine Linn MD here today for urgent care visit.    She notes she was doing a lot of coughing and sneezing for two weeks that has since resolved.  4 days ago she had a bowel movement and then afterwards she had a few drops of blood on her underwear x 3 days after bowel movement.  No large amount of blood.  Blood was bright red and minimal.  She feels that she may have a hemorrhoid.  The bleeding has spontaneously resolved for the past 24 hours.  She had a normal bowel movement this morning with no bleeding.  No black tarry stool or blood in the stool.  No abdominal pain.      HTN: taking amlodipine and losartan, BP is 138/72 today.    Vitamin d deficiency: taking 1000 IU daily.    She tells me she has been a patient at [facility] for 50 years!         Review of Systems   Constitutional: Negative for chills, diaphoresis, fatigue and fever.   HENT: Negative for congestion and sore throat.    Eyes: Negative for visual disturbance.   Respiratory: Negative for cough, chest tightness and shortness of breath.    Cardiovascular: Negative for chest pain, palpitations and leg swelling.   Gastrointestinal: Negative for abdominal pain, blood in stool, constipation, diarrhea, nausea and vomiting.        Rectal bleeding   Genitourinary: Negative for dysuria, frequency, hematuria and urgency.   Musculoskeletal: Negative for arthralgias and back pain.   Skin: Negative for rash.   Neurological: Negative for dizziness, syncope, weakness and headaches.   Psychiatric/Behavioral: Negative for dysphoric mood and sleep disturbance. The patient is not nervous/anxious.        Objective:      Physical Exam   Constitutional: She appears well-developed and well-nourished.   HENT:   Head: Normocephalic.   Right Ear: External ear normal.   Left Ear: External ear normal.  "  Mouth/Throat: Oropharynx is clear and moist.   Eyes: Pupils are equal, round, and reactive to light.   Cardiovascular: Normal rate, regular rhythm and normal heart sounds. Exam reveals no gallop and no friction rub.   No murmur heard.  Pulmonary/Chest: Effort normal and breath sounds normal. No respiratory distress.   Abdominal: Soft. Normal appearance. There is no tenderness.   Genitourinary:   Genitourinary Comments: No external hemorrhoid or bleeding   Musculoskeletal: She exhibits no edema.   Neurological: She is alert.   Skin: Skin is warm and dry.   Psychiatric: She has a normal mood and affect.   Nursing note and vitals reviewed.      Assessment:       1. BRBPR (bright red blood per rectum)        Plan:       Karli was seen today for rectal bleeding and hemorrhoids.    Diagnoses and all orders for this visit:    BRBPR (bright red blood per rectum)  -     hydrocortisone-pramoxine (PROCTOFOAM-HS) rectal foam; Place 1 applicator rectally 2 (two) times daily.  -     Ambulatory consult to Colorectal Surgery    A few days of BRBPR, just a few drops, will start empiric tx for hemorrhoid as above.  Refer to CRS if recurrence.    Pt has been given instructions populated from [employer] database and has verbalized understanding of the after visit summary and information contained wherein.    Follow up with a primary care provider. May go to ER for acute shortness of breath, lightheadedness, fever, or any other emergent complaints or changes in condition.    "This note will be shared with the patient"    Future Appointments   Date Time Provider Department Center   4/24/2019  8:10 AM Thania Fischer NP [facility] COLON [facility]   5/3/2019  8:00 AM EPO, INJECTION [facility] ID INF [facility]   5/3/2019  9:00 AM Jacob Max MD [facility] RHEUM [facility]               "

## 2019-04-16 NOTE — PATIENT INSTRUCTIONS
Treating Hemorrhoids: Self-Care    Follow your healthcare providers advice about caring for your hemorrhoids at home. Some treatments help relieve symptoms right away. Others involve making changes in your diet and exercise habits. These can help ease constipation and prevent hemorrhoid symptoms from coming back.  Relieving symptoms  Your healthcare provider may prescribe anti-inflammatory medicine to help ease your symptoms. The following tips will also help relieve pain and swelling.  · Take sitz baths. Taking a sitz bath means sitting in a few inches of warm bath water. Soaking for 10 minutes twice a day can provide welcome relief from painful hemorrhoids. It can also help the area stay clean.  · Develop good bowel habits. Use the bathroom when you need to. Dont ignore the urge to move your bowels. This can lead to constipation, hard stools, and straining. Also, dont read while on the toilet. Sit only as long as needed. Wipe gently with soft, unscented toilet tissue or baby wipes.  · Use ice packs. Placing an ice pack on a thrombosed external hemorrhoid can help relieve pain right away. It will also help reduce the blood clot. Use the ice for 15 to 20 minutes at a time. Keep a cloth between the ice and your skin to prevent skin damage.  · Use other measures. Laxatives and enemas can help ease constipation. But use them only on your healthcare providers advice. For symptom relief, try using cotton pads soaked in witch hazel. These are available at most drugstores. Over-the-counter hemorrhoid ointments and petroleum jelly can also provide relief.  Add fiber to your diet  Adding fiber to your diet can help relieve constipation by making stools softer and easier to pass. To increase your fiber intake, your healthcare provider may recommend a bulking agent, such as psyllium. This is a high-fiber supplement available at most grocery stores and drugstores. Eating more fiber-rich foods will also help. There are two  types of fiber:  · Insoluble fiber is the main ingredient in bulking agents. Its also found in foods such as wheat bran, whole-grain breads, fresh fruits, and vegetables.  · Soluble fiber is found in foods such as oat bran. Although soluble fiber is good for you, it may not ease constipation as much as foods high in insoluble fiber.  Drink more water  Along with a high-fiber diet, drinking more water can help ease constipation. This is because insoluble fiber absorbs water, making stools soft and bulky. Be sure to drink plenty of water throughout the day. Drinking fruit juices, such as prune juice or apple juice, can also help prevent constipation.  Get more exercise  Regular exercise aids digestion and helps prevent constipation. Its also great for your health. So talk with your healthcare provider about starting an exercise program. Low-impact activities, such as swimming or walking, are good places to start. Take it easy at first. And remember to drink plenty of water when you exercise.  High-fiber foods  High-fiber foods offer many benefits. By making your stools softer, they help heal and prevent swollen hemorrhoids. They may also help reduce the risk of colon and rectal cancer. Best of all, theyre usually low in calories and taste great. Here are some examples of fiber-rich foods.  · Whole grains, such as wheat bran, corn bran, and brown rice.  · Vegetables, especially carrots, broccoli, cabbage, and peas.  · Fruits, such as apples, bananas, raisins, peaches, and pears.  · Nuts and legumes, especially peanuts, lentils, and kidney beans.  Easy ways to add fiber  The tips below offer some simple ways to add more high-fiber foods to your meals.  · Start your day with a high-fiber breakfast. Eat a wheat bran cereal along with a sliced banana. Or, try peanut butter on whole-wheat toast.  · Eat carrot sticks for snacks. Theyre easy to prepare, taste great, and are low in calories.  · Use whole-grain breads  instead of white bread for sandwiches.  · Eat fruits for treats. Try an apple and some raisins instead of a candy bar.   Date Last Reviewed: 7/1/2016  © 9874-3332 [employer]. [address] [place] [address], [place] [address]. All rights reserved. This information is not intended as a substitute for professional medical care. Always follow your healthcare professional's instructions.

## 2019-04-18 ENCOUNTER — TELEPHONE (OUTPATIENT)
Dept: INTERNAL MEDICINE | Facility: CLINIC | Age: 84
End: 2019-04-18

## 2019-04-18 RX ORDER — HYDROCORTISONE 25 MG/G
CREAM TOPICAL 2 TIMES DAILY
Status: CANCELLED | OUTPATIENT
Start: 2019-04-18

## 2019-04-18 RX ORDER — HYDROCORTISONE 25 MG/G
CREAM TOPICAL 2 TIMES DAILY
Qty: 20 G | Refills: 0 | Status: SHIPPED | OUTPATIENT
Start: 2019-04-18 | End: 2019-07-23

## 2019-04-18 NOTE — TELEPHONE ENCOUNTER
----- Message from Kimberlee Felipe sent at 4/18/2019 12:41 PM CDT -----  Contact: Ranjan [phone]  Prescription Alternative Needed:     The pharmacy needs alternative on the following RX:    hydrocortisone-pramoxine (PROCTOFOAM-HS) rectal foam    Reason: Drug not covered.    Pharmacy: [facility] [address] [place] [address] [facility]    Please advise.    Thank You

## 2019-04-23 ENCOUNTER — PATIENT MESSAGE (OUTPATIENT)
Dept: RHEUMATOLOGY | Facility: CLINIC | Age: 84
End: 2019-04-23

## 2019-05-02 ENCOUNTER — TELEPHONE (OUTPATIENT)
Dept: RHEUMATOLOGY | Facility: CLINIC | Age: 84
End: 2019-05-02

## 2019-05-02 DIAGNOSIS — M85.80 OSTEOPENIA, UNSPECIFIED LOCATION: Primary | ICD-10-CM

## 2019-05-03 ENCOUNTER — INFUSION (OUTPATIENT)
Dept: INFECTIOUS DISEASES | Facility: HOSPITAL | Age: 84
End: 2019-05-03
Attending: INTERNAL MEDICINE
Payer: MEDICARE

## 2019-05-03 VITALS — BODY MASS INDEX: 19.04 KG/M2 | WEIGHT: 111.56 LBS | HEIGHT: 64 IN

## 2019-05-03 DIAGNOSIS — M85.80 OSTEOPENIA, UNSPECIFIED LOCATION: Primary | ICD-10-CM

## 2019-05-03 PROCEDURE — 96372 THER/PROPH/DIAG INJ SC/IM: CPT | Mod: HCNC

## 2019-05-03 PROCEDURE — 63600175 PHARM REV CODE 636 W HCPCS: Mod: HCNC,JG | Performed by: INTERNAL MEDICINE

## 2019-05-03 RX ADMIN — DENOSUMAB 60 MG: 60 INJECTION SUBCUTANEOUS at 08:05

## 2019-05-06 ENCOUNTER — PATIENT MESSAGE (OUTPATIENT)
Dept: INTERNAL MEDICINE | Facility: CLINIC | Age: 84
End: 2019-05-06

## 2019-05-06 RX ORDER — LOSARTAN POTASSIUM 50 MG/1
50 TABLET ORAL DAILY
Qty: 90 TABLET | Refills: 3 | Status: SHIPPED | OUTPATIENT
Start: 2019-05-06 | End: 2020-03-11 | Stop reason: SDUPTHER

## 2019-05-29 ENCOUNTER — TELEPHONE (OUTPATIENT)
Dept: INTERNAL MEDICINE | Facility: CLINIC | Age: 84
End: 2019-05-29

## 2019-05-29 ENCOUNTER — PATIENT MESSAGE (OUTPATIENT)
Dept: INTERNAL MEDICINE | Facility: CLINIC | Age: 84
End: 2019-05-29

## 2019-05-29 DIAGNOSIS — H81.13 BENIGN PAROXYSMAL POSITIONAL VERTIGO DUE TO BILATERAL VESTIBULAR DISORDER: Primary | ICD-10-CM

## 2019-05-29 RX ORDER — DIAZEPAM 2 MG/1
2 TABLET ORAL NIGHTLY PRN
Qty: 20 TABLET | Refills: 0 | Status: SHIPPED | OUTPATIENT
Start: 2019-05-29 | End: 2019-07-23

## 2019-05-29 NOTE — TELEPHONE ENCOUNTER
Fwd'd message to PCP.    Dear Dr. Hutchinson:  Since May 8th. I have been having vertigo.  I been taking Diazepam that you prescribed 2/21/2018. It's not working. Will you call [employer] if is possible and get for me, meclizine 25 mg. generic. I don't need 90 pills.  The last time I got Meclizine was 3/5/2013. I took one and felt better. Although still I have 29 pills left.  There too Old!     Please advice.

## 2019-05-29 NOTE — TELEPHONE ENCOUNTER
DANIEL  I spoke to the patient on the phone.  She was in the [employer] parlor on May 8th, when she tipped her head back to get her hair washed she suddenly developed vertigo.  Now she is bothered with vertigo on and off during the day and mostly at night when she is getting in her out of bed and in the morning getting in her out of bed.  It has been pretty miserable.  She is not safe to take meclizine.  Valium a 2 mg tablet or half of a 2 mg tablet taken at bedtime is safe and may help.  I have also placed an order to physical therapy to see if they can work with her to abolished this vertigo.

## 2019-05-30 NOTE — TELEPHONE ENCOUNTER
Pt states she is doing better and she confirmed she will not take the Meclizine but will only take 1/2 tablet on Valium 2 mg at bedtime.  Pt stated Physical therapy has been arranged to start on 06/21/19.

## 2019-06-11 ENCOUNTER — PATIENT MESSAGE (OUTPATIENT)
Dept: INTERNAL MEDICINE | Facility: CLINIC | Age: 84
End: 2019-06-11

## 2019-06-11 DIAGNOSIS — K62.5 RECTAL BLEEDING: Primary | ICD-10-CM

## 2019-06-12 ENCOUNTER — OFFICE VISIT (OUTPATIENT)
Dept: SURGERY | Facility: CLINIC | Age: 84
End: 2019-06-12
Payer: MEDICARE

## 2019-06-12 ENCOUNTER — TELEPHONE (OUTPATIENT)
Dept: ENDOSCOPY | Facility: HOSPITAL | Age: 84
End: 2019-06-12

## 2019-06-12 VITALS — WEIGHT: 110.25 LBS | BODY MASS INDEX: 18.82 KG/M2 | HEIGHT: 64 IN

## 2019-06-12 DIAGNOSIS — Z12.11 SPECIAL SCREENING FOR MALIGNANT NEOPLASMS, COLON: Primary | ICD-10-CM

## 2019-06-12 DIAGNOSIS — K62.89 RECTAL MASS: Primary | ICD-10-CM

## 2019-06-12 PROCEDURE — 99204 PR OFFICE/OUTPT VISIT, NEW, LEVL IV, 45-59 MIN: ICD-10-PCS | Mod: HCNC,S$GLB,, | Performed by: NURSE PRACTITIONER

## 2019-06-12 PROCEDURE — 1101F PT FALLS ASSESS-DOCD LE1/YR: CPT | Mod: HCNC,CPTII,S$GLB, | Performed by: NURSE PRACTITIONER

## 2019-06-12 PROCEDURE — 99204 OFFICE O/P NEW MOD 45 MIN: CPT | Mod: HCNC,S$GLB,, | Performed by: NURSE PRACTITIONER

## 2019-06-12 PROCEDURE — 1101F PR PT FALLS ASSESS DOC 0-1 FALLS W/OUT INJ PAST YR: ICD-10-PCS | Mod: HCNC,CPTII,S$GLB, | Performed by: NURSE PRACTITIONER

## 2019-06-12 PROCEDURE — 99999 PR PBB SHADOW E&M-EST. PATIENT-LVL III: ICD-10-PCS | Mod: PBBFAC,,, | Performed by: NURSE PRACTITIONER

## 2019-06-12 PROCEDURE — 99999 PR PBB SHADOW E&M-EST. PATIENT-LVL III: CPT | Mod: PBBFAC,,, | Performed by: NURSE PRACTITIONER

## 2019-06-12 RX ORDER — POLYETHYLENE GLYCOL 3350, SODIUM SULFATE ANHYDROUS, SODIUM BICARBONATE, SODIUM CHLORIDE, POTASSIUM CHLORIDE 236; 22.74; 6.74; 5.86; 2.97 G/4L; G/4L; G/4L; G/4L; G/4L
4 POWDER, FOR SOLUTION ORAL ONCE
Qty: 4000 ML | Refills: 0 | Status: SHIPPED | OUTPATIENT
Start: 2019-06-12 | End: 2019-06-12

## 2019-06-12 NOTE — LETTER
June 12, 2019      Sophie Busby MD  [address]  [facility] [address]           [facility]  [address]  [facility] [address]  Phone: [phone]          Patient: Karli Hameed   MR Number: [id number]   YOB: 1931   Date of Visit: 6/12/2019       Dear Dr. Sophie Busby:    Thank you for referring Karli Hameed to me for evaluation. Attached you will find relevant portions of my assessment and plan of care.    If you have questions, please do not hesitate to call me. I look forward to following Karli Hameed along with you.    Sincerely,    Thania Fischer, NP    Enclosure  CC:  No Recipients    If you would like to receive this communication electronically, please contact [email or web address] or [phone] to request more information on [employer] access.    For providers and/or their staff who would like to refer a patient to [facility], please contact us through our one-stop-shop provider referral line, [employer] , at [phone].    If you feel you have received this communication in error or would no longer like to receive these types of communications, please e-mail [email or web address]

## 2019-06-12 NOTE — PROGRESS NOTES
Subjective:       Patient ID: Karli Hameed is a 87 y.o. female.    Chief Complaint: No chief complaint on file.    HPI     87 F who presents to clinic for rectal bleeding off and on for several months. Blood with BMs. Having daily BMs. Mild left sided pressure. Not on any anticoagulation. No weight loss.     C-scope 10 years ago and normal per patient  No family hx of colon or rectal cancer  No prior rectal surgeries      Review of Systems   Constitutional: Negative for fatigue, fever and unexpected weight change.   Respiratory: Negative for shortness of breath.    Cardiovascular: Negative for chest pain.   Gastrointestinal: Positive for blood in stool and rectal pain. Negative for abdominal distention, abdominal pain, anal bleeding, constipation, diarrhea, nausea and vomiting.       Objective:      Physical Exam   Constitutional: She is oriented to person, place, and time. She appears well-developed and well-nourished. No distress.   Eyes: Conjunctivae and EOM are normal.   Pulmonary/Chest: Effort normal. No respiratory distress.   Abdominal: Soft. She exhibits no distension. There is no tenderness.   Genitourinary:   Genitourinary Comments: Left lateral anal mass, 3cm X 2cm, mobile   Musculoskeletal: Normal range of motion.   Neurological: She is alert and oriented to person, place, and time.   Skin: Skin is warm and dry.   Psychiatric: She has a normal mood and affect. Her behavior is normal.       Assessment:       1. Rectal mass        Plan:       Examined with Dr Fritz as well due to concerning findings  Cscope next week for biopsy

## 2019-06-13 ENCOUNTER — PATIENT MESSAGE (OUTPATIENT)
Dept: INTERNAL MEDICINE | Facility: CLINIC | Age: 84
End: 2019-06-13

## 2019-06-13 ENCOUNTER — TELEPHONE (OUTPATIENT)
Dept: NEPHROLOGY | Facility: CLINIC | Age: 84
End: 2019-06-13

## 2019-06-13 DIAGNOSIS — N18.4 CHRONIC KIDNEY DISEASE, STAGE IV (SEVERE): Primary | ICD-10-CM

## 2019-06-19 ENCOUNTER — ANESTHESIA (OUTPATIENT)
Dept: ENDOSCOPY | Facility: HOSPITAL | Age: 84
End: 2019-06-19
Payer: MEDICARE

## 2019-06-19 ENCOUNTER — HOSPITAL ENCOUNTER (OUTPATIENT)
Facility: HOSPITAL | Age: 84
Discharge: HOME | End: 2019-06-19
Attending: COLON & RECTAL SURGERY | Admitting: COLON & RECTAL SURGERY
Payer: MEDICARE

## 2019-06-19 ENCOUNTER — TELEPHONE (OUTPATIENT)
Dept: SURGERY | Facility: CLINIC | Age: 84
End: 2019-06-19

## 2019-06-19 ENCOUNTER — ANESTHESIA EVENT (OUTPATIENT)
Dept: ENDOSCOPY | Facility: HOSPITAL | Age: 84
End: 2019-06-19
Payer: MEDICARE

## 2019-06-19 VITALS
BODY MASS INDEX: 18.78 KG/M2 | WEIGHT: 110 LBS | OXYGEN SATURATION: 98 % | RESPIRATION RATE: 16 BRPM | HEART RATE: 60 BPM | TEMPERATURE: 98 F | HEIGHT: 64 IN | SYSTOLIC BLOOD PRESSURE: 140 MMHG | DIASTOLIC BLOOD PRESSURE: 72 MMHG

## 2019-06-19 DIAGNOSIS — Z12.11 ENCOUNTER FOR SCREENING COLONOSCOPY: ICD-10-CM

## 2019-06-19 PROCEDURE — 45380 PR COLONOSCOPY,BIOPSY: ICD-10-PCS | Mod: HCNC,,, | Performed by: COLON & RECTAL SURGERY

## 2019-06-19 PROCEDURE — 88305 TISSUE EXAM BY PATHOLOGIST: CPT | Mod: 26,HCNC,, | Performed by: PATHOLOGY

## 2019-06-19 PROCEDURE — 88305 TISSUE SPECIMEN TO PATHOLOGY - SURGERY: ICD-10-PCS | Mod: 26,HCNC,, | Performed by: PATHOLOGY

## 2019-06-19 PROCEDURE — 45380 COLONOSCOPY AND BIOPSY: CPT | Mod: HCNC,,, | Performed by: COLON & RECTAL SURGERY

## 2019-06-19 PROCEDURE — 27201012 HC FORCEPS, HOT/COLD, DISP: Mod: HCNC | Performed by: COLON & RECTAL SURGERY

## 2019-06-19 PROCEDURE — 25000003 PHARM REV CODE 250: Mod: HCNC | Performed by: COLON & RECTAL SURGERY

## 2019-06-19 PROCEDURE — 88342 TISSUE SPECIMEN TO PATHOLOGY - SURGERY: ICD-10-PCS | Mod: 26,HCNC,, | Performed by: PATHOLOGY

## 2019-06-19 PROCEDURE — E9220 PRA ENDO ANESTHESIA: ICD-10-PCS | Mod: HCNC,,, | Performed by: NURSE ANESTHETIST, CERTIFIED REGISTERED

## 2019-06-19 PROCEDURE — 63600175 PHARM REV CODE 636 W HCPCS: Mod: HCNC | Performed by: NURSE ANESTHETIST, CERTIFIED REGISTERED

## 2019-06-19 PROCEDURE — E9220 PRA ENDO ANESTHESIA: HCPCS | Mod: HCNC,,, | Performed by: NURSE ANESTHETIST, CERTIFIED REGISTERED

## 2019-06-19 PROCEDURE — 88305 TISSUE EXAM BY PATHOLOGIST: CPT | Mod: HCNC | Performed by: PATHOLOGY

## 2019-06-19 PROCEDURE — 88341 IMHCHEM/IMCYTCHM EA ADD ANTB: CPT | Mod: 26,HCNC,, | Performed by: PATHOLOGY

## 2019-06-19 PROCEDURE — 37000008 HC ANESTHESIA 1ST 15 MINUTES: Mod: HCNC | Performed by: COLON & RECTAL SURGERY

## 2019-06-19 PROCEDURE — 88341 PR IHC OR ICC EACH ADD'L SINGLE ANTIBODY  STAINPR: ICD-10-PCS | Mod: 26,HCNC,, | Performed by: PATHOLOGY

## 2019-06-19 PROCEDURE — 88342 IMHCHEM/IMCYTCHM 1ST ANTB: CPT | Mod: 26,HCNC,, | Performed by: PATHOLOGY

## 2019-06-19 PROCEDURE — 45380 COLONOSCOPY AND BIOPSY: CPT | Mod: HCNC | Performed by: COLON & RECTAL SURGERY

## 2019-06-19 PROCEDURE — 37000009 HC ANESTHESIA EA ADD 15 MINS: Mod: HCNC | Performed by: COLON & RECTAL SURGERY

## 2019-06-19 RX ORDER — SODIUM CHLORIDE 9 MG/ML
INJECTION, SOLUTION INTRAVENOUS CONTINUOUS
Status: DISCONTINUED | OUTPATIENT
Start: 2019-06-19 | End: 2019-06-19 | Stop reason: HOSPADM

## 2019-06-19 RX ORDER — PROPOFOL 10 MG/ML
VIAL (ML) INTRAVENOUS CONTINUOUS PRN
Status: DISCONTINUED | OUTPATIENT
Start: 2019-06-19 | End: 2019-06-19

## 2019-06-19 RX ORDER — PROPOFOL 10 MG/ML
VIAL (ML) INTRAVENOUS
Status: DISCONTINUED | OUTPATIENT
Start: 2019-06-19 | End: 2019-06-19

## 2019-06-19 RX ADMIN — PROPOFOL 50 MCG/KG/MIN: 10 INJECTION, EMULSION INTRAVENOUS at 09:06

## 2019-06-19 RX ADMIN — SODIUM CHLORIDE: 0.9 INJECTION, SOLUTION INTRAVENOUS at 08:06

## 2019-06-19 RX ADMIN — PROPOFOL 150 MCG/KG/MIN: 10 INJECTION, EMULSION INTRAVENOUS at 09:06

## 2019-06-19 RX ADMIN — PROPOFOL 40 MG: 10 INJECTION, EMULSION INTRAVENOUS at 09:06

## 2019-06-19 NOTE — DISCHARGE INSTRUCTIONS
Colonoscopy     A camera attached to a flexible tube with a viewing lens is used to take video pictures.     Colonoscopy is a test to view the inside of your lower digestive tract (colon and rectum). Sometimes it can show the last part of the small intestine (ileum). During the test, small pieces of tissue may be removed for testing. This is called a biopsy. Small growths, such as polyps, may also be removed.   Why is colonoscopy done?  The test is done to help look for colon cancer. And it can help find the source of abdominal pain, bleeding, and changes in bowel habits. It may be needed once a year, depending on factors such as your:  · Age  · Health history  · Family health history  · Symptoms  · Results from any prior colonoscopy  Risks and possible complications  These include:  · Bleeding               · A puncture or tear in the colon   · Risks of anesthesia  · A cancer lesion not being seen  Getting ready   To prepare for the test:  · Talk with your healthcare provider about the risks of the test (see below). Also ask your healthcare provider about alternatives to the test.  · Tell your healthcare provider about any medicines you take. Also tell him or her about any health conditions you may have.  · Make sure your rectum and colon are empty for the test. Follow the diet and bowel prep instructions exactly. If you dont, the test may need to be rescheduled.  · Plan for a friend or family member to drive you home after the test.     Colonoscopy provides an inside view of the entire colon.     You may discuss the results with your doctor right away or at a future visit.  During the test   The test is usually done in the hospital on an outpatient basis. This means you go home the same day. The procedure takes about 30 minutes. During that time:  · You are given relaxing (sedating) medicine through an IV line. You may be drowsy, or fully asleep.  · The healthcare provider will first give you a physical exam to  check for anal and rectal problems.  · Then the anus is lubricated and the scope inserted.  · If you are awake, you may have a feeling similar to needing to have a bowel movement. You may also feel pressure as air is pumped into the colon. Its OK to pass gas during the procedure.  · Biopsy, polyp removal, or other treatments may be done during the test.  After the test   You may have gas right after the test. It can help to try to pass it to help prevent later bloating. Your healthcare provider may discuss the results with you right away. Or you may need to schedule a follow-up visit to talk about the results. After the test, you can go back to your normal eating and other activities. You may be tired from the sedation and need to rest for a few hours.  Date Last Reviewed: 11/1/2016 © 2000-2017 [employer]. [address] [place] [address], [place] [address]. All rights reserved. This information is not intended as a substitute for professional medical care. Always follow your healthcare professional's instructions.

## 2019-06-19 NOTE — H&P
Endoscopy H&P    Procedure : Colonoscopy      asymptomatic screening exam      Past Medical History:   Diagnosis Date    Allergy     Anxiety     Arthritis     Back pain     Basal cell carcinoma 6/2011    R nasal ala, excised via Mohs    Chronic kidney disease, stage II (mild) 8/25/2012    cyst since 1975    Flank pain 8/21/2012    HTN (hypertension) 8/21/2012    Inflammatory bowel disease     Joint pain     Kidney stone     left     Lactose disaccharidase deficiency     Senile osteoporosis 4/11/2016    Small bowel obstruction, 06-, resolved witjhout surgery 7/1/2014    Trace cataracts     Ulcer     hx    Urinary tract infection        Family History   Problem Relation Age of Onset    Cancer Father     Cataracts Father     Kidney disease Father     Cancer Paternal Grandmother     No Known Problems Mother     No Known Problems Sister     No Known Problems Brother     No Known Problems Maternal Aunt     No Known Problems Maternal Uncle     No Known Problems Paternal Aunt     No Known Problems Paternal Uncle     No Known Problems Maternal Grandmother     No Known Problems Maternal Grandfather     No Known Problems Paternal Grandfather     Cirrhosis Neg Hx     Celiac disease Neg Hx     Colon polyps Neg Hx     Crohn's disease Neg Hx     Esophageal cancer Neg Hx     Inflammatory bowel disease Neg Hx     Liver cancer Neg Hx     Liver disease Neg Hx     Rectal cancer Neg Hx     Stomach cancer Neg Hx     Ulcerative colitis Neg Hx     Amblyopia Neg Hx     Blindness Neg Hx     Diabetes Neg Hx     Glaucoma Neg Hx     Hypertension Neg Hx     Macular degeneration Neg Hx     Retinal detachment Neg Hx     Strabismus Neg Hx     Stroke Neg Hx     Thyroid disease Neg Hx     Melanoma Neg Hx        Social History     Socioeconomic History    Marital status: Single     Spouse name: Not on file    Number of  children: Not on file    Years of education: Not on file    Highest education level: Not on file   Occupational History    Not on file   Social Needs    Financial resource strain: Not on file    Food insecurity:     Worry: Not on file     Inability: Not on file    Transportation needs:     Medical: Not on file     Non-medical: Not on file   Tobacco Use    Smoking status: Never Smoker    Smokeless tobacco: Never Used   Substance and Sexual Activity    Alcohol use: Yes     Comment: social    Drug use: No    Sexual activity: Never   Lifestyle    Physical activity:     Days per week: Not on file     Minutes per session: Not on file    Stress: Not on file   Relationships    Social connections:     Talks on phone: Not on file     Gets together: Not on file     Attends Hinduism service: Not on file     Active member of club or organization: Not on file     Attends meetings of clubs or organizations: Not on file     Relationship status: Not on file   Other Topics Concern    Are you pregnant or think you may be? No    Breast-feeding No   Social History Narrative    Lives alone.    Great neighbors.    Many friends.    Walks the neighborhood daily with her dog.    Active social life.    Has her own home, takes care of everything and all of her affairs.        Review of Systems:  Respiratory ROS: no cough, shortness of breath, or wheezing  Cardiovascular ROS: no chest pain or dyspnea on exertion  Gastrointestinal ROS: no abdominal pain, change in bowel habits, or black or bloody stools  Musculoskeletal ROS: negative  Neurological ROS: no TIA or stroke symptoms        Physical Exam:  General: no distress  Head: normocephalic  Neck: supple, symmetrical, trachea midline  Lungs:  clear to auscultation bilaterally and normal respiratory effort  Heart: regular rate and rhythm, S1, S2 normal, no murmur, rub or gallop  Abdomen: soft, non-tender non-distented; bowel sounds normal; no masses,  no  organomegaly  Extremities: no cyanosis or edema, or clubbing       Deep Sedation: Mallampati Score II (hard and soft palate, upper portion of tonsils anduvula visible)    II    Assessment and Plan:  Proceed with Colonoscopy

## 2019-06-19 NOTE — ANESTHESIA PREPROCEDURE EVALUATION
06/19/2019  Karli Hameed is a 87 y.o., female.    Anesthesia Evaluation    I have reviewed the Patient Summary Reports.     I have reviewed the Medications.     Review of Systems  Anesthesia Hx:  Denies Family Hx of Anesthesia complications.   Denies Personal Hx of Anesthesia complications.   Hematology/Oncology:  Hematology Normal   Oncology Normal     EENT/Dental:EENT/Dental Normal   Cardiovascular:   Hypertension    Pulmonary:  Pulmonary Normal    Renal/:   Chronic Renal Disease    Hepatic/GI:  Hepatic/GI Normal    Musculoskeletal:   Arthritis     Neurological:  Neurology Normal    Endocrine:  Endocrine Normal    Dermatological:  Skin Normal    Psych:  Psychiatric Normal           Physical Exam  General:  Well nourished    Airway/Jaw/Neck:  Airway Findings: Mallampati: IV TM Distance: 4 - 6 cm     Eyes/Ears/Nose:  EYES/EARS/NOSE FINDINGS: Normal    Chest/Lungs:  Chest/Lungs Clear    Heart/Vascular:  Heart Findings: Normal Heart murmur: negative Vascular Findings: Normal    Abdomen:  Abdomen Findings: Normal    Musculoskeletal:  Musculoskeletal Findings: Normal   Skin:  Skin Findings: Normal    Mental Status:  Mental Status Findings: Normal        Anesthesia Plan  Type of Anesthesia, risks & benefits discussed:  Anesthesia Type:  general  Patient's Preference: general  Intra-op Monitoring Plan: standard ASA monitors  Intra-op Monitoring Plan Comments:   Post Op Pain Control Plan:   Post Op Pain Control Plan Comments:   Induction:   IV  Beta Blocker:  Patient is not currently on a Beta-Blocker (No further documentation required).       Informed Consent: Patient understands risks and agrees with Anesthesia plan.  Questions answered. Anesthesia consent signed with patient.  ASA Score: 2     Day of Surgery Review of History & Physical:    H&P update referred to the surgeon.         Ready For Surgery From  Anesthesia Perspective.

## 2019-06-19 NOTE — PLAN OF CARE
Procedure care reviewed w/ pt. Pt sates she would like her friend to stay in the waiting room until she is ready to be d/c .

## 2019-06-19 NOTE — TRANSFER OF CARE
"Anesthesia Transfer of Care Note    Patient: Karli Hameed    Procedure(s) Performed: Procedure(s) (LRB):  COLONOSCOPY (N/A)    Patient location: PACU    Anesthesia Type: general    Transport from OR: Transported from OR on room air with adequate spontaneous ventilation    Post pain: adequate analgesia    Post assessment: no apparent anesthetic complications    Post vital signs: stable    Level of consciousness: sedated    Nausea/Vomiting: no nausea/vomiting    Complications: none    Transfer of care protocol was followed      Last vitals:   Visit Vitals  /60   Pulse 63   Temp 36.8 °C (98.2 °F) (Tympanic)   Resp 16   Ht 5' 4" (1.626 m)   Wt 49.9 kg (110 lb)   SpO2 98%   Breastfeeding? No   BMI 18.88 kg/m²     "

## 2019-06-19 NOTE — ANESTHESIA RELEASE NOTE
"Anesthesia Release from PACU Note    Patient: Karli Hameed    Procedure(s) Performed: Procedure(s) (LRB):  COLONOSCOPY (N/A)    Anesthesia type: general    Post pain: Adequate analgesia    Post assessment: no apparent anesthetic complications and tolerated procedure well    Last Vitals:   Visit Vitals  BP (!) 140/72   Pulse 60   Temp 36.8 °C (98.2 °F) (Tympanic)   Resp 16   Ht 5' 4" (1.626 m)   Wt 49.9 kg (110 lb)   SpO2 98%   Breastfeeding? No   BMI 18.88 kg/m²       Post vital signs: stable    Level of consciousness: awake and alert     Nausea/Vomiting: no nausea/no vomiting    Complications: none    Airway Patency: patent    Respiratory: unassisted, spontaneous ventilation, room air    Cardiovascular: stable and blood pressure at baseline    Hydration: euvolemic  "

## 2019-06-19 NOTE — [employer] PATIENT INSTRUCTIONS
Discharge Summary/Instructions after an Endoscopic Procedure  Patient Name: Karli Hameed  Patient MRN: [id number]  Patient YOB: 1931 Wednesday, June 19, 2019  Beto Rivera MD  RESTRICTIONS:  During your procedure today, you received medications for sedation.  These   medications may affect your judgment, balance and coordination.  Therefore,   for 24 hours, you have the following restrictions:   - DO NOT drive a car, operate machinery, make legal/financial decisions,   sign important papers or drink alcohol.    ACTIVITY:  Today: no heavy lifting, straining or running due to procedural   sedation/anesthesia.  The following day: return to full activity including work.  DIET:  Eat and drink normally unless instructed otherwise.     TREATMENT FOR COMMON SIDE EFFECTS:  - Mild abdominal pain, nausea, belching, bloating or excessive gas:  rest,   eat lightly and use a heating pad.  - Sore Throat: treat with throat lozenges and/or gargle with warm salt   water.  - Because air was used during the procedure, expelling large amounts of air   from your rectum or belching is normal.  - If a bowel prep was taken, you may not have a bowel movement for 1-3 days.    This is normal.  SYMPTOMS TO WATCH FOR AND REPORT TO YOUR PHYSICIAN:  1. Abdominal pain or bloating, other than gas cramps.  2. Chest pain.  3. Back pain.  4. Signs of infection such as: chills or fever occurring within 24 hours   after the procedure.  5. Rectal bleeding, which would show as bright red, maroon, or black stools.   (A tablespoon of blood from the rectum is not serious, especially if   hemorrhoids are present.)  6. Vomiting.  7. Weakness or dizziness.  GO DIRECTLY TO THE NEAREST EMERGENCY ROOM IF YOU HAVE ANY OF THE FOLLOWING:      Difficulty breathing              Chills and/or fever over 101 F   Persistent vomiting and/or vomiting blood   Severe abdominal pain   Severe chest pain   Black, tarry stools   Bleeding- more than one  tablespoon   Any other symptom or condition that you feel may need urgent attention  Your doctor recommends these additional instructions:  If any biopsies were taken, your doctors clinic will contact you in 1 to 2   weeks with any results.  - return to  in 10 days  - Resume previous diet indefinitely.   - Continue present medications.   - Discharge patient to home (ambulatory).   - Repeat colonoscopy (date not yet determined) for surveillance.  For questions, problems or results please call your physician - Beto Rivera MD at Work:  [phone].  [facility] [place], EMERGENCY ROOM PHONE NUMBER: [phone]  IF A COMPLICATION OR EMERGENCY SITUATION ARISES AND YOU ARE UNABLE TO REACH   YOUR PHYSICIAN - GO DIRECTLY TO THE EMERGENCY ROOM.  Beto Rivera MD  6/19/2019 9:50:34 AM  This report has been verified and signed electronically.  [employer]

## 2019-06-19 NOTE — TELEPHONE ENCOUNTER
Called patient to inform of scheduled appointment 6/26 with Dr. Fritz. No answer. Left message to please call back to confirm. Appointment letter mailed.

## 2019-06-19 NOTE — ANESTHESIA POSTPROCEDURE EVALUATION
Anesthesia Post Evaluation    Patient: Karli Hameed    Procedure(s) Performed: Procedure(s) (LRB):  COLONOSCOPY (N/A)    Final Anesthesia Type: general  Patient location during evaluation: GI PACU  Patient participation: Yes- Able to Participate  Level of consciousness: awake and alert  Post-procedure vital signs: reviewed and stable  Pain management: adequate  Airway patency: patent  PONV status at discharge: No PONV  Anesthetic complications: no      Cardiovascular status: hemodynamically stable  Respiratory status: unassisted, spontaneous ventilation and room air  Hydration status: euvolemic  Follow-up not needed.          Vitals Value Taken Time   /72 6/19/2019 10:10 AM   Temp 36.8 °C (98.2 °F) 6/19/2019  8:17 AM   Pulse 60 6/19/2019 10:10 AM   Resp 16 6/19/2019 10:10 AM   SpO2 98 % 6/19/2019 10:10 AM         Event Time     Out of Recovery 10:37:15          Pain/Marguerite Score: Marguerite Score: 10 (6/19/2019  9:53 AM)

## 2019-06-19 NOTE — TELEPHONE ENCOUNTER
----- Message from Unique Burgos MA sent at 6/19/2019  3:45 PM CDT -----  done  ----- Message -----  From: Rita Zazueta RN  Sent: 6/19/2019  10:57 AM  To: Unique Burgos MA    8:30  Lam  6/26  Ep  [facility] [place]

## 2019-06-21 ENCOUNTER — CLINICAL SUPPORT (OUTPATIENT)
Dept: REHABILITATION | Facility: HOSPITAL | Age: 84
End: 2019-06-21
Attending: INTERNAL MEDICINE
Payer: MEDICARE

## 2019-06-21 DIAGNOSIS — H81.13 BENIGN PAROXYSMAL POSITIONAL VERTIGO DUE TO BILATERAL VESTIBULAR DISORDER: Primary | ICD-10-CM

## 2019-06-21 PROCEDURE — G8979 MOBILITY GOAL STATUS: HCPCS | Mod: CH,HCNC,PO | Performed by: PHYSICAL THERAPIST

## 2019-06-21 PROCEDURE — G8978 MOBILITY CURRENT STATUS: HCPCS | Mod: CJ,HCNC,PO | Performed by: PHYSICAL THERAPIST

## 2019-06-21 PROCEDURE — 97162 PT EVAL MOD COMPLEX 30 MIN: CPT | Mod: HCNC,PO | Performed by: PHYSICAL THERAPIST

## 2019-06-21 PROCEDURE — 97530 THERAPEUTIC ACTIVITIES: CPT | Mod: HCNC,PO | Performed by: PHYSICAL THERAPIST

## 2019-06-21 NOTE — PLAN OF CARE
[facility]  Physical Therapy Neurological Rehabilitation Initial Evaluation    Name: Karli Hameed  Clinic Number: [id number]    Therapy Diagnosis:   Encounter Diagnosis   Name Primary?    Benign paroxysmal positional vertigo due to bilateral vestibular disorder Yes     Physician: Katherine Hutchinson MD    Physician Orders: PT Eval and Treat   Medical Diagnosis from Referral: H81.13 (ICD-10-CM) - Benign paroxysmal positional vertigo due to bilateral vestibular disorder  Evaluation Date: 6/21/2019  Authorization Period Expiration:12/31/19  Plan of Care Expiration: 8/21/19  Visit # / Visits authorized: 1/ 20  G code Count: 1/10    Time In: 808  Time Out: 0925  Total Billable Time: 77 minutes    Precautions: Fall    Subjective   Date of onset: Began May 8, 2019 during [facility] visit.  Tipped head back while getting hair washed.  Had a history of Vertigo and was on MCZ long term.  Had 1 episode last year.  Prior to that 2013 was last episode.    History of current condition - Karli reports: symptoms come on mostly when getting in and out of bed     Medical History:   Past Medical History:   Diagnosis Date    Allergy     Anxiety     Arthritis     Back pain     Basal cell carcinoma 6/2011    R nasal ala, excised via Mohs    Chronic kidney disease, stage II (mild) 8/25/2012    cyst since 1975    Flank pain 8/21/2012    HTN (hypertension) 8/21/2012    Inflammatory bowel disease     Joint pain     Kidney stone     left     Lactose disaccharidase deficiency     Senile osteoporosis 4/11/2016    Small bowel obstruction, 06-, resolved witjhout surgery 7/1/2014    Trace cataracts     Ulcer     hx    Urinary tract infection        Surgical History:   Karli Hameed  has a past surgical history that includes Hysterectomy; Oophorectomy; Appendectomy; Cholecystectomy; lysis of abdominal adhesions; TONSILLECTOMY, ADENOIDECTOMY; Kidney surgery; Cataract extraction w/   "intraocular lens implant (Right, 02/12/2015); Eye surgery; Abdominal surgery; Hernia repair (2017); Cataract extraction w/  intraocular lens implant (Left, 04/09/2015); and Colonoscopy (N/A, 6/19/2019).    Medications:   Karli has a current medication list which includes the following prescription(s): acetaminophen, amlodipine, cholecalciferol (vitamin d3), diazepam, docusate sodium, hydrocortisone, hydrocortisone-pramoxine, losartan, naphazoline, tretinoin, and varicella-zoster [id number] (pf).    Allergies:   Review of patient's allergies indicates:   Allergen Reactions    Advil [ibuprofen] Nausea Only    Parafon forte      Other reaction(s): Hallucinations    Calcitriol (bulk) Nausea Only    Lisinopril      cough        Imaging, none    Prior Therapy: No prior therapy. Previous episode managed with MCZ only and taken only when have dizziness episdoes  Social History: lives alone; has a dog at home  Falls: none recent; last fall 5 years ago  DME: NA owns a walker and cane if needed   Home Environment: 2 story home; sleeps on first floor; 1 SOPHIE   Exercise Routine / History: walks in neighborhood with dog for 30-35 minutes every morning   Family Present at time of Eval: None    Occupation: Retired  Prior Level of Function: Independent  Current Level of Function: Independent for ADLs and Self care    Pain:  Current 0/10, worst 0/10, best 0/10   Easing Factors: na    Pts goals: "Get rid of dizziness when getting in and out of bed"  Patient reports not sleeping well prior to today's session.    History of Current Symptoms:     Current 3/10, worst 10/10, best 0/10    Triggers: getting in and out of bed; now sleeping with 3 pillows behind back because don't want to be flat   Alleviating Factors: focus on something and wait for dizziness to subside; deep breathing; relaxing and napping later in the day can be helpful; Supine position improves symptoms and patient currently sleeping on 3 pillows to avoid flat head " positions   Description of symptoms: spinning sensation and feel like LOB but does not fall. Patient states that symptoms improve throughout the day with activity   Onset: May 8, 2019.  Pt. Reports that physician changed medication to valium instead of MCZ and that the valium has not managed symptoms of vertigo   Frequency:mostly daily and worst in the morning when getting out of bed; pt reports needing additional time to steady before moving around for the day   Duration:less than a minute for most episodes   Positional changes: lying on either side causes dizziness and lying completely flat.   Limitations due to symptoms:afraid to drive if feeling dizziness    History of migraines: no     Patients notes that she did not take BP meds nor Valium this moring because she felt dizzy already.    Objective   - Follows commands: 100% of time   - Speech: no deficits    BP:  Takes BP at 119/78 ; BP taken in at start of eval 157/92 ( not taken meds and pt reports nervous from dirving to appt); BP at end of session 155/91    Mental status: alert, oriented to person, place, and time  Appearance: Casually dressed  Behavior:  calm  Attention Span and Concentration:  Normal    Dominant hand:  right     Posture Alignment in sitting:   Head: forward head   Trunk: increased kyphosis     Posture Alignment in standing:   Head: forward head   Trunk: increased kyphosis     Sensation: Light Touch: Intact          Proprioception: Intact, Kinesthesia Intact         Visual/Auditory: denies changes WFL  Tracking/Smooth Pursuits:Impaired: Slippage at end ranges bilaterally; c/o dizziness   Saccades: Intact  Acuity:Intact  R/L discrimination: Intact  Visual field: Intact  Convergence: 19cm  VOR: Impaired: no dizziness but decreased speed and eye slippage      Coordination:   - fine motor: within functional limits   - UE coordination: within functional limits    - LE coordination:  Within functional limits    ROM:     CERVICAL SPINE  Flexion  "WFL degrees (80-90 deg)  Extension 70 degrees (70-80 deg)  L side bend 25 degrees, R side bend 25 degrees (20-45 degrees)  L rotation 30 degrees, R rotation 30 degrees (70-90 degrees)  Are concurrent symptoms present with any of these movements: No      Modified VAS (Vertebral Artery Screen), in sitting (rotation, then extension):  R: normal  L: normal    UPPER EXTREMITY--AROM/PROM  (R) UE: WFLs  (L) UE: WFLs           RANGE OF MOTION--LOWER EXTREMITIES  (R) LE Hip: full   Knee: full   Ankle: equal bilaterally    (L) LE: Hip: full   Knee: full   Ankle: equal bilaterally    Strength: manual muscle test grades below   Upper Extremity Strength: WFL    Lower Extremity Strength: WFl    Abdominal Strength: 4+/5    Flexibility: within functional limits    TORSTEN SENSORY TESTING:  (P= Pass, F= Fail; note any sway; hold each position for 30")  Condition 1: P(firm surface/feet together/eyes open) 30   Condition 2: P(firm surface/feet together/eyes closed) 30  Condition 3: P(firm surface/feet in tandem/eyes open) 17, 30  Condition 4: F(firm surface/feet in tandem/eyes closed) 5, 5, 8  Condition 5: P(soft surface/feet together/eyes open) 30  Condition 6: F(soft surface/feet together/eyes closed) 15,15  Condition 7: NT(Fakuda step test), measure distance varied from center starting position, > 30 deg deviation to either side indicates hypofunction of biased side      Evaluation   Single Limb Stance R LE 30  (<10 sec = HIGH FALL RISK)   Single Limb Stance L LE 30  (<10 sec = HIGH FALL RISK)         Functional Gait Assessment:   1. Gait on level surface =  3  2. Change in Gait Speed = 3  3. Gait with horizontal head turns  = 2  4. Gait with vertical head turns = 3  5. Gait with pivot turns = 3  6. Step over obstacle = 3  7. Gait with Narrow FRANCO = 3  8. Gait with eyes closed = 3  9. Ambulating Backwards = 3  10. Steps = 3     Score 29/30   Score:   <22/30 fall risk   <20/30 fall risk in older adults   <18/30 fall risk in Parkinsons "     GAIT DEVIATIONS:  Karli displays the following deviations with ambulation: no significant deviations observed    Endurance Deficit: none observed     POSITIONAL CANAL TESTING  Looking for nystagmus (slow drift to affected side with quick correction away)    Connie Hallpike (posterior / CL anterior) * needs retesting due to limits based on patient's fear of producing symptoms   Right: Inconclusive due to patient limiting head and neck movements for fear of vertigo symptoms    Left: Inconclusive due to patient limiting head and neck movements for fear of vertigo symptoms   Horizontal Canals   Right: + nystagmus geotropic; 15 seconds duration; 6/10 dizziness    Left+ nystagmus geotropic; 10 second duration; 4/10 dizziness  Treatment Performed: Initiated HEP 5 reps 1x/day Rolling SIDE<>SUP<>SIDE waiting for symptoms to subside before each position change.      Functional Mobility (Bed mobility, transfers)  Bed mobility: Mod I  Supine to sit: Mod I  Sit to supine: Mod I  Transfers to bed: I  Transfers to toilet: I  Sit to stand:  I  Stand pivot:  I  Car transfers: I    ADL's:  Feeding: I  Grooming: I  Hygiene: I  UB Dressing: I  LB Dressing: I  Toileting: I  Bathing: I    Functional Limitations Reports - G Codes  Category:Mobilty  Tool: TORSTEN  Score: Fail on 2 measures  Current:   Goal:       TREATMENT   Treatment Time In: 808  Treatment Time Out: 925  Total Treatment time separate from Evaluation: 15 minutes    Karli received therapeutic exercises to habituation for rolling for 10 minutes including: Instructions rolling R<>L for 5 repetitions at the end of day monitoring symtpoms.     Home Exercises and Patient Education Provided    Education provided:   -Causes of dizziness and vertigo; goals of vestibular rehabilitation; treatment plan and goals    Written Home Exercises Provided:   Exercises were reviewed and Karli was able to demonstrate them prior to the end of the session.  Karli demonstrated good   understanding of the education provided.     See EMR under Patient Instructions for exercises provided 6/21/2019.    Assessment   Karli is a 87 y.o. female referred to outpatient Physical Therapy with a medical diagnosis of BPPV. Pt presents with dizziness with bilateral sidelying positions when rolling from Supine.  Patient presentation consistent with horizontal canalithiasis however, unable to clear posterior canals due to patient's fear of bringing on symptoms and that patient would have to drive home at end of appointment.    Pt prognosis is Good.   Pt will benefit from skilled outpatient Physical Therapy to address the deficits stated above and in the chart below, provide pt/family education, and to maximize pt's level of independence.     Plan of care discussed with patient: Yes  Pt's spiritual, cultural and educational needs considered and patient is agreeable to the plan of care and goals as stated below:     Anticipated Barriers for therapy: fear of producing symptoms; lives alone    Medical Necessity is demonstrated by the following  History  Co-morbidities and personal factors that may impact the plan of care Co-morbidities:   advanced age and difficulty sleeping   HTN      Personal Factors:   Coping style     Moderate   Examination  Body Structures and Functions, activity limitations and participation restrictions that may impact the plan of care Body Regions:   head  neck    Body Systems:    ROM  balance  gait   Vestibular funciton    Participation Restrictions:   Fear of driving with dizziness    Activity limitations:   Learning and applying knowledge  no deficits    General Tasks and Commands  no deficits    Communication  no deficits    Mobility  bed mobility    Self care  no deficits    Domestic Life  no deficits    Interactions/Relationships  no deficits    Life Areas  no deficits    Community and Social Life  community life  recreation and leisure         Moderate   Clinical Presentation evolving  clinical presentation with changing clinical characteristics moderate   Decision Making/ Complexity Score: moderate     Goals:  Short Term Goals: 4 weeks   1. Decrease dizziness at worst to less than 5/10 for patient to be able to feel safe while driving or performing ADLs at home alone  2. No deficits in Smooth pursuits to improve gaze stabilization during functional activities including bed mobility  3.  Pt will report improved sleep quality and ability to sleep with 2 or less pillow under head without inducing symptoms.  4. TORSTEN Pass at least 5/7 positions for improved static standing balance and reduce risk for falls  5. Abolish symptoms with positional testing for horizontal and posterior canal positioning to improve functional independence with bed mobility  6. Improve cervical ROM (rotation and side bending) by 10 degrees to improve posture during functional activities    Long Term Goals: 8 weeks   1. Abolish dizziness at worst to less than /10 for patient to be able to feel safe while driving or performing ADLs at home alone  2. Pt will report improved sleep quality and ability to sleep with 1 pillow under head without dizziness symptoms.  3. TORSTEN Pass at least 7/7 positions for improved static standing balance and reduce risk for falls  4.Abolish symptoms with positional testing for horizontal and posterior canal positioning to improve functional independence with bed mobility  5. Improve cervical ROM (rotation and side bending) within functional limits      Plan   Plan of care Certification: 6/21/2019 to 8/21/2019.    Outpatient Physical Therapy 2 times weekly for 8 weeks to include the following interventions: Gait Training, Manual Therapy, Neuromuscular Re-ed, Patient Education, Therapeutic Activites, Therapeutic Exercise and Canalith Repositioning.     Nenita Wong, PT

## 2019-06-25 ENCOUNTER — CLINICAL SUPPORT (OUTPATIENT)
Dept: REHABILITATION | Facility: HOSPITAL | Age: 84
End: 2019-06-25
Attending: INTERNAL MEDICINE
Payer: MEDICARE

## 2019-06-25 DIAGNOSIS — H81.13 BENIGN PAROXYSMAL POSITIONAL VERTIGO DUE TO BILATERAL VESTIBULAR DISORDER: Primary | ICD-10-CM

## 2019-06-25 PROCEDURE — 97530 THERAPEUTIC ACTIVITIES: CPT | Mod: HCNC,PO

## 2019-06-25 PROCEDURE — 95992 PR CANALITH REPOSITIONING PROCEDURE, PER DAY: ICD-10-PCS | Mod: HCNC,,, | Performed by: INTERNAL MEDICINE

## 2019-06-25 PROCEDURE — 95992 CANALITH REPOSITIONING PROC: CPT | Mod: HCNC,,, | Performed by: INTERNAL MEDICINE

## 2019-06-25 NOTE — PROGRESS NOTES
Physical Therapy Daily Treatment Note     Name: Karli Hameed  Clinic Number: [id number]    Therapy Diagnosis:   Encounter Diagnosis   Name Primary?    Benign paroxysmal positional vertigo due to bilateral vestibular disorder Yes     Physician: Katherine Hutchinson MD    Visit Date: 6/25/2019    Physician Orders: PT Eval and Treat   Medical Diagnosis from Referral: H81.13 (ICD-10-CM) - Benign paroxysmal positional vertigo due to bilateral vestibular disorder  Evaluation Date: 6/21/2019  Authorization Period Expiration:12/31/19  Plan of Care Expiration: 8/21/19  Visit # / Visits authorized: 2/ 20 (2 total visits of 2019)  G code Count: 2/10     Time In: 10:30  Time Out: 11:15  Total Billable Time: 45 minutes     Precautions: Fall    Subjective     Pt reports: that she was compliant with instructions provided to her on evaluation about sleeping on her left side and about performing rolling. She had bad episodes of vertigo on Sunday and Monday.   She was compliant with home exercise program.  Response to previous treatment: no change at this time  Functional change: none at this time    Pain: 0/10  Location: NA    Objective     Karli received canalith repositioning to improve vestibular function for 30 minutes including:    POSITIONAL CANAL TESTING  Looking for nystagmus (slow drift to affected side with quick correction away)    [place] Hallpike (posterior / CL anterior)   Right : (-) vertigo, (-) nystagmus, (+) dizziness lasting < 3 sec   Left: (-) vertigo, (-) nystagmus, (+) dizziness lasting < 3 sec  Horizontal Canals   Right: NT today due to treatment performed for L [place]   Left : (+) vertigo, (+) nystagmus lasting ~45 seconds  Treatment Performed: BBQ roll to treat L  [place] BPPV    Trial 1: (+) vertigo, (+) nystagmus lasting ~45 seconds in position 1, position held for additional 30 seconds; (+) vertigo, (+) nystagmus lasting ~45 seconds in position 2, position held for additional 30 seconds; (+) vertigo, (+)  nystagmus lasting ~45 seconds in position 3, position held for additional 30 seconds; (-) vertigo, (-) nystagmus in position 4, position held for additional 30 seconds; 3 min seated rest break    Trial 2: (+) vertigo, (+) nystagmus lasting ~10 seconds in position 1, position held for additional 30 seconds; (+) vertigo, (+) nystagmus lasting ~20 seconds in position 2, position held for additional 30 seconds; (+) vertigo, (+) nystagmus lasting ~5 seconds in position 3, position held for additional 30 seconds; (-) vertigo, (-) nystagmus in position 4, position held for additional 30 seconds; 3 min seated rest break    Trial 3: (+) vertigo, (+) nystagmus lasting ~80 seconds in position 1, position held for additional 30 seconds; (+) vertigo, (+) nystagmus lasting ~20 seconds in position 2, position held for additional 30 seconds; (+) vertigo, (+) nystagmus lasting ~40 seconds in position 3, position held for additional 30 seconds; (-) vertigo, (-) nystagmus in position 4, position held for additional 30 seconds; 2 min seated rest break       Patient participated in dynamic functional therapeutic activities to improve functional performance for 15 minutes. Including:     Modified VAS (Vertebral Artery Screen), in sitting (rotation, then extension):  R: (-)  L: (-)    PT educated patient extensively regarding diagnosis, pathology, and treatment of BPPV. PT reviewed appropriate movements and sleeping positions to prevent re-migration of crystals over the next 48 hours.       Home Exercises Provided and Patient Education Provided     Education provided:   - Patient instructed not to roll excessively in bed over the next 48 hours to prevent potential re-migration of crystals. Patient verbalized good understanding.     Written Home Exercises Provided: None provided at this time.     Assessment     Karli tolerated first follow up session well this morning. Patient agreeable to performing canalith testing and maneuvers to help  resolve her dizziness. Upon CRT testing, pt (-) vertigo and nystagmus during both Connie Breman pike tests. Pt (+) vertigo and nystagmus during L horizontal side lying test. Therefore BBQ roll performed to address L HSCC BPPV. Pt did experience vertigo and nystagmus throughout each trial of this maneuver, but no adverse effects noted throughout and post trials. Rest breaks provided after each trial. Plan to reassess next session and treat as indicated. Continue POC.     Karli is progressing well towards her goals.   Pt prognosis is Good.     Pt will continue to benefit from skilled outpatient physical therapy to address the deficits listed in the problem list box on initial evaluation, provide pt/family education and to maximize pt's level of independence in the home and community environment.     Pt's spiritual, cultural and educational needs considered and pt agreeable to plan of care and goals.     Anticipated Barriers for therapy: fear of producing symptoms; lives alone    Goals:  Short Term Goals: 4 weeks   1. Decrease dizziness at worst to less than 5/10 for patient to be able to feel safe while driving or performing ADLs at home alone Ongoing  2. No deficits in Smooth pursuits to improve gaze stabilization during functional activities including bed mobility Ongoing  3.  Pt will report improved sleep quality and ability to sleep with 2 or less pillow under head without inducing symptoms. Ongoing  4. TORSTEN Pass at least 5/7 positions for improved static standing balance and reduce risk for falls Ongoing  5. Abolish symptoms with positional testing for horizontal and posterior canal positioning to improve functional independence with bed mobility Ongoing  6. Improve cervical ROM (rotation and side bending) by 10 degrees to improve posture during functional activities Ongoing     Long Term Goals: 8 weeks   1. Abolish dizziness at worst to less than /10 for patient to be able to feel safe while driving or performing  ADLs at home alone Ongoing  2. Pt will report improved sleep quality and ability to sleep with 1 pillow under head without dizziness symptoms. Ongoing  3. TORSTEN Pass at least 7/7 positions for improved static standing balance and reduce risk for falls Ongoing  4.Abolish symptoms with positional testing for horizontal and posterior canal positioning to improve functional independence with bed mobility Ongoing  5. Improve cervical ROM (rotation and side bending) within functional limits Ongoing    Plan     Reassess L HSCC and treat accordingly. Assess R HSCC if left side clear. If both side clear, then reassess oculomotor and balance performance.     Angelina King, PT

## 2019-06-26 ENCOUNTER — OFFICE VISIT (OUTPATIENT)
Dept: SURGERY | Facility: CLINIC | Age: 84
End: 2019-06-26
Payer: MEDICARE

## 2019-06-26 ENCOUNTER — LAB VISIT (OUTPATIENT)
Dept: LAB | Facility: HOSPITAL | Age: 84
End: 2019-06-26
Attending: COLON & RECTAL SURGERY
Payer: MEDICARE

## 2019-06-26 VITALS — HEIGHT: 64 IN | WEIGHT: 110.69 LBS | BODY MASS INDEX: 18.9 KG/M2

## 2019-06-26 DIAGNOSIS — C20 RECTAL CANCER: Primary | ICD-10-CM

## 2019-06-26 DIAGNOSIS — C20 RECTAL CANCER: ICD-10-CM

## 2019-06-26 LAB — CEA SERPL-MCNC: 8.1 NG/ML (ref 0–5)

## 2019-06-26 PROCEDURE — 99214 PR OFFICE/OUTPT VISIT, EST, LEVL IV, 30-39 MIN: ICD-10-PCS | Mod: HCNC,S$GLB,, | Performed by: COLON & RECTAL SURGERY

## 2019-06-26 PROCEDURE — 99499 UNLISTED E&M SERVICE: CPT | Mod: HCNC,S$GLB,, | Performed by: COLON & RECTAL SURGERY

## 2019-06-26 PROCEDURE — 99214 OFFICE O/P EST MOD 30 MIN: CPT | Mod: HCNC,S$GLB,, | Performed by: COLON & RECTAL SURGERY

## 2019-06-26 PROCEDURE — 36415 COLL VENOUS BLD VENIPUNCTURE: CPT | Mod: HCNC

## 2019-06-26 PROCEDURE — 99499 RISK ADDL DX/OHS AUDIT: ICD-10-PCS | Mod: HCNC,S$GLB,, | Performed by: COLON & RECTAL SURGERY

## 2019-06-26 PROCEDURE — 99999 PR PBB SHADOW E&M-EST. PATIENT-LVL III: ICD-10-PCS | Mod: PBBFAC,HCNC,, | Performed by: COLON & RECTAL SURGERY

## 2019-06-26 PROCEDURE — 1101F PR PT FALLS ASSESS DOC 0-1 FALLS W/OUT INJ PAST YR: ICD-10-PCS | Mod: HCNC,CPTII,S$GLB, | Performed by: COLON & RECTAL SURGERY

## 2019-06-26 PROCEDURE — 1101F PT FALLS ASSESS-DOCD LE1/YR: CPT | Mod: HCNC,CPTII,S$GLB, | Performed by: COLON & RECTAL SURGERY

## 2019-06-26 PROCEDURE — 99999 PR PBB SHADOW E&M-EST. PATIENT-LVL III: CPT | Mod: PBBFAC,HCNC,, | Performed by: COLON & RECTAL SURGERY

## 2019-06-26 PROCEDURE — 82378 CARCINOEMBRYONIC ANTIGEN: CPT | Mod: HCNC

## 2019-06-26 RX ORDER — POLYETHYLENE GLYCOL-3350 AND ELECTROLYTES 236; 6.74; 5.86; 2.97; 22.74 G/274.31G; G/274.31G; G/274.31G; G/274.31G; G/274.31G
POWDER, FOR SOLUTION ORAL
Refills: 0 | COMMUNITY
Start: 2019-06-12 | End: 2019-07-23

## 2019-06-26 RX ORDER — LORAZEPAM 0.5 MG/1
0.5 TABLET ORAL EVERY 6 HOURS PRN
Qty: 2 TABLET | Refills: 0 | Status: SHIPPED | OUTPATIENT
Start: 2019-06-26 | End: 2019-07-23

## 2019-06-26 NOTE — PROGRESS NOTES
CRS Office Visit History and Physical    Referring Md:   Rebeca Self  No address on file    SUBJECTIVE:     Chief Complaint:  Rectal cancer    History of Present Illness:  87 F who underwent colonoscopy for evaluation of rectal bleeding off and on for several months. Blood with BMs. Having daily BMs. Mild left sided pressure. Not on any anticoagulation. No weight loss.  Reports that her energy levels are good.  She continues to live by herself.  She drives.  She is being treated for vertigo but she is quite active.  She denies any changes in her weight.  Appetite is good.      C-scope 10 years ago and normal per patient  No family hx of colon or rectal cancer    Colonoscopy        SPECIMEN  1) Biopsy of anorectal lesion, forceps.  FINAL PATHOLOGIC DIAGNOSIS  BIOPSY OF RECTAL MASS SHOWS INVASIVE COLONIC ADENOCARCINOMA. RESULTS OF  IMMUNOSTAINS PENDING WITH ADDITIONAL REPORT TO FOLLOW.  Diagnosed by: Bruce Coley M.D.  (Electronically Signed: 2019-06-25 15:43:20)      Past Medical History:   Diagnosis Date    Allergy     Anxiety     Arthritis     Back pain     Basal cell carcinoma 6/2011    R nasal ala, excised via Mohs    Chronic kidney disease, stage II (mild) 8/25/2012    cyst since 1975    Flank pain 8/21/2012    HTN (hypertension) 8/21/2012    Inflammatory bowel disease     Joint pain     Kidney stone     left     Lactose disaccharidase deficiency     Senile osteoporosis 4/11/2016    Small bowel obstruction, 06-, resolved witjhout surgery 7/1/2014    Trace cataracts     Ulcer     hx    Urinary tract infection        Past Surgical History:   Procedure Laterality Date    ABDOMINAL SURGERY      APPENDECTOMY      CATARACT EXTRACTION W/  INTRAOCULAR LENS IMPLANT Right 02/12/2015    Dr. Sahni    CATARACT EXTRACTION W/  INTRAOCULAR LENS IMPLANT Left 04/09/2015    CHOLECYSTECTOMY      COLONOSCOPY N/A 6/19/2019    Performed by Beto Rivera [facility] at [facility] ([facility])    EYE SURGERY       HERNIA REPAIR  2017    Open rih with mesh    HYSTERECTOMY      INSERTION-INTRAOCULAR LENS (IOL) Left 4/9/2015    Performed by [facility] at [facility] OR [facility]    INSERTION-INTRAOCULAR LENS (IOL) Right 2/12/2015    Performed by [facility] at [facility] OR [facility]    KIDNEY SURGERY      drained cyst x 2    lysis of abdominal adhesions      OOPHORECTOMY      PHACOEMULSIFICATION-ASPIRATION-CATARACT Left 4/9/2015    Performed by [facility]ni [facility] at [facility] OR [facility]    PHACOEMULSIFICATION-ASPIRATION-CATARACT Right 2/12/2015    Performed by [facility] at [facility] OR [facility]    REPAIR-HERNIA-INGUINAL-INITIAL-RIGHT-OPEN w/ mesh Right 7/19/2017    Performed by Lamont Justice MD at [facility] OR 2ND [facility]LR    TONSILLECTOMY, ADENOIDECTOMY         Review of patient's allergies indicates:   Allergen Reactions    Advil [ibuprofen] Nausea Only    Parafon forte      Other reaction(s): Hallucinations    Calcitriol (bulk) Nausea Only    Lisinopril      cough       Current Outpatient Medications on File Prior to Visit   Medication Sig Dispense Refill    acetaminophen (TYLENOL) 325 MG tablet Take by mouth. 1 Tablet Oral .  Tylenol.To take as needed for arthritis pain.      amLODIPine (NORVASC) 5 MG tablet Take 1 tablet (5 mg total) by mouth every morning. 90 tablet 3    cholecalciferol, vitamin D3, 1,000 unit capsule Take 2 capsules (2,000 Units total) by mouth once daily.  0    diazePAM (VALIUM) 2 MG tablet Take 1 tablet (2 mg total) by mouth nightly as needed (vertigo). 20 tablet 0    docusate sodium (COLACE) 100 MG capsule Take 1 capsule (100 mg total) by mouth 2 (two) times daily.  0    hydrocortisone 2.5 % cream Apply topically 2 (two) times daily. 20 g 0    hydrocortisone-pramoxine (PROCTOFOAM-HS) rectal foam Place 1 applicator rectally 2 (two) times daily. 10 g 0    losartan (COZAAR) 50 MG tablet Take 1 tablet (50 mg total) by mouth once daily. 90 tablet 3    naphazoline (NAPHCON)  0.012 % ophthalmic solution Inject 1 drop into the eye Use as needed.      tretinoin (RETIN-A) 0.025 % gel Apply small amount to entire face qhs. Wash off qam and apply sunscreen. 45 g 3    varicella-zoster gE-AS01B, PF, (SHINGRIX, PF,) 50 mcg/0.5 mL injection Inject into the muscle. 0.5 mL 0     No current facility-administered medications on file prior to visit.        Family History   Problem Relation Age of Onset    Cancer Father     Cataracts Father     Kidney disease Father     Cancer Paternal Grandmother     No Known Problems Mother     No Known Problems Sister     No Known Problems Brother     No Known Problems Maternal Aunt     No Known Problems Maternal Uncle     No Known Problems Paternal Aunt     No Known Problems Paternal Uncle     No Known Problems Maternal Grandmother     No Known Problems Maternal Grandfather     No Known Problems Paternal Grandfather     Cirrhosis Neg Hx     Celiac disease Neg Hx     Colon polyps Neg Hx     Crohn's disease Neg Hx     Esophageal cancer Neg Hx     Inflammatory bowel disease Neg Hx     Liver cancer Neg Hx     Liver disease Neg Hx     Rectal cancer Neg Hx     Stomach cancer Neg Hx     Ulcerative colitis Neg Hx     Amblyopia Neg Hx     Blindness Neg Hx     Diabetes Neg Hx     Glaucoma Neg Hx     Hypertension Neg Hx     Macular degeneration Neg Hx     Retinal detachment Neg Hx     Strabismus Neg Hx     Stroke Neg Hx     Thyroid disease Neg Hx     Melanoma Neg Hx        Social History     Tobacco Use    Smoking status: Never Smoker    Smokeless tobacco: Never Used   Substance Use Topics    Alcohol use: Yes     Comment: social    Drug use: No        Review of Systems:  ROS:  GENERAL: No fever, chills, fatigability or weight loss.  Integument:  No rashes, redness, icterus  CHEST: Denies DOMINGUEZ, cyanosis, wheezing, cough and sputum production.  CARDIOVASCULAR: Denies chest pain, PND, orthopnea or reduced exercise tolerance.  GI:  Denies  abd pain, dysphagia, nausea, vomiting, no hematemesis, no rectal pain  : Denies burning on urination, no hematuria, no bacteriuria  MSK:  No deformities, swelling, joint pain swelling  Neurologic:  No HAs, seizures, weakness, paresthesias, gait problems      OBJECTIVE:     Vital Signs (Most Recent)  There were no vitals taken for this visit.    Physical Exam:  General: White female in no distress   Skin/ Sclera anicteric  LN nonpalpable  HEENT: anicteric, normocephalic, extraocular movements intact   Neck trachea midline, thyroid not enlarged  Chest symmetric, nl excursions, no retractions  Respiratory: respirations are even and unlabored  COR RRR   Abdomen - inspection   soft NT ND.  No masses, no organomegaly    Anorectal:   Inspection   LATISHA:  Decreased tone, palpable low mass extending into the anal canal, left lateral.  2 x 4 cm greater in length. Sessile.  Weak squeeze  Extremities: Warm dry and intact.  NO CCE  Neuro: alert and oriented x 4.  Moves all extremities.         ASSESSMENT/PLAN:   Low rectal cancer, adenocarcinoma biopsy proved  Good functional status  Multiple comorbidities including hypertension, chronic renal insufficiency      Recommend  Discussed diagnosis with patient at length. Discussed need for staging studies.  Limitations given her chronic renal insufficiency in terms of contrast.  If superficial would consider transanal excision given the small size and low-lying nature especially given the patient's advanced age

## 2019-06-27 ENCOUNTER — PATIENT MESSAGE (OUTPATIENT)
Dept: INTERNAL MEDICINE | Facility: CLINIC | Age: 84
End: 2019-06-27

## 2019-06-27 ENCOUNTER — TELEPHONE (OUTPATIENT)
Dept: INTERNAL MEDICINE | Facility: CLINIC | Age: 84
End: 2019-06-27

## 2019-06-27 DIAGNOSIS — N18.5 STAGE 5 CHRONIC KIDNEY DISEASE NOT ON CHRONIC DIALYSIS: Primary | ICD-10-CM

## 2019-06-27 DIAGNOSIS — R63.4 WEIGHT LOSS: ICD-10-CM

## 2019-06-27 DIAGNOSIS — C20 RECTAL CANCER: ICD-10-CM

## 2019-06-27 RX ORDER — ONDANSETRON 4 MG/1
4 TABLET, FILM COATED ORAL EVERY 12 HOURS PRN
Qty: 20 TABLET | Refills: 2 | Status: SHIPPED | OUTPATIENT
Start: 2019-06-27 | End: 2019-07-23

## 2019-06-27 NOTE — TELEPHONE ENCOUNTER
Subject: RE:thinking of you    Dr. Hutchinson, yesterday I saw doctor Lam, He informed, That I was positive with biopsy done about a week ago. Now, I have many appointments with different departments in [facility].  Cancer, I was not expecting this, so, I Trust God to guide me in the moments I am down.  I need to drink some protein for energy, I have lost weight and am trying to keep the 110 pounds I have.  Also, I don't know if it's possible or not, if you can prescribe something mild, just in case I feel like vomiting.  Thanks again.        Please advise  Thank you

## 2019-06-27 NOTE — PROGRESS NOTES
Physical Therapy Daily Treatment Note     Name: Karli Hameed  Clinic Number: [id number]    Therapy Diagnosis:   Encounter Diagnosis   Name Primary?    Benign paroxysmal positional vertigo due to bilateral vestibular disorder Yes     Physician: Katherine Hutchinson MD    Visit Date: 6/28/2019    Physician Orders: PT Eval and Treat   Medical Diagnosis from Referral: H81.13 (ICD-10-CM) - Benign paroxysmal positional vertigo due to bilateral vestibular disorder  Evaluation Date: 6/21/2019  Authorization Period Expiration:12/31/19  Plan of Care Expiration: 8/21/19  Visit # / Visits authorized: 3/ 20 (3 total visits of 2019)  G code Count: 3/10     Time In: 09:45  Time Out: 10:30  Total Billable Time: 45 minutes     Precautions: Fall    Subjective     Pt reports: that she only had one bad episode of dizziness last night when rolling over in her bed last night since her last session.   She was compliant with home exercise program.  Response to previous treatment: no change at this time  Functional change: none at this time    Pain: 0/10  Location: NA    Objective     Karli received canalith repositioning to improve vestibular function for 30 minutes including:    POSITIONAL CANAL TESTING  Looking for nystagmus (slow drift to affected side with quick correction away)    Connie Hallpike (posterior / CL anterior)   Right : NT   Left: NT  Horizontal Canals   Right: (-) vertigo, (-) nystagmus   Left : (+) vertigo, (+) nystagmus lasting ~20 seconds, ~30 latency 1st trial; (+) vertigo, (+) nystagmus lasting ~20 seconds, ~50 latency 1st trial  Treatment Performed: BBQ roll to treat L  [facility] BPPV    Trial 1: (+) vertigo, (+) nystagmus lasting ~20 seconds in position 1, position held for additional 30 seconds; (+) vertigo, (+) nystagmus lasting ~10 seconds in position 2, position held for additional 30 seconds; (+) vertigo, (+) nystagmus lasting ~20 seconds in position 3, position held for additional 30 seconds; (-) vertigo, (-)  nystagmus in position 4, position held for additional 30 seconds; 2 min seated rest break    Trial 2: (+) vertigo, (+) nystagmus lasting ~30 seconds in position 1, 60 sec latency period, position held for additional 30 seconds; (+) vertigo, (+) nystagmus lasting ~15 seconds in position 2, position held for additional 30 seconds; (+) vertigo, (+) nystagmus lasting ~30 seconds in position 3, position held for additional 30 seconds; (-) vertigo, (-) nystagmus in position 4, position held for additional 30 seconds; 3 min seated rest break    Trial 3: (+) vertigo, (+) nystagmus lasting ~90 seconds in position 1, 70 sec latency, position held for additional 30 seconds; (+) vertigo, (+) nystagmus lasting ~10 seconds in position 2, position held for additional 30 seconds; (+) vertigo, (+) nystagmus lasting ~20 seconds in position 3, position held for additional 30 seconds; (-) vertigo, (-) nystagmus in position 4, position held for additional 30 seconds; 2 min seated rest break     Trial 4: (+) vertigo, (+) nystagmus lasting ~20 seconds in position 1, 50 sec latency, position held for additional 30 seconds; (+) vertigo, (+) nystagmus lasting ~5 seconds in position 2, position held for additional 30 seconds; (+) vertigo, (+) nystagmus lasting ~20 seconds in position 3, position held for additional 30 seconds; (-) vertigo, (-) nystagmus in position 4, position held for additional 30 seconds; 2 min seated rest break     Patient participated in dynamic functional therapeutic activities to improve functional performance for 10 minutes. Including:     PT educated patient extensively regarding diagnosis, pathology, and treatment of BPPV. PT reviewed appropriate movements and sleeping positions to prevent re-migration of crystals over the next 48 hours.       Home Exercises Provided and Patient Education Provided     Education provided:   - Patient instructed not to roll excessively in bed over the next 48 hours to prevent  potential re-migration of crystals. Patient verbalized good understanding.     Written Home Exercises Provided: None provided at this time.     Assessment     Karli tolerated therapy session well this morning. R side lying horizontal test performed to assess R [facility] BPPV. Patient (-) for both vertigo and nystagmus in this position. Upon testing L [facility] with side lying test, pt (+) for both vertigo and nystagmus with latency period. Multiple BBQ rolls performed to further address L [facility] BPPV. During start of each roll, latency period for vertigo and nystagmus varied from 20 seconds to 70 seconds with varying intensity of vertigo and nystagmus. Plan to reassess next session and treat as indicated. Continue POC.     Karli is progressing well towards her goals.   Pt prognosis is Good.     Pt will continue to benefit from skilled outpatient physical therapy to address the deficits listed in the problem list box on initial evaluation, provide pt/family education and to maximize pt's level of independence in the home and community environment.     Pt's spiritual, cultural and educational needs considered and pt agreeable to plan of care and goals.     Anticipated Barriers for therapy: fear of producing symptoms; lives alone    Goals:  Short Term Goals: 4 weeks   1. Decrease dizziness at worst to less than 5/10 for patient to be able to feel safe while driving or performing ADLs at home alone Ongoing  2. No deficits in Smooth pursuits to improve gaze stabilization during functional activities including bed mobility Ongoing  3.  Pt will report improved sleep quality and ability to sleep with 2 or less pillow under head without inducing symptoms. Ongoing  4. TORSTEN Pass at least 5/7 positions for improved static standing balance and reduce risk for falls Ongoing  5. Abolish symptoms with positional testing for horizontal and posterior canal positioning to improve functional independence with bed mobility Ongoing  6. Improve  cervical ROM (rotation and side bending) by 10 degrees to improve posture during functional activities Ongoing     Long Term Goals: 8 weeks   1. Abolish dizziness at worst to less than /10 for patient to be able to feel safe while driving or performing ADLs at home alone Ongoing  2. Pt will report improved sleep quality and ability to sleep with 1 pillow under head without dizziness symptoms. Ongoing  3. TORSTEN Pass at least 7/7 positions for improved static standing balance and reduce risk for falls Ongoing  4.Abolish symptoms with positional testing for horizontal and posterior canal positioning to improve functional independence with bed mobility Ongoing  5. Improve cervical ROM (rotation and side bending) within functional limits Ongoing    Plan     Reassess L [facility] and treat accordingly.  If both sides clear, then reassess oculomotor and balance performance.     Angelina King, PT

## 2019-06-27 NOTE — TELEPHONE ENCOUNTER
Art Coronado,  Could you see if we can get Ms. Huff a nutrition appointment? She has been diagnosed with rectal cancer,  has stage 5 CKD and has lost weight.  She has a lot of upcoming appointments   Her treatment plan is in the making, but she is hoping to  some weight that she has lost. Katherine

## 2019-06-28 ENCOUNTER — CLINICAL SUPPORT (OUTPATIENT)
Dept: REHABILITATION | Facility: HOSPITAL | Age: 84
End: 2019-06-28
Attending: INTERNAL MEDICINE
Payer: MEDICARE

## 2019-06-28 DIAGNOSIS — H81.13 BENIGN PAROXYSMAL POSITIONAL VERTIGO DUE TO BILATERAL VESTIBULAR DISORDER: Primary | ICD-10-CM

## 2019-06-28 PROCEDURE — 97530 THERAPEUTIC ACTIVITIES: CPT | Mod: HCNC,PO

## 2019-06-28 PROCEDURE — 95992 CANALITH REPOSITIONING PROC: CPT | Mod: HCNC,PO

## 2019-06-29 ENCOUNTER — HOSPITAL ENCOUNTER (OUTPATIENT)
Dept: RADIOLOGY | Facility: HOSPITAL | Age: 84
Discharge: HOME | End: 2019-06-29
Attending: COLON & RECTAL SURGERY
Payer: MEDICARE

## 2019-06-29 DIAGNOSIS — C20 RECTAL CANCER: ICD-10-CM

## 2019-06-29 PROCEDURE — 74176 CT ABD & PELVIS W/O CONTRAST: CPT | Mod: TC,HCNC

## 2019-06-29 PROCEDURE — 71250 CT THORAX DX C-: CPT | Mod: 26,HCNC,, | Performed by: RADIOLOGY

## 2019-06-29 PROCEDURE — 71250 CT THORAX DX C-: CPT | Mod: TC,HCNC

## 2019-06-29 PROCEDURE — 25500020 PHARM REV CODE 255: Mod: HCNC | Performed by: COLON & RECTAL SURGERY

## 2019-06-29 PROCEDURE — 74176 CT ABD & PELVIS W/O CONTRAST: CPT | Mod: 26,HCNC,, | Performed by: RADIOLOGY

## 2019-06-29 PROCEDURE — 74176 CT CHEST ABDOMEN PELVIS WITHOUT CONTRAST(XPD): ICD-10-PCS | Mod: 26,HCNC,, | Performed by: RADIOLOGY

## 2019-06-29 PROCEDURE — 71250 CT CHEST ABDOMEN PELVIS WITHOUT CONTRAST(XPD): ICD-10-PCS | Mod: 26,HCNC,, | Performed by: RADIOLOGY

## 2019-06-29 RX ADMIN — IOHEXOL 15 ML: 350 INJECTION, SOLUTION INTRAVENOUS at 09:06

## 2019-07-01 ENCOUNTER — PATIENT MESSAGE (OUTPATIENT)
Dept: SURGERY | Facility: CLINIC | Age: 84
End: 2019-07-01

## 2019-07-02 ENCOUNTER — OFFICE VISIT (OUTPATIENT)
Dept: RHEUMATOLOGY | Facility: CLINIC | Age: 84
End: 2019-07-02
Payer: MEDICARE

## 2019-07-02 VITALS
HEART RATE: 68 BPM | WEIGHT: 110.19 LBS | DIASTOLIC BLOOD PRESSURE: 72 MMHG | HEIGHT: 64 IN | BODY MASS INDEX: 18.81 KG/M2 | SYSTOLIC BLOOD PRESSURE: 134 MMHG

## 2019-07-02 DIAGNOSIS — M19.042 OSTEOARTHRITIS OF BOTH HANDS, UNSPECIFIED OSTEOARTHRITIS TYPE: Primary | ICD-10-CM

## 2019-07-02 DIAGNOSIS — M19.041 OSTEOARTHRITIS OF BOTH HANDS, UNSPECIFIED OSTEOARTHRITIS TYPE: Primary | ICD-10-CM

## 2019-07-02 DIAGNOSIS — M11.20 CHONDROCALCINOSIS ARTICULARIS: ICD-10-CM

## 2019-07-02 DIAGNOSIS — M17.0 ARTHRITIS OF BOTH KNEES: ICD-10-CM

## 2019-07-02 PROCEDURE — 1101F PT FALLS ASSESS-DOCD LE1/YR: CPT | Mod: HCNC,CPTII,S$GLB, | Performed by: INTERNAL MEDICINE

## 2019-07-02 PROCEDURE — 1101F PR PT FALLS ASSESS DOC 0-1 FALLS W/OUT INJ PAST YR: ICD-10-PCS | Mod: HCNC,CPTII,S$GLB, | Performed by: INTERNAL MEDICINE

## 2019-07-02 PROCEDURE — 99999 PR PBB SHADOW E&M-EST. PATIENT-LVL IV: ICD-10-PCS | Mod: PBBFAC,HCNC,, | Performed by: INTERNAL MEDICINE

## 2019-07-02 PROCEDURE — 99499 UNLISTED E&M SERVICE: CPT | Mod: HCNC,S$GLB,, | Performed by: INTERNAL MEDICINE

## 2019-07-02 PROCEDURE — 99213 OFFICE O/P EST LOW 20 MIN: CPT | Mod: HCNC,S$GLB,, | Performed by: INTERNAL MEDICINE

## 2019-07-02 PROCEDURE — 99213 PR OFFICE/OUTPT VISIT, EST, LEVL III, 20-29 MIN: ICD-10-PCS | Mod: HCNC,S$GLB,, | Performed by: INTERNAL MEDICINE

## 2019-07-02 PROCEDURE — 99999 PR PBB SHADOW E&M-EST. PATIENT-LVL IV: CPT | Mod: PBBFAC,HCNC,, | Performed by: INTERNAL MEDICINE

## 2019-07-02 PROCEDURE — 99499 RISK ADDL DX/OHS AUDIT: ICD-10-PCS | Mod: HCNC,S$GLB,, | Performed by: INTERNAL MEDICINE

## 2019-07-02 ASSESSMENT — ROUTINE ASSESSMENT OF PATIENT INDEX DATA (RAPID3)
PATIENT GLOBAL ASSESSMENT SCORE: 9.5
FATIGUE SCORE: 8
PSYCHOLOGICAL DISTRESS SCORE: 4.4
PAIN SCORE: 4.5
AM STIFFNESS SCORE: 0, NO
MDHAQ FUNCTION SCORE: .6
TOTAL RAPID3 SCORE: 5.33

## 2019-07-02 NOTE — PROGRESS NOTES
I have personally taken the history and examined the patient and agree with the resident,s note as stated above       Notes recorded by Jacob Max MD on 11/2/2018 at 9:47 AM CDT  The  Neck x-ray shows degenerative disc disease as expected. No chondrocalcinosis. How is the neck stiffness with the Medrol Dospak? RJQ   X-Ray Cervical Spine AP And Lateral   Order: [id number]   Status:  Final result   Visible to patient:  Yes (Patient Portal) Next appt:  07/03/2019 at 09:00 AM in Outpatient Rehab (Angelina King, PT) Dx:  Chondrocalcinosis articularis   Details     Reading Physician Reading Date Result Priority   Elian Lopes MD 11/2/2018       Narrative     EXAMINATION:  XR CERVICAL SPINE AP LATERAL    CLINICAL HISTORY:  Other chondrocalcinosis, unspecified site    TECHNIQUE:  AP, lateral and open mouth views of the cervical spine were performed.    COMPARISON:  None.    FINDINGS:  Cervical vertebra are intact with good alignment.  The  degenerative changes are present at the disc spaces with narrowing and marginal osteophytes throughout the cervical disc spaces with  relative sparing at   C4-5.  Prevertebral soft tissues are unremarkable.  Visualized thorax shows aortic atherosclerosis.      Impression       Degenerative spine changes      Electronically signed by: Elian Lopes MD  Date: 11/02/2018  Time: 09:00             Last Resulted: 11/02/18 09:00 Order Details View Encounter Lab and Collection Details Routing Result History            Patient Result Comments     Viewed by Karli Hameed on 11/5/2018  9:39 AM   Written by Jacob Max MD on 11/2/2018  9:47 AM   The  Neck x-ray shows degenerative disc disease as expected. No chondrocalcinosis. How is the neck stiffness with the Medrol Dospak? RJQ   External Result Report     External Result Report   Narrative     EXAMINATION:  XR CERVICAL SPINE AP LATERAL    CLINICAL HISTORY:  Other chondrocalcinosis, unspecified site    TECHNIQUE:  AP,  lateral and open mouth views of the cervical spine were performed.    COMPARISON:  None.    FINDINGS:  Cervical vertebra are intact with good alignment.  The  degenerative changes are present at the disc spaces with narrowing and marginal osteophytes throughout the cervical disc spaces with  relative sparing at   C4-5.  Prevertebral soft tissues are unremarkable.  Visualized thorax shows aortic atherosclerosis.   Impression       Degenerative spine changes      Electronically signed by: Elian Lopes MD  Date: 11/02/2018  Time: 09:00    Encounter     View Encounter        Results for NIKI TOLENTINO (MRN [id number]) as of 7/2/2019 12:23   Ref. Range 3/29/2019 09:14   PTH Latest Ref Range: 9.0 - 77.0 pg/mL 600.0 (H)   Results for NIKI TOLENTINO (MRN [id number]) as of 7/2/2019 12:23   Ref. Range 10/12/2018 09:50   Vit D, 25-Hydroxy Latest Ref Range: 30 - 96 ng/mL 47   Results for NIKI TOLENTINO (MRN [id number]) as of 7/2/2019 12:23   Ref. Range 3/29/2019 09:14   Magnesium Latest Ref Range: 1.6 - 2.6 mg/dL 2.3   Results for NIKI TOLENTINO (MRN [id number]) as of 7/2/2019 12:23   Ref. Range 3/29/2019 09:14   Phosphorus Latest Ref Range: 2.7 - 4.5 mg/dL 5.2 (H)     Colon cancer recently diagnosed, metastatic w/u underway to determin Rx  Secondary hyperparathryodism  Osteopenia with elevated FRAX major 17% hip 11%  Severe Neck pain and stiffness, strain v. Acute CPDD resolved  Chondrocalcinosis, h/o acute CPDD right knee  Gen OA, hands and knees, AC joints, left glenohumeral  Acute right shoulder pain 9/10/18  With signs of impingement, rotator cuff tendinitis, bicipital tendinitis and received right subacromial bursal injection. Resolved.   CKD stage 5, PCKD  Hypertension,  134/72 currently controlled     *Shingrix   *Tdap  No ID appts for these today  Denosumab 60mg sc last dose 5/3/19  F/u Nephrology for CKD stage 5, secondary  Hyperparathyroidism  Melatonin 1-5mg hs for insomnia resulting from cancer Dx. For Rx check with  Dr. Hutchinson  Cont exercise program  RTC Nov with renal function panel

## 2019-07-02 NOTE — PATIENT INSTRUCTIONS
Schedule Tetanus and Shingrix vaccinations.  Continue Physical Therapy exercises as home.  Initiate Melatonin Over the Counter medication therapy for sleep. Start at 1mg by mouth before bedtime and, if not effective increase 1mg with a maximum dose of 5mg.  Continue using heating pad for stiff neck and shoulders.  Incorporate neck stretching into your daily exercise routine.  RTC in November before next Prolia.

## 2019-07-02 NOTE — PROGRESS NOTES
"Subjective:       Patient ID: Karli Hameed is a 87 y.o. female.    Chief Complaint: No chief complaint on file.    HPI   86 yo F with Hx of osteoporosis treated with Prolia injections, myofascial neck pain, long standing OA of hands/hips/knees, chondrocalcinosis articularis, shoulder impingement syndrome, adhesive capsilitis. She is here for a follow up appointment with regards to previously treated neck pain/shoulder pain.  Neck pain resolved with corticosteroid injection at last visit and physical therapy regimen. She went to PT 2x per week for 2 months. She also utilized, and still uses, the heating pad when her neck stiffness flairs. She is still in PT for vertigo and doing home exercises for her neck. Her history of left shoulder pain is well controlled with heating pad and PT home exercises. Her mood is sorrowful as she recently received a diagnosis of rectal cancer and is undergoing the evaluative stages.   Has yet to receive Shingrix vaccine. She needs a tetanus vaccine as well.   Review of Systems   Constitutional: Negative for diaphoresis, fatigue and fever.   HENT: Negative.    Eyes: Negative.    Respiratory: Negative.    Cardiovascular: Negative.    Gastrointestinal: Positive for blood in stool (recent diagnosis of rectal/colon cancer).   Endocrine: Negative.    Genitourinary: Negative.    Musculoskeletal: Positive for arthralgias (chronic OA).   Neurological: Negative.    Hematological: Negative.          Objective:   /72   Pulse 68   Ht 5' 3.6" (1.615 m)   Wt 50 kg (110 lb 3.2 oz)   BMI 19.15 kg/m²      Physical Exam   Constitutional: She is oriented to person, place, and time and well-developed, well-nourished, and in no distress.   Eyes: Pupils are equal, round, and reactive to light.   Neck: Normal range of motion.   Cardiovascular: Normal heart sounds.    Pulmonary/Chest: Effort normal.   Abdominal: Soft.       Right Side Rheumatological Exam     Examination finds the shoulder, elbow, " wrist, knee, 1st PIP, 1st MCP, 2nd MCP, 3rd MCP, 4th PIP, 4th MCP, 5th PIP and 5th MCP normal.    The patient has an enlarged 2nd PIP and 3rd PIP    Shoulder Exam   Tenderness Location: no tenderness    Muscle Strength (0-5 scale):  Neck Flexion:  5  Neck Extension: 5  Deltoid:  5  Biceps: 5/5   Triceps:  5  : 5/5   Iliopsoas: 5  Quadriceps:  5   Distal Lower Extremity: 5    Left Side Rheumatological Exam     Examination finds the shoulder, elbow, wrist, knee, 1st PIP, 1st MCP, 2nd PIP, 2nd MCP, 3rd PIP, 3rd MCP, 4th PIP, 4th MCP, 5th PIP and 5th MCP normal.    Shoulder Exam   Tenderness Location: no tenderness    Muscle Strength (0-5 scale):  Neck Flexion:  5  Neck Extension: 5  Deltoid:  5  Biceps: 5/5   Triceps:  5  :  5/5   Iliopsoas: 5  Quadriceps:  5   Distal Lower Extremity: 5      Neurological: She is alert and oriented to person, place, and time.   Reflex Scores:       Tricep reflexes are 2+ on the right side and 2+ on the left side.       Bicep reflexes are 2+ on the right side and 2+ on the left side.       Brachioradialis reflexes are 2+ on the right side and 2+ on the left side.       Patellar reflexes are 3+ on the right side and 3+ on the left side.       Achilles reflexes are 1+ on the right side and 1+ on the left side.  Skin: Skin is warm. There is erythema.     Musculoskeletal: She exhibits edema, tenderness and deformity.      trace knee effusion on right  TTP in trapezius muscle bilaterally, mild in nature  No current AC joint tenderness or pain with Mace maneuver bilaterally  Denies radiating pain to shoulders or arms.     Assessment:       1. Osteoarthritis of both hands, unspecified osteoarthritis type    2. Arthritis of both knees    3. Chondrocalcinosis articularis        OA is managed well with conservative therapy. Asymptomatic from baseline currently.    Plan:   Schedule Tetanus and Shingrix vaccinations.  Continue Physical Therapy exercises as home.  Initiate Melatonin Over  the Counter medication therapy for sleep. Start at 1mg by mouth before bedtime and, if not effective increase 1mg with a maximum dose of 5mg.  Continue using heating pad for stiff neck and shoulders.  Incorporate neck stretching into your daily exercise routine.  RTC in November before next Prolia.    Juan A Rodriguez MD PM&R PGY1

## 2019-07-03 ENCOUNTER — PATIENT MESSAGE (OUTPATIENT)
Dept: INTERNAL MEDICINE | Facility: CLINIC | Age: 84
End: 2019-07-03

## 2019-07-03 ENCOUNTER — CLINICAL SUPPORT (OUTPATIENT)
Dept: REHABILITATION | Facility: HOSPITAL | Age: 84
End: 2019-07-03
Attending: INTERNAL MEDICINE
Payer: MEDICARE

## 2019-07-03 DIAGNOSIS — H81.13 BPPV (BENIGN PAROXYSMAL POSITIONAL VERTIGO), BILATERAL: ICD-10-CM

## 2019-07-03 PROCEDURE — 95992 PR CANALITH REPOSITIONING PROCEDURE, PER DAY: ICD-10-PCS | Mod: HCNC,,, | Performed by: INTERNAL MEDICINE

## 2019-07-03 PROCEDURE — 95992 CANALITH REPOSITIONING PROC: CPT | Mod: HCNC,,, | Performed by: INTERNAL MEDICINE

## 2019-07-03 NOTE — PROGRESS NOTES
Physical Therapy Daily Treatment Note     Name: Karli Hameed  Clinic Number: [id number]    Therapy Diagnosis:   Encounter Diagnosis   Name Primary?    BPPV (benign paroxysmal positional vertigo), bilateral      Physician: Katherine Hutchinson MD    Visit Date: 7/3/2019    Physician Orders: PT Eval and Treat   Medical Diagnosis from Referral: H81.13 (ICD-10-CM) - Benign paroxysmal positional vertigo due to bilateral vestibular disorder  Evaluation Date: 6/21/2019  Authorization Period Expiration:12/31/19  Plan of Care Expiration: 8/21/19  Visit # / Visits authorized: 4/ 20 (4 total visits of 2019)  G code Count: 3/10     Time In: 09:00  Time Out: 09:45  Total Billable Time: 45 minutes     Precautions: Fall    Subjective     Pt reports: that her dizziness is doing much better but she is still having some occasional dizziness.   She was compliant with home exercise program.  Response to previous treatment: no change at this time  Functional change: none at this time    Pain: 0/10  Location: NA    Objective     Karli received canalith repositioning to improve vestibular function for 45 minutes including:    POSITIONAL CANAL TESTING  Looking for nystagmus (slow drift to affected side with quick correction away)    [place] Hallpike (posterior / CL anterior)   Right : NT   Left: NT  Horizontal Canals   Right: (-) vertigo, (-) nystagmus   Left : (-) vertigo, (-) nystagmus 1st trial c/ position held for 2 min; (+) vertigo, (+) nystagmus lasting ~40 seconds, ~20 latency for 2nd trial, (+) vertigo, (+) nystagmus lasting ~20 seconds, ~90 latency for 3rd trial; (+) vertigo, (+) nystagmus lasting  ~20 seconds, for trial 4    Treatment Performed: x 3 trials BBQ roll to treat L  [facility] BPPV x 1 trial supine roll to treat L [facility] BPPV    BBQ roll:   Trial 1: (-) vertigo, (-) nystagmus  in position 1, position held for 2 min; (+) vertigo, (+) nystagmus lasting ~20 seconds, 30 sec latency, in position 2, position held for additional 30  seconds; (+) vertigo, (+) nystagmus lasting ~10 seconds in position 3, position held for additional 30 seconds; (-) vertigo, (-) nystagmus in position 4, position held for additional 30 seconds; 2 min seated rest break    Trial 2: (+) vertigo, (+) nystagmus lasting ~40 seconds in position 1, 20 sec latency period, position held for additional 30 seconds; (+) vertigo, (+) nystagmus lasting ~20 seconds in position 2, position held for additional 30 seconds; (+) vertigo, (+) nystagmus lasting ~15 seconds in position 3, position held for additional 30 seconds; (-) vertigo, (-) nystagmus in position 4, position held for additional 30 seconds; 2 min seated rest break    Trial 3: (+) vertigo, (+) nystagmus lasting ~20 seconds in position 1, 90 sec latency, position held for additional 60 seconds; (+) vertigo, (+) nystagmus lasting ~6 seconds in position 2, position held for additional 60 seconds; (+) vertigo, (+) nystagmus lasting ~40 seconds in position 3, position held for additional 60 seconds; (-) vertigo, (-) nystagmus in position 4, position held for additional 60 seconds; 2 min seated rest break     Supine Roll:   Trial 4: (+) vertigo, (+) nystagmus lasting ~20 seconds in position 1, 50 sec latency, position held for additional 30 seconds; (+) vertigo, (+) nystagmus lasting ~5 seconds in position 2, position held for additional 30 seconds; (+) vertigo, (+) nystagmus lasting ~20 seconds in position 3, position held for additional 30 seconds; (-) vertigo, (-) nystagmus in position 4, position held for additional 30 seconds; 2 min seated rest break     Home Exercises Provided and Patient Education Provided     Education provided:   - Patient instructed not to roll excessively in bed over the next 48 hours to prevent potential re-migration of crystals. Patient verbalized good understanding.     Written Home Exercises Provided: None provided at this time.     Assessment     Karli tolerated therapy session well this  morning and reports continued improvement with symptoms in daily routine. She does endorse some remaining dizziness though. Initiated session with R side lying horizontal test to assess R [facility] BPPV. Patient (-) for both vertigo and nystagmus in this position. Upon performing L side lying horizontal test for L [facility], pt (+) for both vertigo and nystagmus with latency period. Three BBQ rolls performed to further address L [facility] BPPV. During start of each roll, latency period for vertigo and nystagmus varied from 30 seconds to 90 seconds with varying intensity of vertigo and nystagmus. For 4th trial, PT performed supine roll test with immediate nystagmus and vertigo when head rolled to left. 1 supine roll maneuver performed to address L [facility] BPPV.  Plan to reassess next session and treat as indicated. Continue POC.     Karli is progressing well towards her goals.   Pt prognosis is Good.     Pt will continue to benefit from skilled outpatient physical therapy to address the deficits listed in the problem list box on initial evaluation, provide pt/family education and to maximize pt's level of independence in the home and community environment.     Pt's spiritual, cultural and educational needs considered and pt agreeable to plan of care and goals.     Anticipated Barriers for therapy: fear of producing symptoms; lives alone    Goals:  Short Term Goals: 4 weeks   1. Decrease dizziness at worst to less than 5/10 for patient to be able to feel safe while driving or performing ADLs at home alone Ongoing  2. No deficits in Smooth pursuits to improve gaze stabilization during functional activities including bed mobility Ongoing  3.  Pt will report improved sleep quality and ability to sleep with 2 or less pillow under head without inducing symptoms. Ongoing  4. TORSTEN Pass at least 5/7 positions for improved static standing balance and reduce risk for falls Ongoing  5. Abolish symptoms with positional testing for horizontal and  posterior canal positioning to improve functional independence with bed mobility Ongoing  6. Improve cervical ROM (rotation and side bending) by 10 degrees to improve posture during functional activities Ongoing     Long Term Goals: 8 weeks   1. Abolish dizziness at worst to less than /10 for patient to be able to feel safe while driving or performing ADLs at home alone Ongoing  2. Pt will report improved sleep quality and ability to sleep with 1 pillow under head without dizziness symptoms. Ongoing  3. TORSTEN Pass at least 7/7 positions for improved static standing balance and reduce risk for falls Ongoing  4.Abolish symptoms with positional testing for horizontal and posterior canal positioning to improve functional independence with bed mobility Ongoing  5. Improve cervical ROM (rotation and side bending) within functional limits Ongoing    Plan     Reassess L [facility] and treat accordingly.  If both sides clear, then reassess oculomotor and balance performance.     Angelina King, PT

## 2019-07-10 ENCOUNTER — HOSPITAL ENCOUNTER (OUTPATIENT)
Dept: RADIOLOGY | Facility: HOSPITAL | Age: 84
Discharge: HOME | End: 2019-07-10
Attending: COLON & RECTAL SURGERY
Payer: MEDICARE

## 2019-07-10 ENCOUNTER — TELEPHONE (OUTPATIENT)
Dept: SURGERY | Facility: CLINIC | Age: 84
End: 2019-07-10

## 2019-07-10 DIAGNOSIS — C20 RECTAL CANCER: ICD-10-CM

## 2019-07-10 PROCEDURE — 72195 MRI PELVIS W/O DYE: CPT | Mod: 26,HCNC,, | Performed by: RADIOLOGY

## 2019-07-10 PROCEDURE — 72195 MRI RECTAL CANCER WITHOUT CONTRAST: ICD-10-PCS | Mod: 26,HCNC,, | Performed by: RADIOLOGY

## 2019-07-10 PROCEDURE — 72195 MRI PELVIS W/O DYE: CPT | Mod: TC,HCNC

## 2019-07-10 NOTE — TELEPHONE ENCOUNTER
Asked pt if she needed an U/S she said it wasn't an u/s it was something in Endo. I do not see a phone message from today. I will try to find out for her.

## 2019-07-10 NOTE — TELEPHONE ENCOUNTER
----- Message from Sammie Peters sent at 7/10/2019  3:40 PM CDT -----  Contact: pt#[phone]  Needs Advice    Reason for call:Pt is calling to schedule a rectal US         Communication Preference:call    Additional Information:

## 2019-07-18 ENCOUNTER — PATIENT MESSAGE (OUTPATIENT)
Dept: SURGERY | Facility: CLINIC | Age: 84
End: 2019-07-18

## 2019-07-18 ENCOUNTER — CLINICAL SUPPORT (OUTPATIENT)
Dept: REHABILITATION | Facility: HOSPITAL | Age: 84
End: 2019-07-18
Attending: INTERNAL MEDICINE
Payer: MEDICARE

## 2019-07-18 DIAGNOSIS — H81.13 BPPV (BENIGN PAROXYSMAL POSITIONAL VERTIGO), BILATERAL: ICD-10-CM

## 2019-07-18 PROCEDURE — 97112 NEUROMUSCULAR REEDUCATION: CPT | Mod: HCNC,PO

## 2019-07-18 PROCEDURE — 95992 PR CANALITH REPOSITIONING PROCEDURE, PER DAY: ICD-10-PCS | Mod: HCNC,,, | Performed by: INTERNAL MEDICINE

## 2019-07-18 PROCEDURE — 95992 CANALITH REPOSITIONING PROC: CPT | Mod: HCNC,,, | Performed by: INTERNAL MEDICINE

## 2019-07-18 NOTE — PROGRESS NOTES
Physical Therapy Daily Treatment Note     Name: Karli Hameed  Clinic Number: [id number]    Therapy Diagnosis:   Encounter Diagnosis   Name Primary?    BPPV (benign paroxysmal positional vertigo), bilateral      Physician: Katherine Hutchinson MD    Visit Date: 7/18/2019    Physician Orders: PT Eval and Treat   Medical Diagnosis from Referral: H81.13 (ICD-10-CM) - Benign paroxysmal positional vertigo due to bilateral vestibular disorder  Evaluation Date: 6/21/2019  Authorization Period Expiration:12/31/19  Plan of Care Expiration: 8/21/19  Visit # / Visits authorized: 5/ 20 (5 total visits of 2019)  G code Count: 5/10     Time In: 09:00  Time Out: 09:45  Total Billable Time: 45 minutes     Precautions: Fall    Subjective     Pt reports: that her dizziness is much better. She has not had any more spinning vertigo since last therapy session and she has been able to sleep on her L side without difficulty. She has had 2-3 occasional episodes of dizziness when standing, but she descirbes this sensation as different compared to BPPV vertigo.   She was compliant with home exercise program.  Response to previous treatment: no change at this time  Functional change: none at this time    Pain: 0/10  Location: NA    Objective     Karli received canalith repositioning to improve vestibular function for 15 minutes including:    POSITIONAL CANAL TESTING  Looking for nystagmus (slow drift to affected side with quick correction away)    [place] Hallpike (posterior / CL anterior)   Right : NT   Left: NT  Horizontal Canals   Right: (-) vertigo, (-) nystagmus   Left : (-) vertigo, (-) nystagmus even with position held for 2 min    Treatment Performed: x 1 trial BBQ roll to further assess L  [facility] BPPV     BBQ roll:   Trial 1: (-) vertigo, (-) nystagmus  in position 1, position held for 2 min; (-) vertigo, (-) nystagmus in position 2, position held for additional 30 seconds; (-) vertigo, (-) nystagmus in position 3, position held for  "additional 30 seconds; (-) vertigo, (-) nystagmus in position 4, position held for additional 30 seconds; 2 min seated rest break    Patient participated in neuromuscular re-education activities to improve: Balance, Vestibular function, and Sense for 30 minutes. The following activities were included:     Oculomotor Performance:  Tracking/Smooth Pursuits: impaired, min visual slippage noted with horizontal tracking of pen, no dizziness  Saccades: Intact  Convergence: 11 cm  VOR 1: WFL, with post it target, no dizziness, no visual slippage    Smooth pursuits/Visual tracking: seated with moderately busy background, reading black numbers on card   2 x 30" horizontal, min visual slippage, no dizziness   2 x 30" vertical, no visual slippage,  Some jerkiness, no dizziness   1 x 30" RUQ <> LLQ no visual slippage, some jerkiness, no dizziness   1 x 30" LUQ <> RLQ, no visual slippage, some jerkiness, no dizziness    TORSTEN SENSORY TESTING:  (P= Pass, F= Fail; note any sway; hold each position for 30")  Condition 1: (firm surface/feet together/eyes open) P  Condition 2: (firm surface/feet together/eyes closed) P  Condition 3: (firm surface/feet in tandem/eyes open) P  Condition 4: (firm surface/feet in tandem/eyes closed) F, 10 sec  Condition 5: (soft surface/feet together/eyes open) P  Condition 6: (soft surface/feet together/eyes closed) P  Condition 7: (Fakuda step test), measure distance varied from center starting position NT    Home Exercises Provided and Patient Education Provided     Education provided:   - POC    Written Home Exercises Provided: Seated smooth pursuits in all planes. See patient instructions on 07/18/19. Patient verbalized and demonstrated good understanding of exercises provided.     Assessment     Karli tolerated therapy session well this morning. Upon reassessment on left side lying test for L [facility],  Pt (-) for both vertigo and nystagmus, even with 2 min position hold to assess for latency. BBQ " performed to further assess L [facility]. Patient (-) for both vertigo and nystagmus throughout this CRM. Therefore, PT reassessed oculomotor performance and GST to see how vestibular function has improved with resolution of BPPV. During smooth pursuits, mild visual slippage noted in horizontal plane, but no dizziness present. Saccades, VOR 1, and convergence point WFL with no symptom provocation today. During GST, patient demonstrates some difficulty with tandem stance with vision eliminated, but she was able to pass all other tested conditions. Therefore, HEP provided for smooth pursuits. Patient to schedule 1 follow up appointment to monitor BPPV resolution and to reinforce HEP. LTGS updated to reflect current progress with therapy and current treatment plan.     Karli is progressing well towards her goals.   Pt prognosis is Good.     Pt will continue to benefit from skilled outpatient physical therapy to address the deficits listed in the problem list box on initial evaluation, provide pt/family education and to maximize pt's level of independence in the home and community environment.     Pt's spiritual, cultural and educational needs considered and pt agreeable to plan of care and goals.     Anticipated Barriers for therapy: fear of producing symptoms; lives alone    Goals:  Short Term Goals: 4 weeks   1. Decrease dizziness at worst to less than 5/10 for patient to be able to feel safe while driving or performing ADLs at home alone MET 07/18/19  2. No deficits in Smooth pursuits to improve gaze stabilization during functional activities including bed mobility Progressing  3.  Pt will report improved sleep quality and ability to sleep with 2 or less pillow under head without inducing symptoms. MET 07/18/19  4. TORSTEN Pass at least 5/7 positions for improved static standing balance and reduce risk for falls MET 07/18/19  5. Abolish symptoms with positional testing for horizontal and posterior canal positioning to  improve functional independence with bed mobility MET 07/17/19  6. Improve cervical ROM (rotation and side bending) by 10 degrees to improve posture during functional activities Discontinued 07/18/19.      Long Term Goals: 8 weeks   1. Abolish dizziness at worst to less than 1/10 for patient to be able to feel safe while driving or performing ADLs at home alone Ongoing  2. Pt will report improved sleep quality and ability to sleep with 1 pillow under head without dizziness symptoms. Discontinued 07/18/19.   3. TORSTEN Pass at least 7/7 positions for improved static standing balance and reduce risk for falls Discontinued 07/18/19.   4.Abolish symptoms with positional testing for horizontal and posterior canal positioning to improve functional independence with bed mobility MET 07/17/19  5. Improve cervical ROM (rotation and side bending) within functional limits Discontinued 07/18/19.     Plan     Continue to monitor BPPV. Reinforce HEP. Prepare for d/c of no further issues.     Angelina King, PT

## 2019-07-22 ENCOUNTER — PATIENT MESSAGE (OUTPATIENT)
Dept: SURGERY | Facility: CLINIC | Age: 84
End: 2019-07-22

## 2019-07-23 ENCOUNTER — OFFICE VISIT (OUTPATIENT)
Dept: SURGERY | Facility: CLINIC | Age: 84
End: 2019-07-23
Payer: MEDICARE

## 2019-07-23 DIAGNOSIS — C20 RECTAL CANCER: Primary | ICD-10-CM

## 2019-07-23 PROCEDURE — 1101F PR PT FALLS ASSESS DOC 0-1 FALLS W/OUT INJ PAST YR: ICD-10-PCS | Mod: HCNC,CPTII,S$GLB, | Performed by: COLON & RECTAL SURGERY

## 2019-07-23 PROCEDURE — 1101F PT FALLS ASSESS-DOCD LE1/YR: CPT | Mod: HCNC,CPTII,S$GLB, | Performed by: COLON & RECTAL SURGERY

## 2019-07-23 PROCEDURE — 99213 OFFICE O/P EST LOW 20 MIN: CPT | Mod: HCNC,S$GLB,, | Performed by: COLON & RECTAL SURGERY

## 2019-07-23 PROCEDURE — 99213 PR OFFICE/OUTPT VISIT, EST, LEVL III, 20-29 MIN: ICD-10-PCS | Mod: HCNC,S$GLB,, | Performed by: COLON & RECTAL SURGERY

## 2019-07-23 PROCEDURE — 99999 PR PBB SHADOW E&M-EST. PATIENT-LVL II: ICD-10-PCS | Mod: PBBFAC,HCNC,, | Performed by: COLON & RECTAL SURGERY

## 2019-07-23 PROCEDURE — 99999 PR PBB SHADOW E&M-EST. PATIENT-LVL II: CPT | Mod: PBBFAC,HCNC,, | Performed by: COLON & RECTAL SURGERY

## 2019-07-23 NOTE — PROGRESS NOTES
HPI:  Karli Hameed is a 87 y.o. female with history of rectal mass found the time of diagnostic colonoscopy.  Biopsies were performed and she is seen today to discuss the results and to lay out treatment plan.    SPECIMEN  1) Biopsy of anorectal lesion, forceps.  Supplemental Diagnosis  Immunohistochemistry (IHC) Testing for Mismatch Repair (MMR) Proteins:  MLH1 - Intact nuclear expression  MSH2 - Intact nuclear expression  MSH6 - Intact nuclear expression  PMS2 - Intact nuclear expression  Background nonneoplastic tissue/internal control with intact nuclear expression  IHC Interpretation  No loss of nuclear expression of MMR proteins: low probability of microsatellite instability  There are exceptions to the above IHC interpretations. These results should not be considered in isolation, and  clinical correlation with genetic counseling is recommended to assess the need for germline testing.  MMR-IHC interpretation is performed by Dr. Sania Reyes  [id number]  (Electronically Signed: 2019-06-26 16:36:34 )  Diagnosed by: Sania Reyes M.D.    FINAL PATHOLOGIC DIAGNOSIS  BIOPSY OF RECTAL MASS SHOWS INVASIVE COLONIC ADENOCARCINOMA. RESULTS OF  IMMUNOSTAINS PENDING WITH ADDITIONAL REPORT TO FOLLOW.  Diagnosed by: Bruce Coley M.D.  (Electronically Signed: 2019-06-25 15:43:20)      Past Medical History:   Diagnosis Date    Allergy     Anxiety     Arthritis     Back pain     Basal cell carcinoma 6/2011    R nasal ala, excised via Mohs    Chronic kidney disease, stage II (mild) 8/25/2012    cyst since 1975    Flank pain 8/21/2012    HTN (hypertension) 8/21/2012    Inflammatory bowel disease     Joint pain     Kidney stone     left     Lactose disaccharidase deficiency     Senile osteoporosis 4/11/2016    Small bowel obstruction, 06-, resolved witjhout surgery 7/1/2014    Trace cataracts     Ulcer     hx    Urinary tract infection         Past Surgical History:   Procedure Laterality Date     ABDOMINAL SURGERY      APPENDECTOMY      CATARACT EXTRACTION W/  INTRAOCULAR LENS IMPLANT Right 02/12/2015    Dr. Sahni    CATARACT EXTRACTION W/  INTRAOCULAR LENS IMPLANT Left 04/09/2015    CHOLECYSTECTOMY      COLONOSCOPY N/A 6/19/2019    Performed by Beto Rivera MD at [facility] (4TH FLR)    EYE SURGERY      HERNIA REPAIR  2017    Open rih with mesh    HYSTERECTOMY      INSERTION-INTRAOCULAR LENS (IOL) Left 4/9/2015    Performed by Vidya Sahni MD at [facility] OR 1ST FLR    INSERTION-INTRAOCULAR LENS (IOL) Right 2/12/2015    Performed by Vidya Sahni MD at [facility] 1ST FLR    KIDNEY SURGERY      drained cyst x 2    lysis of abdominal adhesions      OOPHORECTOMY      PHACOEMULSIFICATION-ASPIRATION-CATARACT Left 4/9/2015    Performed by Vidya Sahni MD at [facility] 1ST FLR    PHACOEMULSIFICATION-ASPIRATION-CATARACT Right 2/12/2015    Performed by Vidya Sahni MD at [facility] 1ST FLR    REPAIR-HERNIA-INGUINAL-INITIAL-RIGHT-OPEN w/ mesh Right 7/19/2017    Performed by Lamont Justice MD at [facility] OR 2ND FLR    TONSILLECTOMY, ADENOIDECTOMY         Review of patient's allergies indicates:   Allergen Reactions    Advil [ibuprofen] Nausea Only    Parafon forte      Other reaction(s): Hallucinations    Calcitriol (bulk) Nausea Only    Lisinopril      cough       Family History   Problem Relation Age of Onset    Cancer Father     Cataracts Father     Kidney disease Father     Cancer Paternal Grandmother     No Known Problems Mother     No Known Problems Sister     No Known Problems Brother     No Known Problems Maternal Aunt     No Known Problems Maternal Uncle     No Known Problems Paternal Aunt     No Known Problems Paternal Uncle     No Known Problems Maternal Grandmother     No Known Problems Maternal Grandfather     No Known Problems Paternal Grandfather     Cirrhosis Neg Hx     Celiac disease Neg Hx     Colon polyps Neg Hx     Crohn's disease Neg Hx      Esophageal cancer Neg Hx     Inflammatory bowel disease Neg Hx     Liver cancer Neg Hx     Liver disease Neg Hx     Rectal cancer Neg Hx     Stomach cancer Neg Hx     Ulcerative colitis Neg Hx     Amblyopia Neg Hx     Blindness Neg Hx     Diabetes Neg Hx     Glaucoma Neg Hx     Hypertension Neg Hx     Macular degeneration Neg Hx     Retinal detachment Neg Hx     Strabismus Neg Hx     Stroke Neg Hx     Thyroid disease Neg Hx     Melanoma Neg Hx        Social History     Socioeconomic History    Marital status: Single     Spouse name: Not on file    Number of children: Not on file    Years of education: Not on file    Highest education level: Not on file   Occupational History    Not on file   Social Needs    Financial resource strain: Not on file    Food insecurity:     Worry: Not on file     Inability: Not on file    Transportation needs:     Medical: Not on file     Non-medical: Not on file   Tobacco Use    Smoking status: Never Smoker    Smokeless tobacco: Never Used   Substance and Sexual Activity    Alcohol use: Yes     Comment: social    Drug use: No    Sexual activity: Never   Lifestyle    Physical activity:     Days per week: Not on file     Minutes per session: Not on file    Stress: Not on file   Relationships    Social connections:     Talks on phone: Not on file     Gets together: Not on file     Attends Cheondoism service: Not on file     Active member of club or organization: Not on file     Attends meetings of clubs or organizations: Not on file     Relationship status: Not on file   Other Topics Concern    Are you pregnant or think you may be? No    Breast-feeding No   Social History Narrative    Lives alone.    Great neighbors.    Many friends.    Walks the neighborhood daily with her dog.    Active social life.    Has her own home, takes care of everything and all of her affairs.        ROS:  GENERAL: No fever, chills, fatigability or weight loss.  Integument: No  rashes, redness, icterus  CHEST: Denies DOMINGUEZ, cyanosis, wheezing, cough and sputum production.  CARDIOVASCULAR: Denies chest pain, PND, orthopnea or reduced exercise tolerance.  GI: Denies abd pain, dysphagia, nausea, vomiting, no hematemesis   : Denies burning on urination, no hematuria, no bacteriuria  MSK: No deformities, swelling, joint pain swelling  Neurologic: No HAs, seizures, weakness, paresthesias, gait problems    PE:  General appearance healthy nontoxic  There were no vitals taken for this visit.    Sclera/ Skin anicteric  LN none palpable  AT NC EOMI  Neck supple trachea midline   Chest symmetric, nl excursion, no retractions, breathing comfortably  Abdomen  ND soft NT.  no masses, no organomegaly  EXT - no CCE  Neuro:  Mood/ affect nl, alert and oriented x 3, moves all ext's, gait nl    Rectal  Inspection   LATISHA palpable low tumor, upper anal canal, right to anterior position 3-4 cm in size.  Movable, not fixed.       MRI pelvis    FINDINGS:  Tumor Location: Low, just above the anal verge.    Relationship to anterior peritoneal reflection: Below.    Tumor Characteristics:    Circumferential extent/location (clock face): Non circumferential.  Left-sided from 12-6 o'clock    Tumor Length: 4 cm    Mucinous: No    Tumor Extent:    Does not extend beyond muscularis into perirectal fat.    No definite spiculation of the perirectal fat.    No definite invasion of adjacent structures.    The distance of tumor to the mesorectal fascia is 0.5 cm.    For low rectal tumors only: Tumor extends below the upper border of the puborectalis sling. Exact extension is unclear secondary to motion artifact.    Lymph Nodes:    No perirectal lymph nodes greater than 5 mm are visualized in the mesorectal fat.    No extra-mesorectal nodes lymph nodes in the visualized pelvis.    No lymph nodes that are suspicious based on heterogeneous spiculated morphology.    No evidence of vascular invasion.    Miscellaneous: Significant  descent of the pelvic structures consistent with prolapse.  Sigmoid diverticulosis without evidence of diverticulitis.  There is a partially visualized large right renal cyst.              CT chest abd pelvis  Impression       In this patient with reported history of rectal cancer, noncontrast CT demonstrates nonspecific soft tissue thickening in the rectum.  These findings can be correlated with colonoscopy findings.    Two subcentimeter soft tissue nodules in the right lung as above.  Can continue follow-up with future scans according to surveillance plan.    Multiple stable findings in the kidney including right pelvic kidney with large simple renal cysts as well as left superior pole complex cyst.    Significant diverticulosis without evidence of diverticulitis.    Other findings as above.    This report was flagged in Epic as abnormal.    Electronically signed by resident: Addi Pennington  Date: 06/29/2019  Time: 12:07    Electronically signed by: Brenda Martin MD  Date: 06/30/2019  Time: 15:03       Assessment:  Low rectal cancer, T2 on MRI.  No evidence of distant spread.  Preserved functional status    Plan:  I informed the patient that I would present her case at tumor board.  She will return to the office in 1 week for further recommendations

## 2019-07-24 ENCOUNTER — PES CALL (OUTPATIENT)
Dept: ADMINISTRATIVE | Facility: CLINIC | Age: 84
End: 2019-07-24

## 2019-07-24 PROBLEM — C20 RECTAL CANCER: Status: ACTIVE | Noted: 2019-07-24

## 2019-07-30 ENCOUNTER — NUTRITION (OUTPATIENT)
Dept: NUTRITION | Facility: CLINIC | Age: 84
End: 2019-07-30
Payer: MEDICARE

## 2019-07-30 ENCOUNTER — OFFICE VISIT (OUTPATIENT)
Dept: SURGERY | Facility: CLINIC | Age: 84
End: 2019-07-30
Payer: MEDICARE

## 2019-07-30 VITALS
HEIGHT: 64 IN | SYSTOLIC BLOOD PRESSURE: 170 MMHG | BODY MASS INDEX: 18.78 KG/M2 | DIASTOLIC BLOOD PRESSURE: 70 MMHG | WEIGHT: 110 LBS

## 2019-07-30 VITALS — BODY MASS INDEX: 18.6 KG/M2 | WEIGHT: 108.94 LBS | HEIGHT: 64 IN

## 2019-07-30 DIAGNOSIS — Z01.818 PRE-OP TESTING: Primary | ICD-10-CM

## 2019-07-30 DIAGNOSIS — N18.5 STAGE 5 CHRONIC KIDNEY DISEASE NOT ON CHRONIC DIALYSIS: ICD-10-CM

## 2019-07-30 DIAGNOSIS — R63.4 UNINTENTIONAL WEIGHT LOSS: Primary | ICD-10-CM

## 2019-07-30 DIAGNOSIS — C80.1 ADENOCARCINOMA: Primary | ICD-10-CM

## 2019-07-30 DIAGNOSIS — Z00.8 NUTRITIONAL ASSESSMENT: ICD-10-CM

## 2019-07-30 DIAGNOSIS — C20 RECTAL CANCER: ICD-10-CM

## 2019-07-30 PROCEDURE — 99214 OFFICE O/P EST MOD 30 MIN: CPT | Mod: HCNC,S$GLB,, | Performed by: COLON & RECTAL SURGERY

## 2019-07-30 PROCEDURE — 1101F PT FALLS ASSESS-DOCD LE1/YR: CPT | Mod: HCNC,CPTII,S$GLB, | Performed by: COLON & RECTAL SURGERY

## 2019-07-30 PROCEDURE — 99999 PR PBB SHADOW E&M-EST. PATIENT-LVL III: CPT | Mod: PBBFAC,HCNC,, | Performed by: COLON & RECTAL SURGERY

## 2019-07-30 PROCEDURE — 99214 PR OFFICE/OUTPT VISIT, EST, LEVL IV, 30-39 MIN: ICD-10-PCS | Mod: HCNC,S$GLB,, | Performed by: COLON & RECTAL SURGERY

## 2019-07-30 PROCEDURE — 97802 PR MED NUTR THER, 1ST, INDIV, EA 15 MIN: ICD-10-PCS | Mod: S$GLB,,, | Performed by: DIETITIAN, REGISTERED

## 2019-07-30 PROCEDURE — 1101F PR PT FALLS ASSESS DOC 0-1 FALLS W/OUT INJ PAST YR: ICD-10-PCS | Mod: HCNC,CPTII,S$GLB, | Performed by: COLON & RECTAL SURGERY

## 2019-07-30 PROCEDURE — 97802 MEDICAL NUTRITION INDIV IN: CPT | Mod: S$GLB,,, | Performed by: DIETITIAN, REGISTERED

## 2019-07-30 PROCEDURE — 99999 PR PBB SHADOW E&M-EST. PATIENT-LVL III: ICD-10-PCS | Mod: PBBFAC,HCNC,,

## 2019-07-30 PROCEDURE — 99999 PR PBB SHADOW E&M-EST. PATIENT-LVL III: CPT | Mod: PBBFAC,HCNC,,

## 2019-07-30 PROCEDURE — 99999 PR PBB SHADOW E&M-EST. PATIENT-LVL III: ICD-10-PCS | Mod: PBBFAC,HCNC,, | Performed by: COLON & RECTAL SURGERY

## 2019-07-30 RX ORDER — NEOMYCIN SULFATE 500 MG/1
500 TABLET ORAL SEE ADMIN INSTRUCTIONS
Qty: 6 TABLET | Refills: 0 | Status: ON HOLD | OUTPATIENT
Start: 2019-07-30 | End: 2019-08-16 | Stop reason: HOSPADM

## 2019-07-30 RX ORDER — METRONIDAZOLE 500 MG/1
500 TABLET ORAL SEE ADMIN INSTRUCTIONS
Qty: 3 TABLET | Refills: 0 | Status: ON HOLD | OUTPATIENT
Start: 2019-07-30 | End: 2019-08-16 | Stop reason: HOSPADM

## 2019-07-30 NOTE — PROGRESS NOTES
HPI:  Karli Hameed is a 87 y.o. female with history of low rectal cancer     SPECIMEN  1) Biopsy of anorectal lesion, forceps.  FINAL PATHOLOGIC DIAGNOSIS  BIOPSY OF RECTAL MASS SHOWS INVASIVE COLONIC ADENOCARCINOMA. RESULTS OF  IMMUNOSTAINS PENDING WITH ADDITIONAL REPORT TO FOLLOW.  Diagnosed by: Bruce Coley M.D.  (Electronically Signed: 2019-06-25 15:43:20)    CT scan:   In this patient with reported history of rectal cancer, noncontrast CT demonstrates nonspecific soft tissue thickening in the rectum.  These findings can be correlated with colonoscopy findings.  Two subcentimeter soft tissue nodules in the right lung as above.  Can continue follow-up with future scans according to surveillance plan.  Multiple stable findings in the kidney including right pelvic kidney with large simple renal cysts as well as left superior pole complex cyst.  Significant diverticulosis without evidence of diverticulitis.    Rectal MRI:   Limited examination secondary to motion and presence of pelvic prolapse.  Rectal mass just above the level of the anal verge, as described above.  No definite transmural extension.    Interval history:   Patient doing well, adequate levels of energy   Normal apettite  Asymptomatic, no rectal bleeding, no abdominal pain.       Past Medical History:   Diagnosis Date    Allergy     Anxiety     Arthritis     Back pain     Basal cell carcinoma 6/2011    R nasal ala, excised via Mohs    Chronic kidney disease, stage II (mild) 8/25/2012    cyst since 1975    Flank pain 8/21/2012    HTN (hypertension) 8/21/2012    Inflammatory bowel disease     Joint pain     Kidney stone     left     Lactose disaccharidase deficiency     Rectal cancer 7/24/2019    Senile osteoporosis 4/11/2016    Small bowel obstruction, 06-, resolved witjhout surgery 7/1/2014    Trace cataracts     Ulcer     hx    Urinary tract infection         Past Surgical History:   Procedure Laterality Date     ABDOMINAL SURGERY      APPENDECTOMY      CATARACT EXTRACTION W/  INTRAOCULAR LENS IMPLANT Right 02/12/2015    Dr. Sahni    CATARACT EXTRACTION W/  INTRAOCULAR LENS IMPLANT Left 04/09/2015    CHOLECYSTECTOMY      COLONOSCOPY N/A 6/19/2019    Performed by [facility] at [facility] (4TH FLR)    EYE SURGERY      HERNIA REPAIR  2017    Open rih with mesh    HYSTERECTOMY      INSERTION-INTRAOCULAR LENS (IOL) Left 4/9/2015    [facility]formed [facility] at [facility] OR 1ST FLR    INSERTION-INTRAOCULAR LENS (IOL) Right 2/12/2015    [facility] at [facility] 1ST FLR    KIDNEY SURGERY      drained cyst x 2    lysis of abdominal adhesions      OOPHORECTOMY      PHACOEMULSIFICATION-ASPIRATION-CATARACT Left 4/9/2015    [facility] 1ST FLR    PHACOEMULSIFICATION-ASPIRATION-CATARACT Right 2/12/2015    [facility] at [facility] 1ST FLR    REPAIR-HERNIA-INGUINAL-INITIAL-RIGHT-OPEN w/ mesh Right 7/19/2017    Performed by Lamont Justice [facility] at [facility] OR 2ND FLR    TONSILLECTOMY, ADENOIDECTOMY         Review of patient's allergies indicates:   Allergen Reactions    Advil [ibuprofen] Nausea Only    Parafon forte      Other reaction(s): Hallucinations    Calcitriol (bulk) Nausea Only    Lisinopril      cough       Family History   Problem Relation Age of Onset    Cancer Father     Cataracts Father     Kidney disease Father     Cancer Paternal Grandmother     No Known Problems Mother     No Known Problems Sister     No Known Problems Brother     No Known Problems Maternal Aunt     No Known Problems Maternal Uncle     No Known Problems Paternal Aunt     No Known Problems Paternal Uncle     No Known Problems Maternal Grandmother     No Known Problems Maternal Grandfather     No Known Problems Paternal Grandfather     Cirrhosis Neg Hx     Celiac disease Neg Hx     Colon polyps Neg Hx     Crohn's disease Neg Hx      Esophageal cancer Neg Hx     Inflammatory bowel disease Neg Hx     Liver cancer Neg Hx     Liver disease Neg Hx     Rectal cancer Neg Hx     Stomach cancer Neg Hx     Ulcerative colitis Neg Hx     Amblyopia Neg Hx     Blindness Neg Hx     Diabetes Neg Hx     Glaucoma Neg Hx     Hypertension Neg Hx     Macular degeneration Neg Hx     Retinal detachment Neg Hx     Strabismus Neg Hx     Stroke Neg Hx     Thyroid disease Neg Hx     Melanoma Neg Hx        Social History     Socioeconomic History    Marital status: Single     Spouse name: Not on file    Number of children: Not on file    Years of education: Not on file    Highest education level: Not on file   Occupational History    Not on file   Social Needs    Financial resource strain: Not on file    Food insecurity:     Worry: Not on file     Inability: Not on file    Transportation needs:     Medical: Not on file     Non-medical: Not on file   Tobacco Use    Smoking status: Never Smoker    Smokeless tobacco: Never Used   Substance and Sexual Activity    Alcohol use: Yes     Comment: social    Drug use: No    Sexual activity: Never   Lifestyle    Physical activity:     Days per week: Not on file     Minutes per session: Not on file    Stress: Not on file   Relationships    Social connections:     Talks on phone: Not on file     Gets together: Not on file     Attends [employer] service: Not on file     Active member of club or organization: Not on file     Attends meetings of clubs or organizations: Not on file     Relationship status: Not on file   Other Topics Concern    Are you pregnant or think you may be? No    Breast-feeding No   Social History Narrative    Lives alone.    Great neighbors.    Many friends.    Walks the neighborhood daily with her dog.    Active social life.    Has her own home, takes care of everything and all of her affairs.        ROS:  GENERAL: No fever, chills, fatigability or weight loss.  Integument: No  rashes, redness, icterus  CHEST: Denies DOMINGUEZ, cyanosis, wheezing, cough and sputum production.  CARDIOVASCULAR: Denies chest pain, PND, orthopnea or reduced exercise tolerance.  GI: Denies abd pain, dysphagia, nausea, vomiting, no hematemesis   : Denies burning on urination, no hematuria, no bacteriuria  MSK: No deformities, swelling, joint pain swelling  Neurologic: No HAs, seizures, weakness, paresthesias, gait problems    PE:  General appearance healthy  There were no vitals taken for this visit.  Sclera/ Skin nonicteric  LN nonpalpable  AT NC EOMI  Neck supple trachea midline   Chest symmetric, nl excursion, no retractions, breathing comfortably  Abdomen  ND soft NT.  no masses, no organomegaly  EXT - no CCE  Neuro:  Mood/ affect nl, alert and oriented x 3, moves all ext's, gait nl      Assessment:  Rectal mass, invasive adenocarcinoma   No metastatic disease.  Functional class ECOG 0    Plan:  Lengthy discussion with the patient regarding with the recommendations of the MD T conference.  The group felt that the patient should undergo abdominoperineal excision.  The patient is very hesitant undergo major resection given her age and comorbid conditions and for fear of her loss of independent status.  She is agreeable to undergo transanal excision.  I told her that this based on preoperative imaging indicating a T2 lesion would leave her with an increased risk of local recurrence.  She understands that we may need to combined this with postoperative radiotherapy if in fact she has a high risk lesion.  She understands that if local recurrence does occur that curative resection may not be achievable at that point.  She needs to be agreeable to close surveillance and she understands this.     1.  Transanal excision   2.  Bowel prep + Antibiotics   3.  Labs and pre op anesthesia evaluation

## 2019-07-30 NOTE — PROGRESS NOTES
"Referring Physician:Katherine Hutchinson MD     Reason for visit:  Chief Complaint   Patient presents with    Weight Loss    Chronic Kidney Disease    Rectal Cancer    Nutrition Counseling      Initial Visit    :1931     Allergies Reviewed  Meds Reviewed    Anthropometrics  Weight:49.4 kg (108 lb 14.5 oz)  Height:5' 3.6" (1.615 m)  BMI:Body mass index is 18.93 kg/m².   IBW:   54.5 kg  +/-10%    Meds:  Outpatient Medications Prior to Visit   Medication Sig Dispense Refill    acetaminophen (TYLENOL) 325 MG tablet Take by mouth. 1 Tablet Oral .  Tylenol.To take as needed for arthritis pain.      amLODIPine (NORVASC) 5 MG tablet Take 1 tablet (5 mg total) by mouth every morning. 90 tablet 3    cholecalciferol, vitamin D3, 1,000 unit capsule Take 2 capsules (2,000 Units total) by mouth once daily.  0    losartan (COZAAR) 50 MG tablet Take 1 tablet (50 mg total) by mouth once daily. 90 tablet 3    varicella-zoster gE-AS01B, PF, (SHINGRIX, PF,) 50 mcg/0.5 mL injection Inject into the muscle. 0.5 mL 0     No facility-administered medications prior to visit.        Food/Drug Interactions Noted:  n/a    Vitamins/Supplements/Herbs:  Vit D    Labs:   3/29/19  Alb  3.9   eGFR  10.4   Phos  5.2   K+  4.4   Cr  3.7     Nutrition Prescription:   1635 Kcals/day( 30 kcal/kg IBW),  44 g protein( 0.8 g/kg IBW)     Support System:    Pt lives alone; does all of her own grocery shopping and meal preparation.    Diet Hx:   Pt with newly dx rectal cancer; long history of CKD (which pt states has been stable for many years).  Pt states she eats 2 avocados in a week; this week has eaten 3 mangos, 4 bananas (she buys small ones because of potassium content), 2 lbs cherries, 3 peaches, 1 apple.  Pt is aware of renal diet; states she has only been advised to not drink commercial po supplements like Ensure because of protein content.  Is going to ask about use of Suplena when she next sees nephrology.    Breakfast:   At 5:30 am " "she drinks a cup of coffee with powdered creamer, after which she walks her dog then does one hour of exercise.  At 8:00 am eats bowl of oatmeal or Special K cereal with Lactaid 2% milk and 20 raisins, 1/2 very small banana, 1 TBSP crushed walnuts.    Goes to [employer] at 10:30.  After [employer], has a small piece of low fat cheese and a piece of fruit.  Lunch:   1/2 ham or turkey sandwich on white bread or leftover cup of red beans.  Water.  Dinner:   States she eats a "good" dinner:  Last night cooked veal cutlets with onion, carrots, bell pepper, shallots.  Would fix mashed or baked potato or rice.  Water.  HS snack:  If her stomach feels empty, may eat a little oatmeal with milk.    Current activity level and/or physical limitations:    Pt is very active and has no physical limitations at this time.    Motivation to make changes/anticipated barriers and/or expected adherence:    Continued diet adherence expected.    Nutrition-Focus Physical Findings:  Pt appears very well nourished, cheerful and positive      Assessment:   Pt is determined to maintain nutritional status in anticipation of starting chemo for rectal cancer in the near future.  We discussed renal diet foods/beverages recommended & to avoid; balancing need for adequate calorie intake with renal diet restrictions; ways to increase calorie intake with low phosphorous/low potassium foods; use of Suplena po supplement (425 calories, 10.6 gm protein, 3 gm dietary fiber per 8 fl oz).  All questions answered, and pt verbalized understanding of information.    Nutrition Diagnosis:   Food- and nutrition-related knowledge deficit RT lack of prior exposure to information AEB dx unintentional weight loss/CKD/ new dx rectal cancer      Recommendations:  Low phosphorous diet.   Suplena oral supplement BID, to provide additional 900 calories/day.  Handouts provided & reviewed:   Eating Right for Kidney Health:  Tips for People with Chronic Kidney Disease (CKD), Sodium, " Protein, Phosphorus, Potassium, How to Read a Food Label,  What is Phosphorous; Milk Alternatives to patients on renal diet; nutrition info for Suplena - [employer] po supplement for renal patients not on dialysis      Strategies Implemented:    Suplena BID    Consultation Time:45 minutes.  Communicated with referring healthcare provider:  Consult note available in pt's Epic chart per MD discretion  Follow Up:  Pt provided with dietitian contact number and advised to call with questions or make future appointment if further intervention needed.

## 2019-07-30 NOTE — H&P (VIEW-ONLY)
HPI:  Karli Hameed is a 87 y.o. female with history of low rectal cancer     SPECIMEN  1) Biopsy of anorectal lesion, forceps.  FINAL PATHOLOGIC DIAGNOSIS  BIOPSY OF RECTAL MASS SHOWS INVASIVE COLONIC ADENOCARCINOMA. RESULTS OF  IMMUNOSTAINS PENDING WITH ADDITIONAL REPORT TO FOLLOW.  Diagnosed by: Bruce Coley M.D.  (Electronically Signed: 2019-06-25 15:43:20)    CT scan:   In this patient with reported history of rectal cancer, noncontrast CT demonstrates nonspecific soft tissue thickening in the rectum.  These findings can be correlated with colonoscopy findings.  Two subcentimeter soft tissue nodules in the right lung as above.  Can continue follow-up with future scans according to surveillance plan.  Multiple stable findings in the kidney including right pelvic kidney with large simple renal cysts as well as left superior pole complex cyst.  Significant diverticulosis without evidence of diverticulitis.    Rectal MRI:   Limited examination secondary to motion and presence of pelvic prolapse.  Rectal mass just above the level of the anal verge, as described above.  No definite transmural extension.    Interval history:   Patient doing well, adequate levels of energy   Normal apettite  Asymptomatic, no rectal bleeding, no abdominal pain.       Past Medical History:   Diagnosis Date    Allergy     Anxiety     Arthritis     Back pain     Basal cell carcinoma 6/2011    R nasal ala, excised via Mohs    Chronic kidney disease, stage II (mild) 8/25/2012    cyst since 1975    Flank pain 8/21/2012    HTN (hypertension) 8/21/2012    Inflammatory bowel disease     Joint pain     Kidney stone     left     Lactose disaccharidase deficiency     Rectal cancer 7/24/2019    Senile osteoporosis 4/11/2016    Small bowel obstruction, 06-, resolved witjhout surgery 7/1/2014    Trace cataracts     Ulcer     hx    Urinary tract infection         Past Surgical History:   Procedure Laterality Date     ABDOMINAL SURGERY      APPENDECTOMY      CATARACT EXTRACTION W/  INTRAOCULAR LENS IMPLANT Right 02/12/2015    Dr. Sahni    CATARACT EXTRACTION W/  INTRAOCULAR LENS IMPLANT Left 04/09/2015    CHOLECYSTECTOMY      COLONOSCOPY N/A 6/19/2019    Performed by [facility]o Rivera [facility] at [facility] (4TH FLR)    EYE SURGERY      HERNIA REPAIR  2017    Open rih with mesh    HYSTERECTOMY      INSERTION-INTRAOCULAR LENS (IOL) Left 4/9/2015    [facility] OR 1ST FLR    INSERTION-INTRAOCULAR LENS (IOL) Right 2/12/2015    [facility] 1ST FLR    KIDNEY SURGERY      drained cyst x 2    lysis of abdominal adhesions      OOPHORECTOMY      PHACOEMULSIFICATION-ASPIRATION-CATARACT Left 4/9/2015    [facility]ST FLR    PHACOEMULSIFICATION-ASPIRATION-CATARACT Right 2/12/2015    [facility]ST [facility]LR    REPAIR-HERNIA-INGUINAL-INITIAL-RIGHT-OPEN w/ mesh Right 7/19/2017    [facility] by Lamont Justice [facility] OR 2ND FLR    TONSILLECTOMY, ADENOIDECTOMY         Review of patient's allergies indicates:   Allergen Reactions    Advil [ibuprofen] Nausea Only    Parafon forte      Other reaction(s): Hallucinations    Calcitriol (bulk) Nausea Only    Lisinopril      cough       Family History   Problem Relation Age of Onset    Cancer Father     Cataracts Father     Kidney disease Father     Cancer Paternal Grandmother     No Known Problems Mother     No Known Problems Sister     No Known Problems Brother     No Known Problems Maternal Aunt     No Known Problems Maternal Uncle     No Known Problems Paternal Aunt     No Known Problems Paternal Uncle     No Known Problems Maternal Grandmother     No Known Problems Maternal Grandfather     No Known Problems Paternal Grandfather     Cirrhosis Neg Hx     Celiac disease Neg Hx     Colon polyps Neg Hx     Crohn's disease Neg Hx      Esophageal cancer Neg Hx     Inflammatory bowel disease Neg Hx     Liver cancer Neg Hx     Liver disease Neg Hx     Rectal cancer Neg Hx     Stomach cancer Neg Hx     Ulcerative colitis Neg Hx     Amblyopia Neg Hx     Blindness Neg Hx     Diabetes Neg Hx     Glaucoma Neg Hx     Hypertension Neg Hx     Macular degeneration Neg Hx     Retinal detachment Neg Hx     Strabismus Neg Hx     Stroke Neg Hx     Thyroid disease Neg Hx     Melanoma Neg Hx        Social History     Socioeconomic History    Marital status: Single     Spouse name: Not on file    Number of children: Not on file    Years of education: Not on file    Highest education level: Not on file   Occupational History    Not on file   Social Needs    Financial resource strain: Not on file    Food insecurity:     Worry: Not on file     Inability: Not on file    Transportation needs:     Medical: Not on file     Non-medical: Not on file   Tobacco Use    Smoking status: Never Smoker    Smokeless tobacco: Never Used   Substance and Sexual Activity    Alcohol use: Yes     Comment: social    Drug use: No    Sexual activity: Never   Lifestyle    Physical activity:     Days per week: Not on file     Minutes per session: Not on file    Stress: Not on file   Relationships    Social connections:     Talks on phone: Not on file     Gets together: Not on file     Attends Congregational service: Not on file     Active member of club or organization: Not on file     Attends meetings of clubs or organizations: Not on file     Relationship status: Not on file   Other Topics Concern    Are you pregnant or think you may be? No    Breast-feeding No   Social History Narrative    Lives alone.    Great neighbors.    Many friends.    Walks the neighborhood daily with her dog.    Active social life.    Has her own home, takes care of everything and all of her affairs.        ROS:  GENERAL: No fever, chills, fatigability or weight loss.  Integument: No  rashes, redness, icterus  CHEST: Denies DOMINGUEZ, cyanosis, wheezing, cough and sputum production.  CARDIOVASCULAR: Denies chest pain, PND, orthopnea or reduced exercise tolerance.  GI: Denies abd pain, dysphagia, nausea, vomiting, no hematemesis   : Denies burning on urination, no hematuria, no bacteriuria  MSK: No deformities, swelling, joint pain swelling  Neurologic: No HAs, seizures, weakness, paresthesias, gait problems    PE:  General appearance healthy  There were no vitals taken for this visit.  Sclera/ Skin nonicteric  LN nonpalpable  AT NC EOMI  Neck supple trachea midline   Chest symmetric, nl excursion, no retractions, breathing comfortably  Abdomen  ND soft NT.  no masses, no organomegaly  EXT - no CCE  Neuro:  Mood/ affect nl, alert and oriented x 3, moves all ext's, gait nl      Assessment:  Rectal mass, invasive adenocarcinoma   No metastatic disease.  Functional class ECOG 0    Plan:  Lengthy discussion with the patient regarding with the recommendations of the MD T conference.  The group felt that the patient should undergo abdominoperineal excision.  The patient is very hesitant undergo major resection given her age and comorbid conditions and for fear of her loss of independent status.  She is agreeable to undergo transanal excision.  I told her that this based on preoperative imaging indicating a T2 lesion would leave her with an increased risk of local recurrence.  She understands that we may need to combined this with postoperative radiotherapy if in fact she has a high risk lesion.  She understands that if local recurrence does occur that curative resection may not be achievable at that point.  She needs to be agreeable to close surveillance and she understands this.     1.  Transanal excision   2.  Bowel prep + Antibiotics   3.  Labs and pre op anesthesia evaluation

## 2019-07-31 ENCOUNTER — TELEPHONE (OUTPATIENT)
Dept: PREADMISSION TESTING | Facility: HOSPITAL | Age: 84
End: 2019-07-31

## 2019-07-31 ENCOUNTER — PATIENT MESSAGE (OUTPATIENT)
Dept: SURGERY | Facility: HOSPITAL | Age: 84
End: 2019-07-31

## 2019-07-31 NOTE — TELEPHONE ENCOUNTER
----- Message from Rita Zazueta RN sent at 7/30/2019  3:01 PM CDT -----  Please evaluate-DOS 8/15.

## 2019-08-01 ENCOUNTER — TELEPHONE (OUTPATIENT)
Dept: PREADMISSION TESTING | Facility: HOSPITAL | Age: 84
End: 2019-08-01

## 2019-08-01 ENCOUNTER — OFFICE VISIT (OUTPATIENT)
Dept: NEPHROLOGY | Facility: CLINIC | Age: 84
End: 2019-08-01
Payer: MEDICARE

## 2019-08-01 ENCOUNTER — HOSPITAL ENCOUNTER (OUTPATIENT)
Dept: CARDIOLOGY | Facility: CLINIC | Age: 84
Discharge: HOME | End: 2019-08-01
Payer: MEDICARE

## 2019-08-01 VITALS
BODY MASS INDEX: 18.78 KG/M2 | DIASTOLIC BLOOD PRESSURE: 80 MMHG | SYSTOLIC BLOOD PRESSURE: 132 MMHG | HEART RATE: 98 BPM | WEIGHT: 110 LBS | HEIGHT: 64 IN | OXYGEN SATURATION: 100 %

## 2019-08-01 DIAGNOSIS — Z01.818 PRE-OP TESTING: ICD-10-CM

## 2019-08-01 DIAGNOSIS — N18.4 CHRONIC KIDNEY DISEASE, STAGE IV (SEVERE): Primary | ICD-10-CM

## 2019-08-01 DIAGNOSIS — N39.0 UTI (URINARY TRACT INFECTION), UNCOMPLICATED: ICD-10-CM

## 2019-08-01 PROCEDURE — 99214 PR OFFICE/OUTPT VISIT, EST, LEVL IV, 30-39 MIN: ICD-10-PCS | Mod: HCNC,S$GLB,, | Performed by: INTERNAL MEDICINE

## 2019-08-01 PROCEDURE — 93010 EKG 12-LEAD: ICD-10-PCS | Mod: HCNC,S$GLB,, | Performed by: INTERNAL MEDICINE

## 2019-08-01 PROCEDURE — 99999 PR PBB SHADOW E&M-EST. PATIENT-LVL III: CPT | Mod: PBBFAC,HCNC,, | Performed by: INTERNAL MEDICINE

## 2019-08-01 PROCEDURE — 93005 ELECTROCARDIOGRAM TRACING: CPT | Mod: HCNC,S$GLB,, | Performed by: COLON & RECTAL SURGERY

## 2019-08-01 PROCEDURE — 93010 ELECTROCARDIOGRAM REPORT: CPT | Mod: HCNC,S$GLB,, | Performed by: INTERNAL MEDICINE

## 2019-08-01 PROCEDURE — 99214 OFFICE O/P EST MOD 30 MIN: CPT | Mod: HCNC,S$GLB,, | Performed by: INTERNAL MEDICINE

## 2019-08-01 PROCEDURE — 1101F PR PT FALLS ASSESS DOC 0-1 FALLS W/OUT INJ PAST YR: ICD-10-PCS | Mod: HCNC,CPTII,S$GLB, | Performed by: INTERNAL MEDICINE

## 2019-08-01 PROCEDURE — 1101F PT FALLS ASSESS-DOCD LE1/YR: CPT | Mod: HCNC,CPTII,S$GLB, | Performed by: INTERNAL MEDICINE

## 2019-08-01 PROCEDURE — 99999 PR PBB SHADOW E&M-EST. PATIENT-LVL III: ICD-10-PCS | Mod: PBBFAC,HCNC,, | Performed by: INTERNAL MEDICINE

## 2019-08-01 PROCEDURE — 93005 EKG 12-LEAD: ICD-10-PCS | Mod: HCNC,S$GLB,, | Performed by: COLON & RECTAL SURGERY

## 2019-08-02 ENCOUNTER — HOSPITAL ENCOUNTER (OUTPATIENT)
Dept: PREADMISSION TESTING | Facility: HOSPITAL | Age: 84
Discharge: HOME | End: 2019-08-02
Attending: ANESTHESIOLOGY
Payer: MEDICARE

## 2019-08-02 ENCOUNTER — PATIENT MESSAGE (OUTPATIENT)
Dept: SURGERY | Facility: HOSPITAL | Age: 84
End: 2019-08-02

## 2019-08-02 ENCOUNTER — ANESTHESIA EVENT (OUTPATIENT)
Dept: SURGERY | Facility: HOSPITAL | Age: 84
End: 2019-08-02
Payer: MEDICARE

## 2019-08-02 VITALS
HEART RATE: 102 BPM | SYSTOLIC BLOOD PRESSURE: 136 MMHG | RESPIRATION RATE: 16 BRPM | WEIGHT: 110 LBS | DIASTOLIC BLOOD PRESSURE: 76 MMHG | BODY MASS INDEX: 18.78 KG/M2 | TEMPERATURE: 99 F | HEIGHT: 64 IN | OXYGEN SATURATION: 100 %

## 2019-08-02 RX ORDER — METRONIDAZOLE 500 MG/1
TABLET ORAL
Qty: 3 TABLET | Refills: 0 | Status: ON HOLD | OUTPATIENT
Start: 2019-08-02 | End: 2019-08-16 | Stop reason: HOSPADM

## 2019-08-02 RX ORDER — NEOMYCIN SULFATE 500 MG/1
TABLET ORAL
Qty: 6 TABLET | Refills: 0 | Status: ON HOLD | OUTPATIENT
Start: 2019-08-02 | End: 2019-08-16 | Stop reason: HOSPADM

## 2019-08-02 NOTE — ANESTHESIA PREPROCEDURE EVALUATION
[facility]  Anesthesia Pre-Operative Evaluation         Patient Name: Karli Hameed  YOB: 1931  MRN: [id number]    SUBJECTIVE:     Pre-operative evaluation for Procedure(s) (LRB):  EXCISION, LESION, RECTUM, ANAL APPROACH (N/A)     08/14/2019    Karli Hameed is a 87 y.o. female w/ a significant PMHx of HTN, polycystic kidney disease /CKD 5 w creatinine baseline 4.3, and K 4. 7; secondary  Hyperparathyroidism, Severe Neck pain and stiffness, Rectal cancer.     Patient now presents for the above procedure(s)--trans anal excision of an invasive adenocarcinoma of the rectum.  .      LDA: None documented       Prev airway:   Airway (Primary) Present Prior to Hospital Arrival?: No; Placement Date: 07/19/17; Placement Time: 0807; Method of Intubation: Direct laryngoscopy; Inserted by: Anesthesia Resident; Airway Device: Endotracheal Tube; Mask Ventilation: Easy; Intubated: Postinduction; Blade: Brady #2; Airway Device Size: 7.0; Style: Cuffed; Cuff Inflation: Minimal occlusive pressure; Placement Verified By: Auscultation, ETT Condensation, Capnometry; Grade: Grade I; Complicating Factors: None; Intubation Findings: Positive EtCO2, Bilateral breath sounds, Atraumatic/Condition of teeth unchanged;  Depth of Insertion: 22; Securment: Teeth; Complications: None; Breath Sounds: Equal Bilateral; Insertion Attempts: 1; Removal Date: 07/19/17;  Removal Time: 0903         Drips: None documented.      Patient Active Problem List   Diagnosis    Benign paroxysmal positional vertigo    Nipple problem, left only    Ischemic optic neuropathy - Left Eye    GERD (gastroesophageal reflux disease)    History of skin cancer    Hyperuricemia     Anemia    Arthritis of both knees    Osteoarthritis of right knee    Osteoarthritis of hands, bilateral    Hyperparathyroidism    Closed fracture of left radius and ulna, displaced  10-    Chondrocalcinosis articularis    Shoulder impingement syndrome    Adhesive capsulitis of both shoulders    Aortic atherosclerosis    Thrombocytopenia    Essential hypertension    Renal cysts, congenital, bilateral    Encounter for screening mammogram for malignant neoplasm of breast    Osteopenia    Bilateral hand pain    Tortuous aorta    Ectatic aorta    Cramps, muscle, general    Secondary hyperparathyroidism of renal origin    Stage 5 chronic kidney disease not on chronic dialysis    Encounter for screening colonoscopy    BPPV (benign paroxysmal positional vertigo), bilateral    Rectal cancer    History of general anesthesia    Arrhythmia       Review of patient's allergies indicates:   Allergen Reactions    Advil [ibuprofen] Nausea Only    Parafon forte      Other reaction(s): Hallucinations    Calcitriol (bulk) Nausea Only    Lisinopril      cough       Current Inpatient Medications:      No current facility-administered medications on file prior to encounter.      Current Outpatient Medications on File Prior to Encounter   Medication Sig Dispense Refill    acetaminophen (TYLENOL) 325 MG tablet Take by mouth. 1 Tablet Oral .  Tylenol.To take as needed for arthritis pain.      amLODIPine (NORVASC) 5 MG tablet Take 1 tablet (5 mg total) by mouth every morning. 90 tablet 3    cholecalciferol, vitamin D3, 1,000 unit capsule Take 2 capsules (2,000 Units total) by mouth once daily. (Patient taking differently: Take 1,000 Units by mouth once daily. )  0    losartan (COZAAR) 50 MG tablet Take 1 tablet (50 mg total) by mouth once daily. 90 tablet 3    metroNIDAZOLE (FLAGYL) 500 MG tablet Take 1 tablet (500 mg total) by mouth As instructed (BEFORE PROCEDURE. 1pm take 1 tablet. 2pm take 1 tablet. 11pm take 1 tablet.). Before surgery 3 tablet 0    neomycin (MYCIFRADIN) 500 mg Tab Take 1 tablet (500 mg total) by mouth As instructed (BEFORE PROCEDURE 1pm take 2 tablets. 2pm take 2  tablets. 11pm take 2 tablets.). Before Surgery 6 tablet 0    varicella-zoster gE-AS01B, PF, (SHINGRIX, PF,) 50 mcg/0.5 mL injection Inject into the muscle. 0.5 mL 0       Past Surgical History:   Procedure Laterality Date    ABDOMINAL SURGERY      APPENDECTOMY      CATARACT EXTRACTION W/  INTRAOCULAR LENS IMPLANT Right 02/12/2015    Dr. Sahni    CATARACT EXTRACTION W/  INTRAOCULAR LENS IMPLANT Left 04/09/2015    CHOLECYSTECTOMY      COLONOSCOPY N/A 6/19/2019    Performed by Beto Rivera [facility] at [facility] (4TH FLR)    EYE SURGERY      HERNIA REPAIR  2017    Open rih with mesh    HYSTERECTOMY      INSERTION-INTRAOCULAR LENS (IOL) Left 4/9/2015    Performed by Vidya Sahni MD at [facility] OR 1ST FLR    INSERTION-INTRAOCULAR LENS (IOL) Right 2/12/2015    Performed by [facility]ni MD at [facility] 1ST FLR    KIDNEY SURGERY      drained cyst x 2    lysis of abdominal adhesions      OOPHORECTOMY      PHACOEMULSIFICATION-ASPIRATION-CATARACT Left 4/9/2015    Performed by [facility]ya [facility]ni [facility] at [facility] 1ST FLR    PHACOEMULSIFICATION-ASPIRATION-CATARACT Right 2/12/2015    Performed by [facility]ni MD at [facility] 1ST FLR    REPAIR-HERNIA-INGUINAL-INITIAL-RIGHT-OPEN w/ mesh Right 7/19/2017    Performed by Lamont Justice MD at [facility] OR 2ND FLR    TONSILLECTOMY, ADENOIDECTOMY         Social History     Socioeconomic History    Marital status: Single     Spouse name: Not on file    Number of children: Not on file    Years of education: Not on file    Highest education level: Not on file   Occupational History    Not on file   Social Needs    Financial resource strain: Not on file    Food insecurity:     Worry: Not on file     Inability: Not on file    Transportation needs:     Medical: Not on file     Non-medical: Not on file   Tobacco Use    Smoking status: Never Smoker    Smokeless tobacco: Never Used   Substance and Sexual Activity    Alcohol use: Yes     Comment: social    Drug  use: No    Sexual activity: Never   Lifestyle    Physical activity:     Days per week: Not on file     Minutes per session: Not on file    Stress: Not on file   Relationships    Social connections:     Talks on phone: Not on file     Gets together: Not on file     Attends Gnosticism service: Not on file     Active member of club or organization: Not on file     Attends meetings of clubs or organizations: Not on file     Relationship status: Not on file   Other Topics Concern    Are you pregnant or think you may be? No    Breast-feeding No   Social History Narrative    Lives alone.    Great neighbors.    Many friends.    Walks the neighborhood daily with her dog.    Active social life.    Has her own home, takes care of everything and all of her affairs.     From [facility] , came to  1969    2 level house          Stays active     Walks every day , does home exercises     Takes stairs at the house            OBJECTIVE:     Vital Signs Range (Last 24H):         Significant Labs:  Lab Results   Component Value Date    WBC 5.52 08/01/2019    HGB 10.2 (L) 08/01/2019    HCT 33.1 (L) 08/01/2019     (L) 08/01/2019    CHOL 246 (H) 08/30/2016    TRIG 117 08/30/2016    HDL 58 08/30/2016    ALT 12 08/01/2019    AST 21 08/01/2019     08/01/2019    K 4.7 08/01/2019     08/01/2019    CREATININE 4.3 (H) 08/01/2019    BUN 68 (H) 08/01/2019    CO2 20 (L) 08/01/2019    TSH 2.170 06/22/2018    INR 1.0 11/24/2009       Diagnostic Studies:     CT Chest Abdoment Pelvis Without Contrast (XPD)   Order: [id number]   Status:  Final result   Visible to patient:  Yes (Patient Portal) Next appt:  11/05/2019 at 08:15 AM in Infectious Diseases (EPO, INJECTION) Dx:  Rectal cancer   Details     Reading Physician Reading Date Result Priority   Brenda Martin MD 6/30/2019    Addi Pennington MD 6/30/2019       Narrative     EXAMINATION:  CT CHEST ABDOMEN PELVIS WITHOUT CONTRAST(XPD)    CLINICAL HISTORY:  rectal cancer; Malignant  neoplasm of rectum    TECHNIQUE:  Low dose axial images, sagittal and coronal reformations were obtained from the neck base to the pubic symphysis without the administration of oral or IV contrast.    COMPARISON:  CT abdomen pelvis 09/01/2016    FINDINGS:  Base of Neck: No significant abnormality. The thyroid is normal in size with no focal lesions.    No significant axillary lymphadenopathy.    CHEST:    -Heart: Normal size. No pericardial effusion.  Coronary artery and mitral annular calcification.    -Pulmonary vasculature: Pulmonary arteries distribute normally.  There are four pulmonary veins.    -Robyn/Mediastinum: Calcified subcarinal and right hilar lymph nodes, denoting old granulomatous disease.    -Trachea and Proximal airways: Patent.    -Lungs/Pleura: The lungs are symmetrically expanded and clear without evidence of consolidation, pleural effusion, or pneumothorax.  There is a 0.4 cm soft tissue nodule in right lower lung (axial series 2, image 79) as well as a 0.5 cm ground-glass nodule in the right lung (axial series 2, image 67).  Stable mild elevation of the left hemidiaphragm.    -Esophagus: Normal course and caliber with small amount of contrast visualized in the esophagus.  This can be due to decreased esophageal motility.    ABDOMEN:    - Liver: Normal in size and attenuation with no focal hepatic abnormality.  Punctate calcification in the liver favoring old granulomatous disease.    - Gallbladder: The gallbladder has been surgically removed.    - Bile Ducts: No intra or extrahepatic biliary ductal dilatation.    - Stomach/Duodenum: Unremarkable.    - Spleen: Unremarkable.    - Pancreas: Unremarkable.    - Adrenals: Unremarkable.    - Kidneys/ureters/urinary bladder: Right pelvic kidney with 3 large simple renal cysts with the largest measuring 6.1 cm (axial series 2, 275).  These cysts are unchanged compared to CT abdomen pelvis 09/01/2016.  The left kidney is normal in position and size with  multiple simple renal cysts, as well as a stable complex hypodensity at the left inferior renal pole measuring 3.1 x 2.6 cm (axial series 2, image 144) with peripheral and septated calcifications, previously characterized as a complex cyst on ultrasound.  No nephrolithiasis or hydro nephrosis.  The ureters are normal in course and caliber.    - Retroperitoneum: No significant adenopathy.    PELVIS:    - Reproductive: The uterus has been surgically removed.    - Other: No pelvic adenopathy, free fluid, or mass.    BOWEL/MESENTERY:    No evidence of bowel obstruction or inflammatory process. Significant diverticulosis without evidence of diverticulitis.  Generalized soft tissue thickening at the rectum, better evaluated on colonoscopy.    VASCULATURE: Left-sided aortic arch with 3 branch vessels.  Mild ectasia of the descending aorta.  Moderate atherosclerosis of the visualized aorta and its branches.    BONES: No acute fracture or bony destructive process. Age-appropriate degenerative changes as well as stable grade 1 anterolisthesis of L4 on L5.    EXTRATHORACIC/EXTRAPERITONEAL SOFT TISSUES: Unremarkable.      Impression       In this patient with reported history of rectal cancer, noncontrast CT demonstrates nonspecific soft tissue thickening in the rectum.  These findings can be correlated with colonoscopy findings.    Two subcentimeter soft tissue nodules in the right lung as above.  Can continue follow-up with future scans according to surveillance plan.    Multiple stable findings in the kidney including right pelvic kidney with large simple renal cysts as well as left superior pole complex cyst.    Significant diverticulosis without evidence of diverticulitis.    Other findings as above.    This report was flagged in Epic as abnormal.    Electronically signed by resident: Addi Pennington  Date: 06/29/2019  Time: 12:07    Electronically signed by: Brenda Martin MD  Date: 06/30/2019  Time: 15:03               EKG:    Results for orders placed or performed during the hospital encounter of 08/08/19   SCHEDULED EKG 12-LEAD (to Muse)    Collection Time: 08/08/19  4:04 PM    Narrative    Test Reason : I49.9,    Vent. Rate : 086 BPM     Atrial Rate : 086 BPM     P-R Int : 160 ms          QRS Dur : 078 ms      QT Int : 382 ms       P-R-T Axes : 085 006 058 degrees     QTc Int : 457 ms    Sinus rhythm with Premature atrial complexes  Otherwise normal ECG  When compared with ECG of 01-AUG-2019 09:53,  No significant change was found  Confirmed by Elkin Ramires MD ([id number]) on 8/9/2019 10:48:17 AM    Referred By: LOTUS STANFORD           Confirmed By:Elkin Ramires MD       2D ECHO:  TTE:    2D ECHO ONLY   2D echo only   Order: [id number]   Status:  Final result   Visible to patient:  Yes (Patient Portal) Next appt:  11/05/2019 at 08:15 AM in Infectious Diseases (EPO, INJECTION) Dx:  Chest pain, unspecified      Narrative   Performed by: [facility]       TEST DESCRIPTION   The aortic root is normal in size, measuring 3.1 cm across. The proximal ascending aorta measures 3.6 cm across. The right atrium is normal in size, measuring 4.6 cm in the apical views. The left atrial volume index is normal, measuring 26.10 cc/m2. The   right ventricle is normal in size. The left ventricle is normal in size, with an end-diastolic diameter of 3.9 cm, and an end-systolic diameter of 2.9 cm. Wall thickness is normal, with the septum and the posterior wall each measuring 0.8 cm across.   Relative wall thickness was normal at 0.41, and the LV mass index was 61.1 g/m2 consistent with normal left ventricular mass.     Global right ventricular systolic function appears normal.   Regional and global left ventricular systolic function appears normal with a visually estimated ejection fraction of 55%. There is abnormal septal motion.   The E/e'(lat) is 16, consistent with diastolic dysfunction secondary to relaxation abnormality. There is normal systolic/diastolic flow in  "the pulmonary vein indicating normal left atrial pressures. The Doppler derived stroke volume equals 65.0 ccs.     The aortic valve is mildly sclerotic with normal leaflet mobility. The mitral valve is normal in structure with normal leaflet mobility. The tricuspid valve is normal in structure with normal leaflet mobility. The pulmonic valve is normal in structure   with normal leaflet mobility. Right atrial pressures as assessed by IVC dynamics are normal at 5-10 mmHg. There is no evidence of pericardial effusion, intracavity mass, thrombi, or vegetation. If clinically indicated, a full Doppler study can be   performed.         CONCLUSIONS     1 - Normal left ventricular function (EF 55%).     2 - Diastolic dysfunction.         This document has been electronically    SIGNED BY: Scarlett Patel M.D. On: 07/18/2012 15:59      Specimen Collected: 07/18/12 00:00 Last Resulted: 07/18/12 16:29                  DIANA:  No results found for this or any previous visit.    ASSESSMENT/PLAN:         Anesthesia Evaluation    I have reviewed the Patient Summary Reports.         Review of Systems  Anesthesia Hx:  No problems with previous Anesthesia  History of prior surgery of interest to airway management or planning: Previous anesthesia: General 6/19/2019: Colonoscopy with general anesthesia.  Procedure performed at an [facility] Facility. Denies Family Hx of Anesthesia complications.   Denies Personal Hx of Anesthesia complications.   Social:  Non-Smoker  Denies Tobacco Use. Denies Alcohol Use.   Hematology/Oncology:         -- Anemia: -- Thrombocytopenia:  Current/Recent Cancer.  --  Cancer in past history:  Oncology Comments: Hx BCC- right nasal ala     EENT/Dental:  EENT/Dental Normal Denies Throat Symptoms  Denies Jaw Problems   Cardiovascular:   Hypertension  Functional Capacity good / => 4 METS, daily: "Sit and Be Fit"; walks dog daily: denies CP/SOB  Denies Coronary Artery Disease.  Denies Deep Venous Thrombosis (DVT)  " Hypertension , Recent typical clinic B/P of 194/90 & 194/100 @ POC visit; manual 136/76    Pulmonary:  Pulmonary Normal  Denies Asthma.  Denies Chronic Obstructive Pulmonary Disease (COPD).  Possible Obstructive Sleep Apnea , (STOP/BANG) Symptoms P - Pressure being treated for high BP and A - Age > 50    Renal/:   Chronic Renal Disease, CRI renal calculi  Kidney Function/Disease, Chronic Kidney Disease (CKD) , CKD Stage V (ESRD) (GFR <15 or on Dialysis) , contributing etiologies of Polycystic Kidney Disease. Does not want Dialysis    Hepatic/GI:   GERD  Gi / Hepatic Symptoms: occasional.  Esophageal / Stomach Disorders Gerd  Denies Liver Disease    Musculoskeletal:  Musculoskeletal Normal  Joint Disease:  Arthritis, Osteoarthritis  Bone Disorders: Osteopenia    Neurological:  Neurology Normal  Neuro Symptoms of vertigo BPPVDenies Pain  Osteoarthritis  Denies Seizure Disorder  Denies CVA - Cerebrovasular Accident  Denies TIA - Transient Ischemic Attack    Endocrine:  Endocrine Normal  Denies Diabetes  Denies Thyroid Disease  Parathyroid Disease, Hyperparathyroidism    Dermatological:  Skin Normal    Psych:  Psychiatric Normal   Anxiety Disorder.          Physical Exam  General:  Well nourished    Airway/Jaw/Neck:  Airway Findings: Mouth Opening: Normal Tongue: Normal  General Airway Assessment: Adult  Mallampati: II  Improves to II with phonation.  TM Distance: Normal, at least 6 cm  Jaw/Neck Findings:  Neck ROM: Normal ROM      Dental:  Dental Findings: (upper perm. bridge) In tact   Chest/Lungs:  Chest/Lungs Findings: Clear to auscultation, Normal Respiratory Rate     Heart/Vascular:  Heart Findings: Rate: Normal  Rhythm: Regular Rhythm  Sounds: Normal  Vascular Findings: Normal       Mental Status:  Mental Status Findings:  Cooperative, Alert and Oriented       The patient was seen by Perioperative Internal Medicine physician Dr. Mosqueda on 8/9/19 , please see recommendations.    Anna Nunez,  RN          Anesthesia Plan  Type of Anesthesia, risks & benefits discussed:  Anesthesia Type:  general  Patient's Preference: General  Intra-op Monitoring Plan: standard ASA monitors  Intra-op Monitoring Plan Comments:   Post Op Pain Control Plan: multimodal analgesia and per primary service following discharge from PACU  Post Op Pain Control Plan Comments:   Induction:   IV  Beta Blocker:  Patient is not currently on a Beta-Blocker (No further documentation required).       Informed Consent: Patient understands risks and agrees with Anesthesia plan.  Questions answered. Anesthesia consent signed with patient.  ASA Score: 3     Day of Surgery Review of History & Physical: I have interviewed and examined the patient. I have reviewed the patient's H&P dated:  There are no significant changes.      Anesthesia Plan Notes: CKD4 - ensure adequate hydration with IVFs, would avoid any/all NSAIDs        Ready For Surgery From Anesthesia Perspective.

## 2019-08-02 NOTE — DISCHARGE INSTRUCTIONS
Your surgery has been scheduled for:__________________________________________    You should report to:  ____[facility], located on the [place] side of the first floor of the           [facility] ([phone])  ____[facility], located on the [facility] side of the            Second floor of the [facility] [phone])  ____3rd Floor [facility] located on the [facility] [address] side of the [facility] [phone]  Please Note   - Tell your doctor if you take Aspirin, products containing Aspirin, herbal medications  or blood thinners, such as Coumadin, Ticlid, or Plavix.  (Consult your provider regarding holding or stopping before surgery).  - Arrange for someone to drive you home following surgery.  You will not be allowed to leave the surgical facility alone or drive yourself home following sedation and anesthesia.  Before Surgery  - Stop taking all herbal medications 14days prior to surgery  - No Motrin/Advil (Ibuprofen) 7 days before surgery  - No Aleve (Naproxen) 7 days before surgery  - Stop Taking Asprin, products containing Asprin _____days before surgery  - Stop taking blood thinners_______days before surgery  - No Goody's/BC  Powder 7 days before surgery  - Refrain from drinking alcoholic beverages for 24hours before and after surgery  - Stop or limit smoking _________days before surgery  - You may take Tylenol for pain  Night before Surgery  Stop ALL solid food, gum, candy (including vitamins) 8 hours before arrival time.  (Please note: If your surgeon gives you different eating and drinking instructions, please follow surgeon's directions.)  Stop all CLOUDY liquids: coffee with creamer, formula, tube feeds, cloudy juices, non-human milk and breast milk with additives, 6 hours prior to arrival time.  Stop plain breast milk 4 hours prior to arrival time.  The patient should be ENCOURAGED to drink carbohydrate-rich  clear liquids (sports drinks, clear juices) until 2 hours prior to arrival time.  CLEAR liquids include only water, black coffee NO creamer, clear oral rehydration drinks, clear sports drinks or clear fruit juices (no orange juice, no pulpy juices, no apple cider). Advise patients if they can read newsprint through the liquid, it qualifies as clear liquid.   IF IN DOUBT, drink water instead.   - Take a shower or bath (shower is recommended).  Bathe with Hibiclens soap or an antibacterial soap from the neck down.  If not supplied by your surgeon, hibiclens soap will need to be purchased over the counter in pharmacy.  Rinse soap off thoroughly.  - Shampoo your hair with your regular shampoo  The Day of Surgery  · NOTHING TO  DRINK 2 hours before arrival time. If you are told to take medication on the morning of surgery, it may be taken with a sip of water.   - Take another bath or shower with hibiclens or any antibacterial soap, to reduce the chance of infection.  - Take heart and blood pressure medications with a small sip of water, as advised by the perioperative team.  - Do not take fluid pills  - You may brush your teeth and rinse your mouth, but do not swall any additional water.   - Do not apply perfumes, powder, body lotions or deodorant on the day of surgery.  - Nail polish should be removed.  - Do not wear makeup or moisturizer  - Wear comfortable clothes, such as a button front shirt and loose fitting pants.  - Leave all jewelry, including body piercings, and valuables at home.    - Bring any devices you will neeed after surgery such as crutches or canes.  - If you have sleep apnea, please bring your CPAP machine  In the event that your physical condition changes including the onset of a cold or respiratory illness, or if you have to delay or cancel your surgery, please notify your surgeon.  Anesthesia: General Anesthesia     You are watched continuously during your procedure by your anesthesia provider.      Youre due to have surgery. During surgery, youll be given medicine called anesthesia or anesthetic. This will keep you comfortable and pain-free. Your anesthesia provider will use general anesthesia.  What is general anesthesia?  General anesthesia puts you into a state like deep sleep. It goes into the bloodstream (IV anesthetics), into the lungs (gas anesthetics), or both. You feel nothing during the procedure. You will not remember it. During the procedure, the anesthesia provider monitors you continuously. He or she checks your heart rate and rhythm, blood pressure, breathing, and blood oxygen.  · IV anesthetics. IV anesthetics are given through an IV line in your arm. Theyre often given first. This is so you are asleep before a gas anesthetic is started. Some kinds of IV anesthetics relieve pain. Others relax you. Your doctor will decide which kind is best in your case.  · Gas anesthetics. Gas anesthetics are breathed into the lungs. They are often used to keep you asleep. They can be given through a facemask or a tube placed in your larynx or trachea (breathing tube).  ? If you have a facemask, your anesthesia provider will most likely place it over your nose and mouth while youre still awake. Youll breathe oxygen through the mask as your IV anesthetic is started. Gas anesthetic may be added through the mask.  ? If you have a tube in the larynx or trachea, it will be inserted into your throat after youre asleep.  Anesthesia tools and medicines  You will likely have:  · IV anesthetics. These are put into an IV line into your bloodstream.  · Gas anesthetics. You breathe these anesthetics into your lungs, where they pass into your bloodstream.  · Pulse oximeter. This is a small clip that is attached to the end of your finger. This measures your blood oxygen level.  · Electrocardiography leads (electrodes). These are small sticky pads that are placed on your chest. They record your heart rate and  rhythm.  · Blood pressure cuff. This reads your blood pressure.  Risks and possible complications  General anesthesia has some risks. These include:  · Breathing problems  · Nausea and vomiting  · Sore throat or hoarseness (usually temporary)  · Allergic reaction to the anesthetic  · Irregular heartbeat (rare)  · Cardiac arrest (rare)   Anesthesia safety  · Follow all instructions you are given for how long not to eat or drink before your procedure.  · Be sure your doctor knows what medicines and drugs you take. This includes over-the-counter medicines, herbs, supplements, alcohol or other drugs. You will be asked when those were last taken.  · Have an adult family member or friend drive you home after the procedure.  · For the first 24 hours after your surgery:  ? Do not drive or use heavy equipment.  ? Do not make important decisions or sign legal documents. If important decisions or signing legal documents is necessary during the first 24 hours after surgery, have a trusted family member or spouse act on your behalf.  ? Avoid alcohol.  ? Have a responsible adult stay with you. He or she can watch for problems and help keep you safe.  Date Last Reviewed: 12/1/2016  © 0967-8644 [employer]. [address] [place] [address], [place] [address]. All rights reserved. This information is not intended as a substitute for professional medical care. Always follow your healthcare professional's instructions

## 2019-08-04 ENCOUNTER — PATIENT MESSAGE (OUTPATIENT)
Dept: INTERNAL MEDICINE | Facility: CLINIC | Age: 84
End: 2019-08-04

## 2019-08-06 ENCOUNTER — DOCUMENTATION ONLY (OUTPATIENT)
Dept: REHABILITATION | Facility: HOSPITAL | Age: 84
End: 2019-08-06

## 2019-08-06 DIAGNOSIS — H81.13 BPPV (BENIGN PAROXYSMAL POSITIONAL VERTIGO), BILATERAL: ICD-10-CM

## 2019-08-06 NOTE — PROGRESS NOTES
OUTPATIENT PHYSICAL THERAPY DISCHARGE SUMMARY     Name: Karli Hameed  Clinic Number: [id number]    Diagnosis:   Encounter Diagnosis   Name Primary?    BPPV (benign paroxysmal positional vertigo), bilateral      Physician: Katherine Hutchinson MD  Treatment Orders: PT Eval and Treat  Past Medical History:   Diagnosis Date    Allergy     Anxiety     Arthritis     Back pain     Basal cell carcinoma 6/2011    R nasal ala, excised via Mohs    Chronic kidney disease, stage II (mild) 8/25/2012    cyst since 1975    Flank pain 8/21/2012    HTN (hypertension) 8/21/2012    Inflammatory bowel disease     Joint pain     Kidney stone     left     Lactose disaccharidase deficiency     Rectal cancer 7/24/2019    Senile osteoporosis 4/11/2016    Small bowel obstruction, 06-, resolved witjhout surgery 7/1/2014    Trace cataracts     Ulcer     hx    Urinary tract infection      Initial visit: 06/21/19  Date of Last visit: 07/18/19  Date of Discharge Note:  08/06/19  Total Visits Received: 5  Missed Visits: none  ASSESSMENT   Status Towards Goals Met:  Please refer to last follow up note on 07/18/19 for most update functional status.      Goals Not achieved and why: On patient's last visit on 07/18/19, she reports improved symptoms of vertigo with CRTs (-) bilaterally for both [place] Hallpike testes and both Side lying horizontal tests. PT issued patient HEP for smooth pursuits. Patient requested to make 1 follow up appointment 2 weeks from 07/18/19 in case symptoms return. Patient called on 08/06/19 to cancel her follow up appointment scheduled for 08/07/19 due to improved symptoms and due to preparing for upcoming surgery next week.     Discharge reason : Patient self discharge. Resolved episode of BPPV.     PLAN   This patient is discharged from [facility]patient [facility].     Angelina King, PT  08/06/2019

## 2019-08-08 ENCOUNTER — HOSPITAL ENCOUNTER (OUTPATIENT)
Dept: CARDIOLOGY | Facility: CLINIC | Age: 84
Discharge: HOME | End: 2019-08-08
Payer: MEDICARE

## 2019-08-08 ENCOUNTER — INITIAL CONSULT (OUTPATIENT)
Dept: INTERNAL MEDICINE | Facility: CLINIC | Age: 84
End: 2019-08-08
Payer: MEDICARE

## 2019-08-08 VITALS
OXYGEN SATURATION: 100 % | HEART RATE: 96 BPM | TEMPERATURE: 98 F | BODY MASS INDEX: 18.89 KG/M2 | WEIGHT: 110.63 LBS | DIASTOLIC BLOOD PRESSURE: 76 MMHG | SYSTOLIC BLOOD PRESSURE: 150 MMHG | HEIGHT: 64 IN

## 2019-08-08 DIAGNOSIS — C20 RECTAL CANCER: ICD-10-CM

## 2019-08-08 DIAGNOSIS — I10 ESSENTIAL HYPERTENSION: ICD-10-CM

## 2019-08-08 DIAGNOSIS — E21.3 HYPERPARATHYROIDISM: ICD-10-CM

## 2019-08-08 DIAGNOSIS — I49.9 CARDIAC ARRHYTHMIA, UNSPECIFIED CARDIAC ARRHYTHMIA TYPE: ICD-10-CM

## 2019-08-08 DIAGNOSIS — Z85.828 HISTORY OF SKIN CANCER: ICD-10-CM

## 2019-08-08 DIAGNOSIS — N18.5 STAGE 5 CHRONIC KIDNEY DISEASE NOT ON CHRONIC DIALYSIS: ICD-10-CM

## 2019-08-08 DIAGNOSIS — Z98.890 HISTORY OF GENERAL ANESTHESIA: ICD-10-CM

## 2019-08-08 DIAGNOSIS — D69.6 THROMBOCYTOPENIA: ICD-10-CM

## 2019-08-08 DIAGNOSIS — R25.2 CRAMPS, MUSCLE, GENERAL: ICD-10-CM

## 2019-08-08 DIAGNOSIS — H81.10 BENIGN PAROXYSMAL POSITIONAL VERTIGO, UNSPECIFIED LATERALITY: ICD-10-CM

## 2019-08-08 DIAGNOSIS — Z01.818 PREOP EXAMINATION: Primary | ICD-10-CM

## 2019-08-08 DIAGNOSIS — N64.59 NIPPLE PROBLEM: ICD-10-CM

## 2019-08-08 DIAGNOSIS — K21.9 GASTROESOPHAGEAL REFLUX DISEASE, ESOPHAGITIS PRESENCE NOT SPECIFIED: ICD-10-CM

## 2019-08-08 DIAGNOSIS — D64.9 ANEMIA, UNSPECIFIED TYPE: ICD-10-CM

## 2019-08-08 PROBLEM — R74.8 ELEVATED LIVER ENZYMES: Status: RESOLVED | Noted: 2017-01-10 | Resolved: 2019-08-08

## 2019-08-08 PROCEDURE — 1101F PT FALLS ASSESS-DOCD LE1/YR: CPT | Mod: HCNC,CPTII,S$GLB, | Performed by: HOSPITALIST

## 2019-08-08 PROCEDURE — 93005 ELECTROCARDIOGRAM TRACING: CPT | Mod: HCNC,S$GLB,, | Performed by: HOSPITALIST

## 2019-08-08 PROCEDURE — 99999 PR PBB SHADOW E&M-EST. PATIENT-LVL III: ICD-10-PCS | Mod: PBBFAC,HCNC,, | Performed by: HOSPITALIST

## 2019-08-08 PROCEDURE — 93010 ELECTROCARDIOGRAM REPORT: CPT | Mod: HCNC,S$GLB,, | Performed by: INTERNAL MEDICINE

## 2019-08-08 PROCEDURE — 99999 PR PBB SHADOW E&M-EST. PATIENT-LVL III: CPT | Mod: PBBFAC,HCNC,, | Performed by: HOSPITALIST

## 2019-08-08 PROCEDURE — 1101F PR PT FALLS ASSESS DOC 0-1 FALLS W/OUT INJ PAST YR: ICD-10-PCS | Mod: HCNC,CPTII,S$GLB, | Performed by: HOSPITALIST

## 2019-08-08 PROCEDURE — 99499 UNLISTED E&M SERVICE: CPT | Mod: HCNC,S$GLB,, | Performed by: HOSPITALIST

## 2019-08-08 PROCEDURE — 99499 RISK ADDL DX/OHS AUDIT: ICD-10-PCS | Mod: HCNC,S$GLB,, | Performed by: HOSPITALIST

## 2019-08-08 PROCEDURE — 93010 EKG 12-LEAD: ICD-10-PCS | Mod: HCNC,S$GLB,, | Performed by: INTERNAL MEDICINE

## 2019-08-08 PROCEDURE — 93005 EKG 12-LEAD: ICD-10-PCS | Mod: HCNC,S$GLB,, | Performed by: HOSPITALIST

## 2019-08-08 PROCEDURE — 99214 OFFICE O/P EST MOD 30 MIN: CPT | Mod: HCNC,S$GLB,, | Performed by: HOSPITALIST

## 2019-08-08 PROCEDURE — 99214 PR OFFICE/OUTPT VISIT, EST, LEVL IV, 30-39 MIN: ICD-10-PCS | Mod: HCNC,S$GLB,, | Performed by: HOSPITALIST

## 2019-08-08 NOTE — ASSESSMENT & PLAN NOTE
PLT  130- 140   Not known to have cirrhosis of liver     I suggest that the perioperative team be aware of this so that appropriate  care can be taken

## 2019-08-08 NOTE — LETTER
August 8, 2019      JULIUS Fritz MD  [address] [facility] [place] [address]           [facility]  [address] [facility] [address]  Phone: [phone]          Patient: Karli Hameed   MR Number: [id number]   YOB: 1931   Date of Visit: 8/8/2019       Dear Dr. JULIUS Fritz:    Thank you for referring Karli Hameed to me for evaluation. Attached you will find relevant portions of my assessment and plan of care.    If you have questions, please do not hesitate to call me. I look forward to following Karli Hameed along with you.    Sincerely,    Rama Mosqueda MD    Enclosure  CC:  No Recipients    If you would like to receive this communication electronically, please contact [email or web address] or [phone] to request more information on [employer] access.    For providers and/or their staff who would like to refer a patient to [facility], please contact us through our one-stop-shop provider referral line, [facility], at [phone].    If you feel you have received this communication in error or would no longer like to receive these types of communications, please e-mail [email or web address]

## 2019-08-08 NOTE — PROGRESS NOTES
[facility] - Pre Op Consult  Progress Note    Patient Name: Karli Hameed  MRN: [id number]  Date of Evaluation- 08/11/2019  PCP- Katherine Linn MD    Future cases for Karli Hameed [[id number]]     Case ID Status Date Time Raji Procedure Provider Location    [id number] [facility] 8/15/2019 10:00  EXCISION, LESION, RECTUM, ANAL APPROACH H. Saji Fritz MD [[id number]] [facility] OR 2ND FLR          HPI:  History of present illness- I had the pleasure of meeting this pleasant 87 y.o. lady in the pre op clinic prior to her elective rectal  surgery. The patient is new to me .     I have obtained the history by speaking to the patient and by reviewing the electronic health records.    Events leading up to surgery / History of presenting illness -    Adenocarcinoma  April 16 th 2019 , noticed noticed fresh blood on toilet paper after wiping after bowel movement   Did not have it for 2 weeks  at which time ir recurred  Had eveluation in [place] rectal clinic   Colonoscopy 6/19 showed - large mass was found at the        anus.   Noticed difficult with passing stool since around around June 2019   Biopsy showed  Cancer    Has occasional mild rectal bleeding   Keeping bowels moving [place]      She has been troubled with  mild  Anal area pain as she has harder bowel movements  . Pain decreases with passing soft stools     Relevant health conditions of significance for the perioperative period/ History of presenting illness -    Health conditions of significance for the perioperative period - HTN, CKD 5, PLT low , anemia    Not known to have heart disease , Diabetes Mellitus, Lung disease     From [place] , came to [place]  Lives with a dog  2 level house   Plans on recovering down stairs   Friend going to help post op   No children       Stays active   Walks every day , does home exercises   Takes stairs at the house          Subjective/ Objective:          Chief complaint-Preoperative evaluation, Perioperative Medical management, complication  "reduction plan     Active cardiac conditions- none    Revised cardiac risk index predictors- creatinine more than 2    Functional capacity -Examples of physical activity, as noted , cooks, gardening   house work and can take 1 flight of stairs----- She can undertake all the above activities without  chest pain,chest tightness, Shortness of breath , making her exercise tolerance more, than 4 Mets.       Review of Systems   Constitutional: Negative for fever.            Weight loss-8 -#  Planning in using some supplements    HENT:        GAYATHRIG score 2 / 8      HTN  Age over 50        Eyes:        No new visual changes   Respiratory:          Cough with phlegm   With rain   Pollen   Does not feel sick   No Hemoptysis   Cardiovascular:        As noted   Gastrointestinal:        No overt upperGI/ blood losses  Bowel movements- Regular    Endocrine:        Prednisone use > 20 mg daily for 3 weeks- none   Genitourinary: Negative for dysuria.        No urinary hesitancy    Musculoskeletal:        As above      Skin: Negative for rash.   Neurological: Negative for syncope.        No unilateral weakness   Hematological:        Current use of Anticoagulants  Current use of Antiplatelet agents  None    Psychiatric/Behavioral:        No Depression,Anxiety       No vascular stenting           No  bleeding, cardiac problems ,PONV with previous surgeries/procedures.  Had forget ful for about 3 weeks after hernia surgery    Medications and Allergies reviewed in epic.   FH- No anesthesia,bleeding / venous thrombosis , early onset heart disease in family     Physical Exam  Blood pressure (!) 150/76, pulse 96, temperature 98.2 °F (36.8 °C), height 5' 4" (1.626 m), weight 50.2 kg (110 lb 9.6 oz), SpO2 100 %.    Physical Exam  Constitutional- Vitals - Body mass index is 18.98 kg/m².,   Vitals:    08/08/19 1327   BP: (!) 150/76   Pulse: 96   Temp: 98.2 °F (36.8 °C)     General appearance-Conscious,Coherent  Eyes- No conjunctival " icterus,pupils  bilateral intra ocular lenses  ENT-Oral cavity- moist  , Hearing grossly normal   Neck- No thyromegaly ,Trachea -central, No jugular venous distension,   No Carotid Bruit   Cardiovascular -Heart Sounds- Normal  and  no murmur   , No gallop rhythm ? Ectopy   Respiratory - Normal Respiratory Effort, Normal breath sounds,  CrepitationsLeft base ,  no wheeze  and  no forced expiratory wheeze    Peripheral pitting pedal edema-- none , no calf pain   Gastrointestinal -Soft abdomen, No palpable masses, Non Tender,Liver,Spleen not palpable. No-- free fluid and shifting dullness  Musculoskeletal- No finger Clubbing. Strength grossly normal   Lymphatic-No Palpable cervical, axillary,Inguinal lymphadenopathy   Psychiatric - normal effect,Orientation  Rt Dorsalis pedis pulses-palpable    Lt Dorsalis pedis pulses- palpable   Rt Posterior tibial pulses -palpable   Left posterior tibial pulses -palpable   Miscellaneous -  Surgical scarRUQ ,  no renal bruit,  bowel sounds positive  and osteoarthritic nodes     Investigations  Lab and Imaging have been reviewed in epic.    Review of Medicine tests    EKG- I had independently reviewed the EKG from--8/1/2019   It was reported to be showing     Sinus rhythm with Premature atrial complexes  Low voltage QRS in the precordial leads  Borderline Abnormal ECG  When compared with ECG of 08-JUN-2014 15:05,  Premature atrial complexes are now Present    2007 Feb 2019     No evidence of obstructive airway disease    July 2012      1 - Normal left ventricular function (EF 55%).     2 - Diastolic dysfunction.     Review of clinical lab tests:  Lab Results   Component Value Date    CREATININE 4.3 (H) 08/01/2019    HGB 10.2 (L) 08/01/2019     (L) 08/01/2019           Review of old records- Was done and information gathered regards to events leading to surgery and health conditions of significance in the perioperative period.        Preoperative cardiac risk  assessment-  The patient does not have any active cardiac conditions . Revised cardiac risk index predictors-1 ---.Functional capacity is more than 4 Mets. She will be undergoing a  Rectal procedure that carries a intermediate risk     Risk of a major Cardiac event ( Defined as death, myocardial infarction, or cardiac arrest at 30 days after noncardiac surgery), based on RCRI score     -6.0%         No further cardiac work up is indicated prior to proceeding with the surgery     Orders Placed This Encounter    SCHEDULED EKG 12-LEAD (to Muse)       American Society of Anesthesiologists Physical status classification ( ASA ) class: 3     Postoperative pulmonary complication risk assessment:      ARISCAT ( [place]et) risk index- risk class -  Low, if duration of surgery is under 2 hours, intermediate, if duration of surgery is over 2 hours        Assessment/Plan:     Essential hypertension  Amlodipine ,Losartan    Home BP readings -None   Recent BP readings in the [id number]'s  Hypertension-  Blood pressure is acceptable . I suggest continuation of   Amlodipine during the entire perioperative period. I suggest holding Losartan - on the morning of the surgery and can continue that  post operatively under blood pressure, electrolyte and renal function monitoring as long as they are acceptable.I suggest addressing pain control as uncontrolled pain can increased blood pressure     Rectal cancer  For Transanal excision     Stage 5 chronic kidney disease not on chronic dialysis  CKD stage 5 PCKD  Under Nephrology care   Considered for renal replacement therapy  urinating well   Hydrating well   Not retaining water  Not on dialysis and she does not want dialysis  Had inguinal hernia repair in 2017   Risk of Acute kidney injury harini op   Will keep the surgeon informed       less invasive nature of this surgery,is reassuring    Stages of CKD discussed  Deleterious effects NSAID's , Beneficial effects of Hydration discussed    Tylenol as needed for pain     I  suggest monitoring renal function, in put and out put status harini-operatively. I  suggest avoiding nephrotoxic medication including NSAIDs, COX2 inhibitors, intravenous contrast agent,avoiding hypotension to prevent further renal impairment.     Benign paroxysmal positional vertigo  She was in the beauty parlor in May 8th, when she tipped her head back to get her hair washed she suddenly developed vertigo.    Tried Medication   Had Therapy helped  Feels much better     Thrombocytopenia  PLT  130- 140   Not known to have cirrhosis of liver     I suggest that the perioperative team be aware of this so that appropriate  care can be taken     GERD (gastroesophageal reflux disease)  Not troubled much   Certain foods triggers Acid reflux , tries to avoid them   No recent problem with acidreflux     Cramps, muscle, general  Doing better   Staying active is helping     Hyperparathyroidism  Following low phosphorus diet     History of skin cancer  Was operated       Anemia  Hb about 10   Apart from rectal bleeding , no other blood losses   No NSAID use   Most likely cause of anemia is renal impairment         Nipple problem, left only  No recent problem   Suggested follow up     History of general anesthesia  2017     Had forget ful for about 3 weeks after hernia surgery    Increased risk of post operative delirium     Arrhythmia  ? Ectopy   ? A fib   Check EKG        Preventive perioperative care    Thromboembolic prophylaxis:  Her risk factors for thrombosis include cancer ,  surgical procedure and age.I suggest  thromboembolic prophylaxis ( mechanical/pharmacological, weighing the risk benefits of pharmacological agent use considering harini procedural bleeding )  during the perioperative period.I suggested being active in the post operative period.      Postoperative pulmonary complication prophylaxis-Risk factors for post operative pulmonary complications include age over 65 years and  ASA class >2- I suggest incentive spirometry use, early ambulation and end tidal carbon dioxide monitoring  , oral care , head end of bed elevation      Renal complication prophylaxis-Risk factors for renal complications include pre-existing renal disease and hypertension . I suggest keeping her well hydrated and avoidance/ minimizing the use of  NSAID's,HANDY 2 Inhibitors ,IV contrast if possible in the perioperative period.I suggested drinking 2 litre's of water a day      Surgical site Infection Prophylaxis-I  suggest appropriate antibiotic for Prophylaxis against Surgical site infections     Delirium prophylaxis-Risk factors - Advanced Age and previous history of delirium/confusion - I suggest avoidance / minimizing the use of  Benzodiazepines ( unless the patient has been taking it on a regular basis ),Anticholinergic medication,Antihistamines ( like  Benadryl).I suggest minimizing the use of opioid medication and use of IV tylenol,if it is appropriate. I suggest using the lowest possible dose of opioids for the shortest duration possible in the perioperative period. I suggest to Keep shades/blinds open during the day, lights off and shades closed at night to encourage normal sleep/wake cycle.I encourage the presence of the family member with the patient at all times, if at all possible as mental status changes can be picked up early by the family members and they help with reorientation. I encouraged the presence of family to help with orientation in the perioperative period. Benadryl avoidance suggested      In view of rectal  the patient  is at risk of postoperative urinary retention.  I suggest avoidance / minimizing the of  Benzodiazepines,Anticholinergic medication,antihistamines ( Benadryl) , if possible in the perioperative period. I suggest using the minimum possible use of opioids for the minimum period of time in the perioperative period. Benadryl avoidance suggested      This visit was focused on  Preoperative evaluation, Perioperative Medical management, complication reduction plans. I suggest that the patient follows up with primary care or relevant sub specialists for ongoing health care.    I appreciate the opportunity to be involved in this patients care. Please feel free to contact me if there were any questions about this consultation.    Patient is optimized     Patient was instructed to call and update me about any changes to health,  medication, office visits ,testing out side of the harini operative care center , hospitalizations between now and surgery     Rama Mosqueda MD  Perioperative Medicine  [facility]   Pager [phone]  ---  8/8- 18 52     EKG from 8/8/2019 personally reviewed   Sinus rhythm - PAC's  No A fib     Messaged surgeon about her advanced renal disease     Called to discuss   Left a message about EKG  ---  8/11- 6 50  EKG from 8/8/2019   Sinus rhythm with Premature atrial complexes  Otherwise normal ECG  When compared with ECG of 01-AUG-2019 09:53,  No significant change was found  --  8/11- 700    Corresponded with surgeon on 8/8- 8/9 regarding potential for harini op acute kidney injury in the setting of   advanced renal disease .Nephrologist included  In the corresponce  She is currently doing well from a renal stand point , but is at risk of of heading to dialysis, if there was any acute kidney injury      she had hernia repair surgery in 2017 seems to have done  well - Renal function stable ( June, Sept 2017 )  Her upcoming surgery is not  very lengthy .     significant  blood loss or  hemodynamic instability -risk of acute kidney injury )     It is expected that -She should not experience much blood loss or instability.       Given her advanced age , she is also at Increased risk of post operative delirium ( She was  forget ful for about 3 weeks after hernia surgery  In 2017 )   [place] hospital stay , reduced opioid use might help with this  -  8/14-  2014       Called to follow up , to address any concerns with the up coming surgery or any questions on Medication instructions   Unable to speak   Left a message to call, if needed  Out of office hours phone number included in the message

## 2019-08-08 NOTE — ASSESSMENT & PLAN NOTE
She was in the [employer] [place] in [place]th, when she tipped her head back to get her hair washed she suddenly developed vertigo.    Tried Medication   Had Therapy helped  Feels much better

## 2019-08-08 NOTE — ASSESSMENT & PLAN NOTE
Hb about 10   Apart from rectal bleeding , no other blood losses   No NSAID use   Most likely cause of anemia is renal impairment

## 2019-08-08 NOTE — ASSESSMENT & PLAN NOTE
Amlodipine ,Losartan    Home BP readings -None   Recent BP readings in the [id number]'s  Hypertension-  Blood pressure is acceptable . I suggest continuation of   Amlodipine during the entire perioperative period. I suggest holding Losartan - on the morning of the surgery and can continue that  post operatively under blood pressure, electrolyte and renal function monitoring as long as they are acceptable.I suggest addressing pain control as uncontrolled pain can increased blood pressure

## 2019-08-08 NOTE — ASSESSMENT & PLAN NOTE
2017     Had forget ful for about 3 weeks after hernia surgery    Increased risk of post operative delirium

## 2019-08-08 NOTE — ASSESSMENT & PLAN NOTE
CKD stage 5 PCKD  Under Nephrology care   Considered for renal replacement therapy  urinating well   Hydrating well   Not retaining water  Not on dialysis and she does not want dialysis  Had inguinal hernia repair in 2017   Risk of Acute kidney injury harini op   Will keep the surgeon informed       less invasive nature of this surgery,is reassuring    Stages of CKD discussed  Deleterious effects NSAID's , Beneficial effects of Hydration discussed   Tylenol as needed for pain     I  suggest monitoring renal function, in put and out put status harini-operatively. I  suggest avoiding nephrotoxic medication including NSAIDs, COX2 inhibitors, intravenous contrast agent,avoiding hypotension to prevent further renal impairment.

## 2019-08-08 NOTE — OUTPATIENT SUBJECTIVE & OBJECTIVE
"Outpatient Subjective & Objective     Chief complaint-Preoperative evaluation, Perioperative Medical management, complication reduction plan     Active cardiac conditions- none    Revised cardiac risk index predictors- creatinine more than 2    Functional capacity -Examples of physical activity, as noted , cooks, gardening   house work and can take 1 flight of stairs----- She can undertake all the above activities without  chest pain,chest tightness, Shortness of breath , making her exercise tolerance more, than 4 Mets.       Review of Systems   Constitutional: Negative for fever.            Weight loss-8 -#  Planning in using some supplements    HENT:        KP score 2 / 8      HTN  Age over 50        Eyes:        No new visual changes   Respiratory:          Cough with phlegm   With rain   Pollen   Does not feel sick   No Hemoptysis   Cardiovascular:        As noted   Gastrointestinal:        No overt upperGI/ blood losses  Bowel movements- Regular    Endocrine:        Prednisone use > 20 mg daily for 3 weeks- none   Genitourinary: Negative for dysuria.        No urinary hesitancy    Musculoskeletal:        As above      Skin: Negative for rash.   Neurological: Negative for syncope.        No unilateral weakness   Hematological:        Current use of Anticoagulants  Current use of Antiplatelet agents  None    Psychiatric/Behavioral:        No Depression,Anxiety       No vascular stenting           No  bleeding, cardiac problems ,PONV with previous surgeries/procedures.  Had forget ful for about 3 weeks after hernia surgery    Medications and Allergies reviewed in epic.   FH- No anesthesia,bleeding / venous thrombosis , early onset heart disease in family     Physical Exam  Blood pressure (!) 150/76, pulse 96, temperature 98.2 °F (36.8 °C), height 5' 4" (1.626 m), weight 50.2 kg (110 lb 9.6 oz), SpO2 100 %.    Physical Exam  Constitutional- Vitals - Body mass index is 18.98 kg/m².,   Vitals:    08/08/19 " 1327   BP: (!) 150/76   Pulse: 96   Temp: 98.2 °F (36.8 °C)     General appearance-Conscious,Coherent  Eyes- No conjunctival icterus,pupils  bilateral intra ocular lenses  ENT-Oral cavity- moist  , Hearing grossly normal   Neck- No thyromegaly ,Trachea -central, No jugular venous distension,   No Carotid Bruit   Cardiovascular -Heart Sounds- Normal  and  no murmur   , No gallop rhythm ? Ectopy   Respiratory - Normal Respiratory Effort, Normal breath sounds,  CrepitationsLeft base ,  no wheeze  and  no forced expiratory wheeze    Peripheral pitting pedal edema-- none , no calf pain   Gastrointestinal -Soft abdomen, No palpable masses, Non Tender,Liver,Spleen not palpable. No-- free fluid and shifting dullness  Musculoskeletal- No finger Clubbing. Strength grossly normal   Lymphatic-No Palpable cervical, axillary,Inguinal lymphadenopathy   Psychiatric - normal effect,Orientation  Rt Dorsalis pedis pulses-palpable    Lt Dorsalis pedis pulses- palpable   Rt Posterior tibial pulses -palpable   Left posterior tibial pulses -palpable   Miscellaneous -  Surgical scarRUQ ,  no renal bruit,  bowel sounds positive  and osteoarthritic nodes     Investigations  Lab and Imaging have been reviewed in epic.    Review of Medicine tests    EKG- I had independently reviewed the EKG from--8/1/2019   It was reported to be showing     Sinus rhythm with Premature atrial complexes  Low voltage QRS in the precordial leads  Borderline Abnormal ECG  When compared with ECG of 08-JUN-2014 15:05,  Premature atrial complexes are now Present    2007 Feb 2019     No evidence of obstructive airway disease    July 2012      1 - Normal left ventricular function (EF 55%).     2 - Diastolic dysfunction.     Review of clinical lab tests:  Lab Results   Component Value Date    CREATININE 4.3 (H) 08/01/2019    HGB 10.2 (L) 08/01/2019     (L) 08/01/2019           Review of old records- Was done and information gathered regards to events  leading to surgery and health conditions of significance in the perioperative period.    Outpatient Subjective & Objective

## 2019-08-13 ENCOUNTER — OFFICE VISIT (OUTPATIENT)
Dept: INTERNAL MEDICINE | Facility: CLINIC | Age: 84
End: 2019-08-13
Payer: MEDICARE

## 2019-08-13 VITALS
HEART RATE: 64 BPM | BODY MASS INDEX: 19.08 KG/M2 | SYSTOLIC BLOOD PRESSURE: 136 MMHG | WEIGHT: 111.75 LBS | HEIGHT: 64 IN | DIASTOLIC BLOOD PRESSURE: 84 MMHG | OXYGEN SATURATION: 97 %

## 2019-08-13 DIAGNOSIS — N18.5 STAGE 5 CHRONIC KIDNEY DISEASE NOT ON CHRONIC DIALYSIS: ICD-10-CM

## 2019-08-13 DIAGNOSIS — C20 RECTAL CANCER: Primary | ICD-10-CM

## 2019-08-13 PROCEDURE — 99999 PR PBB SHADOW E&M-EST. PATIENT-LVL III: ICD-10-PCS | Mod: PBBFAC,HCNC,, | Performed by: INTERNAL MEDICINE

## 2019-08-13 PROCEDURE — 1101F PT FALLS ASSESS-DOCD LE1/YR: CPT | Mod: HCNC,CPTII,S$GLB, | Performed by: INTERNAL MEDICINE

## 2019-08-13 PROCEDURE — 99213 PR OFFICE/OUTPT VISIT, EST, LEVL III, 20-29 MIN: ICD-10-PCS | Mod: HCNC,S$GLB,, | Performed by: INTERNAL MEDICINE

## 2019-08-13 PROCEDURE — 99999 PR PBB SHADOW E&M-EST. PATIENT-LVL III: CPT | Mod: PBBFAC,HCNC,, | Performed by: INTERNAL MEDICINE

## 2019-08-13 PROCEDURE — 1101F PR PT FALLS ASSESS DOC 0-1 FALLS W/OUT INJ PAST YR: ICD-10-PCS | Mod: HCNC,CPTII,S$GLB, | Performed by: INTERNAL MEDICINE

## 2019-08-13 PROCEDURE — 99213 OFFICE O/P EST LOW 20 MIN: CPT | Mod: HCNC,S$GLB,, | Performed by: INTERNAL MEDICINE

## 2019-08-13 NOTE — PROGRESS NOTES
Subjective:       Patient ID: Karli Hameed is a 87 y.o. White female who presents for follow-up evaluation of Chronic Kidney Disease    HPI    Ms. Hameed is an 87 year old woman with past medical history of hypertension, polycystic kidney disease presenting for follow up of chronic kidney disease.  Patient reports adequate fluid intake, denies any NSAID use.  She otherwise denies any anorexia, fatigue, fever, chest pain, shortness of breath, abdominal pain, diarrhea, dysuria/hematuria.  She recently was diagnosed with rectal cancer, has excision scheduled in a few weeks.      Review of Systems   Constitutional: Negative for appetite change, fatigue and fever.   HENT: Negative for congestion.    Respiratory: Negative for cough, chest tightness and shortness of breath.    Cardiovascular: Negative for chest pain and leg swelling.   Gastrointestinal: Negative for abdominal pain, constipation, diarrhea, nausea and vomiting.   Genitourinary: Negative for difficulty urinating, dysuria, flank pain, frequency, hematuria and urgency.   Musculoskeletal: Negative for arthralgias, joint swelling and myalgias.   Skin: Negative for rash and wound.   Neurological: Negative for dizziness, weakness and light-headedness.   All other systems reviewed and are negative.      Objective:      Physical Exam   Constitutional: She appears well-developed and well-nourished.   Cardiovascular: Normal rate, regular rhythm and normal heart sounds. Exam reveals no gallop and no friction rub.   No murmur heard.  Pulmonary/Chest: Effort normal and breath sounds normal. No respiratory distress. She has no wheezes. She has no rales.   Abdominal: Soft. Bowel sounds are normal. There is no tenderness.   Musculoskeletal: She exhibits no edema.   Neurological: She is alert.   Skin: Skin is warm and dry. No rash noted. No erythema.   Psychiatric: She has a normal mood and affect.       Assessment:       1. Chronic kidney disease, stage IV (severe)    2.  UTI (urinary tract infection), uncomplicated        Plan:      Ms. Hameed is an 87 year old woman with past medical history of hypertension, polycystic kidney disease, recently diagnosed with rectal cancer presenting for follow up of chronic kidney disease stage IV.  Patient creatinine slightly above baseline, previously stable since February 2018 (previous acute kidney injury due to UTI, v. secondary to prior URI), will repeat renal panel to trend, reviewed renal US 7.18 with doppler to rule out obstructive/vascular etiology, further recommendations pending results.  Encouraged fluid intake, stressed importance of blood pressure control to prevent any further progression of kidney disease, patient voiced understanding.  Discussed signs/symptoms of uremia, along with indications for renal replacement therapy; will have patient attend [facility] education on dialysis modalities when she is willing (patient reluctant to discuss access, declining any type of dialysis at this time, understands risks/benefits, especially in light of upcoming procedure).  Will repeat renal panel, if creatinine stable or improved, would clear patient for procedure (recommend perioperative IVF to optimize renal perfusion).    - Anemia: Hgb at goal, no indication for erythropoetin therapy, will trend CBC/iron studies  - Bone/mineral metabolism: patient with secondary hyperparathyroidism, PTH previously at goal for stage CKD, will trend    Return to clinic in 2-4 months pending renal panel prior to procedure, then with renal/heme panel, iron/TIBC/ferritin, urinalysis/culture, urine protein/creatinine ratio prior to next visit

## 2019-08-13 NOTE — PROGRESS NOTES
Subjective:       Patient ID: Karli Hameed is a 87 y.o. female.    Chief Complaint: Follow-up (bp F/U, allergies)  This dictation was performed using using MModal.  In 2 days the patient is going to have a trans anal excision of an invasive adenocarcinoma of the rectum.  By imaging it is aT 2 lesion.  She understands that she may need postoperative radiation.  Her surgeon is Dr. Fritz.  She understands that she may need to remain in the hospital for 1-2 days following the procedure.    She feels ready to undergo this procedure.  Every day, including today she has remained active.  Her appetite is good.  She feels strong physically and emotionally and ready to proceed.    She is independent with all activities of daily living, drives, does all of her own grocery shopping, cooking and housekeeping.  She has a dog, who she walks twice daily and she takes exercise in her own home daily.  She has a two-story house and does not have any shortness of breath going up and down the stairs.  HPI  Review of Systems   Constitutional: Negative.  Negative for activity change, appetite change, chills, fatigue, fever and unexpected weight change.   HENT: Negative for hearing loss and tinnitus.    Eyes: Negative for visual disturbance.   Respiratory: Negative for cough, shortness of breath and wheezing.    Cardiovascular: Negative.  Negative for chest pain, palpitations and leg swelling.   Gastrointestinal: Negative for abdominal pain, blood in stool, constipation, diarrhea and nausea.   Genitourinary: Negative for dysuria, frequency and urgency.   Musculoskeletal: Negative for back pain and neck stiffness.   Skin: Negative for rash.   Neurological: Negative for headaches.   Psychiatric/Behavioral: Negative for dysphoric mood and sleep disturbance. The patient is not nervous/anxious.        Objective:      Physical Exam   Constitutional: She is oriented to person, place, and time. She appears well-developed and well-nourished. No  distress.   HENT:   Head: Normocephalic and atraumatic.   Right Ear: External ear normal.   Left Ear: External ear normal.   Mouth/Throat: Oropharynx is clear and moist. No oropharyngeal exudate.   Eyes: Conjunctivae are normal. No scleral icterus.   Neck: Normal range of motion. Neck supple.   Cardiovascular: Normal rate, regular rhythm and normal heart sounds. Exam reveals no gallop and no friction rub.   No murmur heard.  Pulmonary/Chest: Effort normal and breath sounds normal. No respiratory distress. She has no wheezes. She has no rales. She exhibits no tenderness.   Abdominal: Soft. Bowel sounds are normal. She exhibits no distension and no mass. There is no tenderness. There is no rebound and no guarding.   Musculoskeletal: She exhibits no edema.   Lymphadenopathy:     She has no cervical adenopathy.   Neurological: She is alert and oriented to person, place, and time. She exhibits normal muscle tone. Coordination normal.   Psychiatric: She has a normal mood and affect. Her behavior is normal.   Nursing note and vitals reviewed.      Assessment:       1. Rectal cancer    2. Stage 5 chronic kidney disease not on chronic dialysis        Plan:   Karli was seen today for follow-up.    Diagnoses and all orders for this visit:    Rectal cancer, adenocarcinoma, ready for surgery.    Stage 5 chronic kidney disease not on chronic dialysis, her renal function has declined recently she has no symptoms of uremia.

## 2019-08-14 ENCOUNTER — TELEPHONE (OUTPATIENT)
Dept: SURGERY | Facility: CLINIC | Age: 84
End: 2019-08-14

## 2019-08-15 ENCOUNTER — HOSPITAL ENCOUNTER (OUTPATIENT)
Facility: HOSPITAL | Age: 84
Discharge: HOME | End: 2019-08-16
Attending: COLON & RECTAL SURGERY | Admitting: COLON & RECTAL SURGERY
Payer: MEDICARE

## 2019-08-15 ENCOUNTER — ANESTHESIA (OUTPATIENT)
Dept: SURGERY | Facility: HOSPITAL | Age: 84
End: 2019-08-15
Payer: MEDICARE

## 2019-08-15 DIAGNOSIS — C20 RECTAL CANCER: Primary | ICD-10-CM

## 2019-08-15 PROCEDURE — 88305 TISSUE SPECIMEN TO PATHOLOGY - SURGERY: ICD-10-PCS | Mod: 26,HCNC,, | Performed by: PATHOLOGY

## 2019-08-15 PROCEDURE — 37000008 HC ANESTHESIA 1ST 15 MINUTES: Mod: HCNC | Performed by: COLON & RECTAL SURGERY

## 2019-08-15 PROCEDURE — 36000707: Mod: HCNC | Performed by: COLON & RECTAL SURGERY

## 2019-08-15 PROCEDURE — 88305 TISSUE EXAM BY PATHOLOGIST: CPT | Mod: HCNC | Performed by: PATHOLOGY

## 2019-08-15 PROCEDURE — 71000033 HC RECOVERY, INTIAL HOUR: Mod: HCNC | Performed by: COLON & RECTAL SURGERY

## 2019-08-15 PROCEDURE — D9220A PRA ANESTHESIA: ICD-10-PCS | Mod: HCNC,,, | Performed by: ANESTHESIOLOGY

## 2019-08-15 PROCEDURE — 63600175 PHARM REV CODE 636 W HCPCS: Mod: HCNC | Performed by: NURSE PRACTITIONER

## 2019-08-15 PROCEDURE — 45172 PR EXCIS RECTAL TUMOR, TRANSANAL, FULL THICKNESS: ICD-10-PCS | Mod: HCNC,,, | Performed by: COLON & RECTAL SURGERY

## 2019-08-15 PROCEDURE — 27201423 OPTIME MED/SURG SUP & DEVICES STERILE SUPPLY: Mod: HCNC | Performed by: COLON & RECTAL SURGERY

## 2019-08-15 PROCEDURE — 63600175 PHARM REV CODE 636 W HCPCS: Mod: HCNC | Performed by: ANESTHESIOLOGY

## 2019-08-15 PROCEDURE — 25000003 PHARM REV CODE 250: Mod: HCNC | Performed by: SURGERY

## 2019-08-15 PROCEDURE — 25000003 PHARM REV CODE 250: Mod: HCNC | Performed by: COLON & RECTAL SURGERY

## 2019-08-15 PROCEDURE — 36000706: Mod: HCNC | Performed by: COLON & RECTAL SURGERY

## 2019-08-15 PROCEDURE — 25000003 PHARM REV CODE 250: Mod: HCNC | Performed by: STUDENT IN AN ORGANIZED HEALTH CARE EDUCATION/TRAINING PROGRAM

## 2019-08-15 PROCEDURE — 45172 EXC RECT TUM TRANSANAL FULL: CPT | Mod: HCNC,,, | Performed by: COLON & RECTAL SURGERY

## 2019-08-15 PROCEDURE — 37000009 HC ANESTHESIA EA ADD 15 MINS: Mod: HCNC | Performed by: COLON & RECTAL SURGERY

## 2019-08-15 PROCEDURE — 63600175 PHARM REV CODE 636 W HCPCS: Mod: HCNC | Performed by: SURGERY

## 2019-08-15 PROCEDURE — 63600175 PHARM REV CODE 636 W HCPCS: Mod: HCNC | Performed by: STUDENT IN AN ORGANIZED HEALTH CARE EDUCATION/TRAINING PROGRAM

## 2019-08-15 PROCEDURE — S0030 INJECTION, METRONIDAZOLE: HCPCS | Mod: HCNC | Performed by: STUDENT IN AN ORGANIZED HEALTH CARE EDUCATION/TRAINING PROGRAM

## 2019-08-15 PROCEDURE — 94761 N-INVAS EAR/PLS OXIMETRY MLT: CPT | Mod: HCNC

## 2019-08-15 PROCEDURE — 88305 TISSUE EXAM BY PATHOLOGIST: CPT | Mod: 26,HCNC,, | Performed by: PATHOLOGY

## 2019-08-15 PROCEDURE — D9220A PRA ANESTHESIA: Mod: HCNC,,, | Performed by: ANESTHESIOLOGY

## 2019-08-15 RX ORDER — ROCURONIUM BROMIDE 10 MG/ML
INJECTION, SOLUTION INTRAVENOUS
Status: DISCONTINUED | OUTPATIENT
Start: 2019-08-15 | End: 2019-08-15

## 2019-08-15 RX ORDER — ENOXAPARIN SODIUM 100 MG/ML
40 INJECTION SUBCUTANEOUS EVERY 24 HOURS
Status: DISCONTINUED | OUTPATIENT
Start: 2019-08-15 | End: 2019-08-15

## 2019-08-15 RX ORDER — HEPARIN SODIUM 5000 [USP'U]/ML
5000 INJECTION, SOLUTION INTRAVENOUS; SUBCUTANEOUS EVERY 8 HOURS
Status: DISCONTINUED | OUTPATIENT
Start: 2019-08-15 | End: 2019-08-16 | Stop reason: HOSPADM

## 2019-08-15 RX ORDER — NEOSTIGMINE METHYLSULFATE 1 MG/ML
INJECTION, SOLUTION INTRAVENOUS
Status: DISCONTINUED | OUTPATIENT
Start: 2019-08-15 | End: 2019-08-15

## 2019-08-15 RX ORDER — HYDROMORPHONE HYDROCHLORIDE 1 MG/ML
0.2 INJECTION, SOLUTION INTRAMUSCULAR; INTRAVENOUS; SUBCUTANEOUS EVERY 5 MIN PRN
Status: DISCONTINUED | OUTPATIENT
Start: 2019-08-15 | End: 2019-08-15 | Stop reason: HOSPADM

## 2019-08-15 RX ORDER — LIDOCAINE HCL/PF 100 MG/5ML
SYRINGE (ML) INTRAVENOUS
Status: DISCONTINUED | OUTPATIENT
Start: 2019-08-15 | End: 2019-08-15

## 2019-08-15 RX ORDER — HYDROMORPHONE HYDROCHLORIDE 1 MG/ML
0.2 INJECTION, SOLUTION INTRAMUSCULAR; INTRAVENOUS; SUBCUTANEOUS EVERY 5 MIN PRN
Status: DISCONTINUED | OUTPATIENT
Start: 2019-08-15 | End: 2019-08-15

## 2019-08-15 RX ORDER — PROPOFOL 10 MG/ML
VIAL (ML) INTRAVENOUS
Status: DISCONTINUED | OUTPATIENT
Start: 2019-08-15 | End: 2019-08-15

## 2019-08-15 RX ORDER — SODIUM CHLORIDE 9 MG/ML
INJECTION, SOLUTION INTRAVENOUS CONTINUOUS
Status: DISCONTINUED | OUTPATIENT
Start: 2019-08-15 | End: 2019-08-15

## 2019-08-15 RX ORDER — FENTANYL CITRATE 50 UG/ML
INJECTION, SOLUTION INTRAMUSCULAR; INTRAVENOUS
Status: DISCONTINUED | OUTPATIENT
Start: 2019-08-15 | End: 2019-08-15

## 2019-08-15 RX ORDER — OXYCODONE HYDROCHLORIDE 10 MG/1
10 TABLET ORAL EVERY 4 HOURS PRN
Status: DISCONTINUED | OUTPATIENT
Start: 2019-08-15 | End: 2019-08-16 | Stop reason: HOSPADM

## 2019-08-15 RX ORDER — BUPIVACAINE HYDROCHLORIDE 2.5 MG/ML
INJECTION, SOLUTION EPIDURAL; INFILTRATION; INTRACAUDAL
Status: DISCONTINUED | OUTPATIENT
Start: 2019-08-15 | End: 2019-08-15 | Stop reason: HOSPADM

## 2019-08-15 RX ORDER — ONDANSETRON 2 MG/ML
INJECTION INTRAMUSCULAR; INTRAVENOUS
Status: DISCONTINUED | OUTPATIENT
Start: 2019-08-15 | End: 2019-08-15

## 2019-08-15 RX ORDER — ESMOLOL HYDROCHLORIDE 10 MG/ML
INJECTION INTRAVENOUS
Status: DISCONTINUED | OUTPATIENT
Start: 2019-08-15 | End: 2019-08-15

## 2019-08-15 RX ORDER — LOSARTAN POTASSIUM 50 MG/1
50 TABLET ORAL DAILY
Status: DISCONTINUED | OUTPATIENT
Start: 2019-08-15 | End: 2019-08-16 | Stop reason: HOSPADM

## 2019-08-15 RX ORDER — PHENYLEPHRINE HYDROCHLORIDE 10 MG/ML
INJECTION INTRAVENOUS
Status: DISCONTINUED | OUTPATIENT
Start: 2019-08-15 | End: 2019-08-15

## 2019-08-15 RX ORDER — SODIUM CHLORIDE 0.9 % (FLUSH) 0.9 %
10 SYRINGE (ML) INJECTION
Status: DISCONTINUED | OUTPATIENT
Start: 2019-08-15 | End: 2019-08-16 | Stop reason: HOSPADM

## 2019-08-15 RX ORDER — MUPIROCIN 20 MG/G
OINTMENT TOPICAL
Status: DISCONTINUED | OUTPATIENT
Start: 2019-08-15 | End: 2019-08-15

## 2019-08-15 RX ORDER — SODIUM CHLORIDE 0.9 % (FLUSH) 0.9 %
3 SYRINGE (ML) INJECTION
Status: DISCONTINUED | OUTPATIENT
Start: 2019-08-15 | End: 2019-08-15

## 2019-08-15 RX ORDER — OXYCODONE HYDROCHLORIDE 5 MG/1
5 TABLET ORAL EVERY 6 HOURS PRN
Status: DISCONTINUED | OUTPATIENT
Start: 2019-08-15 | End: 2019-08-16 | Stop reason: HOSPADM

## 2019-08-15 RX ORDER — GLYCOPYRROLATE 0.2 MG/ML
INJECTION INTRAMUSCULAR; INTRAVENOUS
Status: DISCONTINUED | OUTPATIENT
Start: 2019-08-15 | End: 2019-08-15

## 2019-08-15 RX ORDER — ACETAMINOPHEN 325 MG/1
650 TABLET ORAL EVERY 6 HOURS
Status: DISCONTINUED | OUTPATIENT
Start: 2019-08-15 | End: 2019-08-16 | Stop reason: HOSPADM

## 2019-08-15 RX ORDER — METRONIDAZOLE 500 MG/100ML
INJECTION, SOLUTION INTRAVENOUS
Status: DISCONTINUED | OUTPATIENT
Start: 2019-08-15 | End: 2019-08-15

## 2019-08-15 RX ORDER — GABAPENTIN 300 MG/1
300 CAPSULE ORAL 3 TIMES DAILY
Status: DISCONTINUED | OUTPATIENT
Start: 2019-08-15 | End: 2019-08-16 | Stop reason: HOSPADM

## 2019-08-15 RX ORDER — AMLODIPINE BESYLATE 5 MG/1
5 TABLET ORAL EVERY MORNING
Status: DISCONTINUED | OUTPATIENT
Start: 2019-08-15 | End: 2019-08-16 | Stop reason: HOSPADM

## 2019-08-15 RX ORDER — MUPIROCIN 20 MG/G
1 OINTMENT TOPICAL 2 TIMES DAILY
Status: DISCONTINUED | OUTPATIENT
Start: 2019-08-15 | End: 2019-08-16 | Stop reason: HOSPADM

## 2019-08-15 RX ORDER — LIDOCAINE HYDROCHLORIDE AND EPINEPHRINE 10; 10 MG/ML; UG/ML
INJECTION, SOLUTION INFILTRATION; PERINEURAL
Status: DISCONTINUED | OUTPATIENT
Start: 2019-08-15 | End: 2019-08-15 | Stop reason: HOSPADM

## 2019-08-15 RX ORDER — TRAMADOL HYDROCHLORIDE 50 MG/1
50 TABLET ORAL EVERY 6 HOURS PRN
Status: DISCONTINUED | OUTPATIENT
Start: 2019-08-15 | End: 2019-08-16 | Stop reason: HOSPADM

## 2019-08-15 RX ORDER — SUCCINYLCHOLINE CHLORIDE 20 MG/ML
INJECTION INTRAMUSCULAR; INTRAVENOUS
Status: DISCONTINUED | OUTPATIENT
Start: 2019-08-15 | End: 2019-08-15

## 2019-08-15 RX ADMIN — PROPOFOL 120 MG: 10 INJECTION, EMULSION INTRAVENOUS at 08:08

## 2019-08-15 RX ADMIN — MUPIROCIN 1 G: 20 OINTMENT TOPICAL at 11:08

## 2019-08-15 RX ADMIN — ACETAMINOPHEN 650 MG: 325 TABLET ORAL at 11:08

## 2019-08-15 RX ADMIN — GLYCOPYRROLATE 0.3 MG: 0.2 INJECTION, SOLUTION INTRAMUSCULAR; INTRAVENOUS at 10:08

## 2019-08-15 RX ADMIN — OXYCODONE HYDROCHLORIDE 10 MG: 10 TABLET ORAL at 09:08

## 2019-08-15 RX ADMIN — LOSARTAN POTASSIUM 50 MG: 50 TABLET, FILM COATED ORAL at 11:08

## 2019-08-15 RX ADMIN — AMLODIPINE BESYLATE 5 MG: 5 TABLET ORAL at 11:08

## 2019-08-15 RX ADMIN — ESMOLOL HYDROCHLORIDE 20 MG: 10 INJECTION INTRAVENOUS at 08:08

## 2019-08-15 RX ADMIN — SODIUM CHLORIDE: 0.9 INJECTION, SOLUTION INTRAVENOUS at 08:08

## 2019-08-15 RX ADMIN — METRONIDAZOLE 500 MG: 500 SOLUTION INTRAVENOUS at 09:08

## 2019-08-15 RX ADMIN — PHENYLEPHRINE HYDROCHLORIDE 100 MCG: 10 INJECTION INTRAVENOUS at 09:08

## 2019-08-15 RX ADMIN — FENTANYL CITRATE 50 MCG: 50 INJECTION, SOLUTION INTRAMUSCULAR; INTRAVENOUS at 08:08

## 2019-08-15 RX ADMIN — ROCURONIUM BROMIDE 5 MG: 10 INJECTION, SOLUTION INTRAVENOUS at 09:08

## 2019-08-15 RX ADMIN — ROCURONIUM BROMIDE 10 MG: 10 INJECTION, SOLUTION INTRAVENOUS at 08:08

## 2019-08-15 RX ADMIN — ONDANSETRON 4 MG: 2 INJECTION INTRAMUSCULAR; INTRAVENOUS at 10:08

## 2019-08-15 RX ADMIN — MUPIROCIN 1 G: 20 OINTMENT TOPICAL at 09:08

## 2019-08-15 RX ADMIN — PHENYLEPHRINE HYDROCHLORIDE 100 MCG: 10 INJECTION INTRAVENOUS at 10:08

## 2019-08-15 RX ADMIN — CEFTRIAXONE 2 G: 1 INJECTION, SOLUTION INTRAVENOUS at 09:08

## 2019-08-15 RX ADMIN — ACETAMINOPHEN 650 MG: 325 TABLET ORAL at 05:08

## 2019-08-15 RX ADMIN — SODIUM CHLORIDE, SODIUM GLUCONATE, SODIUM ACETATE, POTASSIUM CHLORIDE, MAGNESIUM CHLORIDE, SODIUM PHOSPHATE, DIBASIC, AND POTASSIUM PHOSPHATE: .53; .5; .37; .037; .03; .012; .00082 INJECTION, SOLUTION INTRAVENOUS at 09:08

## 2019-08-15 RX ADMIN — NEOSTIGMINE METHYLSULFATE 2.5 MG: 1 INJECTION INTRAVENOUS at 10:08

## 2019-08-15 RX ADMIN — PROMETHAZINE HYDROCHLORIDE 6.25 MG: 25 INJECTION INTRAMUSCULAR; INTRAVENOUS at 12:08

## 2019-08-15 RX ADMIN — GABAPENTIN 300 MG: 300 CAPSULE ORAL at 05:08

## 2019-08-15 RX ADMIN — SUCCINYLCHOLINE CHLORIDE 140 MG: 20 INJECTION, SOLUTION INTRAMUSCULAR; INTRAVENOUS at 08:08

## 2019-08-15 RX ADMIN — HEPARIN SODIUM 5000 UNITS: 5000 INJECTION, SOLUTION INTRAVENOUS; SUBCUTANEOUS at 09:08

## 2019-08-15 RX ADMIN — LIDOCAINE HYDROCHLORIDE 80 MG: 20 INJECTION, SOLUTION INTRAVENOUS at 08:08

## 2019-08-15 RX ADMIN — GABAPENTIN 300 MG: 300 CAPSULE ORAL at 09:08

## 2019-08-15 NOTE — BRIEF OP NOTE
[facility]  Brief Operative Note    SUMMARY     Surgery Date: 8/15/2019     Surgeon(s) and Role:     * JULIUS Fritz MD - Primary     * Kamron Morrow MD - Fellow    Assisting Surgeon: None    Pre-op Diagnosis:  Adenocarcinoma [C80.1]    Post-op Diagnosis:  Post-Op Diagnosis Codes:     * Adenocarcinoma [C80.1]    Procedure(s) (LRB):  transanal EXCISION, LESION, RECTUM, ANAL APPROACH full thickness (N/A)    Anesthesia: General    Description of Procedure: Patient placed in prone position. Rectal mass dissectied with combination of electrcautery, sharp dissection, and harmonic scalpel off of the vagina and perirectal fat. Specimen was marked and oriented for pathology. Rectal flap was closed with interrupted vicryl sutures    Description of the findings of the procedure: Rectal mass in posterior and left lateral position.    Estimated Blood Loss: * No values recorded between 8/15/2019  9:07 AM and 8/15/2019 10:20 AM *         Specimens:   Specimen (12h ago, onward)    None

## 2019-08-15 NOTE — NURSING TRANSFER
Nursing Transfer Note      8/15/2019     Transfer to room 1052 from PACU    Transfer via stretcher    Transfer with     Transported by PACU PCT    Medicines sent:     Chart send with patient: Yes    Notified: friend

## 2019-08-15 NOTE — TRANSFER OF CARE
"Anesthesia Transfer of Care Note    Patient: Karli Hameed    Procedure(s) Performed: Procedure(s) (LRB):  transanal EXCISION, LESION, RECTUM, ANAL APPROACH full thickness (N/A)    Patient location: PACU    Anesthesia Type: general    Transport from OR: Transported from OR on 6-10 L/min O2 by face mask with adequate spontaneous ventilation    Post pain: adequate analgesia    Post assessment: no apparent anesthetic complications    Post vital signs: stable    Level of consciousness: awake and responds to stimulation    Nausea/Vomiting: no nausea/vomiting    Complications: none    Transfer of care protocol was followed      Last vitals:   Visit Vitals  BP (!) 168/91 (BP Location: Left arm)   Pulse (!) 112   Temp 36.4 °C (97.5 °F) (Temporal)   Resp 18   Ht 5' 4" (1.626 m)   Wt 50.3 kg (111 lb)   SpO2 100%   Breastfeeding? No   BMI 19.05 kg/m²     "

## 2019-08-15 NOTE — PROGRESS NOTES
Patient passed approx 100ml of liqid brown stool with gas. Rectal packing noted in bed along with  Stool. Mesh briefs removed and patient cleaned; skin to buttocks intact.

## 2019-08-15 NOTE — OP NOTE
Date of procedure:   August 15, 2019    Indications for procedure:  Patient is an 87-year-old female with history of low rectal cancer.  Distant metastatic disease was not diagnosed at the time of CT scans of the chest abdomen and pelvis.  MRI of the pelvis revealed that the lesion appeared to be a T2 N0 tumor.  Because of the patient's age, comorbid conditions and because of her strong desire to avoid a permanent stoma and maintain her independent social status (ease she currently lives at home by herself) she elected to undergo transanal excision as definitive treatment of rectal cancer with the full understanding that it was recommended that she undergo abdominoperineal resection.  The details of the procedure, risks of bleeding infection fecal incontinence and recurrence of cancer were discussed in detail.    Preoperative diagnosis:   Low rectal cancer    Postoperative diagnosis:  Same    Name of procedure:  Transanal excision of rectal tumor, full-thickness    Surgeon:   JULIUS Fritz MD    Assistant surgeon:  Kamron Morrow MD    Type of anesthesia:  General endotracheal    EBL:  50  Cc's    Drains:  None    Specimen:  Rectal tumor.    Findings:  Low rectal tumor beginning at the dentate line and extending for approximately 3-4 cm.  It was positioned in the left anterior position. The central aspect felt firm and indurated.  The majority of the tumor felt soft.  5 mm margins of resection were obtained around the circumference of the tumor.  The deep aspect of the resection appeared to be in normal soft tissue. Closure of the wound was performed transversely without stenosis.    Technique in detail:  Patient was brought to the operating room positive identified remained on the gurney in the supine position. She had sequential compression devices on her lower extremities.  She had undergone an outpatient will mechanical and oral antibiotic bowel preparation.  She received intravenous antibiotics.  She  underwent general endotracheal anesthesia without complication and was then placed on the operating table prone sri-knife position on a Enrique frame.  Buttock cheeks were taped apart and her perineum and perianal areas were prepared and draped in usual sterile fashion.  A critical time-out was performed.    Exam under anesthesia revealed a palpable lesion in the distal rectum, proximal anal canal.  There is a firm indurated area at the central aspect.  The tumor appeared to be in the left anterior position. A Lopez retractor was placed in the anal canal and I could visualize the lesion.  The sore retractor was removed. [place] retractor was placed everting the anus and bringing essentially the dentate line to the external opening.  We used a lighted retractor for exposure.  1% lidocaine with epinephrine was then infiltrated in the submucosal plane.  The periphery of the resection was outlined with the Bovie.  This was approximately 5 mm from the visible edge of the tumor.  This was deepened through the mucosa submucosa and internal anal sphincter muscle distally I then excised the lesion using scissors distally in the intersphincteric plane and then more proximally as a full-thickness excision under direct vision using lighted retractors and cold scissors. We are in the perirectal fat beginning on the left side this was carried anteriorly with care being taken to avoid injury to the vagina.  This portion of the of the procedure was assisted by placing an intestinal Sizer into the vaginal vault to define the vaginal wall.  The specimen was excised after placing stay sutures at the proximal aspect of the dissection.  The specimen was then pinned on a blue towel using orienting sutures. The long black suture was placed at the proximal aspect, short black at the distal, long white suture placed at the left side and the short white placed on the right side of the specimen.    The operative wound was then irrigated  with Betadine solution.  The wound was closed using interrupted Vicryl suture. Rigid proctoscopy was performed to 20 cm to ensure patency of the closure and to assess for bleeding. Hemostasis was noted.  There is no evidence of narrowing of the bowel. Gel-Foam roll was placed into the anal canal.  Dry gauze dressing was applied over this and held in place with mesh underwear.    Patient was returned to the supine position on the [place].  She was awake from anesthesia, extubated incident returned the recovery area in stable condition.    I was scrubbed and present for the entire operation.    H Saji Fritz MD

## 2019-08-15 NOTE — ANESTHESIA POSTPROCEDURE EVALUATION
Anesthesia Post Evaluation    Patient: Karli Hameed    Procedure(s) Performed: Procedure(s) (LRB):  transanal EXCISION, LESION, RECTUM, ANAL APPROACH full thickness (N/A)    Final Anesthesia Type: general  Patient location during evaluation: PACU  Patient participation: Yes- Able to Participate  Level of consciousness: awake and alert  Post-procedure vital signs: reviewed and stable  Pain management: adequate  Airway patency: patent  PONV status at discharge: No PONV  Anesthetic complications: no      Cardiovascular status: blood pressure returned to baseline and stable  Respiratory status: unassisted  Hydration status: euvolemic  Follow-up not needed.          Vitals Value Taken Time   /78 8/15/2019  3:27 PM   Temp 36.5 °C (97.7 °F) 8/15/2019  3:27 PM   Pulse 84 8/15/2019  3:27 PM   Resp 20 8/15/2019  3:27 PM   SpO2 99 % 8/15/2019  3:27 PM         Event Time     Out of Recovery 10:50:00          Pain/Marguerite Score: Pain Rating Prior to Med Admin: 2 (8/15/2019  5:02 PM)  Marguerite Score: 10 (8/15/2019  2:30 PM)

## 2019-08-16 VITALS
SYSTOLIC BLOOD PRESSURE: 133 MMHG | DIASTOLIC BLOOD PRESSURE: 77 MMHG | RESPIRATION RATE: 18 BRPM | HEIGHT: 64 IN | OXYGEN SATURATION: 98 % | BODY MASS INDEX: 18.44 KG/M2 | HEART RATE: 79 BPM | WEIGHT: 108 LBS | TEMPERATURE: 98 F

## 2019-08-16 PROCEDURE — 25000003 PHARM REV CODE 250: Mod: HCNC | Performed by: SURGERY

## 2019-08-16 PROCEDURE — 63600175 PHARM REV CODE 636 W HCPCS: Mod: HCNC | Performed by: SURGERY

## 2019-08-16 RX ORDER — HYDROCODONE BITARTRATE AND ACETAMINOPHEN 5; 325 MG/1; MG/1
1 TABLET ORAL EVERY 6 HOURS PRN
Qty: 25 TABLET | Refills: 0 | Status: SHIPPED | OUTPATIENT
Start: 2019-08-16 | End: 2020-03-25

## 2019-08-16 RX ORDER — OXYCODONE HYDROCHLORIDE 5 MG/1
5 TABLET ORAL EVERY 4 HOURS PRN
Qty: 30 TABLET | Refills: 0 | OUTPATIENT
Start: 2019-08-16

## 2019-08-16 RX ADMIN — HEPARIN SODIUM 5000 UNITS: 5000 INJECTION, SOLUTION INTRAVENOUS; SUBCUTANEOUS at 06:08

## 2019-08-16 RX ADMIN — AMLODIPINE BESYLATE 5 MG: 5 TABLET ORAL at 08:08

## 2019-08-16 RX ADMIN — ACETAMINOPHEN 650 MG: 325 TABLET ORAL at 12:08

## 2019-08-16 RX ADMIN — ACETAMINOPHEN 650 MG: 325 TABLET ORAL at 06:08

## 2019-08-16 RX ADMIN — GABAPENTIN 300 MG: 300 CAPSULE ORAL at 08:08

## 2019-08-16 RX ADMIN — MUPIROCIN 1 G: 20 OINTMENT TOPICAL at 08:08

## 2019-08-16 RX ADMIN — LOSARTAN POTASSIUM 50 MG: 50 TABLET, FILM COATED ORAL at 08:08

## 2019-08-16 NOTE — PLAN OF CARE
08/16/19 1209   Post-Acute Status   Post-Acute Authorization Other   Other Status No Post-Acute Service Needs   This SW in communication with  and medical team. SW will continue to follow for discharge needs and offer support as needed.    No SW needs identified at this time.    Lucero Hanna LMSW  [facility]  [id number]

## 2019-08-16 NOTE — PROGRESS NOTES
[facility]  Colorectal Surgery  Progress Note    Patient Name: Karli Hameed  MRN: [id number]  Admission Date: 8/15/2019  Hospital Length of Stay: 0 days  Attending Physician: JULIUS Fritz MD    Subjective:     Interval History: no acute events overnight. Afebrile, tolerating diet, pain controlled. Some stool leakage overnight    Post-Op Info:  Procedure(s) (LRB):  transanal EXCISION, LESION, RECTUM, ANAL APPROACH full thickness (N/A)   1 Day Post-Op      Medications:  Continuous Infusions:  Scheduled Meds:   acetaminophen  650 mg Oral Q6H    amLODIPine  5 mg Oral QAM    gabapentin  300 mg Oral TID    heparin (porcine)  5,000 Units Subcutaneous Q8H    losartan  50 mg Oral Daily    mupirocin  1 g Nasal BID     PRN Meds:   oxyCODONE    oxyCODONE    sodium chloride 0.9%    traMADol        Objective:     Vital Signs (Most Recent):  Temp: 97.9 °F (36.6 °C) (08/16/19 0712)  Pulse: 79 (08/16/19 0712)  Resp: 18 (08/16/19 0712)  BP: 133/77 (08/16/19 0712)  SpO2: 99 % (08/16/19 0712) Vital Signs (24h Range):  Temp:  [97.3 °F (36.3 °C)-98.5 °F (36.9 °C)] 97.9 °F (36.6 °C)  Pulse:  [] 79  Resp:  [16-20] 18  SpO2:  [93 %-100 %] 99 %  BP: (111-173)/(60-91) 133/77     Intake/Output - Last 3 Shifts       08/14 0700 - 08/15 0659 08/15 0700 - 08/16 0659 08/16 0700 - 08/17 0659    P.O.  1170     I.V. (mL/kg)  1500 (29.8)     IV Piggyback  50     Total Intake(mL/kg)  2720 (54.1)     Net  +2720            Urine Occurrence  5 x     Stool Occurrence  3 x     Emesis Occurrence  0 x           Physical Exam   Constitutional: She is oriented to person, place, and time. She appears well-developed and well-nourished.   HENT:   Head: Normocephalic.   Eyes: EOM are normal.   Neck: Normal range of motion.   Cardiovascular: Normal rate and regular rhythm.   Pulmonary/Chest: Effort normal.   Abdominal: Soft. Bowel sounds are normal.   Rectal exam- no active drainage, perianal area nontender, non-indurated    Musculoskeletal: Normal range of motion.   Neurological: She is alert and oriented to person, place, and time.   Skin: Skin is warm and dry.         Assessment/Plan:     * Rectal cancer  POD1 from transanal excision of rectal cancer  - Tylenol and oxycodone for pain  - Ambulate/IS  - Regular diet  - Discharge home today, f/u with Dr Fritz in 2 weeks.          Kamron Morrow MD  Colorectal Surgery  [facility]

## 2019-08-16 NOTE — PLAN OF CARE
CM placed call to [facility] to schedule pt's follow up appt. See below scheduled follow up listed on AVS.  Future Appointments   Date Time Provider Department Center   8/28/2019 11:00 AM JULIUS Fritz [facility]   11/5/2019  8:15 AM EPO, INJECTION [facility] ID INF [facility]   11/14/2019  8:20 AM [facility]

## 2019-08-16 NOTE — ASSESSMENT & PLAN NOTE
POD1 from transanal excision of rectal cancer  - Tylenol and oxycodone for pain  - Ambulate/IS  - Regular diet  - Discharge home today, f/u with Dr Fritz in 2 weeks.

## 2019-08-16 NOTE — SUBJECTIVE & OBJECTIVE
Subjective:     Interval History: no acute events overnight. Afebrile, tolerating diet, pain controlled. Some stool leakage overnight    Post-Op Info:  Procedure(s) (LRB):  transanal EXCISION, LESION, RECTUM, ANAL APPROACH full thickness (N/A)   1 Day Post-Op      Medications:  Continuous Infusions:  Scheduled Meds:   acetaminophen  650 mg Oral Q6H    amLODIPine  5 mg Oral QAM    gabapentin  300 mg Oral TID    heparin (porcine)  5,000 Units Subcutaneous Q8H    losartan  50 mg Oral Daily    mupirocin  1 g Nasal BID     PRN Meds:   oxyCODONE    oxyCODONE    sodium chloride 0.9%    traMADol        Objective:     Vital Signs (Most Recent):  Temp: 97.9 °F (36.6 °C) (08/16/19 0712)  Pulse: 79 (08/16/19 0712)  Resp: 18 (08/16/19 0712)  BP: 133/77 (08/16/19 0712)  SpO2: 99 % (08/16/19 0712) Vital Signs (24h Range):  Temp:  [97.3 °F (36.3 °C)-98.5 °F (36.9 °C)] 97.9 °F (36.6 °C)  Pulse:  [] 79  Resp:  [16-20] 18  SpO2:  [93 %-100 %] 99 %  BP: (111-173)/(60-91) 133/77     Intake/Output - Last 3 Shifts       08/14 0700 - 08/15 0659 08/15 0700 - 08/16 0659 08/16 0700 - 08/17 0659    P.O.  1170     I.V. (mL/kg)  1500 (29.8)     IV Piggyback  50     Total Intake(mL/kg)  2720 (54.1)     Net  +2720            Urine Occurrence  5 x     Stool Occurrence  3 x     Emesis Occurrence  0 x           Physical Exam   Constitutional: She is oriented to person, place, and time. She appears well-developed and well-nourished.   HENT:   Head: Normocephalic.   Eyes: EOM are normal.   Neck: Normal range of motion.   Cardiovascular: Normal rate and regular rhythm.   Pulmonary/Chest: Effort normal.   Abdominal: Soft. Bowel sounds are normal.   Rectal exam- no active drainage, perianal area nontender, non-indurated   Musculoskeletal: Normal range of motion.   Neurological: She is alert and oriented to person, place, and time.   Skin: Skin is warm and dry.

## 2019-08-16 NOTE — PLAN OF CARE
CM met with patient to discuss today's discharge to home. Patient in agreement with discharge plan. Patient states her family will provide transportation home. Patient denies need for additional assistance. CM informed patient of follow up appt with Dr. Fritz, pt verbalized understanding.    Future Appointments   Date Time Provider Department Center   8/28/2019 11:00 AM JULIUS Fritz MD [facility] COLON [facility]   11/5/2019  8:15 AM EPO, INJECTION [facility] ID INF [facility]   11/14/2019  8:20 AM Katherine Hutchinson MD [facility]        08/16/19 1526   Final Note   Assessment Type Final Discharge Note   Anticipated Discharge Disposition Home   What phone number can be called within the next 1-3 days to see how you are doing after discharge? [phone]   Hospital Follow Up  Appt(s) scheduled? Yes   Discharge plans and expectations educations in teach back method with documentation complete? Yes

## 2019-08-16 NOTE — PLAN OF CARE
EXAM DATE/TIME:  03/05/2018 21:52 

 

[facility] COMPARISON:     

No previous studies available for comparison.

 

                     

INDICATIONS :     

Left foot pain with no injury for 2 weeks.

                     

 

MEDICAL HISTORY :     

Hypertension.  Hypercholesterolemia.  Myocardial infarction.   CVA.   

 

SURGICAL HISTORY :     

Coronary artery stent.   

 

ENCOUNTER:     

Initial                                        

 

ACUITY:     

2 weeks      

 

PAIN SCORE:     

4/10

 

LOCATION:     

Left  foot.

 

FINDINGS:     

Three view examination of the left foot demonstrates no soft tissue swelling, dislocation, or fractur
e.   The tarsal bones appear intact.  The interphalangeal and metatarsophalangeal joints are intact. 
 Mild hallux obvious deformity with some sclerosis at the articular surface.  The calcaneus is intact
.  Bony mineralization is normal.

 

CONCLUSION:     

Mild hallux valgus.  Otherwise negative exam.

 

 

 

 Santiago Vasquez MD on March 05, 2018 at 22:38           

Board Certified Radiologist.

 This report was verified electronically. Problem: Adult Inpatient Plan of Care  Goal: Plan of Care Review  Plan of care reviewed with pt/verbalized understanding, vss, patient ambulatory to bathroom with stand-by assistance, patient c/o of  abdominal pain requested po meds, patient has small  amt of serosanganeous discharge from rectum, call-light within reach, will continue to monitor.

## 2019-08-16 NOTE — NURSING
Discharge instructions, follow-up appointments, and medications complete with no further questions at this time. Hydrocodone meds del;ivered to bedside. NAD or needs at this time. Pt request wheelchair for transport to hospital exit for discharge home with friend.

## 2019-08-19 ENCOUNTER — PATIENT MESSAGE (OUTPATIENT)
Dept: SURGERY | Facility: CLINIC | Age: 84
End: 2019-08-19

## 2019-08-22 ENCOUNTER — TELEPHONE (OUTPATIENT)
Dept: NEPHROLOGY | Facility: CLINIC | Age: 84
End: 2019-08-22

## 2019-08-22 DIAGNOSIS — N18.5 CHRONIC KIDNEY DISEASE, STAGE V: Primary | ICD-10-CM

## 2019-08-28 ENCOUNTER — LAB VISIT (OUTPATIENT)
Dept: LAB | Facility: HOSPITAL | Age: 84
End: 2019-08-28
Attending: INTERNAL MEDICINE
Payer: MEDICARE

## 2019-08-28 ENCOUNTER — OFFICE VISIT (OUTPATIENT)
Dept: SURGERY | Facility: CLINIC | Age: 84
End: 2019-08-28
Payer: MEDICARE

## 2019-08-28 VITALS
DIASTOLIC BLOOD PRESSURE: 81 MMHG | HEART RATE: 112 BPM | BODY MASS INDEX: 18.63 KG/M2 | WEIGHT: 109.13 LBS | SYSTOLIC BLOOD PRESSURE: 150 MMHG | HEIGHT: 64 IN

## 2019-08-28 DIAGNOSIS — N18.5 CHRONIC KIDNEY DISEASE, STAGE V: ICD-10-CM

## 2019-08-28 DIAGNOSIS — C20 RECTAL CANCER: Primary | ICD-10-CM

## 2019-08-28 LAB
25(OH)D3+25(OH)D2 SERPL-MCNC: 46 NG/ML (ref 30–96)
ALBUMIN SERPL BCP-MCNC: 3.9 G/DL (ref 3.5–5.2)
ANION GAP SERPL CALC-SCNC: 14 MMOL/L (ref 8–16)
BASOPHILS # BLD AUTO: 0.02 K/UL (ref 0–0.2)
BASOPHILS NFR BLD: 0.3 % (ref 0–1.9)
BUN SERPL-MCNC: 54 MG/DL (ref 8–23)
CALCIUM SERPL-MCNC: 8.8 MG/DL (ref 8.7–10.5)
CHLORIDE SERPL-SCNC: 107 MMOL/L (ref 95–110)
CO2 SERPL-SCNC: 20 MMOL/L (ref 23–29)
CREAT SERPL-MCNC: 3.9 MG/DL (ref 0.5–1.4)
DIFFERENTIAL METHOD: ABNORMAL
EOSINOPHIL # BLD AUTO: 0.1 K/UL (ref 0–0.5)
EOSINOPHIL NFR BLD: 0.8 % (ref 0–8)
ERYTHROCYTE [DISTWIDTH] IN BLOOD BY AUTOMATED COUNT: 13.2 % (ref 11.5–14.5)
EST. GFR  (AFRICAN AMERICAN): 11.3 ML/MIN/1.73 M^2
EST. GFR  (NON AFRICAN AMERICAN): 9.8 ML/MIN/1.73 M^2
FERRITIN SERPL-MCNC: 47 NG/ML (ref 20–300)
GLUCOSE SERPL-MCNC: 94 MG/DL (ref 70–110)
HBV SURFACE AB SER-ACNC: POSITIVE M[IU]/ML
HBV SURFACE AG SERPL QL IA: NEGATIVE
HCT VFR BLD AUTO: 31.7 % (ref 37–48.5)
HCV AB SERPL QL IA: NEGATIVE
HGB BLD-MCNC: 9.6 G/DL (ref 12–16)
IMM GRANULOCYTES # BLD AUTO: 0.02 K/UL (ref 0–0.04)
IMM GRANULOCYTES NFR BLD AUTO: 0.3 % (ref 0–0.5)
IRON SERPL-MCNC: 67 UG/DL (ref 30–160)
LYMPHOCYTES # BLD AUTO: 0.9 K/UL (ref 1–4.8)
LYMPHOCYTES NFR BLD: 11.2 % (ref 18–48)
MAGNESIUM SERPL-MCNC: 2.3 MG/DL (ref 1.6–2.6)
MCH RBC QN AUTO: 29.4 PG (ref 27–31)
MCHC RBC AUTO-ENTMCNC: 30.3 G/DL (ref 32–36)
MCV RBC AUTO: 97 FL (ref 82–98)
MONOCYTES # BLD AUTO: 0.4 K/UL (ref 0.3–1)
MONOCYTES NFR BLD: 4.5 % (ref 4–15)
NEUTROPHILS # BLD AUTO: 6.6 K/UL (ref 1.8–7.7)
NEUTROPHILS NFR BLD: 82.9 % (ref 38–73)
NRBC BLD-RTO: 0 /100 WBC
PHOSPHATE SERPL-MCNC: 5 MG/DL (ref 2.7–4.5)
PLATELET # BLD AUTO: 213 K/UL (ref 150–350)
PMV BLD AUTO: 10 FL (ref 9.2–12.9)
POTASSIUM SERPL-SCNC: 4.4 MMOL/L (ref 3.5–5.1)
PTH-INTACT SERPL-MCNC: 736 PG/ML (ref 9–77)
RBC # BLD AUTO: 3.26 M/UL (ref 4–5.4)
SATURATED IRON: 21 % (ref 20–50)
SODIUM SERPL-SCNC: 141 MMOL/L (ref 136–145)
TOTAL IRON BINDING CAPACITY: 321 UG/DL (ref 250–450)
TRANSFERRIN SERPL-MCNC: 217 MG/DL (ref 200–375)
WBC # BLD AUTO: 7.93 K/UL (ref 3.9–12.7)

## 2019-08-28 PROCEDURE — 99499 UNLISTED E&M SERVICE: CPT | Mod: HCNC,S$GLB,, | Performed by: COLON & RECTAL SURGERY

## 2019-08-28 PROCEDURE — 86706 HEP B SURFACE ANTIBODY: CPT | Mod: HCNC

## 2019-08-28 PROCEDURE — 83970 ASSAY OF PARATHORMONE: CPT | Mod: HCNC

## 2019-08-28 PROCEDURE — 99499 RISK ADDL DX/OHS AUDIT: ICD-10-PCS | Mod: HCNC,S$GLB,, | Performed by: COLON & RECTAL SURGERY

## 2019-08-28 PROCEDURE — 85025 COMPLETE CBC W/AUTO DIFF WBC: CPT | Mod: HCNC

## 2019-08-28 PROCEDURE — 99024 POSTOP FOLLOW-UP VISIT: CPT | Mod: HCNC,S$GLB,, | Performed by: COLON & RECTAL SURGERY

## 2019-08-28 PROCEDURE — 36415 COLL VENOUS BLD VENIPUNCTURE: CPT | Mod: HCNC

## 2019-08-28 PROCEDURE — 87340 HEPATITIS B SURFACE AG IA: CPT | Mod: HCNC

## 2019-08-28 PROCEDURE — 80069 RENAL FUNCTION PANEL: CPT | Mod: HCNC

## 2019-08-28 PROCEDURE — 86803 HEPATITIS C AB TEST: CPT | Mod: HCNC

## 2019-08-28 PROCEDURE — 99024 PR POST-OP FOLLOW-UP VISIT: ICD-10-PCS | Mod: HCNC,S$GLB,, | Performed by: COLON & RECTAL SURGERY

## 2019-08-28 PROCEDURE — 82306 VITAMIN D 25 HYDROXY: CPT | Mod: HCNC

## 2019-08-28 PROCEDURE — 83540 ASSAY OF IRON: CPT | Mod: HCNC

## 2019-08-28 PROCEDURE — 99999 PR PBB SHADOW E&M-EST. PATIENT-LVL III: ICD-10-PCS | Mod: PBBFAC,HCNC,, | Performed by: COLON & RECTAL SURGERY

## 2019-08-28 PROCEDURE — 83735 ASSAY OF MAGNESIUM: CPT | Mod: HCNC

## 2019-08-28 PROCEDURE — 99999 PR PBB SHADOW E&M-EST. PATIENT-LVL III: CPT | Mod: PBBFAC,HCNC,, | Performed by: COLON & RECTAL SURGERY

## 2019-08-28 PROCEDURE — 82728 ASSAY OF FERRITIN: CPT | Mod: HCNC

## 2019-08-28 NOTE — PROGRESS NOTES
HPI:  Karli Hameed is a 87 y.o. female with history of rectal cancer status post transanal excision  She did have problems with leakage and fecal urgency.  She denies any severe pain or fever.    Past Medical History:   Diagnosis Date    Allergy     Anxiety     Arthritis     Back pain     Basal cell carcinoma 6/2011    R nasal ala, excised via Mohs    Chronic kidney disease, stage II (mild) 8/25/2012    cyst since 1975    Flank pain 8/21/2012    HTN (hypertension) 8/21/2012    Inflammatory bowel disease     Joint pain     Kidney stone     left     Lactose disaccharidase deficiency     Rectal cancer 7/24/2019    Senile osteoporosis 4/11/2016    Small bowel obstruction, 06-, resolved witjhout surgery 7/1/2014    Trace cataracts     Ulcer     hx    Urinary tract infection         Past Surgical History:   Procedure Laterality Date    ABDOMINAL SURGERY      APPENDECTOMY      CATARACT EXTRACTION W/  INTRAOCULAR LENS IMPLANT Right 02/12/2015    Dr. aShni    CATARACT EXTRACTION W/  INTRAOCULAR LENS IMPLANT Left 04/09/2015    CHOLECYSTECTOMY      COLONOSCOPY N/A 6/19/2019    Performed by Beto Rivera MD at [facility] END[facility] (4TH FLR)    EYE SURGERY      HERNIA REPAIR  2017    Open rih with mesh    HYSTERECTOMY      INSERTION-INTRAOCULAR LENS (IOL) Left 4/9/2015    Performed by Vidya Sahni MD at [facility] OR 1ST FLR    INSERTION-INTRAOCULAR LENS (IOL) Right 2/12/2015    Performed by Vidya Sahni MD at [facility] OR 1ST FLR    KIDNEY SURGERY      drained cyst x 2    lysis of abdominal adhesions      OOPHORECTOMY      PHACOEMULSIFICATION-ASPIRATION-CATARACT Left 4/9/2015    Performed by Vidya Sahni MD at [facility] OR 1ST FLR    PHACOEMULSIFICATION-ASPIRATION-CATARACT Right 2/12/2015    Performed by V[facility]ya Sahni MD at [facility] OR 1ST FLR    REPAIR-HERNIA-INGUINAL-INITIAL-RIGHT-OPEN w/ mesh Right 7/19/2017    Performed by Lamont Justice MD at [facility] OR 2ND FLR    TONSILLECTOMY,  ADENOIDECTOMY      transanal EXCISION, LESION, RECTUM, ANAL APPROACH full thickness N/A 8/15/2019    Performed by JULIUS Fritz [facility] at [facility] OR [address] [place] [address]       Review of patient's allergies indicates:   Allergen Reactions    Advil [ibuprofen] Nausea Only    Parafon forte      Other reaction(s): Hallucinations    Calcitriol (bulk) Nausea Only    Lisinopril      cough       Family History   Problem Relation Age of Onset    Cancer Father         throat - was a tobacco smoker    Cataracts Father     Kidney disease Father     Cancer Paternal Grandmother         was a tobacco smoker    No Known Problems Mother     No Known Problems Sister     No Known Problems Brother     No Known Problems Maternal Aunt     No Known Problems Maternal Uncle     No Known Problems Paternal Aunt     No Known Problems Paternal Uncle     No Known Problems Maternal Grandmother     No Known Problems Maternal Grandfather     No Known Problems Paternal Grandfather     Cirrhosis Neg Hx     Celiac disease Neg Hx     Colon polyps Neg Hx     Crohn's disease Neg Hx     Esophageal cancer Neg Hx     Inflammatory bowel disease Neg Hx     Liver cancer Neg Hx     Liver disease Neg Hx     Rectal cancer Neg Hx     Stomach cancer Neg Hx     Ulcerative colitis Neg Hx     Amblyopia Neg Hx     Blindness Neg Hx     Diabetes Neg Hx     Glaucoma Neg Hx     Hypertension Neg Hx     Macular degeneration Neg Hx     Retinal detachment Neg Hx     Strabismus Neg Hx     Stroke Neg Hx     Thyroid disease Neg Hx     Melanoma Neg Hx        Social History     Socioeconomic History    Marital status: Single     Spouse name: Not on file    Number of children: Not on file    Years of education: Not on file    Highest education level: Not on file   Occupational History    Not on file   Social Needs    Financial resource strain: Not on file    Food insecurity:     Worry: Not on file     Inability: Not on file    Transportation needs:     " Medical: Not on file     Non-medical: Not on file   Tobacco Use    Smoking status: Never Smoker    Smokeless tobacco: Never Used   Substance and Sexual Activity    Alcohol use: Not Currently     Comment: none    Drug use: No    Sexual activity: Never   Lifestyle    Physical activity:     Days per week: Not on file     Minutes per session: Not on file    Stress: Not on file   Relationships    Social connections:     Talks on phone: Not on file     Gets together: Not on file     Attends Jewish service: Not on file     Active member of club or organization: Not on file     Attends meetings of clubs or organizations: Not on file     Relationship status: Not on file   Other Topics Concern    Are you pregnant or think you may be? No    Breast-feeding No   Social History Narrative    Lives alone.    Great neighbors.    Many friends.    Walks the neighborhood daily with her dog.    Active social life.    Has her own home, takes care of everything and all of her affairs.     From [facility] , came to  1969    2 level house          Stays active     Walks every day , does home exercises     Takes stairs at the house            ROS:  GENERAL: No fever, chills, fatigability or weight loss.  Integument: No rashes, redness, icterus  CHEST: Denies DOMINGUEZ, cyanosis, wheezing, cough and sputum production.  CARDIOVASCULAR: Denies chest pain, PND, orthopnea or reduced exercise tolerance.  GI: Denies abd pain, dysphagia, nausea, vomiting, no hematemesis   : Denies burning on urination, no hematuria, no bacteriuria  MSK: No deformities, swelling, joint pain swelling  Neurologic: No HAs, seizures, weakness, paresthesias, gait problems    PE:  General appearance healthy  BP (!) 150/81 (BP Location: Left arm, Patient Position: Sitting, BP Method: Large (Automatic))   Pulse (!) 112   Ht 5' 4" (1.626 m)   Wt 49.5 kg (109 lb 2 oz)   BMI 18.73 kg/m²   Sclera/ Skin anicteric  AT NC EOMI  Neck supple trachea midline   Chest " symmetric, nl excursion, no retractions, breathing comfortably  EXT - no CCE  Neuro:  Mood/ affect nl, alert and oriented x 3, moves all ext's, gait nl    Assessment:  T2 rectal cancer status post transanal excision with negative margins.  Fecal urgency and incontinence following transanal excision    Plan:  Patient has been reassured that the fecal urgency and incontinence should improve.  I would recommend that she undergo postoperative adjuvant therapy in the form of radiation therapy given the risk of local recurrence.  She would like to think about this.  She will return in 1 month for further discussion

## 2019-08-28 NOTE — HOSPITAL COURSE
Underwent transanal excision on above date. Urinating and pain controlled on post operative day 1. Discharged home.

## 2019-08-28 NOTE — DISCHARGE SUMMARY
[facility]  Colorectal Surgery  Discharge Summary      Patient Name: Karli Hameed  MRN: [id number]  Admission Date: 8/15/2019  Hospital Length of Stay: 0 days  Discharge Date and Time:  08/27/2019 8:57 PM  Attending Physician: No att. providers found   Discharging Provider: Kamron Morrow MD  Primary Care Provider: Katherine Linn MD     HPI:  87 year old female with rectal cancer presents for transanal excision 8/15/19    Procedure(s) (LRB):  transanal EXCISION, LESION, RECTUM, ANAL APPROACH full thickness (N/A)     Hospital Course:  Underwent transanal excision on above date. Urinating and pain controlled on post operative day 1. Discharged home.        Significant Diagnostic Studies: none    Pending Diagnostic Studies:     None        Final Active Diagnoses:    Diagnosis Date Noted POA    PRINCIPAL PROBLEM:  Rectal cancer [C20] 07/24/2019 Yes    Stage 5 chronic kidney disease not on chronic dialysis [N18.5] 02/26/2019 Yes      Problems Resolved During this Admission:      Discharged Condition: good    Disposition: Home or Self Care    Follow Up:  Follow-up Information     H Saji Fritz MD In 2 weeks.    Specialty:  Colon and Rectal Surgery  Contact information:  [address] [place] [id number]  [phone]                 Patient Instructions:      Diet Adult Regular     Lifting restrictions   Order Comments: No lifting over 10lbs     Notify your health care provider if you experience any of the following:  temperature >100.4     Notify your health care provider if you experience any of the following:  persistent nausea and vomiting or diarrhea     Notify your health care provider if you experience any of the following:  severe uncontrolled pain     Notify your health care provider if you experience any of the following:  redness, tenderness, or signs of infection (pain, swelling, redness, odor or green/yellow discharge around incision site)     Notify your health care  provider if you experience any of the following:  difficulty breathing or increased cough     Activity as tolerated     Medications:  Reconciled Home Medications:      Medication List      START taking these medications    HYDROcodone-acetaminophen 5-325 mg per tablet  Commonly known as:  NORCO  Take 1 tablet by mouth every 6 (six) hours as needed for Pain.        CHANGE how you take these medications    cholecalciferol (vitamin D3) 1,000 unit capsule  Commonly known as:  VITAMIN D3  Take 2 capsules (2,000 Units total) by mouth once daily.  What changed:  how much to take        CONTINUE taking these medications    acetaminophen 325 MG tablet  Commonly known as:  TYLENOL  Take by mouth. 1 Tablet Oral .  Tylenol.To take as needed for arthritis pain.     amLODIPine 5 MG tablet  Commonly known as:  NORVASC  Take 1 tablet (5 mg total) by mouth every morning.     CITRUCEL ORAL  Take by mouth once daily.     losartan 50 MG tablet  Commonly known as:  COZAAR  Take 1 tablet (50 mg total) by mouth once daily.     varicella-zoster gE-AS01B (PF) 50 mcg/0.5 mL injection  Commonly known as:  SHINGRIX (PF)  Inject into the muscle.        STOP taking these medications    metroNIDAZOLE 500 MG tablet  Commonly known as:  FLAGYL     neomycin 500 mg Tab  Commonly known as:  MYCIFRADIN            Kamron Morrow MD  Colorectal Surgery  [facility]

## 2019-09-03 ENCOUNTER — PATIENT MESSAGE (OUTPATIENT)
Dept: INTERNAL MEDICINE | Facility: CLINIC | Age: 84
End: 2019-09-03

## 2019-09-03 NOTE — TELEPHONE ENCOUNTER
From: Karli Hameed     Sent: 9/3/2019 12:56 PM CDT       To: Katherine Linn MD  Subject: Non-Urgent Medical    Dear Doctor Evangelina:  Need your advice, since last week (Tuesday) I have been having pain in the right side of my face. First of all, I have sneezing some, pain in jaws, behind my ears, forehead, back of the neck,  sometimes is very painful. I been taking Tylenol for pain. It's difficult to open my mouth and eating, I been masticate in the left side.  Today, is a little better. It happen after I had surgery.  Could it be allergies? Maybe the left some air when I had it.  As you can see I am guessing.  Still, I don't feel strong.  Hope you are doing well. Thanks for you help.    Karli Hameed [id number]    Please advise

## 2019-09-04 NOTE — TELEPHONE ENCOUNTER
Dear Karli,  I am not sure what's going on.    I am going to ask Ivonne to call you to see how you are doing and if you want to come in for an appointment.  Sincerely, Dr. Katherine Hutchinson    ===View-only below this line===      ----- Message -----     From: Karli Hameed     Sent: 9/3/2019 12:56 PM CDT       To: Katherine Hutchinson MD  Subject: Non-Urgent Medical    Dear Doctor Evangelina:  Need your advice, since last week (Tuesday) I have been having pain in the right side of my face. First of all, I have sneezing some, pain in jaws, behind my ears, forehead, back of the neck,  sometimes is very painful. I been taking Tylenol for pain. It's difficult to open my mouth and eating, I been masticate in the left side.  Today, is a little better. It happen after I had surgery.  Could it be allergies? Maybe the left some air when I had it.  As you can see I am guessing.  Still, I don't feel strong.  Hope you are doing well. Thanks for you help.    Karli Hameed [id number]

## 2019-09-05 NOTE — TELEPHONE ENCOUNTER
Pt stated she is doing better. She mentioned about 2 weeks ago she had colon surgery and they pain was in the bone under her rt eye and behind ear. She still has a little pain, but not as much. She stated when the pain first began she could hardly open her mouth to brush her teeth. She is much better now. She also mentioned she had been sneezing a lot and her nostrils were dry..Faustina Ling LPN

## 2019-09-11 ENCOUNTER — DOCUMENTATION ONLY (OUTPATIENT)
Dept: SURGERY | Facility: CLINIC | Age: 84
End: 2019-09-11

## 2019-09-11 NOTE — PROGRESS NOTES
Multidisciplinary Rectal Cancer Conference - Treatment Outcome Discussion Summary  9/11/2019  Karli Hameed  [id number]  87 y.o. female      1. Presurgical Evaluation    Pretreatment (clinical) AJCC Stage:  IB T2N0     Pretreatment CEA level:   Lab Results   Component Value Date    CEA 8.1 (H) 06/26/2019     Neoadjuvant therapy: None    2. Surgery     Date: 8/15     Procedure: Transanal excision    Presence of stoma:No    Postoperative complications: None    Unexpected findings: None    Specimen photographs: [x] Reviewed     3. Final Pathology and Stage    Pathological AJCC Stage: [id number], Stage IB    Microsatellite instability status: n/a    Resection margin: negative margins    4. Adjuvant Treatment Recommendation     Regimen Recommended:     Referrals: [x]  Medical oncology [x]  Radiation oncology [] Genetic counseling    Will discuss with patient possible adjuvant radiation and depending on functional status +/- 5 FU.     Surveillance - Physical exam every 3 months for 1 year. Also discuss with patient willingness to undergo APR if recurrence or + LN are found on imaging in the future.

## 2019-09-25 ENCOUNTER — PATIENT MESSAGE (OUTPATIENT)
Dept: INTERNAL MEDICINE | Facility: CLINIC | Age: 84
End: 2019-09-25

## 2019-09-25 NOTE — TELEPHONE ENCOUNTER
Spoke with patient and advised her that she can walk in to out pharmacy and get the flu shot or go to any local pharmacy and get the shot and faxed record to Dr Hutchinson

## 2019-09-25 NOTE — PROGRESS NOTES
Patient Karli Hameed, MRN [id number], was dependent on dialysis (ICD10 Z99.2) at the time of this visit on 8/28/19. This addendum is made to the medical record on 09/25/2019.

## 2019-09-26 ENCOUNTER — IMMUNIZATION (OUTPATIENT)
Dept: PHARMACY | Facility: CLINIC | Age: 84
End: 2019-09-26
Payer: MEDICARE

## 2019-10-08 ENCOUNTER — LAB VISIT (OUTPATIENT)
Dept: LAB | Facility: HOSPITAL | Age: 84
End: 2019-10-08
Attending: COLON & RECTAL SURGERY
Payer: MEDICARE

## 2019-10-08 ENCOUNTER — OFFICE VISIT (OUTPATIENT)
Dept: SURGERY | Facility: CLINIC | Age: 84
End: 2019-10-08
Payer: MEDICARE

## 2019-10-08 DIAGNOSIS — R10.84 GENERALIZED ABDOMINAL PAIN: Primary | ICD-10-CM

## 2019-10-08 DIAGNOSIS — R10.84 GENERALIZED ABDOMINAL PAIN: ICD-10-CM

## 2019-10-08 DIAGNOSIS — R15.9 FULL INCONTINENCE OF FECES: ICD-10-CM

## 2019-10-08 DIAGNOSIS — R19.5 LOOSE STOOLS: ICD-10-CM

## 2019-10-08 LAB
ALBUMIN SERPL BCP-MCNC: 4.3 G/DL (ref 3.5–5.2)
ALP SERPL-CCNC: 45 U/L (ref 55–135)
ALT SERPL W/O P-5'-P-CCNC: 12 U/L (ref 10–44)
ANION GAP SERPL CALC-SCNC: 13 MMOL/L (ref 8–16)
AST SERPL-CCNC: 20 U/L (ref 10–40)
BASOPHILS # BLD AUTO: 0.01 K/UL (ref 0–0.2)
BASOPHILS NFR BLD: 0.2 % (ref 0–1.9)
BILIRUB SERPL-MCNC: 0.4 MG/DL (ref 0.1–1)
BUN SERPL-MCNC: 63 MG/DL (ref 8–23)
CALCIUM SERPL-MCNC: 8.6 MG/DL (ref 8.7–10.5)
CHLORIDE SERPL-SCNC: 109 MMOL/L (ref 95–110)
CO2 SERPL-SCNC: 22 MMOL/L (ref 23–29)
CREAT SERPL-MCNC: 3.9 MG/DL (ref 0.5–1.4)
CRP SERPL-MCNC: 0.7 MG/L (ref 0–8.2)
DIFFERENTIAL METHOD: ABNORMAL
EOSINOPHIL # BLD AUTO: 0.1 K/UL (ref 0–0.5)
EOSINOPHIL NFR BLD: 1.7 % (ref 0–8)
ERYTHROCYTE [DISTWIDTH] IN BLOOD BY AUTOMATED COUNT: 13.8 % (ref 11.5–14.5)
EST. GFR  (AFRICAN AMERICAN): 11.3 ML/MIN/1.73 M^2
EST. GFR  (NON AFRICAN AMERICAN): 9.8 ML/MIN/1.73 M^2
GLUCOSE SERPL-MCNC: 103 MG/DL (ref 70–110)
HCT VFR BLD AUTO: 32 % (ref 37–48.5)
HGB BLD-MCNC: 9.9 G/DL (ref 12–16)
IMM GRANULOCYTES # BLD AUTO: 0.01 K/UL (ref 0–0.04)
IMM GRANULOCYTES NFR BLD AUTO: 0.2 % (ref 0–0.5)
LYMPHOCYTES # BLD AUTO: 1.3 K/UL (ref 1–4.8)
LYMPHOCYTES NFR BLD: 25.4 % (ref 18–48)
MCH RBC QN AUTO: 29.7 PG (ref 27–31)
MCHC RBC AUTO-ENTMCNC: 30.9 G/DL (ref 32–36)
MCV RBC AUTO: 96 FL (ref 82–98)
MONOCYTES # BLD AUTO: 0.3 K/UL (ref 0.3–1)
MONOCYTES NFR BLD: 6.1 % (ref 4–15)
NEUTROPHILS # BLD AUTO: 3.5 K/UL (ref 1.8–7.7)
NEUTROPHILS NFR BLD: 66.4 % (ref 38–73)
NRBC BLD-RTO: 0 /100 WBC
PLATELET # BLD AUTO: 123 K/UL (ref 150–350)
PMV BLD AUTO: 10.8 FL (ref 9.2–12.9)
POTASSIUM SERPL-SCNC: 4.4 MMOL/L (ref 3.5–5.1)
PROT SERPL-MCNC: 7.2 G/DL (ref 6–8.4)
RBC # BLD AUTO: 3.33 M/UL (ref 4–5.4)
SODIUM SERPL-SCNC: 144 MMOL/L (ref 136–145)
WBC # BLD AUTO: 5.28 K/UL (ref 3.9–12.7)

## 2019-10-08 PROCEDURE — 1101F PR PT FALLS ASSESS DOC 0-1 FALLS W/OUT INJ PAST YR: ICD-10-PCS | Mod: HCNC,CPTII,S$GLB, | Performed by: COLON & RECTAL SURGERY

## 2019-10-08 PROCEDURE — 99999 PR PBB SHADOW E&M-EST. PATIENT-LVL II: CPT | Mod: PBBFAC,HCNC,, | Performed by: COLON & RECTAL SURGERY

## 2019-10-08 PROCEDURE — 99999 PR PBB SHADOW E&M-EST. PATIENT-LVL II: ICD-10-PCS | Mod: PBBFAC,HCNC,, | Performed by: COLON & RECTAL SURGERY

## 2019-10-08 PROCEDURE — 99213 PR OFFICE/OUTPT VISIT, EST, LEVL III, 20-29 MIN: ICD-10-PCS | Mod: 24,HCNC,S$GLB, | Performed by: COLON & RECTAL SURGERY

## 2019-10-08 PROCEDURE — 36415 COLL VENOUS BLD VENIPUNCTURE: CPT | Mod: HCNC

## 2019-10-08 PROCEDURE — 80053 COMPREHEN METABOLIC PANEL: CPT | Mod: HCNC

## 2019-10-08 PROCEDURE — 85025 COMPLETE CBC W/AUTO DIFF WBC: CPT | Mod: HCNC

## 2019-10-08 PROCEDURE — 1101F PT FALLS ASSESS-DOCD LE1/YR: CPT | Mod: HCNC,CPTII,S$GLB, | Performed by: COLON & RECTAL SURGERY

## 2019-10-08 PROCEDURE — 99213 OFFICE O/P EST LOW 20 MIN: CPT | Mod: 24,HCNC,S$GLB, | Performed by: COLON & RECTAL SURGERY

## 2019-10-08 PROCEDURE — 86140 C-REACTIVE PROTEIN: CPT | Mod: HCNC

## 2019-10-08 NOTE — PROGRESS NOTES
HPI:  Karli Hameed is a 87 y.o. female with history of low rectal cancer status post transanal excision.  She reports that since surgery she has had significant change in bowel habits.  She has had crampy lower abdominal pain and often time will have loose stool which she cannot control.  Yesterday she had a formed bowel movement which came out as a long piece in she could control it.  She denies any rectal bleeding.  She denies any fevers or chills.  She is very concerned because she is losing weight and she is afraid to leave the house because of her change in bowel habits and loose stools.  She has had multiple episodes of fecal incontinence.        Past Medical History:   Diagnosis Date    Allergy     Anxiety     Arthritis     Back pain     Basal cell carcinoma 6/2011    R nasal ala, excised via Mohs    Chronic kidney disease, stage II (mild) 8/25/2012    cyst since 1975    Flank pain 8/21/2012    HTN (hypertension) 8/21/2012    Inflammatory bowel disease     Joint pain     Kidney stone     left     Lactose disaccharidase deficiency     Rectal cancer 7/24/2019    Senile osteoporosis 4/11/2016    [facility]    Trace cataracts     Ulcer     hx    Urinary tract infection         Past Surgical History:   Procedure Laterality Date    ABDOMINAL SURGERY      APPENDECTOMY      CATARACT EXTRACTION W/  INTRAOCULAR LENS IMPLANT Right 02/12/2015    Dr. Sahni    CATARACT EXTRACTION W/  INTRAOCULAR LENS IMPLANT Left 04/09/2015    CHOLECYSTECTOMY      COLONOSCOPY N/A 6/19/2019    Procedure: COLONOSCOPY;  Surgeon: Beto Rivera MD;  Location: [address]);  Service: Endoscopy;  Laterality: N/A;  to be scheudled on 6/19/19 with Dr. Rivera per JACY Fischer NP    EYE SURGERY      HERNIA REPAIR  2017    Open rih with mesh    HYSTERECTOMY      KIDNEY SURGERY      drained cyst x 2    lysis of abdominal adhesions      OOPHORECTOMY       TONSILLECTOMY, ADENOIDECTOMY      TRANSANAL RECTAL RESECTION N/A 8/15/2019    Procedure: transanal EXCISION, LESION, RECTUM, ANAL APPROACH full thickness;  Surgeon: JULIUS Fritz MD;  Location: [facility] OR [address] [place] [address];  Service: Colon and Rectal;  Laterality: N/A;       Review of patient's allergies indicates:   Allergen Reactions    Advil [ibuprofen] Nausea Only    Parafon forte      Other reaction(s): Hallucinations    Calcitriol (bulk) Nausea Only    Lisinopril      cough       Family History   Problem Relation Age of Onset    Cancer Father         throat - was a tobacco smoker    Cataracts Father     Kidney disease Father     Cancer Paternal Grandmother         was a tobacco smoker    No Known Problems Mother     No Known Problems Sister     No Known Problems Brother     No Known Problems Maternal Aunt     No Known Problems Maternal Uncle     No Known Problems Paternal Aunt     No Known Problems Paternal Uncle     No Known Problems Maternal Grandmother     No Known Problems Maternal Grandfather     No Known Problems Paternal Grandfather     Cirrhosis Neg Hx     Celiac disease Neg Hx     Colon polyps Neg Hx     Crohn's disease Neg Hx     Esophageal cancer Neg Hx     Inflammatory bowel disease Neg Hx     Liver cancer Neg Hx     Liver disease Neg Hx     Rectal cancer Neg Hx     Stomach cancer Neg Hx     Ulcerative colitis Neg Hx     Amblyopia Neg Hx     Blindness Neg Hx     Diabetes Neg Hx     Glaucoma Neg Hx     Hypertension Neg Hx     Macular degeneration Neg Hx     Retinal detachment Neg Hx     Strabismus Neg Hx     Stroke Neg Hx     Thyroid disease Neg Hx     Melanoma Neg Hx        Social History     Socioeconomic History    Marital status: Single     Spouse name: Not on file    Number of children: Not on file    Years of education: Not on file    Highest education level: Not on file   Occupational History    Not on file   Social Needs    Financial resource strain:  Not on file    Food insecurity:     Worry: Not on file     Inability: Not on file    Transportation needs:     Medical: Not on file     Non-medical: Not on file   Tobacco Use    Smoking status: Never Smoker    Smokeless tobacco: Never Used   Substance and Sexual Activity    Alcohol use: Not Currently     Comment: none    Drug use: No    Sexual activity: Never   Lifestyle    Physical activity:     Days per week: Not on file     Minutes per session: Not on file    Stress: Not on file   Relationships    Social connections:     Talks on phone: Not on file     Gets together: Not on file     Attends Hindu service: Not on file     Active member of club or organization: Not on file     Attends meetings of clubs or organizations: Not on file     Relationship status: Not on file   Other Topics Concern    Are you pregnant or think you may be? No    Breast-feeding No   Social History Narrative    Lives alone.    Great neighbors.    Many friends.    Walks the neighborhood daily with her dog.    Active social life.    Has her own home, takes care of everything and all of her affairs.     From [facility] , came to  1969    2 level house          Stays active     Walks every day , does home exercises     Takes stairs at the house            ROS:  GENERAL: No fever, chills, fatigability or weight loss.  Integument: No rashes, redness, icterus  CHEST: Denies DOMINGUEZ, cyanosis, wheezing, cough and sputum production.  CARDIOVASCULAR: Denies chest pain, PND, orthopnea or reduced exercise tolerance.  GI: Denies abd pain, dysphagia, nausea, vomiting, no hematemesis   : Denies burning on urination, no hematuria, no bacteriuria  MSK: No deformities, swelling, joint pain swelling  Neurologic: No HAs, seizures, weakness, paresthesias, gait problems    PE:  General appearance elderly female, nontoxic    Sclera/ Skin anicteric  LN none palpable  AT NC EOMI  Neck supple trachea midline   Chest symmetric, nl excursion, no retractions,  breathing comfortably  Abdomen  ND soft NT.  no masses, no organomegaly  EXT - no CCE  Neuro:  Mood/ affect nl, alert and oriented x 3, moves all ext's, gait nl    Rectal  Inspection nl appearing anus  LATISHA nl tone, palpable scar without defect.  No mass.      Assessment:  1.  Post operative irritable bowel syndrome with loose stools, incontinence  2.  Wt loss  3.  Abdominal pain    Plan:  Discussed with pt.  Uncertain etiology of pain and loose stools  Will perform CT scan abd pelvis  Check labs  IMODIUM 1 tablet po twice a day  High fiber diet  Metamucil 1 dose daily  OV 2 weeks

## 2019-10-11 ENCOUNTER — HOSPITAL ENCOUNTER (OUTPATIENT)
Dept: RADIOLOGY | Facility: HOSPITAL | Age: 84
Discharge: HOME | End: 2019-10-11
Attending: COLON & RECTAL SURGERY
Payer: MEDICARE

## 2019-10-11 DIAGNOSIS — R10.84 GENERALIZED ABDOMINAL PAIN: ICD-10-CM

## 2019-10-11 PROCEDURE — 74176 CT ABD & PELVIS W/O CONTRAST: CPT | Mod: TC,HCNC

## 2019-10-11 PROCEDURE — 74176 CT ABDOMEN PELVIS WITHOUT CONTRAST: ICD-10-PCS | Mod: 26,HCNC,, | Performed by: RADIOLOGY

## 2019-10-11 PROCEDURE — 74176 CT ABD & PELVIS W/O CONTRAST: CPT | Mod: 26,HCNC,, | Performed by: RADIOLOGY

## 2019-10-11 PROCEDURE — 25500020 PHARM REV CODE 255: Mod: HCNC | Performed by: COLON & RECTAL SURGERY

## 2019-10-11 RX ADMIN — IOHEXOL 15 ML: 350 INJECTION, SOLUTION INTRAVENOUS at 11:10

## 2019-10-22 ENCOUNTER — OFFICE VISIT (OUTPATIENT)
Dept: SURGERY | Facility: CLINIC | Age: 84
End: 2019-10-22
Payer: MEDICARE

## 2019-10-22 VITALS
WEIGHT: 107.81 LBS | BODY MASS INDEX: 18.4 KG/M2 | HEART RATE: 88 BPM | HEIGHT: 64 IN | SYSTOLIC BLOOD PRESSURE: 132 MMHG | DIASTOLIC BLOOD PRESSURE: 84 MMHG

## 2019-10-22 DIAGNOSIS — C20 RECTAL CANCER: Primary | ICD-10-CM

## 2019-10-22 PROCEDURE — 99999 PR PBB SHADOW E&M-EST. PATIENT-LVL III: CPT | Mod: PBBFAC,HCNC,, | Performed by: COLON & RECTAL SURGERY

## 2019-10-22 PROCEDURE — 99999 PR PBB SHADOW E&M-EST. PATIENT-LVL III: ICD-10-PCS | Mod: PBBFAC,HCNC,, | Performed by: COLON & RECTAL SURGERY

## 2019-10-22 PROCEDURE — 99024 PR POST-OP FOLLOW-UP VISIT: ICD-10-PCS | Mod: HCNC,S$GLB,, | Performed by: COLON & RECTAL SURGERY

## 2019-10-22 PROCEDURE — 99024 POSTOP FOLLOW-UP VISIT: CPT | Mod: HCNC,S$GLB,, | Performed by: COLON & RECTAL SURGERY

## 2019-10-22 NOTE — LETTER
October 22, 2019      Katherine Hutchinson MD  [address]  [facility] [address]           [facility]  [address]  [facility] [address]  Phone: [phone]          Patient: Karli Hameed   MR Number: [id number]   YOB: 1931   Date of Visit: 10/22/2019       Dear Dr. Katherine Hutchinson:    Thank you for referring Karli Hameed to me for evaluation. Attached you will find relevant portions of my assessment and plan of care.    If you have questions, please do not hesitate to call me. I look forward to following Karli Hameed along with you.    Sincerely,    JULIUS Fritz MD    Enclosure  CC:  No Recipients    If you would like to receive this communication electronically, please contact [email or web address] or [phone] to request more information on [employer] access.    For providers and/or their staff who would like to refer a patient to [facility], please contact us through our one-stop-shop provider referral line, [facility], at [phone].    If you feel you have received this communication in error or would no longer like to receive these types of communications, please e-mail [email or web address]

## 2019-10-22 NOTE — PROGRESS NOTES
HPI:  Karli Hameed is a 87 y.o. female with history of rectal cancer status post transanal excision.  She had had major issues with diarrhea following her excision with fecal incontinence and anal discomfort.  Since beginning Imodium twice a day this has resolved.  She feels much better.  She is back to regular activities.  She denies any further diarrhea or incontinence. She denies any rectal bleeding.  Appetite is good.      SPECIMEN  1) Rectal tumor; long black proximal, long white left, short right white, short black distal.  FINAL PATHOLOGIC DIAGNOSIS  Rectum, transanal excision:  -INVASIVE ADENOCARCINOMA, MODERATELY DIFFERENTIATED, 2.4 CM (pT2), see synoptic report  -SURGICAL MARGINS ARE NEGATIVE FOR MALIGNANCY  SYNOPTIC REPORT (COLON AND RECTUM):  Procedure: Transanal excision  Tumor site: Rectum  Tumor size: 2.4 x 1.2 cm  Histologic type: Adenocarcinoma  Histologic grade: Moderately differentiated, G2  Tumor extension: Tumor invades the superficial muscularis propria  Margins: All margins are uninvolved by invasive carcinoma, high-grade dysplasia, intramucosal adenocarcinoma,  and adenoma  Margins examined: Proximal, distal, left, right, and deep  Lymphovascular invasion: Not identified  Perineural invasion: Not identified  Regional lymph nodes: No lymph nodes submitted or found  Pathologic stage classification (pTNM, AJCC 8th Edition): [id number]  Diagnosed by: Feroz Tejeda  (Electronically Signed: 2019-08-26 08:55:43)      Preoperative MRI  FINDINGS:  Tumor Location: Low, just above the anal verge.  Relationship to anterior peritoneal reflection: Below.  Tumor Characteristics:  Circumferential extent/location (clock face): Non circumferential.  Left-sided from 12-6 o'clock  Tumor Length: 4 cm  Mucinous: No  Tumor Extent:  Does not extend beyond muscularis into perirectal fat.  No definite spiculation of the perirectal fat.  No definite invasion of adjacent structures.  The distance of tumor to the  mesorectal fascia is 0.5 cm.  For low rectal tumors only: Tumor extends below the upper border of the puborectalis sling. Exact extension is unclear secondary to motion artifact.    Lymph Nodes:  No perirectal lymph nodes greater than 5 mm are visualized in the mesorectal fat.  No extra-mesorectal nodes lymph nodes in the visualized pelvis.  No lymph nodes that are suspicious based on heterogeneous spiculated morphology.    No evidence of vascular invasion.    Pa  Past Medical History:   Diagnosis Date    Allergy     Anxiety     Arthritis     Back pain     Basal cell carcinoma 6/2011    R nasal ala, excised via Mohs    Chronic kidney disease, stage II (mild) 8/25/2012    cyst since 1975    Flank pain 8/21/2012    HTN (hypertension) 8/21/2012    Inflammatory bowel disease     Joint pain     Kidney stone     left     Lactose disaccharidase deficiency     Rectal cancer 7/24/2019    Senile osteoporosis 4/11/2016    Small bowel obstruction, 06-, resolved witjhout surgery 7/1/2014    Trace cataracts     Ulcer     hx    Urinary tract infection         Past Surgical History:   Procedure Laterality Date    ABDOMINAL SURGERY      APPENDECTOMY      CATARACT EXTRACTION W/  INTRAOCULAR LENS IMPLANT Right 02/12/2015    Dr. Sahni    CATARACT EXTRACTION W/  INTRAOCULAR LENS IMPLANT Left 04/09/2015    CHOLECYSTECTOMY      COLONOSCOPY N/A 6/19/2019    Procedure: COLONOSCOPY;  Surgeon: Beto Rivera MD;  Location: [address]);  Service: Endoscopy;  Laterality: N/A;  to be scheudled on 6/19/19 with Dr. Rivera per JACY Fischer NP    EYE SURGERY      HERNIA REPAIR  2017    Open rih with mesh    HYSTERECTOMY      KIDNEY SURGERY      drained cyst x 2    lysis of abdominal adhesions      OOPHORECTOMY      TONSILLECTOMY, ADENOIDECTOMY      TRANSANAL RECTAL RESECTION N/A 8/15/2019    Procedure: transanal EXCISION, LESION, RECTUM, ANAL APPROACH full thickness;  Surgeon: JULIUS Fritz MD;   [facility];  Service: Colon and Rectal;  Laterality: N/A;       Review of patient's allergies indicates:   Allergen Reactions    Advil [ibuprofen] Nausea Only    Parafon forte      Other reaction(s): Hallucinations    Calcitriol (bulk) Nausea Only    Lisinopril      cough       Family History   Problem Relation Age of Onset    Cancer Father         throat - was a tobacco smoker    Cataracts Father     Kidney disease Father     Cancer Paternal Grandmother         was a tobacco smoker    No Known Problems Mother     No Known Problems Sister     No Known Problems Brother     No Known Problems Maternal Aunt     No Known Problems Maternal Uncle     No Known Problems Paternal Aunt     No Known Problems Paternal Uncle     No Known Problems Maternal Grandmother     No Known Problems Maternal Grandfather     No Known Problems Paternal Grandfather     Cirrhosis Neg Hx     Celiac disease Neg Hx     Colon polyps Neg Hx     Crohn's disease Neg Hx     Esophageal cancer Neg Hx     Inflammatory bowel disease Neg Hx     Liver cancer Neg Hx     Liver disease Neg Hx     Rectal cancer Neg Hx     Stomach cancer Neg Hx     Ulcerative colitis Neg Hx     Amblyopia Neg Hx     Blindness Neg Hx     Diabetes Neg Hx     Glaucoma Neg Hx     Hypertension Neg Hx     Macular degeneration Neg Hx     Retinal detachment Neg Hx     Strabismus Neg Hx     Stroke Neg Hx     Thyroid disease Neg Hx     Melanoma Neg Hx        Social History     Socioeconomic History    Marital status: Single     Spouse name: Not on file    Number of children: Not on file    Years of education: Not on file    Highest education level: Not on file   Occupational History    Not on file   Social Needs    Financial resource strain: Not on file    Food insecurity:     Worry: Not on file     Inability: Not on file    Transportation needs:     Medical: Not on file     Non-medical: Not on file   Tobacco Use    Smoking  "status: Never Smoker    Smokeless tobacco: Never Used   Substance and Sexual Activity    Alcohol use: Not Currently     Comment: none    Drug use: No    Sexual activity: Never   Lifestyle    Physical activity:     Days per week: Not on file     Minutes per session: Not on file    Stress: Not on file   Relationships    Social connections:     Talks on phone: Not on file     Gets together: Not on file     Attends [employer] service: Not on file     Active member of club or organization: Not on file     Attends meetings of clubs or organizations: Not on file     Relationship status: Not on file   Other Topics Concern    Are you pregnant or think you may be? No    Breast-feeding No   Social History Narrative    Lives alone.    Great neighbors.    Many friends.    Walks the neighborhood daily with her dog.    Active social life.    Has her own home, takes care of everything and all of her affairs.     From [facility] , came to [place]    2 level house          Stays active     Walks every day , does home exercises     Takes stairs at the house            ROS:  GENERAL: No fever, chills, fatigability or weight loss.  Integument: No rashes, redness, icterus  CHEST: Denies DOMINGUEZ, cyanosis, wheezing, cough and sputum production.  CARDIOVASCULAR: Denies chest pain, PND, orthopnea or reduced exercise tolerance.  GI: Denies abd pain, dysphagia, nausea, vomiting, no hematemesis   : Denies burning on urination, no hematuria, no bacteriuria  MSK: No deformities, swelling, joint pain swelling  Neurologic: No HAs, seizures, weakness, paresthesias, gait problems    PE:  General appearance much happier and like herself.  /84 (BP Location: Left arm, Patient Position: Sitting, BP Method: Large (Automatic))   Pulse 88   Ht 5' 4" (1.626 m)   Wt 48.9 kg (107 lb 12.9 oz)   BMI 18.50 kg/m²   Sclera/ Skin anicteric  AT NC EOMI  Neck supple trachea midline   Chest symmetric, nl excursion, no retractions, breathing comfortably  EXT " - no CCE  Neuro:  Mood/ affect nl, alert and oriented x 3, moves all ext's, gait nl      Assessment:  T2 rectal cancer status post transanal excision with negative margins.  No adverse features.  Metastatic workup negative    Plan:  Discussed with patient the risk of local recurrence.  I recommended to her that she consider postoperative adjuvant radiation therapy.  She is more inclined to consider short course radiotherapy.  She will meet with Radiation Oncology for consultation.  Return to clinic 3 months for flexible sigmoidoscopy.

## 2019-10-22 NOTE — PROGRESS NOTES
Date: 10/23/2019    Referring provider: JULIUS Fritz MD     Radiation Oncology Consultation    Reason: Stage I pT2N0 rectal adenocarcinoma following transanal excision.    HPI: Karli Hameed is a 87 y.o. female with recent diagnosis of early stage low-lying rectal cancer following transanal excision, here today for initial consultation.    She was diagnosed in 6/19 after experiencing intermittent rectal bleeding for months and undergoing colonoscopy, which demonstrated a frond-like/villous non-obstructing large mass near the anus, non-circumferential, measuring 7 cm in length, with biopsy demonstrating adenocarcinoma. CT C/A/P on 6/26/19 demonstrated nonspecific soft tissue thickening in the rectum, correlating with colonoscopy findings and two subcentimeter soft tissue nodules in the right lung.    MRI pelvis on 7/10/19 demonstrated a 4 cm non-circumferential mass, not extending beyond the muscularis, just above the anal verge without suspicious lymphadenopathy.    She underwent transanal excision of the rectal with Dr. Fritz on 8/15/19, with pathology demonstrating 2.4 cm moderately differentiated adenocarcinoma invading the superficial muscularis propria, negative margins and negative LVI/PNI, [id number].    She developed issues with diarrhea and fecal incontinence following the procedure and CT C/A/P on 10/11/19 re-demonstrated soft tissue thickening at the distal rectum. The patient started using Imodium and these symptoms resolved. She was seen by Dr. Fritz on 10/22/19, with recommendations for adjuvant radiation therapy due to increased risk of local recurrence without adjuvant therapy. The patient was subsequently referred by Dr. Fritz to our department for additional discussion regarding radiotherapy.    Today, she is doing very well, denying fevers, chills, nausea, vomiting, diarrhea, or pain.    Prior Radiation History: Denies    Past Medical History:   Diagnosis Date    Allergy     Anxiety      Arthritis     Back pain     Basal cell carcinoma 6/2011    R nasal ala, excised via Mohs    Chronic kidney disease, stage II (mild) 8/25/2012    cyst since 1975    Flank pain 8/21/2012    HTN (hypertension) 8/21/2012    Inflammatory bowel disease     Joint pain     Kidney stone     left     Lactose disaccharidase deficiency     Rectal cancer 7/24/2019    Senile osteoporosis 4/11/2016    Small bowel obstruction, 06-, resolved witjhout surgery 7/1/2014    Trace cataracts     Ulcer     hx    Urinary tract infection        Past Surgical History:   Procedure Laterality Date    ABDOMINAL SURGERY      APPENDECTOMY      CATARACT EXTRACTION W/  INTRAOCULAR LENS IMPLANT Right 02/12/2015    Dr. Sahni    CATARACT EXTRACTION W/  INTRAOCULAR LENS IMPLANT Left 04/09/2015    CHOLECYSTECTOMY      COLONOSCOPY N/A 6/19/2019    Procedure: COLONOSCOPY;  Surgeon: Beto Rivera MD;  Location: [facility] (4TH FLR);  Service: Endoscopy;  Laterality: N/A;  to be scheudled on 6/19/19 with Dr. Rivera per JACY Fischer NP    EYE SURGERY      HERNIA REPAIR  2017    Open rih with mesh    HYSTERECTOMY      KIDNEY SURGERY      drained cyst x 2    lysis of abdominal adhesions      OOPHORECTOMY      TONSILLECTOMY, ADENOIDECTOMY      TRANSANAL RECTAL RESECTION N/A 8/15/2019    Procedure: transanal EXCISION, LESION, RECTUM, ANAL APPROACH full thickness;  Surgeon: JULIUS Fritz MD;  [facility];  Service: Colon and Rectal;  Laterality: N/A;       Social History     Tobacco Use    Smoking status: Never Smoker    Smokeless tobacco: Never Used   Substance Use Topics    Alcohol use: Not Currently     Comment: none    Drug use: No       Cancer-related family history includes Cancer in her father and paternal grandmother. There is no history of Esophageal cancer, Liver cancer, Rectal cancer, or Melanoma.    Current Outpatient Medications on File Prior to Visit   Medication Sig Dispense Refill     acetaminophen (TYLENOL) 325 MG tablet Take by mouth. 1 Tablet Oral .  Tylenol.To take as needed for arthritis pain.      amLODIPine (NORVASC) 5 MG tablet Take 1 tablet (5 mg total) by mouth every morning. 90 tablet 3    cholecalciferol, vitamin D3, 1,000 unit capsule Take 2 capsules (2,000 Units total) by mouth once daily. (Patient taking differently: Take 1,000 Units by mouth once daily. )  0    HYDROcodone-acetaminophen (NORCO) 5-325 mg per tablet Take 1 tablet by mouth every 6 (six) hours as needed for Pain. 25 tablet 0    losartan (COZAAR) 50 MG tablet Take 1 tablet (50 mg total) by mouth once daily. 90 tablet 3    methylcellulose (CITRUCEL ORAL) Take by mouth once daily.      varicella-zoster [id number], PF, (SHINGRIX, PF,) 50 mcg/0.5 mL injection Inject into the muscle. 0.5 mL 0     No current facility-administered medications on file prior to visit.        Review of patient's allergies indicates:   Allergen Reactions    Advil [ibuprofen] Nausea Only    Parafon forte      Other reaction(s): Hallucinations    Calcitriol (bulk) Nausea Only    Lisinopril      cough       Review of Systems   Constitutional: Positive for unexpected weight change. Negative for fatigue.   HENT: Negative for congestion, sore throat and voice change.    Eyes: Negative for photophobia and visual disturbance.   Respiratory: Negative for cough, shortness of breath and wheezing.    Cardiovascular: Negative for chest pain and palpitations.   Gastrointestinal: Negative for abdominal distention, abdominal pain, diarrhea, nausea, rectal pain and vomiting.   Genitourinary: Positive for urgency. Negative for difficulty urinating.   Musculoskeletal: Positive for back pain. Negative for joint swelling.   Skin: Positive for rash. Negative for color change.   Neurological: Negative for dizziness and weakness.   Hematological: Negative for adenopathy.   Psychiatric/Behavioral: Negative for agitation and dysphoric mood. The patient is not  nervous/anxious.         Vital Signs:   Vitals:    10/23/19 1319   BP: (!) 176/89   Pulse: 93   Resp: 20   Temp: 97.8 °F (36.6 °C)          ECOG Performance Status: 1 - Ambulates, capable of light work    Physical exam:    Constitutional:  Very pleasant elderly woman, looking much younger than stated age and in no acute distress.  Head: Normocephalic, atraumatic.    Eyes: Sclerae are non-icteric.  Pupils are equal and round.  Extraocular movements are intact.  ENMT: Oral cavity and oropharynx are without lesions. Mucous membranes are moist.  Neck: Supple.  No palpable cervical or supraclavicular adenopathy.   Pulmonary: Clear to auscultation, bilaterally.  No rhonchi, rales or wheezes.  Cardiovascular: Regular rate and rhythm.   No murmurs heard.  No edema.  GI: Soft, nontender and nondistended.  No palpable masses or hepatosplenomegaly.  Musculoskeletal: No palpable spinous or paraspinous tenderness.  Range of motion appears normal in all 4 extremities.  Lymphatics: No palpable cervical, supraclavicular, axillary adenopathy.  Extremities: No clubbing, cyanosis, or edema.    Skin: No visible lesions.  Neurologic: CN II-XII are grossly intact.  Motor strength is 5/5 in bilateral upper and lower extremities and symmetric.    Sensory exam is grossly intact to touch.  Gait is normal.    Psychiatric: Patient is alert and oriented x 3.  Normal mood and affect.    Labs: I have personally reviewed the patient's available lab work and reports and summarized pertinent findings above in HPI.     Imaging: I have personally reviewed the patient's available images and reports and summarized pertinent findings above in HPI.     Pathology: I have personally reviewed the patient's available pathology and summarized pertinent findings above in HPI.     Assessment:  Karli Hameed is a 87 y.o. female with stage I [id number] moderately differentiated adenocarcinoma of the rectum s/p transanal excision, here today for initial  consultation.    Plan:  I agree with previous recommendations for adjuvant radiotherapy, due to Ms. Hameed's increased risk of local recurrence without further post-operative treatment. She is an appropriate candidate for a 5 fraction short-course radiotherapy treatment regimen and will be scheduled for next available CT simulation to begin the radiation treatment planning process.     Plan is subject to change pending further analysis.    GI INFORMED CONSENT: Acute and delayed side effects of gastrointestinal radiation, including but not limited to fatigue, redness of the skin, desquamation, nausea, vomiting, diarrhea, bladder irritation, tenesmus, infection as well as rare but catastrophic injury to the spinal cord and digestive tract were discussed with the patient in great detail today.    I reviewed the rationale and goal of the proposed treatment course. The radiotherapeutic procedure with associated side effects and potential complications were explained. They had the opportunity to ask questions which were answered to the best of my knowledge. The patient voiced understanding of the above and has elected to proceed with treatment. Consent form was signed. They were also given our contact information should further questions or concerns arise.    I spent approximately 60 minutes reviewing the available records and evaluating the patient, out of which over 50% of the time was spent face to face with the patient in counseling and coordinating this patient's care.    Thank you for allowing me to participate in the care of this delightful patient. Please feel free to contact me with any questions or concerns.    Andrew Ohara MD  Radiation Oncology  [facility]

## 2019-10-23 ENCOUNTER — INITIAL CONSULT (OUTPATIENT)
Dept: RADIATION ONCOLOGY | Facility: CLINIC | Age: 84
End: 2019-10-23
Payer: MEDICARE

## 2019-10-23 VITALS
TEMPERATURE: 98 F | DIASTOLIC BLOOD PRESSURE: 89 MMHG | HEIGHT: 64 IN | RESPIRATION RATE: 20 BRPM | BODY MASS INDEX: 18.59 KG/M2 | HEART RATE: 93 BPM | WEIGHT: 108.88 LBS | SYSTOLIC BLOOD PRESSURE: 176 MMHG

## 2019-10-23 DIAGNOSIS — C20 RECTAL CANCER: Primary | ICD-10-CM

## 2019-10-23 PROCEDURE — 99499 RISK ADDL DX/OHS AUDIT: ICD-10-PCS | Mod: HCNC,S$GLB,, | Performed by: RADIOLOGY

## 2019-10-23 PROCEDURE — 99999 PR PBB SHADOW E&M-EST. PATIENT-LVL III: CPT | Mod: PBBFAC,HCNC,, | Performed by: RADIOLOGY

## 2019-10-23 PROCEDURE — 99999 PR PBB SHADOW E&M-EST. PATIENT-LVL III: ICD-10-PCS | Mod: PBBFAC,HCNC,, | Performed by: RADIOLOGY

## 2019-10-23 PROCEDURE — 99205 PR OFFICE/OUTPT VISIT, NEW, LEVL V, 60-74 MIN: ICD-10-PCS | Mod: HCNC,S$GLB,, | Performed by: RADIOLOGY

## 2019-10-23 PROCEDURE — 1101F PR PT FALLS ASSESS DOC 0-1 FALLS W/OUT INJ PAST YR: ICD-10-PCS | Mod: HCNC,CPTII,S$GLB, | Performed by: RADIOLOGY

## 2019-10-23 PROCEDURE — 99205 OFFICE O/P NEW HI 60 MIN: CPT | Mod: HCNC,S$GLB,, | Performed by: RADIOLOGY

## 2019-10-23 PROCEDURE — 99499 UNLISTED E&M SERVICE: CPT | Mod: HCNC,S$GLB,, | Performed by: RADIOLOGY

## 2019-10-23 PROCEDURE — 1101F PT FALLS ASSESS-DOCD LE1/YR: CPT | Mod: HCNC,CPTII,S$GLB, | Performed by: RADIOLOGY

## 2019-10-23 NOTE — LETTER
October 23, 2019      JULIUS Fritz MD  [address]  [facility] [address]           [facility]  [address]  [facility] [address]  Phone: [phone]          Patient: Karli Hameed   MR Number: [id number]   YOB: 1931   Date of Visit: 10/23/2019       Dear Dr. JULIUS Fritz:    Thank you for referring Karli Hameed to me for evaluation. Attached you will find relevant portions of my assessment and plan of care.    If you have questions, please do not hesitate to call me. I look forward to following Karli Hameed along with you.    Sincerely,    Gunnar Ohara MD    Enclosure  CC:  No Recipients    If you would like to receive this communication electronically, please contact [email or web address] or [phone] to request more information on frintit Link access.    For providers and/or their staff who would like to refer a patient to [facility], please contact us through our one-stop-shop provider referral line, [facility] , at [phone].    If you feel you have received this communication in error or would no longer like to receive these types of communications, please e-mail [email or web address]

## 2019-10-28 ENCOUNTER — HOSPITAL ENCOUNTER (OUTPATIENT)
Dept: RADIATION THERAPY | Facility: HOSPITAL | Age: 84
Discharge: HOME | End: 2019-10-28
Attending: RADIOLOGY
Payer: MEDICARE

## 2019-10-28 PROCEDURE — 77290 THER RAD SIMULAJ FIELD CPLX: CPT | Mod: TC,HCNC | Performed by: RADIOLOGY

## 2019-10-28 PROCEDURE — 77290 THER RAD SIMULAJ FIELD CPLX: CPT | Mod: 26,HCNC,, | Performed by: RADIOLOGY

## 2019-10-28 PROCEDURE — 77263 PR  RADIATION THERAPY PLAN COMPLEX: ICD-10-PCS | Mod: HCNC,,, | Performed by: RADIOLOGY

## 2019-10-28 PROCEDURE — 77263 THER RADIOLOGY TX PLNG CPLX: CPT | Mod: HCNC,,, | Performed by: RADIOLOGY

## 2019-10-28 PROCEDURE — 77334 RADIATION TREATMENT AID(S): CPT | Mod: TC,HCNC | Performed by: RADIOLOGY

## 2019-10-28 PROCEDURE — 77290 PR  SET RADN THERAPY FIELD COMPLEX: ICD-10-PCS | Mod: 26,HCNC,, | Performed by: RADIOLOGY

## 2019-10-28 PROCEDURE — 77334 RADIATION TREATMENT AID(S): CPT | Mod: 26,HCNC,, | Performed by: RADIOLOGY

## 2019-10-28 PROCEDURE — 77014 HC CT GUIDANCE RADIATION THERAPY FLDS PLACEMENT: CPT | Mod: TC,HCNC | Performed by: RADIOLOGY

## 2019-10-28 PROCEDURE — 77334 PR  RADN TREATMENT AID(S) COMPLX: ICD-10-PCS | Mod: 26,HCNC,, | Performed by: RADIOLOGY

## 2019-10-29 PROCEDURE — 77301 RADIOTHERAPY DOSE PLAN IMRT: CPT | Mod: 26,HCNC,, | Performed by: RADIOLOGY

## 2019-10-29 PROCEDURE — 77301 RADIOTHERAPY DOSE PLAN IMRT: CPT | Mod: TC,HCNC | Performed by: RADIOLOGY

## 2019-10-29 PROCEDURE — 77301 PR  INTEN MOD RADIOTHER PLAN W/DOSE VOL HIST: ICD-10-PCS | Mod: 26,HCNC,, | Performed by: RADIOLOGY

## 2019-10-30 PROCEDURE — 77338 PR  MLC IMRT DESIGN & CONSTRUCTION PER IMRT PLAN: ICD-10-PCS | Mod: 26,HCNC,, | Performed by: RADIOLOGY

## 2019-10-30 PROCEDURE — 77300 RADIATION THERAPY DOSE PLAN: CPT | Mod: TC,HCNC | Performed by: RADIOLOGY

## 2019-10-30 PROCEDURE — 77338 DESIGN MLC DEVICE FOR IMRT: CPT | Mod: 26,HCNC,, | Performed by: RADIOLOGY

## 2019-10-30 PROCEDURE — 77300 PR RADIATION THERAPY,DOSIMETRY PLAN: ICD-10-PCS | Mod: 26,HCNC,, | Performed by: RADIOLOGY

## 2019-10-30 PROCEDURE — 77300 RADIATION THERAPY DOSE PLAN: CPT | Mod: 26,HCNC,, | Performed by: RADIOLOGY

## 2019-10-30 PROCEDURE — 77338 DESIGN MLC DEVICE FOR IMRT: CPT | Mod: TC,HCNC | Performed by: RADIOLOGY

## 2019-11-01 ENCOUNTER — HOSPITAL ENCOUNTER (OUTPATIENT)
Dept: RADIATION THERAPY | Facility: HOSPITAL | Age: 84
Discharge: HOME | End: 2019-11-01
Attending: RADIOLOGY
Payer: MEDICARE

## 2019-11-04 ENCOUNTER — INFUSION (OUTPATIENT)
Dept: INFECTIOUS DISEASES | Facility: HOSPITAL | Age: 84
End: 2019-11-04
Attending: INTERNAL MEDICINE
Payer: MEDICARE

## 2019-11-04 VITALS — BODY MASS INDEX: 18.6 KG/M2 | HEIGHT: 64 IN | WEIGHT: 108.94 LBS

## 2019-11-04 DIAGNOSIS — M85.80 OSTEOPENIA, UNSPECIFIED LOCATION: Primary | ICD-10-CM

## 2019-11-04 PROCEDURE — 77386 HC IMRT, COMPLEX: CPT | Mod: HCNC | Performed by: RADIOLOGY

## 2019-11-04 PROCEDURE — 63600175 PHARM REV CODE 636 W HCPCS: Mod: JG,HCNC | Performed by: INTERNAL MEDICINE

## 2019-11-04 PROCEDURE — 77014 HC CT GUIDANCE RADIATION THERAPY FLDS PLACEMENT: CPT | Mod: TC,HCNC | Performed by: RADIOLOGY

## 2019-11-04 PROCEDURE — 96372 THER/PROPH/DIAG INJ SC/IM: CPT | Mod: HCNC

## 2019-11-04 RX ADMIN — DENOSUMAB 60 MG: 60 INJECTION SUBCUTANEOUS at 11:11

## 2019-11-05 PROCEDURE — 77386 HC IMRT, COMPLEX: CPT | Mod: HCNC | Performed by: RADIOLOGY

## 2019-11-05 PROCEDURE — 77014 HC CT GUIDANCE RADIATION THERAPY FLDS PLACEMENT: CPT | Mod: TC,HCNC | Performed by: RADIOLOGY

## 2019-11-06 PROCEDURE — 77014 HC CT GUIDANCE RADIATION THERAPY FLDS PLACEMENT: CPT | Mod: TC,HCNC | Performed by: RADIOLOGY

## 2019-11-06 PROCEDURE — 77386 HC IMRT, COMPLEX: CPT | Mod: HCNC | Performed by: RADIOLOGY

## 2019-11-07 PROCEDURE — 77386 HC IMRT, COMPLEX: CPT | Mod: HCNC | Performed by: RADIOLOGY

## 2019-11-07 PROCEDURE — 77014 HC CT GUIDANCE RADIATION THERAPY FLDS PLACEMENT: CPT | Mod: TC,HCNC | Performed by: RADIOLOGY

## 2019-11-08 ENCOUNTER — DOCUMENTATION ONLY (OUTPATIENT)
Dept: RADIATION ONCOLOGY | Facility: CLINIC | Age: 84
End: 2019-11-08

## 2019-11-08 DIAGNOSIS — C20 RECTAL CANCER: Primary | ICD-10-CM

## 2019-11-08 PROCEDURE — 77336 RADIATION PHYSICS CONSULT: CPT | Mod: HCNC | Performed by: RADIOLOGY

## 2019-11-08 PROCEDURE — 77014 HC CT GUIDANCE RADIATION THERAPY FLDS PLACEMENT: CPT | Mod: TC,HCNC | Performed by: RADIOLOGY

## 2019-11-08 PROCEDURE — 77386 HC IMRT, COMPLEX: CPT | Mod: HCNC | Performed by: RADIOLOGY

## 2019-11-08 RX ORDER — ONDANSETRON 4 MG/1
4 TABLET, FILM COATED ORAL EVERY 8 HOURS PRN
Qty: 30 TABLET | Refills: 1 | Status: SHIPPED | OUTPATIENT
Start: 2019-11-08 | End: 2021-03-05 | Stop reason: SDUPTHER

## 2019-11-11 ENCOUNTER — PATIENT OUTREACH (OUTPATIENT)
Dept: ADMINISTRATIVE | Facility: HOSPITAL | Age: 84
End: 2019-11-11

## 2019-11-14 ENCOUNTER — OFFICE VISIT (OUTPATIENT)
Dept: INTERNAL MEDICINE | Facility: CLINIC | Age: 84
End: 2019-11-14
Payer: MEDICARE

## 2019-11-14 VITALS
BODY MASS INDEX: 18.06 KG/M2 | HEART RATE: 84 BPM | DIASTOLIC BLOOD PRESSURE: 64 MMHG | HEIGHT: 64 IN | SYSTOLIC BLOOD PRESSURE: 110 MMHG | WEIGHT: 105.81 LBS | OXYGEN SATURATION: 99 %

## 2019-11-14 DIAGNOSIS — R19.7 DIARRHEA, UNSPECIFIED TYPE: ICD-10-CM

## 2019-11-14 DIAGNOSIS — R58 BLOOD LOSS: Primary | ICD-10-CM

## 2019-11-14 DIAGNOSIS — Z98.890 HISTORY OF GENERAL ANESTHESIA: ICD-10-CM

## 2019-11-14 DIAGNOSIS — I10 ESSENTIAL HYPERTENSION: ICD-10-CM

## 2019-11-14 DIAGNOSIS — C20 RECTAL CANCER: ICD-10-CM

## 2019-11-14 DIAGNOSIS — N18.5 STAGE 5 CHRONIC KIDNEY DISEASE NOT ON CHRONIC DIALYSIS: ICD-10-CM

## 2019-11-14 DIAGNOSIS — Z23 NEED FOR PNEUMOCOCCAL VACCINATION: ICD-10-CM

## 2019-11-14 PROCEDURE — 1101F PT FALLS ASSESS-DOCD LE1/YR: CPT | Mod: HCNC,CPTII,S$GLB, | Performed by: INTERNAL MEDICINE

## 2019-11-14 PROCEDURE — 1101F PR PT FALLS ASSESS DOC 0-1 FALLS W/OUT INJ PAST YR: ICD-10-PCS | Mod: HCNC,CPTII,S$GLB, | Performed by: INTERNAL MEDICINE

## 2019-11-14 PROCEDURE — 99499 UNLISTED E&M SERVICE: CPT | Mod: HCNC,S$GLB,, | Performed by: INTERNAL MEDICINE

## 2019-11-14 PROCEDURE — 99999 PR PBB SHADOW E&M-EST. PATIENT-LVL IV: CPT | Mod: PBBFAC,HCNC,, | Performed by: INTERNAL MEDICINE

## 2019-11-14 PROCEDURE — 99214 OFFICE O/P EST MOD 30 MIN: CPT | Mod: HCNC,S$GLB,, | Performed by: INTERNAL MEDICINE

## 2019-11-14 PROCEDURE — 99214 PR OFFICE/OUTPT VISIT, EST, LEVL IV, 30-39 MIN: ICD-10-PCS | Mod: HCNC,S$GLB,, | Performed by: INTERNAL MEDICINE

## 2019-11-14 PROCEDURE — 99499 RISK ADDL DX/OHS AUDIT: ICD-10-PCS | Mod: HCNC,S$GLB,, | Performed by: INTERNAL MEDICINE

## 2019-11-14 PROCEDURE — 99999 PR PBB SHADOW E&M-EST. PATIENT-LVL IV: ICD-10-PCS | Mod: PBBFAC,HCNC,, | Performed by: INTERNAL MEDICINE

## 2019-11-15 ENCOUNTER — TELEPHONE (OUTPATIENT)
Dept: RADIATION ONCOLOGY | Facility: CLINIC | Age: 84
End: 2019-11-15

## 2019-11-15 NOTE — TELEPHONE ENCOUNTER
----- Message from Gina Torres sent at 11/15/2019 11:40 AM CST -----  Pt has had diarrhea since she finished her treatment last week. She wants to know what she should do. Please call at [phone].  Return call to patient who is experiencing diarrhea after completing radiation to the rectum  Instructed to take immodium as directed and to push fluids

## 2019-11-15 NOTE — PROGRESS NOTES
Subjective:       Patient ID: Karli Hameed is a 87 y.o. female.    Chief Complaint: Follow-up (3 month f/u ); Abdominal Cramping; and Nausea   She is completing radiation therapy  The main difficulty that she is having is diarrhea and nausea    She denies pleuritic chest pain.  She is sleeping well.  She has not had any edema.  No shortness of breath.  Her blood pressure has been well controlled.  It is 110/64 today and this is about the same blood pressure she is getting at home consistently.  She has no itching.  HPI  Review of Systems   Constitutional: Negative for activity change, appetite change, chills, fatigue, fever and unexpected weight change.   HENT: Negative for hearing loss.    Eyes: Negative for visual disturbance.   Respiratory: Negative for cough, chest tightness, shortness of breath and wheezing.    Cardiovascular: Negative for chest pain, palpitations and leg swelling.   Gastrointestinal: Negative for abdominal pain, constipation, nausea and vomiting.   Genitourinary: Negative for dysuria, frequency and urgency.   Musculoskeletal: Negative for arthralgias, back pain, gait problem, joint swelling and myalgias.   Skin: Negative for rash.   Neurological: Negative for light-headedness and headaches.   Psychiatric/Behavioral: Negative for dysphoric mood and sleep disturbance. The patient is not nervous/anxious.        Objective:      Physical Exam   Constitutional: She is oriented to person, place, and time. No distress.   HENT:   Head: Normocephalic and atraumatic.   Right Ear: External ear normal.   Left Ear: External ear normal.   Nose: Nose normal.   Mouth/Throat: Oropharynx is clear and moist. No oropharyngeal exudate.   Eyes: Conjunctivae are normal. No scleral icterus.   Neck: Neck supple.   Cardiovascular: Normal rate and regular rhythm.   Pulmonary/Chest: Effort normal and breath sounds normal.   Abdominal: Soft. There is no tenderness.   Musculoskeletal: She exhibits no edema.    Lymphadenopathy:     She has no cervical adenopathy.   Neurological: She is alert and oriented to person, place, and time. She exhibits normal muscle tone. Coordination normal.   Skin: Skin is warm and dry.   Psychiatric: She has a normal mood and affect. Her behavior is normal.   Nursing note and vitals reviewed.      Assessment:       1. Blood loss    2. Diarrhea, unspecified type    3. Stage 5 chronic kidney disease not on chronic dialysis    4. Essential hypertension        Plan:   Karli was seen today for follow-up, abdominal cramping and nausea.    Diagnoses and all orders for this visit:    Blood loss, occult.  Monitor.  -     CBC auto differential; Future  -     Ferritin; Future  -     Comprehensive metabolic panel; Future    Diarrhea, unspecified type, stop [employer] instant coffee crystals. Try Cool Brew, refrigerated area    Stage 5 chronic kidney disease not on chronic dialysis, no signs of uremia  -     CBC auto differential; Future  -     Ferritin; Future  -     Comprehensive metabolic panel; Future    Essential hypertension, controlled.

## 2019-11-19 ENCOUNTER — TELEPHONE (OUTPATIENT)
Dept: RADIATION ONCOLOGY | Facility: CLINIC | Age: 84
End: 2019-11-19

## 2019-11-19 NOTE — TELEPHONE ENCOUNTER
Ms. Hameed called our office this AM with complaints of nausea, fatigue, and continued diarrhea with intermittent blood in stool.    I advised her to start taking Zofran every 8 hours until her nausea symptoms improve. I also counseled her regarding diet and changing her feeding schedule to every 2 hours with small high calorie meals.     For her diarrhea, I recommended increasing her Imodium usage, up to 8 pills daily. I also explained that her current symptoms of tenesmus and diarrhea are common in the 2-3 weeks following short course rectal irradiation and improvement should follow in the next few weeks.    My office plans to follow up with the patient tomorrow for hopeful clinical improvement.    Andrew Ohara MD

## 2019-11-20 ENCOUNTER — INFUSION (OUTPATIENT)
Dept: INFUSION THERAPY | Facility: HOSPITAL | Age: 84
End: 2019-11-20
Attending: RADIOLOGY
Payer: MEDICARE

## 2019-11-20 ENCOUNTER — OFFICE VISIT (OUTPATIENT)
Dept: RADIATION ONCOLOGY | Facility: CLINIC | Age: 84
End: 2019-11-20
Payer: MEDICARE

## 2019-11-20 ENCOUNTER — TELEPHONE (OUTPATIENT)
Dept: INTERNAL MEDICINE | Facility: CLINIC | Age: 84
End: 2019-11-20

## 2019-11-20 VITALS
SYSTOLIC BLOOD PRESSURE: 121 MMHG | RESPIRATION RATE: 20 BRPM | TEMPERATURE: 97 F | WEIGHT: 108 LBS | DIASTOLIC BLOOD PRESSURE: 72 MMHG | HEART RATE: 117 BPM | BODY MASS INDEX: 18.54 KG/M2

## 2019-11-20 VITALS
HEART RATE: 75 BPM | DIASTOLIC BLOOD PRESSURE: 65 MMHG | RESPIRATION RATE: 18 BRPM | TEMPERATURE: 98 F | OXYGEN SATURATION: 94 % | SYSTOLIC BLOOD PRESSURE: 130 MMHG

## 2019-11-20 DIAGNOSIS — E86.0 DEHYDRATION DUE TO RADIATION: ICD-10-CM

## 2019-11-20 DIAGNOSIS — C18.9 COLON CANCER: Primary | ICD-10-CM

## 2019-11-20 DIAGNOSIS — C20 RECTAL CANCER: Primary | ICD-10-CM

## 2019-11-20 PROCEDURE — 99213 OFFICE O/P EST LOW 20 MIN: CPT | Mod: HCNC,S$GLB,, | Performed by: RADIOLOGY

## 2019-11-20 PROCEDURE — 63600175 PHARM REV CODE 636 W HCPCS: Mod: HCNC | Performed by: RADIOLOGY

## 2019-11-20 PROCEDURE — 99213 PR OFFICE/OUTPT VISIT, EST, LEVL III, 20-29 MIN: ICD-10-PCS | Mod: HCNC,S$GLB,, | Performed by: RADIOLOGY

## 2019-11-20 PROCEDURE — 1101F PT FALLS ASSESS-DOCD LE1/YR: CPT | Mod: HCNC,CPTII,S$GLB, | Performed by: RADIOLOGY

## 2019-11-20 PROCEDURE — 96361 HYDRATE IV INFUSION ADD-ON: CPT | Mod: HCNC

## 2019-11-20 PROCEDURE — 99999 PR PBB SHADOW E&M-EST. PATIENT-LVL III: CPT | Mod: PBBFAC,HCNC,, | Performed by: RADIOLOGY

## 2019-11-20 PROCEDURE — 1159F PR MEDICATION LIST DOCUMENTED IN MEDICAL RECORD: ICD-10-PCS | Mod: HCNC,S$GLB,, | Performed by: RADIOLOGY

## 2019-11-20 PROCEDURE — 1159F MED LIST DOCD IN RCRD: CPT | Mod: HCNC,S$GLB,, | Performed by: RADIOLOGY

## 2019-11-20 PROCEDURE — 1126F AMNT PAIN NOTED NONE PRSNT: CPT | Mod: HCNC,S$GLB,, | Performed by: RADIOLOGY

## 2019-11-20 PROCEDURE — 99999 PR PBB SHADOW E&M-EST. PATIENT-LVL III: ICD-10-PCS | Mod: PBBFAC,HCNC,, | Performed by: RADIOLOGY

## 2019-11-20 PROCEDURE — 96360 HYDRATION IV INFUSION INIT: CPT | Mod: HCNC

## 2019-11-20 PROCEDURE — 1126F PR PAIN SEVERITY QUANTIFIED, NO PAIN PRESENT: ICD-10-PCS | Mod: HCNC,S$GLB,, | Performed by: RADIOLOGY

## 2019-11-20 PROCEDURE — 1101F PR PT FALLS ASSESS DOC 0-1 FALLS W/OUT INJ PAST YR: ICD-10-PCS | Mod: HCNC,CPTII,S$GLB, | Performed by: RADIOLOGY

## 2019-11-20 RX ORDER — PROMETHAZINE HYDROCHLORIDE 12.5 MG/1
12.5 TABLET ORAL EVERY 6 HOURS PRN
Qty: 30 TABLET | Refills: 0 | Status: SHIPPED | OUTPATIENT
Start: 2019-11-20

## 2019-11-20 RX ORDER — HYDROCORTISONE ACETATE, PRAMOXINE HCL 2.5; 1 G/100G; G/100G
CREAM TOPICAL 3 TIMES DAILY
Qty: 57 G | Refills: 1 | Status: SHIPPED | OUTPATIENT
Start: 2019-11-20 | End: 2020-12-15

## 2019-11-20 RX ADMIN — SODIUM CHLORIDE 1000 ML: 0.9 INJECTION, SOLUTION INTRAVENOUS at 02:11

## 2019-11-20 NOTE — PROGRESS NOTES
11/20/2019    Radiation Oncology Follow-Up Visit    Prior Radiation History: 25 Gy in 5 fractions to rectum and pelvis, 11/4-11/8/19    HPI: Ms. Hameed is an 87 y.o. female with recent diagnosis of early stage I T2N0M0 low-lying rectal adenocarcinoma following transanal excision, here today for follow up after completing 25 Gy in 5 fractions of pelvic radiotherapy on 11/8/19.    Following completion of radiotherapy, she has developed symptoms of nausea, diarrhea, and intermittent rectal bleeding.     Past Medical History:   Diagnosis Date    Allergy     Anxiety     Arthritis     Back pain     Basal cell carcinoma 6/2011    R nasal ala, excised via Mohs    Chronic kidney disease, stage II (mild) 8/25/2012    cyst since 1975    Flank pain 8/21/2012    HTN (hypertension) 8/21/2012    Inflammatory bowel disease     Joint pain     Kidney stone     left     Lactose disaccharidase deficiency     Rectal cancer 7/24/2019    Senile osteoporosis 4/11/2016    Small bowel obstruction, 06-, resolved witjhout surgery 7/1/2014    Trace cataracts     Ulcer     hx    Urinary tract infection        Past Surgical History:   Procedure Laterality Date    ABDOMINAL SURGERY      APPENDECTOMY      CATARACT EXTRACTION W/  INTRAOCULAR LENS IMPLANT Right 02/12/2015    Dr. Sahni    CATARACT EXTRACTION W/  INTRAOCULAR LENS IMPLANT Left 04/09/2015    CHOLECYSTECTOMY      COLONOSCOPY N/A 6/19/2019    Procedure: COLONOSCOPY;  Surgeon: Beto Rivera MD;  Location: [address]);  Service: Endoscopy;  Laterality: N/A;  to be scheudled on 6/19/19 with Dr. Rivera per JACY Fischer NP    EYE SURGERY      HERNIA REPAIR  2017    Open rih with mesh    HYSTERECTOMY      KIDNEY SURGERY      drained cyst x 2    lysis of abdominal adhesions      OOPHORECTOMY      TONSILLECTOMY, ADENOIDECTOMY      TRANSANAL RECTAL RESECTION N/A 8/15/2019    Procedure: transanal EXCISION, LESION, RECTUM, ANAL APPROACH full  thickness;  Surgeon: JULIUS Fritz MD;  Location: [facility] OR [address] [place] [address];  Service: Colon and Rectal;  Laterality: N/A;       Social History     Tobacco Use    Smoking status: Never Smoker    Smokeless tobacco: Never Used   Substance Use Topics    Alcohol use: Not Currently     Comment: none    Drug use: No       Cancer-related family history includes Cancer in her father and paternal grandmother. There is no history of Esophageal cancer, Liver cancer, Rectal cancer, or Melanoma.    Current Outpatient Medications on File Prior to Visit   Medication Sig Dispense Refill    acetaminophen (TYLENOL) 325 MG tablet Take by mouth. 1 Tablet Oral .  Tylenol.To take as needed for arthritis pain.      amLODIPine (NORVASC) 5 MG tablet Take 1 tablet (5 mg total) by mouth every morning. 90 tablet 3    cholecalciferol, vitamin D3, 1,000 unit capsule Take 2 capsules (2,000 Units total) by mouth once daily. (Patient taking differently: Take 1,000 Units by mouth once daily. )  0    HYDROcodone-acetaminophen (NORCO) 5-325 mg per tablet Take 1 tablet by mouth every 6 (six) hours as needed for Pain. 25 tablet 0    losartan (COZAAR) 50 MG tablet Take 1 tablet (50 mg total) by mouth once daily. 90 tablet 3    methylcellulose (CITRUCEL ORAL) Take by mouth once daily.      ondansetron (ZOFRAN) 4 MG tablet Take 1 tablet (4 mg total) by mouth every 8 (eight) hours as needed for Nausea. 30 tablet 1    varicella-zoster gE-AS01B, PF, (SHINGRIX, PF,) 50 mcg/0.5 mL injection Inject into the muscle. 0.5 mL 0     No current facility-administered medications on file prior to visit.        Review of patient's allergies indicates:   Allergen Reactions    Advil [ibuprofen] Nausea Only    Parafon forte      Other reaction(s): Hallucinations    Calcitriol (bulk) Nausea Only    Lisinopril      cough       Review of Systems   Constitutional: Negative for malaise/fatigue.   HENT: Negative for congestion.    Eyes: Negative for redness.    Respiratory: Negative for cough and hemoptysis.    Cardiovascular: Negative for chest pain and leg swelling.   Gastrointestinal: Positive for blood in stool, diarrhea and nausea. Negative for abdominal pain.   Genitourinary: Negative for dysuria.   Musculoskeletal: Negative for joint pain and myalgias.   Skin: Negative for rash.   Neurological: Negative for dizziness and sensory change.   Psychiatric/Behavioral: Negative for depression. The patient is not nervous/anxious.         Vital Signs:   Vitals:    11/20/19 1328   BP: 121/72   Pulse: (!) 117   Resp: 20   Temp: 97.4 °F (36.3 °C)         ECOG Performance Status: 0 - Fully Active    Physical exam:  Constitutional:  Pleasant fatigued appearing elderly woman.  Head: Normocephalic, atraumatic.    Eyes: Sclerae are non-icteric.  Pupils are equal and round.  Extraocular movements are intact.  ENMT: Oral cavity and oropharynx are without lesions. Mucous membranes are moist.  Neck: Supple.  No palpable cervical or supraclavicular adenopathy.   Pulmonary: Clear to auscultation, bilaterally.  No rhonchi, rales or wheezes.  Cardiovascular: Regular rate and rhythm.   No murmurs heard.  No edema.  GI: Soft, nontender and nondistended.  No palpable masses or hepatosplenomegaly.  Musculoskeletal: No palpable spinous or paraspinous tenderness.  Range of motion appears normal in all 4 extremities.  Lymphatics: No palpable cervical, supraclavicular, axillary adenopathy.  Extremities: No clubbing, cyanosis, or edema.    Skin: No visible lesions.  Neurologic: CN II-XII are grossly intact.  Gait is normal.    Psychiatric: Patient is alert and oriented x 3.  Normal mood and affect.     Labs: I have personally reviewed the patient's available labs and reports and summarized pertinent findings above in HPI.    Imaging: I have personally reviewed the patient's available images and reports and summarized pertinent findings above in HPI.     Pathology: I have personally reviewed the  patient's available pathology and summarized pertinent findings above in HPI.     Assessment:  This is a 88 y.o. y/o female with early stage T2N0M0 rectal adenocarcinoma s/p transanal excision and short course 5 fraction radiotherapy, here today for follow up appointment, displaying symptoms of acute radiation proctitis and nausea.    Plan:  I have ordered IV fluids due to her symptoms of increased nausea and dehydration, which will be given following this appointment. I encouraged the patient to continue to take Zofran as needed for nausea but will prescribe Phenergan if her symptoms remain persistent. For her radiation proctitis I will prescribe Protofoam-HC to use as needed. The patient will follow up with me in approximately two months but has our contact information should additional questions or concerns arise.      Andrew Ohara MD  Radiation Oncology  [facility]

## 2019-11-20 NOTE — PLAN OF CARE
Pt admitted for 1L IVF. Signs of dehydration discussed, and increase in oral intake of fluid encouraged. Pt received 1 L NS over 2 hours as ordered, tolerated well. Pt was given AVS and instructed to contact MD with any further concerns or questions. Pt was discharged  @ 16:15.

## 2019-11-20 NOTE — TELEPHONE ENCOUNTER
Aggie from [facility] lab called with a critical value for pt. Calcium 6.6. Dr. Hutchinson notified.

## 2019-11-21 NOTE — TELEPHONE ENCOUNTER
Dear Dr. Alonzo,  and Dr. Fritz,  Please see most recent labs. She is having difficulty with nausea, unable to eat much and today is the 12th day watery diarrhea and bloody diarrhea on and off for week.  Do you recommend stool studies?  Sincerely, Dr. Katherine Hutchinson

## 2019-11-22 ENCOUNTER — TELEPHONE (OUTPATIENT)
Dept: RADIATION ONCOLOGY | Facility: CLINIC | Age: 84
End: 2019-11-22

## 2019-11-22 NOTE — TELEPHONE ENCOUNTER
Follow up call to patient states she continues with rectal bleeding She is drinking fluids and taking immodium  She will start the proctofoam today Instructed if she feels faint dizzy or dehydrated to go to the emergency room

## 2019-11-26 ENCOUNTER — TELEPHONE (OUTPATIENT)
Dept: RADIATION ONCOLOGY | Facility: CLINIC | Age: 84
End: 2019-11-26

## 2019-11-26 NOTE — TELEPHONE ENCOUNTER
Follow up call after completing radiation to the rectum  Patient is using proctofoam with some relief f/u appt confirmed

## 2019-12-13 NOTE — PROGRESS NOTES
12/18/2019    Radiation Oncology Follow-Up Visit    Prior Radiation History: 25 Gy in 5 fractions to rectum and pelvic LN, 11/4-11/8/19, at [facility]    HPI: Ms. Hameed is an 88 year old woman with stage I T2N0M0 low-lying rectal adenocarcinoma following transanal excision, here today for follow up appointment after completing 25 Gy in 5 fractions of radiotherapy to her rectum and pelvic LN in early November 2019.     She was seen by me on 11/20/19 with symptoms of radiation proctitis and I prescribed her Proctofoam, with resolution of symptoms. Today, she notes mild fatigue but improvement of her rectal tenesmus and diarrhea symptoms, limited to every other week currently. Her diet is improving and she has more energy to accomplish her daily tasks, as she continues to live alone at home.    Past Medical History:   Diagnosis Date    Allergy     Anxiety     Arthritis     Back pain     Basal cell carcinoma 6/2011    R nasal ala, excised via Mohs    Chronic kidney disease, stage II (mild) 8/25/2012    cyst since 1975    Flank pain 8/21/2012    HTN (hypertension) 8/21/2012    Inflammatory bowel disease     Joint pain     Kidney stone     left     Lactose disaccharidase deficiency     Rectal cancer 7/24/2019    Senile osteoporosis 4/11/2016    Small bowel obstruction, 06-, resolved witjhout surgery 7/1/2014    Trace cataracts     Ulcer     hx    Urinary tract infection        Past Surgical History:   Procedure Laterality Date    ABDOMINAL SURGERY      APPENDECTOMY      CATARACT EXTRACTION W/  INTRAOCULAR LENS IMPLANT Right 02/12/2015    Dr. Sahni    CATARACT EXTRACTION W/  INTRAOCULAR LENS IMPLANT Left 04/09/2015    CHOLECYSTECTOMY      COLONOSCOPY N/A 6/19/2019    Procedure: COLONOSCOPY;  Surgeon: Beto Rivera MD;  Location: [facility] ([address] [place] [address]);  Service: Endoscopy;  Laterality: N/A;  to be scheudled on 6/19/19 with Dr. Rivera per JACY Fischer NP    EYE SURGERY      HERNIA REPAIR  2017     Open rih with mesh    HYSTERECTOMY      KIDNEY SURGERY      drained cyst x 2    lysis of abdominal adhesions      OOPHORECTOMY      TONSILLECTOMY, ADENOIDECTOMY      TRANSANAL RECTAL RESECTION N/A 8/15/2019    Procedure: transanal EXCISION, LESION, RECTUM, ANAL APPROACH full thickness;  Surgeon: JULIUS Fritz MD;  Location: [facility] OR 2ND FLR;  Service: Colon and Rectal;  Laterality: N/A;       Social History     Tobacco Use    Smoking status: Never Smoker    Smokeless tobacco: Never Used   Substance Use Topics    Alcohol use: Not Currently     Comment: none    Drug use: No       Cancer-related family history includes Cancer in her father and paternal grandmother. There is no history of Esophageal cancer, Liver cancer, Rectal cancer, or Melanoma.    Current Outpatient Medications on File Prior to Visit   Medication Sig Dispense Refill    acetaminophen (TYLENOL) 325 MG tablet Take by mouth. 1 Tablet Oral .  Tylenol.To take as needed for arthritis pain.      amLODIPine (NORVASC) 5 MG tablet Take 1 tablet (5 mg total) by mouth every morning. 90 tablet 3    cholecalciferol, vitamin D3, 1,000 unit capsule Take 2 capsules (2,000 Units total) by mouth once daily.  0    HYDROcodone-acetaminophen (NORCO) 5-325 mg per tablet Take 1 tablet by mouth every 6 (six) hours as needed for Pain. (Patient not taking: Reported on 11/20/2019) 25 tablet 0    losartan (COZAAR) 50 MG tablet Take 1 tablet (50 mg total) by mouth once daily. 90 tablet 3    methylcellulose (CITRUCEL ORAL) Take by mouth once daily.      ondansetron (ZOFRAN) 4 MG tablet Take 1 tablet (4 mg total) by mouth every 8 (eight) hours as needed for Nausea. 30 tablet 1    pramoxine-hydrocortisone cream Apply topically 3 (three) times daily. 57 g 1    promethazine (PHENERGAN) 12.5 MG Tab Take 1 tablet (12.5 mg total) by mouth every 6 (six) hours as needed. 30 tablet 0    varicella-zoster gE-AS01B, PF, (SHINGRIX, PF,) 50 mcg/0.5 mL injection Inject  into the muscle. 0.5 mL 0     No current facility-administered medications on file prior to visit.        Review of patient's allergies indicates:   Allergen Reactions    Advil [ibuprofen] Nausea Only    Parafon forte      Other reaction(s): Hallucinations    Calcitriol (bulk) Nausea Only    Lisinopril      cough       Review of Systems   Constitutional: Positive for malaise/fatigue. Negative for weight loss.   HENT: Negative for congestion, hearing loss and sore throat.    Respiratory: Negative for cough and shortness of breath.    Cardiovascular: Negative for chest pain, palpitations and leg swelling.   Gastrointestinal: Positive for diarrhea. Negative for abdominal pain, blood in stool, nausea and vomiting.   Genitourinary: Negative for dysuria.   Musculoskeletal: Negative for neck pain.   Skin: Negative for rash.   Neurological: Negative for dizziness, weakness and headaches.   Psychiatric/Behavioral: Negative for depression. The patient is not nervous/anxious.         Vital Signs:   Vitals:    12/18/19 1314   BP: 116/66   Pulse: 99   Resp: 20   Temp: 98 °F (36.7 °C)         ECOG Performance Status: 1 - Ambulates, capable of light work    Physical exam:  Constitutional:  Elderly thin woman, well-nourished and in no acute distress.  Head: Normocephalic, atraumatic.    Eyes: Sclerae are non-icteric.  Pupils are equal and round.  Extraocular movements are intact.  ENMT: Oral cavity and oropharynx are without lesions. Mucous membranes are moist.  Neck: Supple.  No palpable cervical or supraclavicular adenopathy.   Pulmonary: Clear to auscultation, bilaterally.  No rhonchi, rales or wheezes.  Cardiovascular: Regular rate and rhythm.   No murmurs heard.  No edema.  GI: Soft, nontender and nondistended.  No palpable masses or hepatosplenomegaly.  Musculoskeletal: No palpable spinous or paraspinous tenderness.  Range of motion appears normal in all 4 extremities.  Lymphatics: No palpable cervical, supraclavicular,  axillary, or inguino-femoral adenopathy.  Extremities: No clubbing, cyanosis, or edema.    Skin: No visible lesions.  Neurologic: Gait is normal.    Psychiatric: Patient is alert and oriented x 3.  Normal mood and affect.     Labs: I have personally reviewed the patient's available labs and reports and summarized pertinent findings above in HPI.    Imaging: I have personally reviewed the patient's available images and reports and summarized pertinent findings above in HPI.     Pathology: I have personally reviewed the patient's available pathology and summarized pertinent findings above in HPI.     Assessment:  This is a 88 y.o. y/o female with history of early stage rectal adenocarcinoma treated with transanal excision and adjuvant five fraction radiotherapy, doing well today in follow up, approximately 5 weeks following treatment.    Plan:  Ms. Hameed is doing well and no longer needs to formally follow up with me, as she will continue to follow with Dr. Fritz (next appointment in 1/2020). She continues to heal from her previous radiotherapy and I counseled the patient today in regards to her diet, nutrition, and increasing her liquid intake.  All of her questions were answered today.    She has my clinical contact information should additional questions or concerns arise.      Andrew Ohara MD  Radiation Oncology  [facility]

## 2019-12-18 ENCOUNTER — OFFICE VISIT (OUTPATIENT)
Dept: RADIATION ONCOLOGY | Facility: CLINIC | Age: 84
End: 2019-12-18
Payer: MEDICARE

## 2019-12-18 VITALS
RESPIRATION RATE: 20 BRPM | DIASTOLIC BLOOD PRESSURE: 66 MMHG | WEIGHT: 103.69 LBS | HEART RATE: 99 BPM | TEMPERATURE: 98 F | BODY MASS INDEX: 17.8 KG/M2 | SYSTOLIC BLOOD PRESSURE: 116 MMHG

## 2019-12-18 DIAGNOSIS — C20 RECTAL CANCER: Primary | ICD-10-CM

## 2019-12-18 PROCEDURE — 99999 PR PBB SHADOW E&M-EST. PATIENT-LVL III: CPT | Mod: PBBFAC,HCNC,, | Performed by: RADIOLOGY

## 2019-12-18 PROCEDURE — 1126F AMNT PAIN NOTED NONE PRSNT: CPT | Mod: HCNC,S$GLB,, | Performed by: RADIOLOGY

## 2019-12-18 PROCEDURE — 99213 OFFICE O/P EST LOW 20 MIN: CPT | Mod: HCNC,S$GLB,, | Performed by: RADIOLOGY

## 2019-12-18 PROCEDURE — 99999 PR PBB SHADOW E&M-EST. PATIENT-LVL III: ICD-10-PCS | Mod: PBBFAC,HCNC,, | Performed by: RADIOLOGY

## 2019-12-18 PROCEDURE — 99213 PR OFFICE/OUTPT VISIT, EST, LEVL III, 20-29 MIN: ICD-10-PCS | Mod: HCNC,S$GLB,, | Performed by: RADIOLOGY

## 2019-12-18 PROCEDURE — 1159F MED LIST DOCD IN RCRD: CPT | Mod: HCNC,S$GLB,, | Performed by: RADIOLOGY

## 2019-12-18 PROCEDURE — 1101F PR PT FALLS ASSESS DOC 0-1 FALLS W/OUT INJ PAST YR: ICD-10-PCS | Mod: HCNC,CPTII,S$GLB, | Performed by: RADIOLOGY

## 2019-12-18 PROCEDURE — 1101F PT FALLS ASSESS-DOCD LE1/YR: CPT | Mod: HCNC,CPTII,S$GLB, | Performed by: RADIOLOGY

## 2019-12-18 PROCEDURE — 1126F PR PAIN SEVERITY QUANTIFIED, NO PAIN PRESENT: ICD-10-PCS | Mod: HCNC,S$GLB,, | Performed by: RADIOLOGY

## 2019-12-18 PROCEDURE — 1159F PR MEDICATION LIST DOCUMENTED IN MEDICAL RECORD: ICD-10-PCS | Mod: HCNC,S$GLB,, | Performed by: RADIOLOGY

## 2020-01-13 ENCOUNTER — OFFICE VISIT (OUTPATIENT)
Dept: RHEUMATOLOGY | Facility: CLINIC | Age: 85
End: 2020-01-13
Payer: MEDICARE

## 2020-01-13 VITALS
HEIGHT: 64 IN | HEART RATE: 70 BPM | SYSTOLIC BLOOD PRESSURE: 132 MMHG | WEIGHT: 103.69 LBS | DIASTOLIC BLOOD PRESSURE: 72 MMHG | BODY MASS INDEX: 17.7 KG/M2

## 2020-01-13 DIAGNOSIS — M85.80 OSTEOPENIA, UNSPECIFIED LOCATION: Primary | ICD-10-CM

## 2020-01-13 PROCEDURE — 1159F MED LIST DOCD IN RCRD: CPT | Mod: HCNC,S$GLB,, | Performed by: INTERNAL MEDICINE

## 2020-01-13 PROCEDURE — 99213 PR OFFICE/OUTPT VISIT, EST, LEVL III, 20-29 MIN: ICD-10-PCS | Mod: HCNC,S$GLB,, | Performed by: INTERNAL MEDICINE

## 2020-01-13 PROCEDURE — 99999 PR PBB SHADOW E&M-EST. PATIENT-LVL III: CPT | Mod: PBBFAC,HCNC,, | Performed by: INTERNAL MEDICINE

## 2020-01-13 PROCEDURE — 1159F PR MEDICATION LIST DOCUMENTED IN MEDICAL RECORD: ICD-10-PCS | Mod: HCNC,S$GLB,, | Performed by: INTERNAL MEDICINE

## 2020-01-13 PROCEDURE — 99999 PR PBB SHADOW E&M-EST. PATIENT-LVL III: ICD-10-PCS | Mod: PBBFAC,HCNC,, | Performed by: INTERNAL MEDICINE

## 2020-01-13 PROCEDURE — 99213 OFFICE O/P EST LOW 20 MIN: CPT | Mod: HCNC,S$GLB,, | Performed by: INTERNAL MEDICINE

## 2020-01-13 PROCEDURE — 1125F PR PAIN SEVERITY QUANTIFIED, PAIN PRESENT: ICD-10-PCS | Mod: HCNC,S$GLB,, | Performed by: INTERNAL MEDICINE

## 2020-01-13 PROCEDURE — 1101F PR PT FALLS ASSESS DOC 0-1 FALLS W/OUT INJ PAST YR: ICD-10-PCS | Mod: HCNC,CPTII,S$GLB, | Performed by: INTERNAL MEDICINE

## 2020-01-13 PROCEDURE — 1125F AMNT PAIN NOTED PAIN PRSNT: CPT | Mod: HCNC,S$GLB,, | Performed by: INTERNAL MEDICINE

## 2020-01-13 PROCEDURE — 1101F PT FALLS ASSESS-DOCD LE1/YR: CPT | Mod: HCNC,CPTII,S$GLB, | Performed by: INTERNAL MEDICINE

## 2020-01-13 ASSESSMENT — ROUTINE ASSESSMENT OF PATIENT INDEX DATA (RAPID3)
PATIENT GLOBAL ASSESSMENT SCORE: 5.5
PSYCHOLOGICAL DISTRESS SCORE: 0
PAIN SCORE: 3.5
AM STIFFNESS SCORE: 0, NO
MDHAQ FUNCTION SCORE: 0
TOTAL RAPID3 SCORE: 3
FATIGUE SCORE: 0

## 2020-01-13 NOTE — PATIENT INSTRUCTIONS
Please follow up with your Nephrologist, Colorectal Surgeon, and Primary Care doctors.    One week before your next Prolia injection, please have a Renal Function Panel drawn in the Laboratory here at [facility].    You may bring the Prolia order to the pharmacy and see if their price is cheaper than [facility].

## 2020-01-13 NOTE — PROGRESS NOTES
Subjective:       Patient ID: Karli Hameed is a 88 y.o. female.    Chief Complaint: Osteoarthritis, chondrocalcinosis    Karli Hameed returns today for follow up of her OA, osteopenia, and chondrocalcinosis. In the interim, she reports improvement of her neck and back pain. Her painful areas are her left shoulder, right elbow, and right thumb cmc joint. All these pains, however, are mild to moderate and are controlled with occasional Tylenol. She got her last Prolia injection in November. She wants to try to find a cheaper facility to administer it as her copay has risen to $2-300 at [facility]. She is now s/p resection and radiation for her rectal cancer. She continues to have frequent diarrhea and tenesmus. She has lost over 20lbs since undergoing treatment for her cancer. She is often too fatigued or too worried about her bowels to do much outside of her home. She has follow up with her surgeon scheduled. She also has a follow up with Dr Alonzo, her nephrologist.     Review of Systems   Constitutional: Positive for fatigue. Negative for chills and fever.   HENT: Negative for congestion, mouth sores, rhinorrhea, sore throat and trouble swallowing.    Eyes: Negative for pain and redness.   Respiratory: Negative for chest tightness, shortness of breath and wheezing.    Cardiovascular: Negative for chest pain and palpitations.   Gastrointestinal: Positive for diarrhea and rectal pain. Negative for abdominal distention, nausea and vomiting.   Genitourinary: Negative for dysuria.   Musculoskeletal: Positive for arthralgias. Negative for back pain, myalgias and neck pain.   Skin: Negative for rash.   Neurological: Positive for dizziness and weakness (nonfocal). Negative for numbness and headaches.         Objective:   There were no vitals taken for this visit.     Physical Exam   Constitutional: She is oriented to person, place, and time and well-developed, well-nourished, and in no distress.   HENT:   Head:  Normocephalic and atraumatic.   Nose: Nose normal.   Mouth/Throat: Oropharynx is clear and moist.   Eyes: Conjunctivae and EOM are normal. Pupils are equal, round, and reactive to light.   Neck: Normal range of motion.   Cardiovascular: Normal rate and regular rhythm.    Murmur (prominent systolic click in aortic area) heard.  Pulmonary/Chest: Effort normal and breath sounds normal. She has no wheezes. She has no rales.   Abdominal: Soft. Bowel sounds are normal. She exhibits no distension. There is tenderness (Mildly tender to deep palpation in the lower quadrants).       Right Side Rheumatological Exam     Shoulder Exam   Tenderness Location: no tenderness    Range of Motion   The patient has normal right shoulder ROM.    Crepitus: positive  Swelling: negative    Knee Exam     Range of Motion   The patient has normal right knee ROM.  Patellofemoral Crepitus: positive  Effusion: negative  Warmth: negative    Hip Exam     Range of Motion   The patient has normal right hip ROM.    Left Side Rheumatological Exam     Shoulder Exam   Tenderness Location: no tenderness    Range of Motion   The patient has normal left shoulder ROM.    Crepitus: positive  Swelling: negative    Knee Exam     Range of Motion   The patient has normal left knee ROM.    Patellofemoral Crepitus: positive  Effusion: negative  Warmth: negative    Hip Exam     Range of Motion   The patient has normal left hip ROM.      Lymphadenopathy:     She has no cervical adenopathy.   Neurological: She is alert and oriented to person, place, and time.   Skin: Skin is warm and dry. No rash noted. No erythema.     Psychiatric: Mood, memory, affect and judgment normal.   Musculoskeletal: She exhibits deformity (bony hypertrophy of the fingers most prominent in the right index finger and base of the right thumb. These areas are not tender.). She exhibits no edema or tenderness.          Assessment/Plan   89 y/o F with a hx of rectal cancer s/p resection and  radiation, OA, chrondocalcinosis, CPPD, CKD 5, and ostepenia who is stable from a rheumatologic standpoint but continues to have issues associated with her rectal cx and treatments.     Repeat DXA on 3/21/20  Next denosumab 60mg sc q 6 months scheduled 5/7/20. Schedule renal function one week prior. Printed orders for this injection out so that the patient can try to find a cheaper place to administer the medicine.   F/u as scheduled with Dr. Alonzo for CKD stage 5, ESRD  F/u Radiation Oncology & CRS for radiation proctitis, diarrhea  F/u Dr. Evangelina RODRIGES for primary care  RTC 6 months or sooner if problems arise.    Diego Leong MD  [employer] [facility], PGY-1

## 2020-01-13 NOTE — PROGRESS NOTES
I have personally taken the history and examined the patient and agree with the resident,s note as stated above       Osteopenia with FRAX   Radiation proctitis, persistent diarrhea, resolved nausea  CKD stage 5, ESRD      DXA > 3/21/20  Next denosumab 60mg sc q 6 months scheduled 5/7/20. Schedule renal function one week prior. She now has $200 co-pay and will try to get at less expensive facility  F/u as scheduled with Dr. Alonzo for  CKD stage 5, ESRD  F/u Radiation Oncology for radiation proctitis, diarrhea  F/u Dr. Hutchinson IM  RTC  6months

## 2020-01-22 ENCOUNTER — OFFICE VISIT (OUTPATIENT)
Dept: SURGERY | Facility: CLINIC | Age: 85
End: 2020-01-22
Payer: MEDICARE

## 2020-01-22 VITALS
HEIGHT: 64 IN | BODY MASS INDEX: 17.65 KG/M2 | SYSTOLIC BLOOD PRESSURE: 139 MMHG | DIASTOLIC BLOOD PRESSURE: 80 MMHG | WEIGHT: 103.38 LBS | HEART RATE: 103 BPM

## 2020-01-22 DIAGNOSIS — Z85.048 ENCOUNTER FOR FOLLOW-UP SURVEILLANCE OF RECTAL CANCER: Primary | ICD-10-CM

## 2020-01-22 DIAGNOSIS — Z08 ENCOUNTER FOR FOLLOW-UP SURVEILLANCE OF RECTAL CANCER: Primary | ICD-10-CM

## 2020-01-22 PROCEDURE — 99999 PR PBB SHADOW E&M-EST. PATIENT-LVL III: ICD-10-PCS | Mod: PBBFAC,HCNC,, | Performed by: COLON & RECTAL SURGERY

## 2020-01-22 PROCEDURE — 1159F PR MEDICATION LIST DOCUMENTED IN MEDICAL RECORD: ICD-10-PCS | Mod: HCNC,S$GLB,, | Performed by: COLON & RECTAL SURGERY

## 2020-01-22 PROCEDURE — 1159F MED LIST DOCD IN RCRD: CPT | Mod: HCNC,S$GLB,, | Performed by: COLON & RECTAL SURGERY

## 2020-01-22 PROCEDURE — 99213 PR OFFICE/OUTPT VISIT, EST, LEVL III, 20-29 MIN: ICD-10-PCS | Mod: HCNC,S$GLB,, | Performed by: COLON & RECTAL SURGERY

## 2020-01-22 PROCEDURE — 1101F PT FALLS ASSESS-DOCD LE1/YR: CPT | Mod: HCNC,CPTII,S$GLB, | Performed by: COLON & RECTAL SURGERY

## 2020-01-22 PROCEDURE — 1101F PR PT FALLS ASSESS DOC 0-1 FALLS W/OUT INJ PAST YR: ICD-10-PCS | Mod: HCNC,CPTII,S$GLB, | Performed by: COLON & RECTAL SURGERY

## 2020-01-22 PROCEDURE — 1126F PR PAIN SEVERITY QUANTIFIED, NO PAIN PRESENT: ICD-10-PCS | Mod: HCNC,S$GLB,, | Performed by: COLON & RECTAL SURGERY

## 2020-01-22 PROCEDURE — 1126F AMNT PAIN NOTED NONE PRSNT: CPT | Mod: HCNC,S$GLB,, | Performed by: COLON & RECTAL SURGERY

## 2020-01-22 PROCEDURE — 99213 OFFICE O/P EST LOW 20 MIN: CPT | Mod: HCNC,S$GLB,, | Performed by: COLON & RECTAL SURGERY

## 2020-01-22 PROCEDURE — 99999 PR PBB SHADOW E&M-EST. PATIENT-LVL III: CPT | Mod: PBBFAC,HCNC,, | Performed by: COLON & RECTAL SURGERY

## 2020-01-22 RX ORDER — DIPHENOXYLATE HCL/ATROPINE 2.5-.025/5
5 LIQUID (ML) ORAL 3 TIMES DAILY PRN
Qty: 60 ML | Refills: 2 | Status: SHIPPED | OUTPATIENT
Start: 2020-01-22 | End: 2020-02-01

## 2020-01-22 RX ORDER — DIPHENOXYLATE HCL/ATROPINE 2.5-.025/5
5 LIQUID (ML) ORAL 3 TIMES DAILY PRN
Qty: 60 ML | Refills: 2 | Status: SHIPPED | OUTPATIENT
Start: 2020-01-22 | End: 2020-01-22 | Stop reason: CLARIF

## 2020-01-22 NOTE — PROGRESS NOTES
HPI:  Karli Hameed is a 88 y.o. female with history of rectal cancer status post transanal excision.  She had had major issues with diarrhea following her excision with fecal incontinence and anal discomfort.  Since beginning Imodium twice a day this has resolved. At her last visit on 10/22/2019, it was recommended that she undergo radiation therapy given her risk of recurrence based of the surgical pathology. She has since completed 25 Gy in 5 fractions of pelvic radiotherapy on 11/8/19. Initially had some issues with radiation proctitis, tenesmus, and diarrhea. Still reports having issues with diarrhea which seems to come every 4 days. Has tried using immodium without relief. Also reports she has lost about 20 lbs since we have seen her, but per the records appears she is only down 5 lbs and overall down 8 lbs since August 2019.       SPECIMEN  1) Rectal tumor; long black proximal, long white left, short right white, short black distal.  FINAL PATHOLOGIC DIAGNOSIS  Rectum, transanal excision:  -INVASIVE ADENOCARCINOMA, MODERATELY DIFFERENTIATED, 2.4 CM (pT2), see synoptic report  -SURGICAL MARGINS ARE NEGATIVE FOR MALIGNANCY  SYNOPTIC REPORT (COLON AND RECTUM):  Procedure: Transanal excision  Tumor site: Rectum  Tumor size: 2.4 x 1.2 cm  Histologic type: Adenocarcinoma  Histologic grade: Moderately differentiated, G2  Tumor extension: Tumor invades the superficial muscularis propria  Margins: All margins are uninvolved by invasive carcinoma, high-grade dysplasia, intramucosal adenocarcinoma,  and adenoma  Margins examined: Proximal, distal, left, right, and deep  Lymphovascular invasion: Not identified  Perineural invasion: Not identified  Regional lymph nodes: No lymph nodes submitted or found  Pathologic stage classification (pTNM, AJCC 8th Edition): [id number]  Diagnosed by: Feroz Tejeda  (Electronically Signed: 2019-08-26 08:55:43)      Preoperative MRI  FINDINGS:  Tumor Location: Low, just above the anal  verge.  Relationship to anterior peritoneal reflection: Below.  Tumor Characteristics:  Circumferential extent/location (clock face): Non circumferential.  Left-sided from 12-6 o'clock  Tumor Length: 4 cm  Mucinous: No  Tumor Extent:  Does not extend beyond muscularis into perirectal fat.  No definite spiculation of the perirectal fat.  No definite invasion of adjacent structures.  The distance of tumor to the mesorectal fascia is 0.5 cm.  For low rectal tumors only: Tumor extends below the upper border of the puborectalis sling. Exact extension is unclear secondary to motion artifact.    Lymph Nodes:  No perirectal lymph nodes greater than 5 mm are visualized in the mesorectal fat.  No extra-mesorectal nodes lymph nodes in the visualized pelvis.  No lymph nodes that are suspicious based on heterogeneous spiculated morphology.    No evidence of vascular invasion.    Pa  Past Medical History:   Diagnosis Date    Allergy     Anxiety     Arthritis     Back pain     Basal cell carcinoma 6/2011    R nasal ala, excised via Mohs    Chronic kidney disease, stage II (mild) 8/25/2012    cyst since 1975    Flank pain 8/21/2012    HTN (hypertension) 8/21/2012    Inflammatory bowel disease     Joint pain     Kidney stone     left     Lactose disaccharidase deficiency     Rectal cancer 7/24/2019    Senile osteoporosis 4/11/2016    Small bowel obstruction, 06-, resolved witjhout surgery 7/1/2014    Trace cataracts     Ulcer     hx    Urinary tract infection         Past Surgical History:   Procedure Laterality Date    ABDOMINAL SURGERY      APPENDECTOMY      CATARACT EXTRACTION W/  INTRAOCULAR LENS IMPLANT Right 02/12/2015    Dr. Sahni    CATARACT EXTRACTION W/  INTRAOCULAR LENS IMPLANT Left 04/09/2015    CHOLECYSTECTOMY      COLONOSCOPY N/A 6/19/2019    Procedure: COLONOSCOPY;  Surgeon: Beto Rivera MD;  Location: [facility] ([address] [place] [address]);  Service: Endoscopy;  Laterality: N/A;  to be scheudled on  6/19/19 with Dr. Rivera per JACY Fischer NP    EYE SURGERY      HERNIA REPAIR  2017    Open rih with mesh    HYSTERECTOMY      KIDNEY SURGERY      drained cyst x 2    lysis of abdominal adhesions      OOPHORECTOMY      TONSILLECTOMY, ADENOIDECTOMY      TRANSANAL RECTAL RESECTION N/A 8/15/2019    Procedure: transanal EXCISION, LESION, RECTUM, ANAL APPROACH full thickness;  Surgeon: JULIUS Fritz MD;  Location: [facility] OR 2ND FLR;  Service: Colon and Rectal;  Laterality: N/A;       Review of patient's allergies indicates:   Allergen Reactions    Advil [ibuprofen] Nausea Only    Parafon forte      Other reaction(s): Hallucinations    Calcitriol (bulk) Nausea Only    Lisinopril      cough       Family History   Problem Relation Age of Onset    Cancer Father         throat - was a tobacco smoker    Cataracts Father     Kidney disease Father     Cancer Paternal Grandmother         was a tobacco smoker    No Known Problems Mother     No Known Problems Sister     No Known Problems Brother     No Known Problems Maternal Aunt     No Known Problems Maternal Uncle     No Known Problems Paternal Aunt     No Known Problems Paternal Uncle     No Known Problems Maternal Grandmother     No Known Problems Maternal Grandfather     No Known Problems Paternal Grandfather     Cirrhosis Neg Hx     Celiac disease Neg Hx     Colon polyps Neg Hx     Crohn's disease Neg Hx     Esophageal cancer Neg Hx     Inflammatory bowel disease Neg Hx     Liver cancer Neg Hx     Liver disease Neg Hx     Rectal cancer Neg Hx     Stomach cancer Neg Hx     Ulcerative colitis Neg Hx     Amblyopia Neg Hx     Blindness Neg Hx     Diabetes Neg Hx     Glaucoma Neg Hx     Hypertension Neg Hx     Macular degeneration Neg Hx     Retinal detachment Neg Hx     Strabismus Neg Hx     Stroke Neg Hx     Thyroid disease Neg Hx     Melanoma Neg Hx        Social History     Socioeconomic History    Marital status: Single      Spouse name: Not on file    Number of children: Not on file    Years of education: Not on file    Highest education level: Not on file   Occupational History    Not on file   Social Needs    Financial resource strain: Not on file    Food insecurity:     Worry: Not on file     Inability: Not on file    Transportation needs:     Medical: Not on file     Non-medical: Not on file   Tobacco Use    Smoking status: Never Smoker    Smokeless tobacco: Never Used   Substance and Sexual Activity    Alcohol use: Not Currently     Comment: none    Drug use: No    Sexual activity: Never   Lifestyle    Physical activity:     Days per week: Not on file     Minutes per session: Not on file    Stress: Not on file   Relationships    Social connections:     Talks on phone: Not on file     Gets together: Not on file     Attends Islam service: Not on file     Active member of club or organization: Not on file     Attends meetings of clubs or organizations: Not on file     Relationship status: Not on file   Other Topics Concern    Are you pregnant or think you may be? No    Breast-feeding No   Social History Narrative    Lives alone.    Great neighbors.    Many friends.    Walks the neighborhood daily with her dog.    Active social life.    Has her own home, takes care of everything and all of her affairs.     From [facility] , came to [place] 1969    2 level house          Stays active     Walks every day , does home exercises     Takes stairs at the house            ROS:  GENERAL: No fever, chills, fatigability. +unintentional weight loss  Integument: No rashes, redness, icterus  CHEST: Denies DOMINGUEZ, cyanosis, wheezing, cough and sputum production.  CARDIOVASCULAR: Denies chest pain, PND, orthopnea or reduced exercise tolerance.  GI: Denies abd pain, dysphagia, nausea, vomiting, no hematemesis  +diarrhea +abdominal pain unless restricts diet  : Denies burning on urination, no hematuria, no bacteriuria  MSK: No deformities,  swelling, joint pain swelling  Neurologic: No HAs, seizures, weakness, paresthesias, gait problems    PE:  General appearance: cachectic  There were no vitals taken for this visit.  Sclera/ Skin anicteric  AT NC EOMI  Neck supple trachea midline   Chest symmetric, nl excursion, no retractions, breathing comfortably  EXT - no CCE  Rectal: No masses palpated. Good rectal tone circumferentially.   Inspection: Skin tag noted posteriorly. Otherwise, no other abnormalities. No external or prolapsed internal hemorrhoids. No fissures noted            Neuro:  Mood/ affect nl, alert and oriented x 3, moves all ext's, gait nl      Assessment:  T2 rectal cancer status post transanal excision with negative margins, no adverse features and negative metastatic work up now s/p short-course radiotherapy.   No signs of local recurrence.   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  I agree with the above findings and plan with the following exceptions:  None    H, Saji Fritz MD, FACS, FASCRS  Staff Surgeon  Dept of Colon and Rectal Surgery  [facility] [place], [place]

## 2020-01-22 NOTE — LETTER
January 30, 2020      Katherine Hutchinson MD  [address]  [facility] [address]           [facility]  [address]  [facility] [address]  Phone: [phone]          Patient: Karil Hameed   MR Number: [id number]   YOB: 1931   Date of Visit: 1/22/2020       Dear Dr. Katherine Hutchinson:    Thank you for referring Karli Hameed to me for evaluation. Attached you will find relevant portions of my assessment and plan of care.    If you have questions, please do not hesitate to call me. I look forward to following Karli Hameed along with you.    Sincerely,    Elian Manzo  CC:  No Recipients    If you would like to receive this communication electronically, please contact [email or web address] or [phone] to request more information on [employer] access.    For providers and/or their staff who would like to refer a patient to [facility], please contact us through our one-stop-shop provider referral line, Deandre Stock, at [phone].    If you feel you have received this communication in error or would no longer like to receive these types of communications, please e-mail [email or web address]

## 2020-02-04 ENCOUNTER — PATIENT OUTREACH (OUTPATIENT)
Dept: ADMINISTRATIVE | Facility: HOSPITAL | Age: 85
End: 2020-02-04

## 2020-02-07 ENCOUNTER — OFFICE VISIT (OUTPATIENT)
Dept: NEPHROLOGY | Facility: CLINIC | Age: 85
End: 2020-02-07
Payer: MEDICARE

## 2020-02-07 ENCOUNTER — TELEPHONE (OUTPATIENT)
Dept: INTERNAL MEDICINE | Facility: CLINIC | Age: 85
End: 2020-02-07

## 2020-02-07 VITALS
OXYGEN SATURATION: 95 % | HEIGHT: 64 IN | WEIGHT: 103.38 LBS | DIASTOLIC BLOOD PRESSURE: 80 MMHG | BODY MASS INDEX: 17.65 KG/M2 | SYSTOLIC BLOOD PRESSURE: 140 MMHG | HEART RATE: 80 BPM

## 2020-02-07 DIAGNOSIS — N18.5 STAGE 5 CHRONIC KIDNEY DISEASE NOT ON CHRONIC DIALYSIS: ICD-10-CM

## 2020-02-07 DIAGNOSIS — N18.5 CHRONIC KIDNEY DISEASE, STAGE V: Primary | ICD-10-CM

## 2020-02-07 PROCEDURE — 1159F MED LIST DOCD IN RCRD: CPT | Mod: HCNC,S$GLB,, | Performed by: INTERNAL MEDICINE

## 2020-02-07 PROCEDURE — 99999 PR PBB SHADOW E&M-EST. PATIENT-LVL III: CPT | Mod: PBBFAC,HCNC,, | Performed by: INTERNAL MEDICINE

## 2020-02-07 PROCEDURE — 1126F AMNT PAIN NOTED NONE PRSNT: CPT | Mod: HCNC,S$GLB,, | Performed by: INTERNAL MEDICINE

## 2020-02-07 PROCEDURE — 1101F PT FALLS ASSESS-DOCD LE1/YR: CPT | Mod: HCNC,CPTII,S$GLB, | Performed by: INTERNAL MEDICINE

## 2020-02-07 PROCEDURE — 1126F PR PAIN SEVERITY QUANTIFIED, NO PAIN PRESENT: ICD-10-PCS | Mod: HCNC,S$GLB,, | Performed by: INTERNAL MEDICINE

## 2020-02-07 PROCEDURE — 1159F PR MEDICATION LIST DOCUMENTED IN MEDICAL RECORD: ICD-10-PCS | Mod: HCNC,S$GLB,, | Performed by: INTERNAL MEDICINE

## 2020-02-07 PROCEDURE — 99214 OFFICE O/P EST MOD 30 MIN: CPT | Mod: HCNC,S$GLB,, | Performed by: INTERNAL MEDICINE

## 2020-02-07 PROCEDURE — 99999 PR PBB SHADOW E&M-EST. PATIENT-LVL III: ICD-10-PCS | Mod: PBBFAC,HCNC,, | Performed by: INTERNAL MEDICINE

## 2020-02-07 PROCEDURE — 99214 PR OFFICE/OUTPT VISIT, EST, LEVL IV, 30-39 MIN: ICD-10-PCS | Mod: HCNC,S$GLB,, | Performed by: INTERNAL MEDICINE

## 2020-02-07 PROCEDURE — 1101F PR PT FALLS ASSESS DOC 0-1 FALLS W/OUT INJ PAST YR: ICD-10-PCS | Mod: HCNC,CPTII,S$GLB, | Performed by: INTERNAL MEDICINE

## 2020-02-07 NOTE — PROGRESS NOTES
Subjective:       Patient ID: Karli Hameed is a 88 y.o. White female who presents for follow-up evaluation of Chronic Kidney Disease    HPI    Ms. Hameed is an 88 year old woman with past medical history of hypertension, polycystic kidney disease, recently diagnosed rectal cancer, presenting for follow up of chronic kidney disease.  Patient reports completion of radiation therapy for rectal cancer after excision per Colorectal surgery, she reports inadequate intake during session, has slowly improved since, though still not back to her baseline.  She otherwise denies any fatigue, fever, chest pain, shortness of breath, abdominal pain, diarrhea, dysuria/hematuria.      Review of Systems   Constitutional: Positive for appetite change. Negative for fatigue and fever.   HENT: Negative for congestion.    Respiratory: Negative for cough, chest tightness and shortness of breath.    Cardiovascular: Negative for chest pain and leg swelling.   Gastrointestinal: Negative for abdominal pain, constipation, diarrhea, nausea and vomiting.   Genitourinary: Negative for difficulty urinating, dysuria, flank pain, frequency, hematuria and urgency.   Musculoskeletal: Negative for arthralgias, joint swelling and myalgias.   Skin: Negative for rash and wound.   Neurological: Negative for dizziness, weakness and light-headedness.   All other systems reviewed and are negative.      Objective:      Physical Exam   Constitutional: She appears well-developed and well-nourished.   Cardiovascular: Normal rate, regular rhythm and normal heart sounds. Exam reveals no gallop and no friction rub.   No murmur heard.  Pulmonary/Chest: Effort normal and breath sounds normal. No respiratory distress. She has no wheezes. She has no rales.   Abdominal: Soft. Bowel sounds are normal. There is no tenderness.   Musculoskeletal: She exhibits no edema.   Neurological: She is alert.   Skin: Skin is warm and dry. No rash noted. No erythema.   Psychiatric:  She has a normal mood and affect.       Assessment:       1. Chronic kidney disease, stage V        Plan:      Ms. Hameed is an 88 year old woman with past medical history of hypertension, polycystic kidney disease, recently diagnosed with rectal cancer (excision, XRT) presenting for follow up of chronic kidney disease stage IV/V.  Patient creatinine slightly above baseline, previously stable since February 2018 (previous acute kidney injury due to UTI, v. secondary to prior URI), will repeat renal panel to trend, reviewed renal US 7.18 with doppler to rule out obstructive/vascular etiology, further recommendations pending results.  Encouraged fluid intake, stressed importance of blood pressure control to prevent any further progression of kidney disease, patient voiced understanding.  Discussed signs/symptoms of uremia, along with indications for renal replacement therapy; will have patient attend [facility] education on dialysis modalities when she is willing (patient reluctant to discuss access, declining any type of dialysis at this time, understands risks/benefits).  Further recommendations pending results of above.    - Anemia: Hgb previously at goal, no indication for erythropoetin therapy, will trend CBC/iron studies  - Bone/mineral metabolism: patient with secondary hyperparathyroidism, PTH previously at goal for stage CKD, will trend    Return to clinic in 6-8 weeks pending renal panel, then with renal/heme panel, iron/TIBC/ferritin, urinalysis/culture, urine protein/creatinine ratio prior to next visit

## 2020-02-12 ENCOUNTER — OFFICE VISIT (OUTPATIENT)
Dept: INTERNAL MEDICINE | Facility: CLINIC | Age: 85
End: 2020-02-12
Payer: MEDICARE

## 2020-02-12 VITALS
HEART RATE: 73 BPM | SYSTOLIC BLOOD PRESSURE: 140 MMHG | HEIGHT: 64 IN | DIASTOLIC BLOOD PRESSURE: 72 MMHG | WEIGHT: 104.5 LBS | BODY MASS INDEX: 17.84 KG/M2 | OXYGEN SATURATION: 99 %

## 2020-02-12 DIAGNOSIS — R63.4 WEIGHT LOSS, NON-INTENTIONAL: Primary | ICD-10-CM

## 2020-02-12 DIAGNOSIS — N18.5 STAGE 5 CHRONIC KIDNEY DISEASE NOT ON CHRONIC DIALYSIS: ICD-10-CM

## 2020-02-12 DIAGNOSIS — C20 RECTAL CANCER: ICD-10-CM

## 2020-02-12 DIAGNOSIS — R68.2 DRY MOUTH: ICD-10-CM

## 2020-02-12 PROCEDURE — 99999 PR PBB SHADOW E&M-EST. PATIENT-LVL III: ICD-10-PCS | Mod: PBBFAC,HCNC,, | Performed by: INTERNAL MEDICINE

## 2020-02-12 PROCEDURE — 99214 OFFICE O/P EST MOD 30 MIN: CPT | Mod: HCNC,S$GLB,, | Performed by: INTERNAL MEDICINE

## 2020-02-12 PROCEDURE — 1159F PR MEDICATION LIST DOCUMENTED IN MEDICAL RECORD: ICD-10-PCS | Mod: HCNC,S$GLB,, | Performed by: INTERNAL MEDICINE

## 2020-02-12 PROCEDURE — 99999 PR PBB SHADOW E&M-EST. PATIENT-LVL III: CPT | Mod: PBBFAC,HCNC,, | Performed by: INTERNAL MEDICINE

## 2020-02-12 PROCEDURE — 1159F MED LIST DOCD IN RCRD: CPT | Mod: HCNC,S$GLB,, | Performed by: INTERNAL MEDICINE

## 2020-02-12 PROCEDURE — 1101F PT FALLS ASSESS-DOCD LE1/YR: CPT | Mod: HCNC,CPTII,S$GLB, | Performed by: INTERNAL MEDICINE

## 2020-02-12 PROCEDURE — 99499 UNLISTED E&M SERVICE: CPT | Mod: HCNC,S$GLB,, | Performed by: INTERNAL MEDICINE

## 2020-02-12 PROCEDURE — 1126F AMNT PAIN NOTED NONE PRSNT: CPT | Mod: HCNC,S$GLB,, | Performed by: INTERNAL MEDICINE

## 2020-02-12 PROCEDURE — 1101F PR PT FALLS ASSESS DOC 0-1 FALLS W/OUT INJ PAST YR: ICD-10-PCS | Mod: HCNC,CPTII,S$GLB, | Performed by: INTERNAL MEDICINE

## 2020-02-12 PROCEDURE — 99499 RISK ADDL DX/OHS AUDIT: ICD-10-PCS | Mod: HCNC,S$GLB,, | Performed by: INTERNAL MEDICINE

## 2020-02-12 PROCEDURE — 99214 PR OFFICE/OUTPT VISIT, EST, LEVL IV, 30-39 MIN: ICD-10-PCS | Mod: HCNC,S$GLB,, | Performed by: INTERNAL MEDICINE

## 2020-02-12 PROCEDURE — 1126F PR PAIN SEVERITY QUANTIFIED, NO PAIN PRESENT: ICD-10-PCS | Mod: HCNC,S$GLB,, | Performed by: INTERNAL MEDICINE

## 2020-02-12 RX ORDER — FLUTICASONE PROPIONATE 50 MCG
2 SPRAY, SUSPENSION (ML) NASAL DAILY
Qty: 16 G | Refills: 12 | Status: SHIPPED | OUTPATIENT
Start: 2020-02-12 | End: 2020-03-13

## 2020-02-12 NOTE — PATIENT INSTRUCTIONS
Exercises for Shoulder Flexibility: Pendulum Exercise      Improving your flexibility can reduce pain. Stretching exercises also can help increase your range of pain-free motion. Breathe normally when you exercise. And try to use smooth, fluid movements.  Follow any special instructions you are given. If you feel pain, stop the exercise. If the pain continues after stopping, call your healthcare provider.  · Lean over with your good arm supported on a table or chair.  · Relax the arm on the painful side, letting it hang straight down.  · Slowly begin to swing the relaxed arm. Move it in a small [place], gradually making it bigger if you can. Then reverse the direction. Next, move it backward and forward. Finally, move it side to side.     Note: Spend about 5 minutes doing the exercise, 3 times a day. Change direction after 1 minute of motion.   Date Last Reviewed: 9/3/2015  © 6958-6692 [employer]. [address] [place], [place] [address]. All rights reserved. This information is not intended as a substitute for professional medical care. Always follow your healthcare professional's instructions.        Exercises for Shoulder Flexibility: Wall Walk    Improving your flexibility can reduce pain. Stretching exercises also can help increase your range of pain-free motion. Breathe normally when you exercise. Use smooth, fluid movements.  Note: Follow any special instructions you are given. If you feel pain, stop the exercise. If the pain continues after stopping, call your healthcare provider:  · Stand with your shoulder about 2 feet from the wall.  · Raise your arm to shoulder level and gently walk your fingers up the wall as high as you can.  · Hold for a few seconds. Then walk your fingers back down.  · Repeat 3 times. Move closer to the wall as you repeat.  · Build up to holding each stretch for 30 seconds.  Caution: Do this stretch only if your healthcare provider recommends it. Dont do it when  you are first injured.       Date Last Reviewed: 8/16/2015  © 2519-1539 [employer] [address] [place] [address], [place] [address]. All rights reserved. This information is not intended as a substitute for professional medical care. Always follow your healthcare professional's instructions.

## 2020-02-13 ENCOUNTER — TELEPHONE (OUTPATIENT)
Dept: INTERNAL MEDICINE | Facility: CLINIC | Age: 85
End: 2020-02-13

## 2020-02-13 DIAGNOSIS — N18.5 STAGE 5 CHRONIC KIDNEY DISEASE NOT ON CHRONIC DIALYSIS: Primary | ICD-10-CM

## 2020-02-14 NOTE — PROGRESS NOTES
Subjective:       Patient ID: Karli Hameed is a 88 y.o. female.    Chief Complaint: Follow-up and Shoulder Pain   She is better.  She has had almost 2 months of diarrhea but currently Citrucel is helping to reduce the diarrhea.  She lost 22 lb in 2 weeks dropping down to 90 lb.  At the present time her nausea is gone.  She is eating lot of aches and avocados.    She is grieving the death of 2 friends and neighbors 160 9 years old and another person elderly.    She is having some financial struggles.    She has had a lot of trouble with dry mouth.  Various over-the-counter remedies were advised and pictures were shown on the will images.    She is back driving, grocery shopping and cooking.  HPI  Review of Systems   Constitutional: Positive for appetite change and fatigue. Negative for activity change, chills, fever and unexpected weight change.   HENT: Negative for hearing loss.    Eyes: Negative for visual disturbance.   Respiratory: Negative for cough, chest tightness, shortness of breath and wheezing.    Cardiovascular: Negative for chest pain, palpitations and leg swelling.   Gastrointestinal: Positive for diarrhea. Negative for abdominal pain, constipation, nausea and vomiting.   Genitourinary: Negative for dysuria, frequency and urgency.   Musculoskeletal: Negative for arthralgias, back pain, gait problem, joint swelling and myalgias.   Skin: Negative for rash.   Neurological: Negative for light-headedness and headaches.   Psychiatric/Behavioral: Negative for dysphoric mood and sleep disturbance. The patient is not nervous/anxious.        Objective:      Physical Exam   Constitutional: She appears well-nourished.   HENT:   Head: Atraumatic.   Eyes: Conjunctivae are normal. No scleral icterus.   Neck: Neck supple.   Cardiovascular: Normal rate and regular rhythm.   Pulmonary/Chest: Effort normal and breath sounds normal.   Abdominal: Soft. There is no tenderness.   Musculoskeletal: She exhibits no edema.    Lymphadenopathy:     She has no cervical adenopathy.   Neurological: She is alert.   Skin: Skin is warm and dry.   Psychiatric: She has a normal mood and affect. Her behavior is normal.   Nursing note and vitals reviewed.      Assessment:       1. Weight loss, non-intentional    2. Rectal cancer    3. Stage 5 chronic kidney disease not on chronic dialysis    4. Dry mouth        Plan:   Karli was seen today for follow-up and shoulder pain.    Diagnoses and all orders for this visit:    Weight loss, non-intentional  Rectal cancer, currently she is on the mend.  Her plan is to remain in her home.  She asks about in-home care.  She does not qualify for home health because she is not homebound.  Should she have a change in status, such as being unable to drive, or homebound basically then she would qualify.  I recommend she start looking now for suitable sitters in the event that she needs in home care part time or 24 hours in the future.  The 2 agencies most often used in her neighborhood are [facility].  These agencies are private pay, approximately 25 dollars per hour, not a covered Medicare benefit, additionally the quality of their providers is spotty.    Stage 5 chronic kidney disease not on chronic dialysis, she has no signs or symptoms of uremia.    Dry mouth, over-the-counter products are recommended.    Other orders  -     fluticasone propionate (FLONASE) 50 mcg/actuation nasal spray; 2 sprays (100 mcg total) by Each Nostril route once daily.    Exercises for Shoulder Flexibility: Pendulum Exercise      Improving your flexibility can reduce pain. Stretching exercises also can help increase your range of pain-free motion. Breathe normally when you exercise. And try to use smooth, fluid movements.  Follow any special instructions you are given. If you feel pain, stop the exercise. If the pain continues after stopping, call your healthcare provider.  · Lean over with your good arm supported  on a table or chair.  · Relax the arm on the painful side, letting it hang straight down.  · Slowly begin to swing the relaxed arm. Move it in a small [place], gradually making it bigger if you can. Then reverse the direction. Next, move it backward and forward. Finally, move it side to side.     Note: Spend about 5 minutes doing the exercise, 3 times a day. Change direction after 1 minute of motion.   Date Last Reviewed: 9/3/2015  © 8755-3629 [employer]. [address] [place] [address]. All rights reserved. This information is not intended as a substitute for professional medical care. Always follow your healthcare professional's instructions.        Exercises for Shoulder Flexibility: Wall Walk    Improving your flexibility can reduce pain. Stretching exercises also can help increase your range of pain-free motion. Breathe normally when you exercise. Use smooth, fluid movements.  Note: Follow any special instructions you are given. If you feel pain, stop the exercise. If the pain continues after stopping, call your healthcare provider:  · Stand with your shoulder about 2 feet from the wall.  · Raise your arm to shoulder level and gently walk your fingers up the wall as high as you can.  · Hold for a few seconds. Then walk your fingers back down.  · Repeat 3 times. Move closer to the wall as you repeat.  · Build up to holding each stretch for 30 seconds.  Caution: Do this stretch only if your healthcare provider recommends it. Dont do it when you are first injured.       Date Last Reviewed: 8/16/2015  © 5366-3730 [employer]. [address] [place] [address]. All rights reserved. This information is not intended as a substitute for professional medical care. Always follow your healthcare professional's instructions.

## 2020-02-18 NOTE — TELEPHONE ENCOUNTER
Good afternoon,  doesn't have any appointments at this time but I placed the patient on the wait list, but I can schedule the patient with another provider if you want me too???please advise      Thank you

## 2020-03-11 ENCOUNTER — PATIENT MESSAGE (OUTPATIENT)
Dept: INTERNAL MEDICINE | Facility: CLINIC | Age: 85
End: 2020-03-11

## 2020-03-16 RX ORDER — AMLODIPINE BESYLATE 5 MG/1
5 TABLET ORAL EVERY MORNING
Qty: 90 TABLET | Refills: 0 | Status: SHIPPED | OUTPATIENT
Start: 2020-03-16 | End: 2020-05-19 | Stop reason: SDUPTHER

## 2020-03-16 RX ORDER — LOSARTAN POTASSIUM 50 MG/1
50 TABLET ORAL DAILY
Qty: 90 TABLET | Refills: 0 | Status: SHIPPED | OUTPATIENT
Start: 2020-03-16 | End: 2020-05-19 | Stop reason: SDUPTHER

## 2020-03-16 NOTE — TELEPHONE ENCOUNTER
Refill Authorization Note     is requesting a refill authorization.    Brief assessment and rationale for refill: APPROVE: prr          Medication Therapy Plan: BP-elevated at LOV and Nephrology; 02/20-[place](Pt. creatinine slightly above baseline, previously stable since 02/18); FOVS(03/20); FLOS(05/20); Approve 3 more months    Medication reconciliation completed: No                         Comments:   Requested Prescriptions   Pending Prescriptions Disp Refills    amLODIPine (NORVASC) 5 MG tablet 90 tablet 0     Sig: Take 1 tablet (5 mg total) by mouth every morning.       Cardiovascular:  Calcium Channel Blockers Failed - 3/16/2020  2:54 PM        Failed - Last BP in normal range within 360 days.     BP Readings from Last 3 Encounters:   02/12/20 (!) 140/72   02/07/20 (!) 140/80   01/22/20 139/80              Passed - Patient is at least 18 years old        Passed - Office visit in past 12 months or future 90 days.     Recent Outpatient Visits            1 month ago Weight loss, non-intentional    [facility] Katherine Hutchinson MD    1 month ago Chronic kidney disease, stage V    [facility] Luis M Alonzo MD    1 month ago Encounter for follow-up surveillance of rectal cancer    [facility] JULIUS Fritz MD    2 months ago Osteopenia, unspecified location    [facility]umat[facility] Jacob Max MD    2 months ago Rectal cancer    [facility] Radiation [facility] Gunnar Ohara MD          Future Appointments              In 1 week MD [facility]    In 1 month MD [facility], [facility]    In 1 month LAB, APPOINT[facility] [place] [facility]    In 1 month EPO, [facility]               losartan (COZAAR) 50 MG tablet 90 tablet 0     Sig: Take 1 tablet (50 mg total) by mouth once daily.        Cardiovascular:  Angiotensin Receptor Blockers Failed - 3/16/2020  2:55 PM        Failed - Last BP in normal range within 360 days.     BP Readings from Last 3 Encounters:   02/12/20 (!) 140/72   02/07/20 (!) 140/80   01/22/20 139/80              Failed - Cr is 1.3 or below and within 360 days     Creatinine   Date Value Ref Range Status   02/07/2020 4.0 (H) 0.5 - 1.4 mg/dL Final   11/20/2019 4.5 (H) 0.5 - 1.4 mg/dL Final   10/08/2019 3.9 (H) 0.5 - 1.4 mg/dL Final              Passed - Patient is at least 18 years old        Passed - Office visit in past 12 months or future 90 days.     Recent Outpatient Visits            1 month ago Weight loss, non-intentional    [facility] Katherine Hutchinson MD    1 month ago Chronic kidney disease, stage V    [facility] Luis M Alonzo MD    1 month ago Encounter for follow-up surveillance of rectal cancer    [facility] JULIUS Fritz MD    2 months ago Osteopenia, unspecified location    [facility] Rheumatology Jacob Max MD    2 months ago Rectal cancer    [facility] On[facility] Gunnar Ohara MD          Future Appointments              In 1 week MD [facility] - [facility] Medicine, [facility]    In 1 month MD [facility]    In 1 month LAB, APPOINTMENT [place] [facility]    In 1 month EPO, INJECTION [facility]                Passed - K in normal range and within 360 days     Potassium   Date Value Ref Range Status   02/07/2020 4.0 3.5 - 5.1 mmol/L Final   11/20/2019 4.2 3.5 - 5.1 mmol/L Final   10/08/2019 4.4 3.5 - 5.1 mmol/L Final              Passed - eGFR within 360 days     eGFR if non    Date Value Ref Range Status   02/07/2020 9.4 (A) >60 mL/min/1.73 m^2 Final     Comment:     Calculation used to obtain the estimated glomerular filtration  rate  (eGFR) is the CKD-EPI equation.      11/20/2019 8.2 (A) >60 mL/min/1.73 m^2 Final     Comment:     Calculation used to obtain the estimated glomerular filtration  rate (eGFR) is the CKD-EPI equation.      10/08/2019 9.8 (A) >60 mL/min/1.73 m^2 Final     Comment:     Calculation used to obtain the estimated glomerular filtration  rate (eGFR) is the CKD-EPI equation.        eGFR if    Date Value Ref Range Status   02/07/2020 10.9 (A) >60 mL/min/1.73 m^2 Final   11/20/2019 9.4 (A) >60 mL/min/1.73 m^2 Final   10/08/2019 11.3 (A) >60 mL/min/1.73 m^2 Final               Appointments  past 12m or future 3m with PCP    Date Provider   Last Visit   2/12/2020 Katherine Hutchinson MD   Next Visit   3/26/2020 Katherine Hutchinson MD   .  ED visits in past 90 days: [unfilled]       Note composed:3:09 PM 03/16/2020

## 2020-03-24 ENCOUNTER — PATIENT MESSAGE (OUTPATIENT)
Dept: SURGERY | Facility: CLINIC | Age: 85
End: 2020-03-24

## 2020-03-24 ENCOUNTER — TELEPHONE (OUTPATIENT)
Dept: SURGERY | Facility: CLINIC | Age: 85
End: 2020-03-24

## 2020-03-24 RX ORDER — MESALAMINE 4 G/60ML
4 SUSPENSION RECTAL NIGHTLY
Qty: 30 ENEMA | Refills: 0 | Status: ON HOLD | OUTPATIENT
Start: 2020-03-24 | End: 2021-04-01 | Stop reason: HOSPADM

## 2020-03-24 NOTE — TELEPHONE ENCOUNTER
Called pt to let her know Dr Fritz sent a rx for enemas to [facility]. If the amount of bleeding gets worse she will call me back.

## 2020-03-24 NOTE — TELEPHONE ENCOUNTER
Pt concerned because she has been having small amount of brb per rectum for past week. It happens every other day. Yesterday she said she had a lot of blood.  When asked about what she considers a lot of blood she said she puts a kleenex in her panties and yesterday it was full but not dripping onto her clothing. I will talk to Dr Fritz and get back with her.

## 2020-03-24 NOTE — TELEPHONE ENCOUNTER
----- Message from Rachael Morgan sent at 3/24/2020  8:47 AM CDT -----  Contact: [phone]  Calling to get a sooner appointment than scheduled due to rectal bleeding. Please call

## 2020-03-25 ENCOUNTER — TELEPHONE (OUTPATIENT)
Dept: INTERNAL MEDICINE | Facility: CLINIC | Age: 85
End: 2020-03-25

## 2020-03-25 RX ORDER — HYDROCODONE BITARTRATE AND ACETAMINOPHEN 5; 325 MG/1; MG/1
1 TABLET ORAL EVERY 8 HOURS PRN
Qty: 30 TABLET | Refills: 0 | Status: ON HOLD | OUTPATIENT
Start: 2020-03-25 | End: 2021-04-05 | Stop reason: HOSPADM

## 2020-03-25 NOTE — TELEPHONE ENCOUNTER
Telephone call  She is in terrible pain  I spoke to her neighborhood pharmacist  He will fix up 30 hydrocodone acetaminophen 5-325 tablets and she will send someone to pick it up  The pharmacist will allow someone else to pick it for her   so she can self quarantine  She is at an extremely high risk for complications from corona virus

## 2020-03-25 NOTE — TELEPHONE ENCOUNTER
Telephone call  3 months shoulder pain  Long talk  Will try for PT but I do not think they are open

## 2020-04-01 ENCOUNTER — TELEPHONE (OUTPATIENT)
Dept: SURGERY | Facility: CLINIC | Age: 85
End: 2020-04-01

## 2020-04-01 ENCOUNTER — PATIENT MESSAGE (OUTPATIENT)
Dept: SURGERY | Facility: CLINIC | Age: 85
End: 2020-04-01

## 2020-04-01 NOTE — TELEPHONE ENCOUNTER
LM on pt vm explaining to her that Dr. Fritz requests that she keep her OV on 04/22/2020, but asks that she come in the morning. CRS contact info was left to pt to buck to change time of OV.

## 2020-04-03 ENCOUNTER — TELEPHONE (OUTPATIENT)
Dept: SURGERY | Facility: CLINIC | Age: 85
End: 2020-04-03

## 2020-04-03 ENCOUNTER — PATIENT MESSAGE (OUTPATIENT)
Dept: SURGERY | Facility: CLINIC | Age: 85
End: 2020-04-03

## 2020-04-03 NOTE — TELEPHONE ENCOUNTER
I returned pt call about moving up the appointment time for her OV on 04/22/202, she agreed to the time change. A new letter will be sent. She mentioned that she has been taking a lot of enemas that Dr. Fritz and Katherine told her to take but she is not clear on sig instructions. Pt reports not feeling well due to the amount of fluid she has self administered through the rectum. She stated that she sent a message with all of her complaints to the pt portal and would like someone to call her regarding this matter.

## 2020-04-03 NOTE — TELEPHONE ENCOUNTER
Spoke with patient concerning her reported symptoms -instructed to do enema's every othernignt this weekend and call us Monday morning with an update. States has intermittent diarrhea, blood on TP, dizziness (not severe). Denies fever, nausea, vomiting ,pain. Instructed to call over the weekend if symptoms worsen.

## 2020-04-10 ENCOUNTER — PATIENT MESSAGE (OUTPATIENT)
Dept: INTERNAL MEDICINE | Facility: CLINIC | Age: 85
End: 2020-04-10

## 2020-04-12 RX ORDER — HYDROCORTISONE 25 MG/G
CREAM TOPICAL 2 TIMES DAILY
Qty: 28 G | Refills: 5 | Status: SHIPPED | OUTPATIENT
Start: 2020-04-12 | End: 2020-12-15 | Stop reason: CLARIF

## 2020-04-16 ENCOUNTER — TELEPHONE (OUTPATIENT)
Dept: SURGERY | Facility: CLINIC | Age: 85
End: 2020-04-16

## 2020-04-16 NOTE — TELEPHONE ENCOUNTER
----- Message from Sammie Peters sent at 4/16/2020 10:01 AM CDT -----  Contact: pt [phone]  Pt states that she's still feeling weak from surgery and she wants to know if she should come to appt due to covid 19

## 2020-04-16 NOTE — TELEPHONE ENCOUNTER
Spoke with patient. States that she has been bleeding per rectum but has not been feeling well and was concerned about coming to the hospital because she does not have a mask. Informed patient that she will receive a mask upon arrival to the hospital and encouraged patient to come to appointment due to rectal bleeding and history of rectal cancer. Stated understanding. Also reported mesalamine suppositories did not work for her.

## 2020-04-22 ENCOUNTER — OFFICE VISIT (OUTPATIENT)
Dept: SURGERY | Facility: CLINIC | Age: 85
End: 2020-04-22
Payer: MEDICARE

## 2020-04-22 VITALS
BODY MASS INDEX: 17.29 KG/M2 | HEART RATE: 86 BPM | WEIGHT: 100.75 LBS | SYSTOLIC BLOOD PRESSURE: 149 MMHG | DIASTOLIC BLOOD PRESSURE: 77 MMHG

## 2020-04-22 DIAGNOSIS — C20 RECTAL CANCER: ICD-10-CM

## 2020-04-22 PROCEDURE — 99999 PR PBB SHADOW E&M-EST. PATIENT-LVL III: ICD-10-PCS | Mod: PBBFAC,HCNC,, | Performed by: COLON & RECTAL SURGERY

## 2020-04-22 PROCEDURE — 1159F MED LIST DOCD IN RCRD: CPT | Mod: HCNC,S$GLB,, | Performed by: COLON & RECTAL SURGERY

## 2020-04-22 PROCEDURE — 99213 OFFICE O/P EST LOW 20 MIN: CPT | Mod: HCNC,S$GLB,, | Performed by: COLON & RECTAL SURGERY

## 2020-04-22 PROCEDURE — 99999 PR PBB SHADOW E&M-EST. PATIENT-LVL III: CPT | Mod: PBBFAC,HCNC,, | Performed by: COLON & RECTAL SURGERY

## 2020-04-22 PROCEDURE — 1101F PT FALLS ASSESS-DOCD LE1/YR: CPT | Mod: HCNC,CPTII,S$GLB, | Performed by: COLON & RECTAL SURGERY

## 2020-04-22 PROCEDURE — 1159F PR MEDICATION LIST DOCUMENTED IN MEDICAL RECORD: ICD-10-PCS | Mod: HCNC,S$GLB,, | Performed by: COLON & RECTAL SURGERY

## 2020-04-22 PROCEDURE — 1101F PR PT FALLS ASSESS DOC 0-1 FALLS W/OUT INJ PAST YR: ICD-10-PCS | Mod: HCNC,CPTII,S$GLB, | Performed by: COLON & RECTAL SURGERY

## 2020-04-22 PROCEDURE — 99213 PR OFFICE/OUTPT VISIT, EST, LEVL III, 20-29 MIN: ICD-10-PCS | Mod: HCNC,S$GLB,, | Performed by: COLON & RECTAL SURGERY

## 2020-04-22 NOTE — PROGRESS NOTES
HPI:  Karli Hameed is a 88 y.o. female with history of low rectal cancer status post transanal excision 8-15-19.  Because of her age, comorbid conditions, and strong desire to maintain independence without a permanent ostomy she chose to undergo local excision even though APR was recommended.    FINAL PATHOLOGIC DIAGNOSIS  Rectum, transanal excision:  -INVASIVE ADENOCARCINOMA, MODERATELY DIFFERENTIATED, 2.4 CM (pT2), see synoptic report  -SURGICAL MARGINS ARE NEGATIVE FOR MALIGNANCY  SYNOPTIC REPORT (COLON AND RECTUM):  Procedure: Transanal excision  Tumor site: Rectum  Tumor size: 2.4 x 1.2 cm  Histologic type: Adenocarcinoma  Histologic grade: Moderately differentiated, G2  Tumor extension: Tumor invades the superficial muscularis propria  Margins: All margins are uninvolved by invasive carcinoma, high-grade dysplasia, intramucosal adenocarcinoma,  and adenoma  Margins examined: Proximal, distal, left, right, and deep  Lymphovascular invasion: Not identified  Perineural invasion: Not identified  Regional lymph nodes: No lymph nodes submitted or found  Pathologic stage classification (pTNM, AJCC 8th Edition): [id number]  Diagnosed by: Feroz Tejeda  (Electronically Signed: 2019-08-26 08:55:43)    She had had major issues with diarrhea following her excision with fecal incontinence and anal discomfort.    Given T2 path it was recommended that she undergo radiation therapy given her risk of recurrence based of the surgical pathology.   She has since completed 25 Gy in 5 fractions of pelvic radiotherapy on 11/8/19. Initially had some issues with radiation proctitis, tenesmus, and diarrhea.     Currently, she presents with complaint of bleeding per anus starting approximately 1 month ago.  This bleeding occurs both with BMs and in between bowel movements.  She uses Kleenex as a pad that she changes multiple times during the day both for blood spotting and feces spotting.  Occasionally the blood drips down into the  toilet when she is having BMs.  She was called in a prescription for mesalamine but this has not helped the bleeding.    Additionally, she has ongoing issues with stool frequency and tenesmus.  She will often have > 10 BMs per day.  She uses Citrucel fiber.  She will have FI leakage but no incontinence to solid stool.    Past Medical History:   Diagnosis Date    Allergy     Anxiety     Arthritis     Back pain     Basal cell carcinoma 6/2011    R nasal ala, excised via Mohs    Chronic kidney disease, stage II (mild) 8/25/2012    cyst since 1975    Flank pain 8/21/2012    HTN (hypertension) 8/21/2012    Inflammatory bowel disease     Joint pain     Kidney stone     left     Lactose disaccharidase deficiency     Rectal cancer 7/24/2019    Senile osteoporosis 4/11/2016    Small bowel obstruction, 06-, resolved witjhout surgery 7/1/2014    Trace cataracts     Ulcer     hx    Urinary tract infection         Past Surgical History:   Procedure Laterality Date    ABDOMINAL SURGERY      APPENDECTOMY      CATARACT EXTRACTION W/  INTRAOCULAR LENS IMPLANT Right 02/12/2015    Dr. Sahni    CATARACT EXTRACTION W/  INTRAOCULAR LENS IMPLANT Left 04/09/2015    CHOLECYSTECTOMY      COLONOSCOPY N/A 6/19/2019    Procedure: COLONOSCOPY;  Surgeon: Beto Rivera MD;  Location: [facility] (4TH FLR);  Service: Endoscopy;  Laterality: N/A;  to be scheudled on 6/19/19 with Dr. Rivera per JACY Fischer NP    EYE SURGERY      HERNIA REPAIR  2017    Open rih with mesh    HYSTERECTOMY      KIDNEY SURGERY      drained cyst x 2    lysis of abdominal adhesions      OOPHORECTOMY      TONSILLECTOMY, ADENOIDECTOMY      TRANSANAL RECTAL RESECTION N/A 8/15/2019    Procedure: transanal EXCISION, LESION, RECTUM, ANAL APPROACH full thickness;  Surgeon: JULIUS Fritz MD;  Location: [facility] OR 2ND FLR;  Service: Colon and Rectal;  Laterality: N/A;       Review of patient's allergies indicates:   Allergen Reactions     Advil [ibuprofen] Nausea Only    Parafon forte      Other reaction(s): Hallucinations    Calcitriol (bulk) Nausea Only    Lisinopril      cough       Family History   Problem Relation Age of Onset    Cancer Father         throat - was a tobacco smoker    Cataracts Father     Kidney disease Father     Cancer Paternal Grandmother         was a tobacco smoker    No Known Problems Mother     No Known Problems Sister     No Known Problems Brother     No Known Problems Maternal Aunt     No Known Problems Maternal Uncle     No Known Problems Paternal Aunt     No Known Problems Paternal Uncle     No Known Problems Maternal Grandmother     No Known Problems Maternal Grandfather     No Known Problems Paternal Grandfather     Cirrhosis Neg Hx     Celiac disease Neg Hx     Colon polyps Neg Hx     Crohn's disease Neg Hx     Esophageal cancer Neg Hx     Inflammatory bowel disease Neg Hx     Liver cancer Neg Hx     Liver disease Neg Hx     Rectal cancer Neg Hx     Stomach cancer Neg Hx     Ulcerative colitis Neg Hx     Amblyopia Neg Hx     Blindness Neg Hx     Diabetes Neg Hx     Glaucoma Neg Hx     Hypertension Neg Hx     Macular degeneration Neg Hx     Retinal detachment Neg Hx     Strabismus Neg Hx     Stroke Neg Hx     Thyroid disease Neg Hx     Melanoma Neg Hx        Social History     Socioeconomic History    Marital status: Single     Spouse name: Not on file    Number of children: Not on file    Years of education: Not on file    Highest education level: Not on file   Occupational History    Not on file   Social Needs    Financial resource strain: Not on file    Food insecurity:     Worry: Not on file     Inability: Not on file    Transportation needs:     Medical: Not on file     Non-medical: Not on file   Tobacco Use    Smoking status: Never Smoker    Smokeless tobacco: Never Used   Substance and Sexual Activity    Alcohol use: Not Currently     Comment: none    Drug  use: No    Sexual activity: Never   Lifestyle    Physical activity:     Days per week: Not on file     Minutes per session: Not on file    Stress: Not on file   Relationships    Social connections:     Talks on phone: Not on file     Gets together: Not on file     Attends Mu-ism service: Not on file     Active member of club or organization: Not on file     Attends meetings of clubs or organizations: Not on file     Relationship status: Not on file   Other Topics Concern    Are you pregnant or think you may be? No    Breast-feeding No   Social History Narrative    Lives alone.    Great neighbors.    Many friends.    Walks the neighborhood daily with her dog.    Active social life.    Has her own home, takes care of everything and all of her affairs.     From [facility] , came to  1969    2 level house          Stays active     Walks every day , does home exercises     Takes stairs at the house            ROS:  GENERAL: No fever, chills, fatigability or weight loss.  Integument: No rashes, redness, icterus  CHEST: Denies DOMINGUEZ, cyanosis, wheezing, cough and sputum production.  CARDIOVASCULAR: Denies chest pain, PND, orthopnea or reduced exercise tolerance.  GI: Denies abd pain, dysphagia, nausea, vomiting, no hematemesis   : Denies burning on urination, no hematuria, no bacteriuria  MSK: No deformities, swelling, joint pain swelling  Neurologic: No HAs, seizures, weakness, paresthesias, gait problems    PE:  General appearance elderly female in NAD  BP (!) 149/77 (BP Location: Left arm, Patient Position: Sitting, BP Method: Medium (Automatic))   Pulse 86   Wt 45.7 kg (100 lb 12 oz)   BMI 17.29 kg/m²     Sclera/ Skin anicteric  LN none palpable  AT NC EOMI  Neck supple trachea midline   Chest symmetric, nl excursion, no retractions, breathing comfortably  Abdomen  ND soft NT.  no masses, non organomegaly  EXT - no CCE  Neuro:  Mood/ affect nl, alert and oriented x 3, moves all ext's, gait  nl    Rectal  Inspection nl  LATISHA no masses, smooth      Assessment:  1. H/o low rectal cancer s/p TAMIS excision.  Now with bleeding  - No luminal recurrence on flexible sigmoidoscopy  - Bleeding likely from radiation proctitis    Plan:  Check MRI  CEA  OV 2 weeks  Colonoscopy recommended but deferred by patient.    I have personally obtained a history and performed a physical exam with and independent to my resident and discussed the findings and plan with the patient.  I agree with the above findings and plan with the following exceptions:  None    H, Saji Fritz MD, FACS, FASCRS  Staff Surgeon  Dept of Colon and Rectal Surgery  [facility] [place], [place]        Procedure note    Flexible sigmoidoscopy    Verbal consent obtained.     Indications:  Bleeding    Post procedure diagnosis:  Proctitis    Procedure:  Flexible sigmoidoscopy    Surgeon LASHAUN    Asst:  Xiao    Findings:        Scar - no recurrence.  Left lateral punctate ulcerations      Technique in detail:  Timeout performed.  Pt placed in left lateral Jensen position.  Lubrication with digital rectal exam revealed nl tone,  No palpable masses.  The endoscope was lubricated and the tip inserted into the anal canal.  The endoscope was advanced under direct vision to 10  cm.  Upon withdrawal the mucosa was meticulously inspected.  The pt tolerated the procedure well     Complications:  None    EBL:  None    Patient discharged from the office in stable condition

## 2020-04-24 ENCOUNTER — LAB VISIT (OUTPATIENT)
Dept: LAB | Facility: HOSPITAL | Age: 85
End: 2020-04-24
Attending: COLON & RECTAL SURGERY
Payer: MEDICARE

## 2020-04-24 DIAGNOSIS — C20 RECTAL CANCER: ICD-10-CM

## 2020-04-24 LAB — CEA SERPL-MCNC: 8.5 NG/ML (ref 0–5)

## 2020-04-24 PROCEDURE — 82378 CARCINOEMBRYONIC ANTIGEN: CPT | Mod: HCNC

## 2020-04-24 PROCEDURE — 36415 COLL VENOUS BLD VENIPUNCTURE: CPT | Mod: HCNC

## 2020-04-28 ENCOUNTER — TELEPHONE (OUTPATIENT)
Dept: RHEUMATOLOGY | Facility: CLINIC | Age: 85
End: 2020-04-28

## 2020-04-30 ENCOUNTER — PATIENT MESSAGE (OUTPATIENT)
Dept: INTERNAL MEDICINE | Facility: CLINIC | Age: 85
End: 2020-04-30

## 2020-04-30 ENCOUNTER — TELEPHONE (OUTPATIENT)
Dept: INTERNAL MEDICINE | Facility: CLINIC | Age: 85
End: 2020-04-30

## 2020-04-30 NOTE — TELEPHONE ENCOUNTER
----- Message from Shannon Colon sent at 4/30/2020 11:32 AM CDT -----  Type:  Needs Medical Advice    Who Called:NIKI       Would the patient rather a call back or a response via [facility]? CALL BACK     Best Call Back Number: [phone]    Additional Information: NIKI WOULD LIKE TO SPEAK WITH ANTONELLA ABOUT MAKING IN PERSON APPT.   
Spoke  To pt and booked appointment  
I have reviewed and confirmed nurses' notes for patient's medications, allergies, medical history, and surgical history.

## 2020-05-01 ENCOUNTER — TELEPHONE (OUTPATIENT)
Dept: INTERNAL MEDICINE | Facility: CLINIC | Age: 85
End: 2020-05-01

## 2020-05-01 DIAGNOSIS — M75.41 IMPINGEMENT SYNDROME OF RIGHT SHOULDER: ICD-10-CM

## 2020-05-01 DIAGNOSIS — M75.01 ADHESIVE CAPSULITIS OF BOTH SHOULDERS: Primary | ICD-10-CM

## 2020-05-01 DIAGNOSIS — M75.02 ADHESIVE CAPSULITIS OF BOTH SHOULDERS: Primary | ICD-10-CM

## 2020-05-04 ENCOUNTER — LAB VISIT (OUTPATIENT)
Dept: LAB | Facility: HOSPITAL | Age: 85
End: 2020-05-04
Attending: INTERNAL MEDICINE
Payer: MEDICARE

## 2020-05-04 DIAGNOSIS — N18.4 CHRONIC KIDNEY DISEASE, STAGE IV (SEVERE): ICD-10-CM

## 2020-05-04 LAB
ALBUMIN SERPL BCP-MCNC: 3.9 G/DL (ref 3.5–5.2)
ANION GAP SERPL CALC-SCNC: 13 MMOL/L (ref 8–16)
BUN SERPL-MCNC: 93 MG/DL (ref 8–23)
CALCIUM SERPL-MCNC: 8.3 MG/DL (ref 8.7–10.5)
CHLORIDE SERPL-SCNC: 111 MMOL/L (ref 95–110)
CO2 SERPL-SCNC: 17 MMOL/L (ref 23–29)
CREAT SERPL-MCNC: 4.2 MG/DL (ref 0.5–1.4)
EST. GFR  (AFRICAN AMERICAN): 10.3 ML/MIN/1.73 M^2
EST. GFR  (NON AFRICAN AMERICAN): 8.9 ML/MIN/1.73 M^2
GLUCOSE SERPL-MCNC: 92 MG/DL (ref 70–110)
PHOSPHATE SERPL-MCNC: 7.1 MG/DL (ref 2.7–4.5)
POTASSIUM SERPL-SCNC: 4.7 MMOL/L (ref 3.5–5.1)
SODIUM SERPL-SCNC: 141 MMOL/L (ref 136–145)

## 2020-05-04 PROCEDURE — 80069 RENAL FUNCTION PANEL: CPT | Mod: HCNC

## 2020-05-04 PROCEDURE — 36415 COLL VENOUS BLD VENIPUNCTURE: CPT | Mod: HCNC

## 2020-05-05 ENCOUNTER — HOSPITAL ENCOUNTER (OUTPATIENT)
Dept: RADIOLOGY | Facility: HOSPITAL | Age: 85
Discharge: HOME | End: 2020-05-05
Attending: COLON & RECTAL SURGERY
Payer: MEDICARE

## 2020-05-05 DIAGNOSIS — C20 RECTAL CANCER: ICD-10-CM

## 2020-05-05 PROCEDURE — 72195 MRI PELVIS W/O DYE: CPT | Mod: TC,HCNC

## 2020-05-05 PROCEDURE — 72195 MRI PELVIS W/O DYE: CPT | Mod: 26,HCNC,, | Performed by: RADIOLOGY

## 2020-05-05 PROCEDURE — 72195 MRI RECTAL CANCER WITHOUT CONTRAST: ICD-10-PCS | Mod: 26,HCNC,, | Performed by: RADIOLOGY

## 2020-05-07 ENCOUNTER — INFUSION (OUTPATIENT)
Dept: INFECTIOUS DISEASES | Facility: HOSPITAL | Age: 85
End: 2020-05-07
Attending: INTERNAL MEDICINE
Payer: MEDICARE

## 2020-05-07 VITALS
DIASTOLIC BLOOD PRESSURE: 75 MMHG | HEART RATE: 80 BPM | SYSTOLIC BLOOD PRESSURE: 128 MMHG | TEMPERATURE: 97 F | HEIGHT: 64 IN | BODY MASS INDEX: 17.2 KG/M2 | WEIGHT: 100.75 LBS

## 2020-05-07 DIAGNOSIS — M85.80 OSTEOPENIA, UNSPECIFIED LOCATION: Primary | ICD-10-CM

## 2020-05-07 PROCEDURE — 63600175 PHARM REV CODE 636 W HCPCS: Mod: JG,HCNC | Performed by: INTERNAL MEDICINE

## 2020-05-07 PROCEDURE — 96372 THER/PROPH/DIAG INJ SC/IM: CPT | Mod: HCNC

## 2020-05-07 RX ADMIN — DENOSUMAB 60 MG: 60 INJECTION SUBCUTANEOUS at 08:05

## 2020-05-07 NOTE — PROGRESS NOTES
Patient received Prolia 60 mg injection sub Q in the left arm.  Tolerated well and left in NAD.    Patient will start taking her vit D again after radiation

## 2020-05-13 ENCOUNTER — DOCUMENTATION ONLY (OUTPATIENT)
Dept: SURGERY | Facility: HOSPITAL | Age: 85
End: 2020-05-13

## 2020-05-13 NOTE — PROGRESS NOTES
Multidisciplinary Rectal Cancer Conference - Evaluation and Recommendation Summary    7/24/2019  Karli Hameed  [id number]  87 y.o. female    1. Evaluation    86 yo F with rectal bleeding for several months, left lateral anal mass initially diagnosed by Thania. Has stage IV CKD.     C scope 6/19/19: non obstructing mass in the anus.     Path: invasive adenocarcinoma.   CEA 6/26/19: 8.1    CT c/a/p 6/29/19: non specific soft tissue thickening in the rectum. Two subcentimeter nodules in the right lung, otherwise negative for evidence of metastatic disease.     MRI date: 7/10/19 (limited examination due to motion and prolapse.   Rectal mass just above level of the anal verge. Does NOT extend beyond muscularis propria (no definite transmural extension). No LN greater than 5 mm.     ECOG 0    Patient with a strong desire not to have a permanent colostomy.     Transanal excision 8/15/2019: tumor began at dentate line and proximal extension to 3-4 cm. Left anterior position.     Path: T2Nx. Negative margins.     Adjuvant therapy: 5x5 Short course radiotherapy --> finished on 11/8/19.     Currently: rectal bleeding, tenesmus, >10 BM a day.     CEA 8.1 -->8.5    MRI 5/5/20: T2 intense R anterior lesion, extending to the right levator ani. Scar favored. No suspicious lymph nodes.     Recommendations:     MRI, Flex sig and CEA every 3 months.

## 2020-05-19 ENCOUNTER — HOSPITAL ENCOUNTER (OUTPATIENT)
Dept: RADIOLOGY | Facility: HOSPITAL | Age: 85
Discharge: HOME | End: 2020-05-19
Attending: INTERNAL MEDICINE
Payer: MEDICARE

## 2020-05-19 ENCOUNTER — OFFICE VISIT (OUTPATIENT)
Dept: INTERNAL MEDICINE | Facility: CLINIC | Age: 85
End: 2020-05-19
Payer: MEDICARE

## 2020-05-19 VITALS
DIASTOLIC BLOOD PRESSURE: 68 MMHG | SYSTOLIC BLOOD PRESSURE: 128 MMHG | HEART RATE: 66 BPM | BODY MASS INDEX: 17.24 KG/M2 | OXYGEN SATURATION: 95 % | WEIGHT: 101 LBS | HEIGHT: 64 IN

## 2020-05-19 DIAGNOSIS — I10 ESSENTIAL HYPERTENSION: ICD-10-CM

## 2020-05-19 DIAGNOSIS — E83.39 HYPERPHOSPHATEMIA: ICD-10-CM

## 2020-05-19 DIAGNOSIS — M75.41 IMPINGEMENT SYNDROME OF RIGHT SHOULDER: ICD-10-CM

## 2020-05-19 DIAGNOSIS — M75.41 IMPINGEMENT SYNDROME OF RIGHT SHOULDER: Primary | ICD-10-CM

## 2020-05-19 DIAGNOSIS — R68.2 DRY MOUTH: ICD-10-CM

## 2020-05-19 DIAGNOSIS — R25.2 CRAMPS, MUSCLE, GENERAL: ICD-10-CM

## 2020-05-19 DIAGNOSIS — I70.0 ATHEROSCLEROSIS OF AORTA: ICD-10-CM

## 2020-05-19 DIAGNOSIS — L29.9 ITCHING: ICD-10-CM

## 2020-05-19 DIAGNOSIS — E21.3 HYPERPARATHYROIDISM: ICD-10-CM

## 2020-05-19 DIAGNOSIS — R63.1 ALWAYS THIRSTY: ICD-10-CM

## 2020-05-19 DIAGNOSIS — R43.8 METALLIC TASTE: ICD-10-CM

## 2020-05-19 DIAGNOSIS — C20 RECTAL CANCER: ICD-10-CM

## 2020-05-19 DIAGNOSIS — D69.6 THROMBOCYTOPENIA: ICD-10-CM

## 2020-05-19 DIAGNOSIS — N18.5 STAGE 5 CHRONIC KIDNEY DISEASE NOT ON CHRONIC DIALYSIS: ICD-10-CM

## 2020-05-19 PROCEDURE — 99999 PR PBB SHADOW E&M-EST. PATIENT-LVL V: CPT | Mod: PBBFAC,HCNC,, | Performed by: INTERNAL MEDICINE

## 2020-05-19 PROCEDURE — 99999 PR PBB SHADOW E&M-EST. PATIENT-LVL V: ICD-10-PCS | Mod: PBBFAC,HCNC,, | Performed by: INTERNAL MEDICINE

## 2020-05-19 PROCEDURE — 1125F AMNT PAIN NOTED PAIN PRSNT: CPT | Mod: HCNC,S$GLB,, | Performed by: INTERNAL MEDICINE

## 2020-05-19 PROCEDURE — 73030 X-RAY EXAM OF SHOULDER: CPT | Mod: TC,HCNC,RT

## 2020-05-19 PROCEDURE — 1125F PR PAIN SEVERITY QUANTIFIED, PAIN PRESENT: ICD-10-PCS | Mod: HCNC,S$GLB,, | Performed by: INTERNAL MEDICINE

## 2020-05-19 PROCEDURE — 99214 PR OFFICE/OUTPT VISIT, EST, LEVL IV, 30-39 MIN: ICD-10-PCS | Mod: HCNC,S$GLB,, | Performed by: INTERNAL MEDICINE

## 2020-05-19 PROCEDURE — 99214 OFFICE O/P EST MOD 30 MIN: CPT | Mod: HCNC,S$GLB,, | Performed by: INTERNAL MEDICINE

## 2020-05-19 PROCEDURE — 99499 RISK ADDL DX/OHS AUDIT: ICD-10-PCS | Mod: HCNC,S$GLB,, | Performed by: INTERNAL MEDICINE

## 2020-05-19 PROCEDURE — 99499 UNLISTED E&M SERVICE: CPT | Mod: HCNC,S$GLB,, | Performed by: INTERNAL MEDICINE

## 2020-05-19 PROCEDURE — 73030 X-RAY EXAM OF SHOULDER: CPT | Mod: 26,HCNC,RT, | Performed by: RADIOLOGY

## 2020-05-19 PROCEDURE — 73030 XR SHOULDER COMPLETE 2 OR MORE VIEWS RIGHT: ICD-10-PCS | Mod: 26,HCNC,RT, | Performed by: RADIOLOGY

## 2020-05-19 PROCEDURE — 1159F PR MEDICATION LIST DOCUMENTED IN MEDICAL RECORD: ICD-10-PCS | Mod: HCNC,S$GLB,, | Performed by: INTERNAL MEDICINE

## 2020-05-19 PROCEDURE — 1101F PT FALLS ASSESS-DOCD LE1/YR: CPT | Mod: HCNC,CPTII,S$GLB, | Performed by: INTERNAL MEDICINE

## 2020-05-19 PROCEDURE — 1159F MED LIST DOCD IN RCRD: CPT | Mod: HCNC,S$GLB,, | Performed by: INTERNAL MEDICINE

## 2020-05-19 PROCEDURE — 1101F PR PT FALLS ASSESS DOC 0-1 FALLS W/OUT INJ PAST YR: ICD-10-PCS | Mod: HCNC,CPTII,S$GLB, | Performed by: INTERNAL MEDICINE

## 2020-05-19 RX ORDER — AMLODIPINE BESYLATE 5 MG/1
5 TABLET ORAL EVERY MORNING
Qty: 90 TABLET | Refills: 3 | Status: SHIPPED | OUTPATIENT
Start: 2020-05-19 | End: 2020-10-23 | Stop reason: SDUPTHER

## 2020-05-19 RX ORDER — LOSARTAN POTASSIUM 50 MG/1
50 TABLET ORAL DAILY
Qty: 90 TABLET | Refills: 3 | Status: SHIPPED | OUTPATIENT
Start: 2020-05-19 | End: 2020-10-23 | Stop reason: SDUPTHER

## 2020-05-19 NOTE — PROGRESS NOTES
Subjective:       Patient ID: Karli Hameed is a 88 y.o. female.    Chief Complaint: Arm Pain (reports of R arm pain, has been persistent since Nov of last year. has difficulty elevating R arm, denies anyt tingling/numbness.) and Dehydration (reports of constant dry mouth possibly due to radiation she says. is having to drink plenty of water. )   She is not doing very well.  She complains of an extremely dry mouth, metallic taste in her mouth, constant thirst cook, itchiness all over, and sudden severe muscle cramps in both legs thighs as well as calves.  On Sunday night it was impossible for her to sleep.  Last night she slept with both legs propped up on 3 pillows and seem to get some degree of relief.    Review of labs shows the recent development of hyperphosphatemia.  Her serum potassium never been a problem despite her stage 5 kidney disease.  Her most recent potassium was 4.7 but she has had a potassium of this in the past without any issue.  She has been eating potatoes so she could benefit from a visit with a dietitian.    Her right shoulder remains extremely painful.  There has been no injury.    HPI  Review of Systems   Constitutional: Positive for fatigue.   Musculoskeletal: Positive for arthralgias.       Objective:      Physical Exam   Constitutional: She is oriented to person, place, and time. She appears well-nourished.   HENT:   Head: Atraumatic.   Eyes: Conjunctivae are normal. No scleral icterus.   Neck: Neck supple.   Cardiovascular: Normal rate and regular rhythm. Exam reveals no friction rub.   Pulmonary/Chest: Effort normal and breath sounds normal. No respiratory distress. She has no wheezes. She has no rales.   Abdominal: Soft. There is no tenderness.   Musculoskeletal: She exhibits no edema.   Range of motion of right shoulder is limited because of pain   Lymphadenopathy:     She has no cervical adenopathy.   Neurological: She is alert and oriented to person, place, and time. She exhibits  normal muscle tone. Coordination normal.   Skin: Skin is warm and dry.   Psychiatric: She has a normal mood and affect. Her behavior is normal.       Assessment:       1. Impingement syndrome of right shoulder    2. Cramps, muscle, general    3. Stage 5 chronic kidney disease not on chronic dialysis    4. Rectal cancer    5. Essential hypertension    6. Hyperparathyroidism    7. Thrombocytopenia    8. Atherosclerosis of aorta    9. Dry mouth    10. Always thirsty    11. Metallic taste    12. Hyperphosphatemia    13. Itching        Plan:   Karli was seen today for arm pain and dehydration.    Diagnoses and all orders for this visit:    Impingement syndrome of right shoulder  -     Ambulatory referral/consult to Physical/Occupational Therapy; Future  -     X-ray Shoulder 2 or More Views Right; Future    Cramps, muscle, general  -     Ambulatory referral/consult to Nephrology; Future    Stage 5 chronic kidney disease not on chronic dialysis  -     Ambulatory referral/consult to Nephrology; Future  -     Ambulatory referral/consult to Nutrition Services; Future  -     CBC auto differential; Future  -     Ferritin; Future  -     Comprehensive metabolic panel; Future  -     PHOSPHORUS; Future  -     PTH, intact; Future    Rectal cancer  -     CBC auto differential; Future  -     Ferritin; Future    Essential hypertension    Hyperparathyroidism  -     Ambulatory referral/consult to Nephrology; Future  -     Ambulatory referral/consult to Nutrition Services; Future  -     PTH, intact; Future    Thrombocytopenia    Atherosclerosis of aorta    Dry mouth  -     Ambulatory referral/consult to Nephrology; Future    Always thirsty  -     Ambulatory referral/consult to Nephrology; Future    Metallic taste  -     Ambulatory referral/consult to Nephrology; Future    Hyperphosphatemia  -     Ambulatory referral/consult to Nephrology; Future  -     Ambulatory referral/consult to Nutrition Services; Future  -     PHOSPHORUS;  Future    Itching  -     Ambulatory referral/consult to Nephrology; Future    Other orders  -     losartan (COZAAR) 50 MG tablet; Take 1 tablet (50 mg total) by mouth once daily.  -     amLODIPine (NORVASC) 5 MG tablet; Take 1 tablet (5 mg total) by mouth every morning.

## 2020-05-20 ENCOUNTER — OFFICE VISIT (OUTPATIENT)
Dept: SURGERY | Facility: CLINIC | Age: 85
End: 2020-05-20
Payer: MEDICARE

## 2020-05-20 VITALS
SYSTOLIC BLOOD PRESSURE: 151 MMHG | DIASTOLIC BLOOD PRESSURE: 80 MMHG | HEIGHT: 64 IN | WEIGHT: 102.94 LBS | HEART RATE: 79 BPM | BODY MASS INDEX: 17.57 KG/M2

## 2020-05-20 DIAGNOSIS — R30.0 DYSURIA: Primary | ICD-10-CM

## 2020-05-20 DIAGNOSIS — K62.5 ANAL BLEEDING: ICD-10-CM

## 2020-05-20 DIAGNOSIS — Z85.048 ENCOUNTER FOR FOLLOW-UP SURVEILLANCE OF RECTAL CANCER: ICD-10-CM

## 2020-05-20 DIAGNOSIS — Z08 ENCOUNTER FOR FOLLOW-UP SURVEILLANCE OF RECTAL CANCER: ICD-10-CM

## 2020-05-20 PROCEDURE — 1125F AMNT PAIN NOTED PAIN PRSNT: CPT | Mod: HCNC,S$GLB,, | Performed by: COLON & RECTAL SURGERY

## 2020-05-20 PROCEDURE — 1159F MED LIST DOCD IN RCRD: CPT | Mod: HCNC,S$GLB,, | Performed by: COLON & RECTAL SURGERY

## 2020-05-20 PROCEDURE — 99213 OFFICE O/P EST LOW 20 MIN: CPT | Mod: HCNC,S$GLB,, | Performed by: COLON & RECTAL SURGERY

## 2020-05-20 PROCEDURE — 99213 PR OFFICE/OUTPT VISIT, EST, LEVL III, 20-29 MIN: ICD-10-PCS | Mod: HCNC,S$GLB,, | Performed by: COLON & RECTAL SURGERY

## 2020-05-20 PROCEDURE — 1159F PR MEDICATION LIST DOCUMENTED IN MEDICAL RECORD: ICD-10-PCS | Mod: HCNC,S$GLB,, | Performed by: COLON & RECTAL SURGERY

## 2020-05-20 PROCEDURE — 1125F PR PAIN SEVERITY QUANTIFIED, PAIN PRESENT: ICD-10-PCS | Mod: HCNC,S$GLB,, | Performed by: COLON & RECTAL SURGERY

## 2020-05-20 PROCEDURE — 99999 PR PBB SHADOW E&M-EST. PATIENT-LVL III: ICD-10-PCS | Mod: PBBFAC,HCNC,, | Performed by: COLON & RECTAL SURGERY

## 2020-05-20 PROCEDURE — 99999 PR PBB SHADOW E&M-EST. PATIENT-LVL III: CPT | Mod: PBBFAC,HCNC,, | Performed by: COLON & RECTAL SURGERY

## 2020-05-20 PROCEDURE — 1101F PR PT FALLS ASSESS DOC 0-1 FALLS W/OUT INJ PAST YR: ICD-10-PCS | Mod: HCNC,CPTII,S$GLB, | Performed by: COLON & RECTAL SURGERY

## 2020-05-20 PROCEDURE — 1101F PT FALLS ASSESS-DOCD LE1/YR: CPT | Mod: HCNC,CPTII,S$GLB, | Performed by: COLON & RECTAL SURGERY

## 2020-05-20 NOTE — LETTER
May 20, 2020      Katherine Hutchinson MD  [address]  [facility] [address]           [facility]  [address]  [facility] [address]  Phone: [phone]          Patient: Karli Hameed   MR Number: [id number]   YOB: 1931   Date of Visit: 5/20/2020       Dear Dr. Katherine Hutchinson:    Thank you for referring Karli Hameed to me for evaluation. Attached you will find relevant portions of my assessment and plan of care.    If you have questions, please do not hesitate to call me. I look forward to following Karli Hameed along with you.    Sincerely,    JULIUS Fritz MD    Enclosure  CC:  No Recipients    If you would like to receive this communication electronically, please contact [email or web address] or [phone] to request more information on [employer] access.    For providers and/or their staff who would like to refer a patient to [facility], please contact us through our one-stop-shop provider referral line, [facility], at [phone].    If you feel you have received this communication in error or would no longer like to receive these types of communications, please e-mail [email or web address]

## 2020-05-20 NOTE — PROGRESS NOTES
HPI:  Karli Hameed is a 88 y.o. female with history of low rectal cancer status post transanal excision followed by radiation therapy.  She was recently seen and underwent MRI for postoperative surveillance.  It was recommended that she undergo colonoscopy put the patient was not interested in refused.    MRI  FINDINGS:  Examination is limited by motion artifact and by significant pelvic descent.    There are postoperative changes transanal resection of a low rectal tumor.    At the right anterior aspect of the anorectal junction is a T2 hypointense lesion extending cranial-caudally for approximately 1.6 cm (long axis T2 oblique series 6001, image 13).    The inferior most aspect of the lesion is located approximately 1.5 cm proximal to the anal verge (low rectum).    Relationship to anterior peritoneal reflection: Below.    Characteristics:    Circumferential extent/location (clock face): Approximately 9:00 to 11:00.    Tumor Length: 1.6 cm.    Mucinous: No.    Restricted diffusion: Absent.    Tumor Extent: There is no evidence of spiculation of the perirectal fat or invasion of adjacent structures.    There is extension through the external sphincter to the right levator ani.    Lymph Nodes: There are no abnormal perirectal lymph nodes greater than 5 mm.  There are no extra mesorectal lymph nodes within the visualized pelvis.  There are no lymph nodes with better suspicious based on heterogeneous or spiculated morphology.  There is no evidence of vascular invasion.    Impression       T2 hypointense lesion at the right anterior aspect of the anorectal junction extending through the external sphincter to the right levator ani muscle, favored to represent scar tissue or radiation changes given lack of restricted diffusion.    Electronically signed by resident: Eric Keene  Date: 05/05/2020  Time: 12:46    Electronically signed by: Joseph Teran MD  Date: 05/05/2020  Time: 16:45     Miscellaneous: There is  continued evidence of pelvic floor descent.  There is colonic diverticulosis without evidence of acute diverticulitis.    Interval hx  Taking immodium prn diarrhea.  Still having blood on toilet paper when she strains and when she has diarrhea.  This happens twice a week.  Reports that she had some burning on urination today.  No fever.      Past Medical History:   Diagnosis Date    Allergy     Anxiety     Arthritis     Back pain     Basal cell carcinoma 6/2011    R nasal ala, excised via Mohs    Chronic kidney disease, stage II (mild) 8/25/2012    cyst since 1975    Flank pain 8/21/2012    HTN (hypertension) 8/21/2012    Inflammatory bowel disease     Joint pain     Kidney stone     left     Lactose disaccharidase deficiency     Rectal cancer 7/24/2019    Senile osteoporosis 4/11/2016    Small bowel obstruction, 06-, resolved witjhout surgery 7/1/2014    Trace cataracts     Ulcer     hx    Urinary tract infection         Past Surgical History:   Procedure Laterality Date    ABDOMINAL SURGERY      APPENDECTOMY      CATARACT EXTRACTION W/  INTRAOCULAR LENS IMPLANT Right 02/12/2015    Dr. Sahni    CATARACT EXTRACTION W/  INTRAOCULAR LENS IMPLANT Left 04/09/2015    CHOLECYSTECTOMY      COLONOSCOPY N/A 6/19/2019    Procedure: COLONOSCOPY;  Surgeon: Beto Rivera MD;  Location: [facility] (4TH FLR);  Service: Endoscopy;  Laterality: N/A;  to be scheudled on 6/19/19 with Dr. Rivera per JACY Fischer NP    EYE SURGERY      HERNIA REPAIR  2017    Open rih with mesh    HYSTERECTOMY      KIDNEY SURGERY      drained cyst x 2    lysis of abdominal adhesions      OOPHORECTOMY      TONSILLECTOMY, ADENOIDECTOMY      TRANSANAL RECTAL RESECTION N/A 8/15/2019    Procedure: transanal EXCISION, LESION, RECTUM, ANAL APPROACH full thickness;  Surgeon: JULIUS Fritz MD;  Location: [facility] OR 2ND FLR;  Service: Colon and Rectal;  Laterality: N/A;       Review of patient's allergies indicates:    Allergen Reactions    Advil [ibuprofen] Nausea Only    Parafon forte      Other reaction(s): Hallucinations    Calcitriol (bulk) Nausea Only    Lisinopril      cough       Family History   Problem Relation Age of Onset    Cancer Father         throat - was a tobacco smoker    Cataracts Father     Kidney disease Father     Cancer Paternal Grandmother         was a tobacco smoker    No Known Problems Mother     No Known Problems Sister     No Known Problems Brother     No Known Problems Maternal Aunt     No Known Problems Maternal Uncle     No Known Problems Paternal Aunt     No Known Problems Paternal Uncle     No Known Problems Maternal Grandmother     No Known Problems Maternal Grandfather     No Known Problems Paternal Grandfather     Cirrhosis Neg Hx     Celiac disease Neg Hx     Colon polyps Neg Hx     Crohn's disease Neg Hx     Esophageal cancer Neg Hx     Inflammatory bowel disease Neg Hx     Liver cancer Neg Hx     Liver disease Neg Hx     Rectal cancer Neg Hx     Stomach cancer Neg Hx     Ulcerative colitis Neg Hx     Amblyopia Neg Hx     Blindness Neg Hx     Diabetes Neg Hx     Glaucoma Neg Hx     Hypertension Neg Hx     Macular degeneration Neg Hx     Retinal detachment Neg Hx     Strabismus Neg Hx     Stroke Neg Hx     Thyroid disease Neg Hx     Melanoma Neg Hx        Social History     Socioeconomic History    Marital status: Single     Spouse name: Not on file    Number of children: Not on file    Years of education: Not on file    Highest education level: Not on file   Occupational History    Not on file   Social Needs    Financial resource strain: Not on file    Food insecurity:     Worry: Not on file     Inability: Not on file    Transportation needs:     Medical: Not on file     Non-medical: Not on file   Tobacco Use    Smoking status: Never Smoker    Smokeless tobacco: Never Used   Substance and Sexual Activity    Alcohol use: Not Currently      Comment: none    Drug use: No    Sexual activity: Never   Lifestyle    Physical activity:     Days per week: Not on file     Minutes per session: Not on file    Stress: Not on file   Relationships    Social connections:     Talks on phone: Not on file     Gets together: Not on file     Attends Voodoo service: Not on file     Active member of club or organization: Not on file     Attends meetings of clubs or organizations: Not on file     Relationship status: Not on file   Other Topics Concern    Are you pregnant or think you may be? No    Breast-feeding No   Social History Narrative    Lives alone.    Great neighbors.    Many friends.    Walks the neighborhood daily with her dog.    Active social life.    Has her own home, takes care of everything and all of her affairs.     From [facility] , came to [place]    2 level house          Stays active     Walks every day , does home exercises     Takes stairs at the house            ROS:  GENERAL: No fever, chills, fatigability or weight loss.  Integument: No rashes, redness, icterus  CHEST: Denies DOMINGUEZ, cyanosis, wheezing, cough and sputum production.  CARDIOVASCULAR: Denies chest pain, PND, orthopnea or reduced exercise tolerance.  GI: Denies abd pain, dysphagia, nausea, vomiting, no hematemesis   : Denies burning on urination, no hematuria, no bacteriuria  MSK: No deformities, swelling, joint pain swelling  Neurologic: No HAs, seizures, weakness, paresthesias, gait problems    PE:  General appearance nontoxic  No exam performed today      Assessment:  Anal bleeding most likely hemorrhoidal due to diarrhea and chronic straining  I did recommend colonoscopy but again she refused  Loose stools status post transanal excision and radiation therapy  History of dysuria    Plan:  Check urinalysis  Repeat MRI 6 months  Begin taking Imodium 1-2 tablets twice a day every day, not p.r.n.  Office visit 6 weeks for follow-up of diarrhea      Addendum  UA appears suspicious  for UTI.  Will begin therapy Cipro for 5days

## 2020-05-21 ENCOUNTER — TELEPHONE (OUTPATIENT)
Dept: SURGERY | Facility: CLINIC | Age: 85
End: 2020-05-21

## 2020-05-21 ENCOUNTER — TELEPHONE (OUTPATIENT)
Dept: INTERNAL MEDICINE | Facility: CLINIC | Age: 85
End: 2020-05-21

## 2020-05-21 RX ORDER — AMOXICILLIN AND CLAVULANATE POTASSIUM 500; 125 MG/1; MG/1
TABLET, FILM COATED ORAL
Qty: 7 TABLET | Refills: 0 | Status: SHIPPED | OUTPATIENT
Start: 2020-05-21 | End: 2020-12-15 | Stop reason: ALTCHOICE

## 2020-05-21 RX ORDER — CIPROFLOXACIN 500 MG/1
500 TABLET ORAL 2 TIMES DAILY
Qty: 20 TABLET | Refills: 0 | Status: SHIPPED | OUTPATIENT
Start: 2020-05-21 | End: 2020-05-21 | Stop reason: SDUPTHER

## 2020-05-21 RX ORDER — CIPROFLOXACIN 250 MG/1
250 TABLET, FILM COATED ORAL EVERY 12 HOURS
Qty: 20 TABLET | Refills: 0 | Status: SHIPPED | OUTPATIENT
Start: 2020-05-21 | End: 2020-05-21 | Stop reason: ALTCHOICE

## 2020-05-21 NOTE — TELEPHONE ENCOUNTER
----- Message from JULIUS Fritz MD sent at 5/21/2020  7:08 AM CDT -----  Could you please notify pt that UA appears suspicious for UTI and that a  Cipro prescription has been sent in for her.  Thank you  DV

## 2020-05-22 ENCOUNTER — PATIENT MESSAGE (OUTPATIENT)
Dept: RHEUMATOLOGY | Facility: CLINIC | Age: 85
End: 2020-05-22

## 2020-05-24 ENCOUNTER — PATIENT MESSAGE (OUTPATIENT)
Dept: INTERNAL MEDICINE | Facility: CLINIC | Age: 85
End: 2020-05-24

## 2020-05-24 ENCOUNTER — TELEPHONE (OUTPATIENT)
Dept: INTERNAL MEDICINE | Facility: CLINIC | Age: 85
End: 2020-05-24

## 2020-05-24 DIAGNOSIS — N39.0 URINARY TRACT INFECTION WITHOUT HEMATURIA, SITE UNSPECIFIED: Primary | ICD-10-CM

## 2020-05-24 NOTE — TELEPHONE ENCOUNTER
She is UTI prone  Orders for home collect and future U/A and urine C and S are in computer and patient knows to come to [facility] to provide a specimen if her symptoms return

## 2020-05-24 NOTE — TELEPHONE ENCOUNTER
All  ENTEROCOCCUS FAECALIS urinary tract infections are sensitive to ampicillin are sensitive to Augmentin although the laboratory does not run that specific antibiotic sensitivity test.

## 2020-05-25 NOTE — TELEPHONE ENCOUNTER
Please call the patient back  The orders are for in the future  Not now  If in the future, she develops symptomas of a UTI, then she is to come to the [facility] to check in desk to ask to provide a specimen   Her orders are in the computer for future use if needed  She does not need to do anything now except finish her daily antibiotic

## 2020-05-27 ENCOUNTER — PATIENT MESSAGE (OUTPATIENT)
Dept: INTERNAL MEDICINE | Facility: CLINIC | Age: 85
End: 2020-05-27

## 2020-05-27 DIAGNOSIS — N39.0 URINARY TRACT INFECTION WITHOUT HEMATURIA, SITE UNSPECIFIED: Primary | ICD-10-CM

## 2020-05-27 RX ORDER — CLOTRIMAZOLE AND BETAMETHASONE DIPROPIONATE 10; .64 MG/G; MG/G
CREAM TOPICAL 2 TIMES DAILY
Qty: 15 G | Refills: 0 | Status: SHIPPED | OUTPATIENT
Start: 2020-05-27

## 2020-05-29 NOTE — TELEPHONE ENCOUNTER
Dear Karli,   You finished your antibiotic for the urinary tract infection yesterday and If you are concerned there is still infection present   consider coming over to the primary care and wellness center to provide another clean catch fresh urine specimen for analysis and culture.  I will have the orders in the computer and you ask at the check in desk for the labeled specimen container cup. The staff will get it to the lab.  Sincerely, Dr. Katherine Hutchinson

## 2020-06-03 ENCOUNTER — CLINICAL SUPPORT (OUTPATIENT)
Dept: REHABILITATION | Facility: HOSPITAL | Age: 85
End: 2020-06-03
Attending: INTERNAL MEDICINE
Payer: MEDICARE

## 2020-06-03 DIAGNOSIS — M75.41 IMPINGEMENT SYNDROME OF RIGHT SHOULDER: ICD-10-CM

## 2020-06-03 DIAGNOSIS — G89.29 CHRONIC RIGHT SHOULDER PAIN: ICD-10-CM

## 2020-06-03 DIAGNOSIS — M25.611 DECREASED ROM OF RIGHT SHOULDER: ICD-10-CM

## 2020-06-03 DIAGNOSIS — M25.511 CHRONIC RIGHT SHOULDER PAIN: ICD-10-CM

## 2020-06-03 DIAGNOSIS — R29.898 SHOULDER WEAKNESS: ICD-10-CM

## 2020-06-03 PROCEDURE — 97161 PT EVAL LOW COMPLEX 20 MIN: CPT | Mod: HCNC

## 2020-06-03 NOTE — PLAN OF CARE
[facility]  Physical Therapy Initial Evaluation    Name: Karli Hameed  Clinic Number: [id number]    Therapy Diagnosis:   Encounter Diagnoses   Name Primary?    Impingement syndrome of right shoulder     Chronic right shoulder pain     Decreased ROM of right shoulder     Shoulder weakness      Physician: Katherine Hutchinson MD    Physician Orders: PT Eval and Treat   Medical Diagnosis from Referral: M75.41 (ICD-10-CM) - Impingement syndrome of right shoulder  Evaluation Date: 6/3/2020  Authorization Period Expiration: TBD  Plan of Care Expiration: 8/14/2020  Visit # / Visits authorized: 1/ TBD    Time In: 753 (arrived late)  Time Out: 825  Total Billable Time: 0 minutes    Visit: 82   Total: 82    Precautions: Standard    Subjective   Date of onset: Jan 2020  History of current condition - Karli reports: randomly experiencing R shoulder pain with no known mechanism of injury. Patient reports she has to occasionally assist her R shoulder with her L arm. Patient reports she feels radiating symptoms down her arm occasionally. Patient reports she has difficulty reaching overhead because of her pain. Patient reports she has difficulty sleeping. Patient reports her shoulder has improved since Jan, but very little.      Medical History:   Past Medical History:   Diagnosis Date    Allergy     Anxiety     Arthritis     Back pain     Basal cell carcinoma 6/2011    R nasal ala, excised via Mohs    Chronic kidney disease, stage II (mild) 8/25/2012    cyst since 1975    Flank pain 8/21/2012    HTN (hypertension) 8/21/2012    Inflammatory bowel disease     Joint pain     Kidney stone     left     Lactose disaccharidase deficiency     Rectal cancer 7/24/2019    Senile osteoporosis 4/11/2016    Small bowel obstruction, 06-, resolved witjhout surgery 7/1/2014    Trace cataracts     Ulcer     hx    Urinary tract infection        Surgical History:   Karli Hmaeed  has a past  surgical history that includes Hysterectomy; Oophorectomy; Appendectomy; Cholecystectomy; lysis of abdominal adhesions; TONSILLECTOMY, ADENOIDECTOMY; Kidney surgery; Cataract extraction w/  intraocular lens implant (Right, 02/12/2015); Eye surgery; Abdominal surgery; Hernia repair (2017); Cataract extraction w/  intraocular lens implant (Left, 04/09/2015); Colonoscopy (N/A, 6/19/2019); and Transanal rectal resection (N/A, 8/15/2019).    Medications:   Karli has a current medication list which includes the following prescription(s): acetaminophen, amlodipine, amoxicillin-clavulanate 500-125mg, cholecalciferol (vitamin d3), clotrimazole-betamethasone 1-0.05%, hydrocodone-acetaminophen, hydrocortisone, losartan, mesalamine, methylcellulose, ondansetron, pramoxine-hydrocortisone, promethazine, and varicella-zoster [id number] (pf).    Allergies:   Review of patient's allergies indicates:   Allergen Reactions    Advil [ibuprofen] Nausea Only    Parafon forte      Other reaction(s): Hallucinations    Calcitriol (bulk) Nausea Only    Lisinopril      cough        Imaging, xrays:     Prior Therapy: Yes   Social History: lives alone  Occupation: Retired   Prior Level of Function: Exercise everyday, grocery shop, paint  Current Level of Function: Exercise everyday, grocery shop, paint    Pain:  Current 3/10, worst 10/10, best 0/10   Location: right shoulder   Description: Sharp  Aggravating Factors: Reaching with R shoulder  Easing Factors: rest    Pts goals: To improve shoulder pain and range of motion    Objective     Shoulder Right Right Right Left Left Pain/Dysfunction with Movement    AROM PROM MMT AROM MMT    Flexion 100 130 2/5 WNL 4/5    Abduction 90 120 2/5 WNL 4/5    Internal rotation WNL WNL 3+/5 WNL 4/5    External Rotation WNL WNL 3/5 WNL 4/5          Posture Assessment: Forward head and rounded shoulders    MariamaFernando: Positive    Painful-Arc: Positive     Infraspinatus Test: Post    Drop Arm:  Neg        CMS Impairment/Limitation/Restriction for FOTO Shoulder Survey    Therapist reviewed FOTO scores for Karli Hameed on 6/3/2020.   FOTO documents entered into [employer] - see Media section.    Limitation Score: 57%  Category: Mobility           TREATMENT     Next Visit: pulleys, scap squeezes, wand, seated PROM    Home Exercises and Patient Education Provided    Education provided:   - Purpose of PT  - Pain free movement only     Assessment   Karli is a 88 y.o. female referred to outpatient Physical Therapy with a medical diagnosis of impingement syndrome of R shoulder. Pt presents with increased R shoulder pain, decreased R shoulder ROM, decreased R shoulder strength, and poor posture. Patient tested positive for cluster of rotator cuff pathology special tests. Patient has increased pain with R shoulder AROM, however, has decreased pain with R shoulder PROM which is a similar finding to rotator cuff tears.     Pt prognosis is Good.   Pt will benefit from skilled outpatient Physical Therapy to address the deficits stated above and in the chart below, provide pt/family education, and to maximize pt's level of independence.     Plan of care discussed with patient: Yes  Pt's spiritual, cultural and educational needs considered and patient is agreeable to the plan of care and goals as stated below:     Anticipated Barriers for therapy: None    Medical Necessity is demonstrated by the following  History  Co-morbidities and personal factors that may impact the plan of care Co-morbidities:   anxiety and HTN    Personal Factors:   no deficits     moderate   Examination  Body Structures and Functions, activity limitations and participation restrictions that may impact the plan of care Body Regions:   upper extremities    Body Systems:    ROM  strength    Participation Restrictions:   None    Activity limitations:   Learning and applying knowledge  no deficits    General Tasks and Commands  no  deficits    Communication  no deficits    Mobility  lifting and carrying objects    Self care  dressing    Domestic Life  no deficits    Interactions/Relationships  no deficits    Life Areas  no deficits    Community and Social Life  recreation and leisure         moderate   Clinical Presentation stable and uncomplicated low   Decision Making/ Complexity Score: low     Goals:  Short Term Goals (3 Weeks):   1. Pt will be independent with HEP to supplement PT in improving functional use of R UE.  2. Pt will increase pain free R shoulder elevation/abduction PROM to >/= 160 deg to improve functional mobility of UE    Long Term Goals (6 Weeks):   1. Pt will improve FOTO score to </=30% limited to decrease perceived limitation with carrying, moving, and handling objects.  2. Pt will increase R shoulder PROM to WNL in all planes to improve functional use of R RUE.  3. Pt will increase R shoulder AROM to 170 in flexion/abduction planes to improve functional use of R RUE.  4. Pt will improve R shoulder MMTs by 2 scores to promote equal use of B UEs in performing functional tasks.  5. Pt to report pain </= 1/10 with ADLs and IADLs using R UE to improve functional QOL.      Plan   Plan of care Certification: 6/3/2020 to 8/14/2020.    Outpatient Physical Therapy 2 times weekly for 10 weeks to include the following interventions: Manual Therapy, Moist Heat/ Ice, Neuromuscular Re-ed, Patient Education, Self Care, Therapeutic Activites and Therapeutic Exercise.     Maldonado Lantigua, PT

## 2020-06-10 ENCOUNTER — NUTRITION (OUTPATIENT)
Dept: NUTRITION | Facility: CLINIC | Age: 85
End: 2020-06-10
Payer: MEDICARE

## 2020-06-10 VITALS — BODY MASS INDEX: 17.77 KG/M2 | HEIGHT: 64 IN | WEIGHT: 104.06 LBS

## 2020-06-10 DIAGNOSIS — E83.39 HYPERPHOSPHATEMIA: ICD-10-CM

## 2020-06-10 DIAGNOSIS — N18.5 STAGE 5 CHRONIC KIDNEY DISEASE NOT ON CHRONIC DIALYSIS: Primary | ICD-10-CM

## 2020-06-10 DIAGNOSIS — Z71.3 DIETARY COUNSELING: ICD-10-CM

## 2020-06-10 DIAGNOSIS — E21.3 HYPERPARATHYROIDISM: ICD-10-CM

## 2020-06-10 PROCEDURE — 97802 PR MED NUTR THER, 1ST, INDIV, EA 15 MIN: ICD-10-PCS | Mod: HCNC,S$GLB,, | Performed by: DIETITIAN, REGISTERED

## 2020-06-10 PROCEDURE — 97802 MEDICAL NUTRITION INDIV IN: CPT | Mod: HCNC,S$GLB,, | Performed by: DIETITIAN, REGISTERED

## 2020-06-10 PROCEDURE — 99999 PR PBB SHADOW E&M-EST. PATIENT-LVL III: ICD-10-PCS | Mod: PBBFAC,HCNC,,

## 2020-06-10 PROCEDURE — 99999 PR PBB SHADOW E&M-EST. PATIENT-LVL III: CPT | Mod: PBBFAC,HCNC,,

## 2020-06-10 NOTE — PROGRESS NOTES
"Referring Physician:Katherine Hutchinson MD     Reason for visit:  Chief Complaint   Patient presents with    Chronic Kidney Disease    Nutrition Counseling    Weight Loss      Initial Visit    :1931     Allergies Reviewed  Meds Reviewed    Anthropometrics  Weight:47.2 kg (104 lb 0.9 oz) - standing scale; wt on 19 49.4 kg (108 lb 14.5 oz)-standing scale  Height:5' 4" (1.626 m)  BMI:Body mass index is 17.86 kg/m².   IBW:   54.5 kg  +/-10%    Meds:  Outpatient Medications Prior to Visit   Medication Sig Dispense Refill    acetaminophen (TYLENOL) 325 MG tablet Take by mouth. 1 Tablet Oral .  Tylenol.To take as needed for arthritis pain.      amLODIPine (NORVASC) 5 MG tablet Take 1 tablet (5 mg total) by mouth every morning. 90 tablet 3    amoxicillin-clavulanate 500-125mg (AUGMENTIN) 500-125 mg Tab One tablet after a meal once daily dose adjusted for kidney function 7 tablet 0    cholecalciferol, vitamin D3, 1,000 unit capsule Take 2 capsules (2,000 Units total) by mouth once daily. (Patient not taking: Reported on 2020)  0    clotrimazole-betamethasone 1-0.05% (LOTRISONE) cream Apply topically 2 (two) times daily. 15 g 0    HYDROcodone-acetaminophen (NORCO) 5-325 mg per tablet Take 1 tablet by mouth every 8 (eight) hours as needed for Pain. 30 tablet 0    hydrocortisone 2.5 % cream Apply topically 2 (two) times daily. 28 g 5    losartan (COZAAR) 50 MG tablet Take 1 tablet (50 mg total) by mouth once daily. 90 tablet 3    mesalamine (ROWASA) 4 gram/60 mL Enem Place 60 mLs (4 g total) rectally every evening. (Patient not taking: Reported on 2020) 30 enema 0    methylcellulose (CITRUCEL ORAL) Take by mouth once daily.      ondansetron (ZOFRAN) 4 MG tablet Take 1 tablet (4 mg total) by mouth every 8 (eight) hours as needed for Nausea. (Patient not taking: Reported on 2020) 30 tablet 1    pramoxine-hydrocortisone cream Apply topically 3 (three) times daily. 57 g 1    promethazine " "(PHENERGAN) 12.5 MG Tab Take 1 tablet (12.5 mg total) by mouth every 6 (six) hours as needed. (Patient not taking: Reported on 5/19/2020) 30 tablet 0    varicella-zoster gE-AS01B, PF, (SHINGRIX, PF,) 50 mcg/0.5 mL injection Inject into the muscle. 0.5 mL 0     No facility-administered medications prior to visit.        Food/Drug Interactions Noted:  none    Vitamins/Supplements/Herbs:    Vit D    Labs:   eGFR  8.9   K+  4.7   Cr  4.2   Phos  7.1     Nutrition Prescription:   1635 Kcals/day( 30 kcal/kg IBW),  44 g protein( 0.8 g/kg IBW)     Support System:   Pt lives alone, and does her own grocery shopping and cooking.    Diet Hx: pt seen 7/30/19 for Unintentional weight loss; Stage 5 CKD; Rectal cancer and provided with renal, low phosphorous diet education.  States she bought a case of Suplena, and drank 2-3 cartons but stopped when it seemed to cause diarrhea.  On 8/15/19 pt had surgery for her cancer, and states she was all right until she had radiation 11/04-11/11/19, when she lost weight and was unable to eat much.  Presently she is eating "whatever sounds good" in an effort to regain some of the 20 lbs she states she's lost over the past year. Pt reports experiencing severe thirst and dry mouth at present, and has a "metallic" taste in her mouth.  Pt states she has been advised to drink 8-6 oz glasses of water daily, which she tries to do.  Beverages:  When she's thirsty, has a strawberry-kiwi drink she likes which she mixes half and half with water and adds some lemon juice.  Pt states she has an herb garden in her yard and uses all types of those fresh herbs to flavor her foods.    Breakfast:   Large cup of Lactaid whole milk with 1/4 tsp cocoa for flavor.  Frozen regular waffle with sugar-free syrup ("I thought sugar-free was better for me").  Or bowl of Rice Krispies cereal with lactaid milk and large cup of milk.    Around 10 am, after walking her dog:  Mozzarella string cheese stick and fruit or bowl " "of Rice Krispies with 1/2 banana (when reminded bananas are high potassium foods, pt replied "I know")  Lunch:   Whole wheat breanna with deli low sodium turkey and a little jauregui.  Cup of camomile tea with sugar.  Dinner:   Last night:  Meatball and spaghetti (homemade tomato sauce with canned tomato products, not low sodium).  During discussion of high potassium content of canned, tomato products, pt stated she fixes what sounds good to her and what she feels like eating because she understands importance of po intake.  Snack:  Lactose-free ice cream (because I "feel like I need it")    Current activity level and/or physical limitations:    Activity for age    Motivation to make changes/anticipated barriers and/or expected adherence:    Continued diet adherence expected    Nutrition-Focus Physical Findings:    Wearing face covering per COVID19 protocol; appears well nourished      Assessment:   Pt asked relevant questions about foods/beverages recommended & to avoid, including those high in phosphorous, sodium, potassium; alternatives to dairy; portion sizes of all meats/proteins; seasoning her foods to help with metallic taste in her mouth.  All questions answered, and pt verbalized understanding of information and appreciation for all of the helpful information provided to her today.    Nutrition Diagnosis:   Food- and nutrition-related knowledge deficit RT lack of prior exposure to information AEB dx CKD V; unintentional weight loss      Recommendations:   Renal, low phosphorous, low sodium diet.  Aim for 3 meals and 3 snacks daily to increase calorie intake.    Try Suplena again, mixing half and half with water to see if GI symptoms are less (although pt states eating any fruits and vegetables give her loose stools)  Request referral to oncology dietitian from her oncologist if weight continues to be a concern to pt.    Handouts provided & reviewed:   Eating Right for Kidney Health:  Tips for People with Chronic " Kidney Disease (CKD), Sodium, Protein, Phosphorus, Potassium, How to Read a Food Label,  Increasing Calories and Protein (ONC); Taste & Smell Changes (ONC)    Strategies Implemented:     Avoid high potassium foods; 3 oz meat portions; try diluting Suplena half and half with water.    Consultation Time:30 minutes.  Communicated with referring healthcare provider:  Consult note available in pt's Epic chart per MD discretion  Follow Up:  Pt provided with dietitian contact number and advised to call with questions or make future appointment if further intervention needed.                     no

## 2020-06-10 NOTE — PATIENT INSTRUCTIONS
Renal, low phosphorous, low sodium diet.  Aim for 3 meals and 3 snacks daily to increase calorie intake.

## 2020-07-06 ENCOUNTER — CLINICAL SUPPORT (OUTPATIENT)
Dept: REHABILITATION | Facility: HOSPITAL | Age: 85
End: 2020-07-06
Attending: INTERNAL MEDICINE
Payer: MEDICARE

## 2020-07-06 DIAGNOSIS — M25.511 CHRONIC RIGHT SHOULDER PAIN: ICD-10-CM

## 2020-07-06 DIAGNOSIS — G89.29 CHRONIC RIGHT SHOULDER PAIN: ICD-10-CM

## 2020-07-06 DIAGNOSIS — M25.611 DECREASED ROM OF RIGHT SHOULDER: ICD-10-CM

## 2020-07-06 DIAGNOSIS — R29.898 SHOULDER WEAKNESS: ICD-10-CM

## 2020-07-06 PROCEDURE — 97110 THERAPEUTIC EXERCISES: CPT | Mod: HCNC

## 2020-07-06 NOTE — PROGRESS NOTES
Physical Therapy Treatment Note     Name: Karli Hameed  Clinic Number: [id number]    Therapy Diagnosis:   Encounter Diagnoses   Name Primary?    Chronic right shoulder pain     Decreased ROM of right shoulder     Shoulder weakness      Physician: Katherine Hutchinson MD    Visit Date: 7/6/2020    Physician Orders: PT Eval and Treat   Medical Diagnosis from Referral: M75.41 (ICD-10-CM) - Impingement syndrome of right shoulder  Evaluation Date: 6/3/2020  Authorization Period Expiration: TBD  Plan of Care Expiration: 8/14/2020  Visit # / Visits authorized: 2/ TBD     Time In: 830  Time Out: 912  Total Billable Time: 42 minutes     Visit: 90  Total: 172     Precautions: Standard    Subjective     Pt reports: she was sore over the weekend, but feels better today    Pain: 5/10  Location: right shoulder      Objective     Karli received therapeutic exercises to develop strength, endurance, ROM, flexibility, posture and core stabilization for 42 minutes including:    Next Visit: pulleys, scap squeezes, wand, seated PROM  Pulleys flexion and abduction 3 min ea  Scapular squeezes 5 sec x 20  Supine Wand Flexion (180 - 90 degree range of motion)      Karli received the following manual therapy techniques: Joint mobilizations were applied to the: R Glenohumeral Joint for 0 minutes, including:            Home Exercises Provided and Patient Education Provided     Education provided:   - HEP    Written Home Exercises Provided: yes.  Exercises were reviewed and Karli was able to demonstrate them prior to the end of the session.  Karli demonstrated good  understanding of the education provided.     See EMR under Patient Instructions for exercises provided 7/6/2020.    Assessment     Patient tolerated all therex today without any adverse effects. Patient completed the wand exercise between 180 and 90 degrees of shoulder ROM secondary to pain with lowering of shoulder back to neutral. Continue to progress as tolerated.   Karli is  progressing well towards her goals.   Pt prognosis is Good.     Pt will continue to benefit from skilled outpatient physical therapy to address the deficits listed in the problem list box on initial evaluation, provide pt/family education and to maximize pt's level of independence in the home and community environment.     Pt's spiritual, cultural and educational needs considered and pt agreeable to plan of care and goals.     Anticipated barriers to physical therapy: None    Goals:   Short Term Goals (3 Weeks):   1. Pt will be independent with HEP to supplement PT in improving functional use of R UE.  2. Pt will increase pain free R shoulder elevation/abduction PROM to >/= 160 deg to improve functional mobility of UE     Long Term Goals (6 Weeks):   1. Pt will improve FOTO score to </=30% limited to decrease perceived limitation with carrying, moving, and handling objects.  2. Pt will increase R shoulder PROM to WNL in all planes to improve functional use of R RUE.  3. Pt will increase R shoulder AROM to 170 in flexion/abduction planes to improve functional use of R RUE.  4. Pt will improve R shoulder MMTs by 2 scores to promote equal use of B UEs in performing functional tasks.  5. Pt to report pain </= 1/10 with ADLs and IADLs using R UE to improve functional QOL.        Plan   Plan of care Certification: 6/3/2020 to 8/14/2020.     Outpatient Physical Therapy 2 times weekly for 10 weeks to include the following interventions: Manual Therapy, Moist Heat/ Ice, Neuromuscular Re-ed, Patient Education, Self Care, Therapeutic Activites and Therapeutic Exercise.     Maldonado Lantigua, PT

## 2020-07-08 ENCOUNTER — OFFICE VISIT (OUTPATIENT)
Dept: SURGERY | Facility: CLINIC | Age: 85
End: 2020-07-08
Payer: MEDICARE

## 2020-07-08 VITALS
DIASTOLIC BLOOD PRESSURE: 80 MMHG | HEART RATE: 75 BPM | HEIGHT: 64 IN | SYSTOLIC BLOOD PRESSURE: 115 MMHG | WEIGHT: 101.13 LBS | BODY MASS INDEX: 17.26 KG/M2

## 2020-07-08 DIAGNOSIS — Z85.048 ENCOUNTER FOR FOLLOW-UP SURVEILLANCE OF RECTAL CANCER: Primary | ICD-10-CM

## 2020-07-08 DIAGNOSIS — Z08 ENCOUNTER FOR FOLLOW-UP SURVEILLANCE OF RECTAL CANCER: Primary | ICD-10-CM

## 2020-07-08 PROCEDURE — 1159F PR MEDICATION LIST DOCUMENTED IN MEDICAL RECORD: ICD-10-PCS | Mod: HCNC,S$GLB,, | Performed by: COLON & RECTAL SURGERY

## 2020-07-08 PROCEDURE — 99999 PR PBB SHADOW E&M-EST. PATIENT-LVL III: CPT | Mod: PBBFAC,HCNC,, | Performed by: COLON & RECTAL SURGERY

## 2020-07-08 PROCEDURE — 1101F PT FALLS ASSESS-DOCD LE1/YR: CPT | Mod: HCNC,CPTII,S$GLB, | Performed by: COLON & RECTAL SURGERY

## 2020-07-08 PROCEDURE — 99213 OFFICE O/P EST LOW 20 MIN: CPT | Mod: HCNC,S$GLB,, | Performed by: COLON & RECTAL SURGERY

## 2020-07-08 PROCEDURE — 1126F AMNT PAIN NOTED NONE PRSNT: CPT | Mod: HCNC,S$GLB,, | Performed by: COLON & RECTAL SURGERY

## 2020-07-08 PROCEDURE — 1159F MED LIST DOCD IN RCRD: CPT | Mod: HCNC,S$GLB,, | Performed by: COLON & RECTAL SURGERY

## 2020-07-08 PROCEDURE — 99999 PR PBB SHADOW E&M-EST. PATIENT-LVL III: ICD-10-PCS | Mod: PBBFAC,HCNC,, | Performed by: COLON & RECTAL SURGERY

## 2020-07-08 PROCEDURE — 1126F PR PAIN SEVERITY QUANTIFIED, NO PAIN PRESENT: ICD-10-PCS | Mod: HCNC,S$GLB,, | Performed by: COLON & RECTAL SURGERY

## 2020-07-08 PROCEDURE — 99213 PR OFFICE/OUTPT VISIT, EST, LEVL III, 20-29 MIN: ICD-10-PCS | Mod: HCNC,S$GLB,, | Performed by: COLON & RECTAL SURGERY

## 2020-07-08 PROCEDURE — 1101F PR PT FALLS ASSESS DOC 0-1 FALLS W/OUT INJ PAST YR: ICD-10-PCS | Mod: HCNC,CPTII,S$GLB, | Performed by: COLON & RECTAL SURGERY

## 2020-07-08 NOTE — PROGRESS NOTES
HPI:  Karli Hameed is a 88 y.o. female with history of low rectal cancer status post transanal excision 8- followed by radiation therapy (short course).  She was recently seen and underwent MRI for postoperative surveillance.  It was recommended that she undergo colonoscopy put the patient was not interested and refused.    SPECIMEN  1) Rectal tumor; long black proximal, long white left, short right white, short black distal.  FINAL PATHOLOGIC DIAGNOSIS  Rectum, transanal excision:  -INVASIVE ADENOCARCINOMA, MODERATELY DIFFERENTIATED, 2.4 CM (pT2), see synoptic report  -SURGICAL MARGINS ARE NEGATIVE FOR MALIGNANCY  SYNOPTIC REPORT (COLON AND RECTUM):  Procedure: Transanal excision  Tumor site: Rectum  Tumor size: 2.4 x 1.2 cm  Histologic type: Adenocarcinoma  Histologic grade: Moderately differentiated, G2  Tumor extension: Tumor invades the superficial muscularis propria  Margins: All margins are uninvolved by invasive carcinoma, high-grade dysplasia, intramucosal adenocarcinoma,  and adenoma  Margins examined: Proximal, distal, left, right, and deep  Lymphovascular invasion: Not identified  Perineural invasion: Not identified  Regional lymph nodes: No lymph nodes submitted or found  Pathologic stage classification (pTNM, AJCC 8th Edition): [id number]  Diagnosed by: Feroz Tejeda  (Electronically Signed: 2019-08-26 08:55:43)  MRI RECTAL CANCER WITHOUT CONTRAST     CLINICAL HISTORY:  Low rectal cancer post transanal resection performed on 08/15/2019 followed by radiation therapy.     TECHNIQUE:  Multiplanar, multisequence magnetic resonance images of pelvis were performed per the rectal cancer protocol without intravenous contrast.     COMPARISON:  Rectal MRI from 07/10/2019.     FINDINGS:  Examination is limited by motion artifact and by significant pelvic descent.     There are postoperative changes transanal resection of a low rectal tumor.     At the right anterior aspect of the anorectal junction is a T2  hypointense lesion extending cranial-caudally for approximately 1.6 cm (long axis T2 oblique series 6001, image 13).     The inferior most aspect of the lesion is located approximately 1.5 cm proximal to the anal verge (low rectum).     Relationship to anterior peritoneal reflection: Below.     Characteristics:     Circumferential extent/location (clock face): Approximately 9:00 to 11:00.     Tumor Length: 1.6 cm.     Mucinous: No.     Restricted diffusion: Absent.     Tumor Extent: There is no evidence of spiculation of the perirectal fat or invasion of adjacent structures.     There is extension through the external sphincter to the right levator ani.     Lymph Nodes: There are no abnormal perirectal lymph nodes greater than 5 mm.  There are no extra mesorectal lymph nodes within the visualized pelvis.  There are no lymph nodes with better suspicious based on heterogeneous or spiculated morphology.  There is no evidence of vascular invasion.     Miscellaneous: There is continued evidence of pelvic floor descent.  There is colonic diverticulosis without evidence of acute diverticulitis.     Impression:     T2 hypointense lesion at the right anterior aspect of the anorectal junction extending through the external sphincter to the right levator ani muscle, favored to represent scar tissue or radiation changes given lack of restricted diffusion.     Electronically signed by resident: Eric Keene  Date:                                            05/05/2020  Time:                                           12:46     Electronically signed by: Joseph Teran MD  Date:                                            05/05/2020  Time:                                           16:45    Interval history  Feels better.  No pain.  Diarrhea improved.  No bleeding    Past Medical History:   Diagnosis Date    Allergy     Anxiety     Arthritis     Back pain     Basal cell carcinoma 6/2011    R nasal ala, excised via Mohs     Chronic kidney disease, stage II (mild) 8/25/2012    cyst since 1975    Flank pain 8/21/2012    HTN (hypertension) 8/21/2012    Inflammatory bowel disease     Joint pain     Kidney stone     left     Lactose disaccharidase deficiency     Rectal cancer 7/24/2019    Senile osteoporosis 4/11/2016    Small bowel obstruction, 06-, resolved witjhout surgery 7/1/2014    Trace cataracts     Ulcer     hx    Urinary tract infection         Past Surgical History:   Procedure Laterality Date    ABDOMINAL SURGERY      APPENDECTOMY      CATARACT EXTRACTION W/  INTRAOCULAR LENS IMPLANT Right 02/12/2015    Dr. Sahni    CATARACT EXTRACTION W/  INTRAOCULAR LENS IMPLANT Left 04/09/2015    CHOLECYSTECTOMY      COLONOSCOPY N/A 6/19/2019    Procedure: COLONOSCOPY;  Surgeon: Beto Rivera MD;  Location: [facility] (4TH FLR);  Service: Endoscopy;  Laterality: N/A;  to be scheudled on 6/19/19 with Dr. Rivera per JACY Fischer NP    EYE SURGERY      HERNIA REPAIR  2017    Open rih with mesh    HYSTERECTOMY      KIDNEY SURGERY      drained cyst x 2    lysis of abdominal adhesions      OOPHORECTOMY      TONSILLECTOMY, ADENOIDECTOMY      TRANSANAL RECTAL RESECTION N/A 8/15/2019    Procedure: transanal EXCISION, LESION, RECTUM, ANAL APPROACH full thickness;  Surgeon: JULIUS Fritz MD;  Location: [facility] OR 2ND FLR;  Service: Colon and Rectal;  Laterality: N/A;       Review of patient's allergies indicates:   Allergen Reactions    Advil [ibuprofen] Nausea Only    Parafon forte      Other reaction(s): Hallucinations    Calcitriol (bulk) Nausea Only    Lisinopril      cough       Family History   Problem Relation Age of Onset    Cancer Father         throat - was a tobacco smoker    Cataracts Father     Kidney disease Father     Cancer Paternal Grandmother         was a tobacco smoker    No Known Problems Mother     No Known Problems Sister     No Known Problems Brother     No Known Problems Maternal  Aunt     No Known Problems Maternal Uncle     No Known Problems Paternal Aunt     No Known Problems Paternal Uncle     No Known Problems Maternal Grandmother     No Known Problems Maternal Grandfather     No Known Problems Paternal Grandfather     Cirrhosis Neg Hx     Celiac disease Neg Hx     Colon polyps Neg Hx     Crohn's disease Neg Hx     Esophageal cancer Neg Hx     Inflammatory bowel disease Neg Hx     Liver cancer Neg Hx     Liver disease Neg Hx     Rectal cancer Neg Hx     Stomach cancer Neg Hx     Ulcerative colitis Neg Hx     Amblyopia Neg Hx     Blindness Neg Hx     Diabetes Neg Hx     Glaucoma Neg Hx     Hypertension Neg Hx     Macular degeneration Neg Hx     Retinal detachment Neg Hx     Strabismus Neg Hx     Stroke Neg Hx     Thyroid disease Neg Hx     Melanoma Neg Hx        Social History     Socioeconomic History    Marital status: Single     Spouse name: Not on file    Number of children: Not on file    Years of education: Not on file    Highest education level: Not on file   Occupational History    Not on file   Social Needs    Financial resource strain: Not on file    Food insecurity     Worry: Not on file     Inability: Not on file    Transportation needs     Medical: Not on file     Non-medical: Not on file   Tobacco Use    Smoking status: Never Smoker    Smokeless tobacco: Never Used   Substance and Sexual Activity    Alcohol use: Not Currently     Comment: none    Drug use: No    Sexual activity: Never   Lifestyle    Physical activity     Days per week: Not on file     Minutes per session: Not on file    Stress: Not on file   Relationships    Social connections     Talks on phone: Not on file     Gets together: Not on file     Attends L[employer] service: Not on file     Active member of club or organization: Not on file     Attends meetings of clubs or organizations: Not on file     Relationship status: Not on file   Other Topics Concern    Are you  "pregnant or think you may be? No    Breast-feeding No   Social History Narrative    Lives alone.    Great neighbors.    Many friends.    Walks the neighborhood daily with her dog.    Active social life.    Has her own home, takes care of everything and all of her affairs.     From [facility] , came to [place]    2 level house          Stays active     Walks every day , does home exercises     Takes stairs at the house            ROS:  GENERAL: No fever, chills, fatigability or weight loss.  Integument: No rashes, redness, icterus  CHEST: Denies DOMINGUEZ, cyanosis, wheezing, cough and sputum production.  CARDIOVASCULAR: Denies chest pain, PND, orthopnea or reduced exercise tolerance.  GI: Denies abd pain, dysphagia, nausea, vomiting, no hematemesis   : Denies burning on urination, no hematuria, no bacteriuria  MSK: No deformities, swelling, joint pain swelling  Neurologic: No HAs, seizures, weakness, paresthesias, gait problems    PE:  General appearance well  Ht 5' 4" (1.626 m)   Wt 45.9 kg (101 lb 1.6 oz)   BMI 17.35 kg/m²   Sclera/ Skin  anicteric  LN none palpable  AT NC EOMI  Neck supple trachea midline   Chest symmetric, nl excursion, no retractions, breathing comfortably  Abdomen  ND soft NT.  no masses, no organomegaly  EXT - no CCE  Neuro:  Mood/ affect nl, alert and oriented x 3, moves all ext's, gait nl    Rectal  Inspection nl  LATISHA nl. No mass      Assessment:  No evidence of recurrence  Diarrhea improved    Plan:  RTC 3 months       "

## 2020-07-09 ENCOUNTER — CLINICAL SUPPORT (OUTPATIENT)
Dept: REHABILITATION | Facility: HOSPITAL | Age: 85
End: 2020-07-09
Attending: INTERNAL MEDICINE
Payer: MEDICARE

## 2020-07-09 DIAGNOSIS — G89.29 CHRONIC RIGHT SHOULDER PAIN: ICD-10-CM

## 2020-07-09 DIAGNOSIS — M25.511 CHRONIC RIGHT SHOULDER PAIN: ICD-10-CM

## 2020-07-09 DIAGNOSIS — M25.611 DECREASED ROM OF RIGHT SHOULDER: ICD-10-CM

## 2020-07-09 DIAGNOSIS — R29.898 SHOULDER WEAKNESS: ICD-10-CM

## 2020-07-09 PROCEDURE — 97140 MANUAL THERAPY 1/> REGIONS: CPT | Mod: HCNC

## 2020-07-09 PROCEDURE — 97110 THERAPEUTIC EXERCISES: CPT | Mod: HCNC

## 2020-07-09 NOTE — PROGRESS NOTES
Physical Therapy Treatment Note     Name: Karli Hameed  Clinic Number: [id number]    Therapy Diagnosis:   Encounter Diagnoses   Name Primary?    Chronic right shoulder pain     Decreased ROM of right shoulder     Shoulder weakness      Physician: Katherine Hutchinson MD    Visit Date: 7/9/2020    Physician Orders: PT Eval and Treat   Medical Diagnosis from Referral: M75.41 (ICD-10-CM) - Impingement syndrome of right shoulder  Evaluation Date: 6/3/2020  Authorization Period Expiration: TBD  Plan of Care Expiration: 8/14/2020  Visit # / Visits authorized: 3/ TBD     Time In: 1130  Time Out: 1215  Total Billable Time: 45 minutes     Visit: 90  Total: 262     Precautions: Standard    Subjective     Pt reports: she is not feeling well today because she went to the doctor for her colon cancer    Pain: 5/10  Location: right shoulder      Objective     Karli received therapeutic exercises to develop strength, endurance, ROM, flexibility, posture and core stabilization for 35 minutes including:      Pulleys flexion and abduction 3 min ea  Scapular squeezes 5 sec x 20  Supine Wand Flexion (180 - 90 degree range of motion)  Seated flexion/abduction/ER PROM 15 x 5 sec    Karli received the following manual therapy techniques: Joint mobilizations were applied to the: R Glenohumeral Joint for 10 minutes, including:  Gr III PA/Inferior mobs   Manual Stretching Flexion/Abduction         Home Exercises Provided and Patient Education Provided     Education provided:   - HEP    Written Home Exercises Provided: yes.  Exercises were reviewed and Karli was able to demonstrate them prior to the end of the session.  Karli demonstrated good  understanding of the education provided.     See EMR under Patient Instructions for exercises provided 7/6/2020.    Assessment   Patient responded well to manual therapy today with improvements in pain and discomfort. Patient continues to have pain with initiating shoulder flexion. Continue to  progress as tolerated.   Karli is progressing well towards her goals.   Pt prognosis is Good.     Pt will continue to benefit from skilled outpatient physical therapy to address the deficits listed in the problem list box on initial evaluation, provide pt/family education and to maximize pt's level of independence in the home and community environment.     Pt's spiritual, cultural and educational needs considered and pt agreeable to plan of care and goals.     Anticipated barriers to physical therapy: None    Goals:   Short Term Goals (3 Weeks):   1. Pt will be independent with HEP to supplement PT in improving functional use of R UE.  2. Pt will increase pain free R shoulder elevation/abduction PROM to >/= 160 deg to improve functional mobility of UE     Long Term Goals (6 Weeks):   1. Pt will improve FOTO score to </=30% limited to decrease perceived limitation with carrying, moving, and handling objects.  2. Pt will increase R shoulder PROM to WNL in all planes to improve functional use of R RUE.  3. Pt will increase R shoulder AROM to 170 in flexion/abduction planes to improve functional use of R RUE.  4. Pt will improve R shoulder MMTs by 2 scores to promote equal use of B UEs in performing functional tasks.  5. Pt to report pain </= 1/10 with ADLs and IADLs using R UE to improve functional QOL.        Plan   Plan of care Certification: 6/3/2020 to 8/14/2020.     Outpatient Physical Therapy 2 times weekly for 10 weeks to include the following interventions: Manual Therapy, Moist Heat/ Ice, Neuromuscular Re-ed, Patient Education, Self Care, Therapeutic Activites and Therapeutic Exercise.     Maldonado Lantigua, PT

## 2020-07-13 ENCOUNTER — CLINICAL SUPPORT (OUTPATIENT)
Dept: REHABILITATION | Facility: HOSPITAL | Age: 85
End: 2020-07-13
Attending: INTERNAL MEDICINE
Payer: MEDICARE

## 2020-07-13 DIAGNOSIS — M25.511 CHRONIC RIGHT SHOULDER PAIN: ICD-10-CM

## 2020-07-13 DIAGNOSIS — G89.29 CHRONIC RIGHT SHOULDER PAIN: ICD-10-CM

## 2020-07-13 DIAGNOSIS — M25.611 DECREASED ROM OF RIGHT SHOULDER: ICD-10-CM

## 2020-07-13 DIAGNOSIS — R29.898 SHOULDER WEAKNESS: ICD-10-CM

## 2020-07-13 PROCEDURE — 97110 THERAPEUTIC EXERCISES: CPT | Mod: HCNC

## 2020-07-13 PROCEDURE — 97140 MANUAL THERAPY 1/> REGIONS: CPT | Mod: HCNC

## 2020-07-13 NOTE — PROGRESS NOTES
Physical Therapy Treatment Note     Name: Karli Hameed  Clinic Number: [id number]    Therapy Diagnosis:   Encounter Diagnoses   Name Primary?    Chronic right shoulder pain     Decreased ROM of right shoulder     Shoulder weakness      Physician: Katherine Hutchinson MD    Visit Date: 7/13/2020    Physician Orders: PT Eval and Treat   Medical Diagnosis from Referral: M75.41 (ICD-10-CM) - Impingement syndrome of right shoulder  Evaluation Date: 6/3/2020  Authorization Period Expiration: TBD  Plan of Care Expiration: 8/14/2020  Visit # / Visits authorized: 4/ TBD     Time In: 915  Time Out: 1003  Total Billable Time: 48 minutes     Visit: 90  Total: 352     Precautions: Standard    Subjective     Pt reports: she is feeling good today and has been working on her home exercises.  Pain: 5/10  Location: right shoulder      Objective     Karli received therapeutic exercises to develop strength, endurance, ROM, flexibility, posture and core stabilization for 38 minutes including:      Pulleys flexion and abduction 3 min ea  Scapular squeezes 5 sec x 20  Seated on 1/2 roll  No Money w/o resistance 20x 5 sec  Supine Wand Flexion (180 - 90 degree range of motion)  Seated flexion/abduction/ER PROM 15 x 5 sec    Karli received the following manual therapy techniques: Joint mobilizations were applied to the: R Glenohumeral Joint for 10 minutes, including:  Gr III PA/Inferior mobs   Manual Stretching Flexion/Abduction         Home Exercises Provided and Patient Education Provided     Education provided:   - HEP    Written Home Exercises Provided: yes.  Exercises were reviewed and Karli was able to demonstrate them prior to the end of the session.  Karli demonstrated good  understanding of the education provided.     See EMR under Patient Instructions for exercises provided 7/6/2020.    Assessment   Patient was progressed with focusing on periscapular activation. Patient was able to tolerated all exercises without pain today  Continue to progress as tolerated.   Karli is progressing well towards her goals.   Pt prognosis is Good.     Pt will continue to benefit from skilled outpatient physical therapy to address the deficits listed in the problem list box on initial evaluation, provide pt/family education and to maximize pt's level of independence in the home and community environment.     Pt's spiritual, cultural and educational needs considered and pt agreeable to plan of care and goals.     Anticipated barriers to physical therapy: None    Goals:   Short Term Goals (3 Weeks):   1. Pt will be independent with HEP to supplement PT in improving functional use of R UE.  2. Pt will increase pain free R shoulder elevation/abduction PROM to >/= 160 deg to improve functional mobility of UE     Long Term Goals (6 Weeks):   1. Pt will improve FOTO score to </=30% limited to decrease perceived limitation with carrying, moving, and handling objects.  2. Pt will increase R shoulder PROM to WNL in all planes to improve functional use of R RUE.  3. Pt will increase R shoulder AROM to 170 in flexion/abduction planes to improve functional use of R RUE.  4. Pt will improve R shoulder MMTs by 2 scores to promote equal use of B UEs in performing functional tasks.  5. Pt to report pain </= 1/10 with ADLs and IADLs using R UE to improve functional QOL.        Plan   Plan of care Certification: 6/3/2020 to 8/14/2020.     Outpatient Physical Therapy 2 times weekly for 10 weeks to include the following interventions: Manual Therapy, Moist Heat/ Ice, Neuromuscular Re-ed, Patient Education, Self Care, Therapeutic Activites and Therapeutic Exercise.     Maldonado Lantigua, PT

## 2020-07-16 ENCOUNTER — CLINICAL SUPPORT (OUTPATIENT)
Dept: REHABILITATION | Facility: HOSPITAL | Age: 85
End: 2020-07-16
Attending: INTERNAL MEDICINE
Payer: MEDICARE

## 2020-07-16 DIAGNOSIS — G89.29 CHRONIC RIGHT SHOULDER PAIN: ICD-10-CM

## 2020-07-16 DIAGNOSIS — M25.511 CHRONIC RIGHT SHOULDER PAIN: ICD-10-CM

## 2020-07-16 DIAGNOSIS — M25.611 DECREASED ROM OF RIGHT SHOULDER: ICD-10-CM

## 2020-07-16 DIAGNOSIS — R29.898 SHOULDER WEAKNESS: ICD-10-CM

## 2020-07-16 PROCEDURE — 97140 MANUAL THERAPY 1/> REGIONS: CPT | Mod: HCNC

## 2020-07-16 PROCEDURE — 97110 THERAPEUTIC EXERCISES: CPT | Mod: HCNC

## 2020-07-16 NOTE — PROGRESS NOTES
"  Physical Therapy Treatment Note     Name: Karli Hameed  Clinic Number: [id number]    Therapy Diagnosis:   Encounter Diagnoses   Name Primary?    Chronic right shoulder pain     Decreased ROM of right shoulder     Shoulder weakness      Physician: Katherine Hutchinson MD    Visit Date: 7/16/2020    Physician Orders: PT Eval and Treat   Medical Diagnosis from Referral: M75.41 (ICD-10-CM) - Impingement syndrome of right shoulder  Evaluation Date: 6/3/2020  Authorization Period Expiration: TBD  Plan of Care Expiration: 8/14/2020  Visit # / Visits authorized: 5/ TBD     Time In: 959  Time Out: 145  Total Billable Time: 46 minutes     Visit: 90  Total: 442     Precautions: Standard    Subjective     Pt reports: she is feeling better after last session. Despite patient reporting feeling "much better" she reported her pain to be a 7/10 today in her R shoulder. Patient continues to perform her home exercises     Objective     Karli received therapeutic exercises to develop strength, endurance, ROM, flexibility, posture and core stabilization for 36 minutes including:      Pulleys flexion and abduction 3 min ea  Seated on 1/2 roll  No Money w/o resistance 20x 5 sec  Wall Slides 20x 5 seconds  Isometric ER/IR/Flex 10 sec x 10  Supine Wand Flexion (180 - 45 degree range of motion)  Sidelying ER 15x  Seated flexion/abduction/ER PROM 15 x 5 sec    Karli received the following manual therapy techniques: Joint mobilizations were applied to the: R Glenohumeral Joint for 10 minutes, including:  Gr III PA/Inferior mobs   Manual Stretching Flexion/Abduction         Home Exercises Provided and Patient Education Provided     Education provided:   - HEP    Written Home Exercises Provided: yes.  Exercises were reviewed and Karli was able to demonstrate them prior to the end of the session.  Karli demonstrated good  understanding of the education provided.     See EMR under Patient Instructions for exercises provided " 7/6/2020.    Assessment   Patient was progressed with focusing on rotator cuff activation and range of motion. Patient improved with pain free range of motion of wand flexion exercise. Patient was able to tolerated all exercises without pain today. Continue to progress as tolerated.   Karli is progressing well towards her goals.   Pt prognosis is Good.     Pt will continue to benefit from skilled outpatient physical therapy to address the deficits listed in the problem list box on initial evaluation, provide pt/family education and to maximize pt's level of independence in the home and community environment.     Pt's spiritual, cultural and educational needs considered and pt agreeable to plan of care and goals.     Anticipated barriers to physical therapy: None    Goals:   Short Term Goals (3 Weeks):   1. Pt will be independent with HEP to supplement PT in improving functional use of R UE.  2. Pt will increase pain free R shoulder elevation/abduction PROM to >/= 160 deg to improve functional mobility of UE     Long Term Goals (6 Weeks):   1. Pt will improve FOTO score to </=30% limited to decrease perceived limitation with carrying, moving, and handling objects.  2. Pt will increase R shoulder PROM to WNL in all planes to improve functional use of R RUE.  3. Pt will increase R shoulder AROM to 170 in flexion/abduction planes to improve functional use of R RUE.  4. Pt will improve R shoulder MMTs by 2 scores to promote equal use of B UEs in performing functional tasks.  5. Pt to report pain </= 1/10 with ADLs and IADLs using R UE to improve functional QOL.        Plan   Plan of care Certification: 6/3/2020 to 8/14/2020.     Outpatient Physical Therapy 2 times weekly for 10 weeks to include the following interventions: Manual Therapy, Moist Heat/ Ice, Neuromuscular Re-ed, Patient Education, Self Care, Therapeutic Activites and Therapeutic Exercise.     Maldonado Lantigua, PT

## 2020-07-20 ENCOUNTER — CLINICAL SUPPORT (OUTPATIENT)
Dept: REHABILITATION | Facility: HOSPITAL | Age: 85
End: 2020-07-20
Attending: INTERNAL MEDICINE
Payer: MEDICARE

## 2020-07-20 DIAGNOSIS — G89.29 CHRONIC RIGHT SHOULDER PAIN: ICD-10-CM

## 2020-07-20 DIAGNOSIS — M25.511 CHRONIC RIGHT SHOULDER PAIN: ICD-10-CM

## 2020-07-20 DIAGNOSIS — R29.898 SHOULDER WEAKNESS: ICD-10-CM

## 2020-07-20 DIAGNOSIS — M25.611 DECREASED ROM OF RIGHT SHOULDER: ICD-10-CM

## 2020-07-20 PROCEDURE — 97110 THERAPEUTIC EXERCISES: CPT | Mod: HCNC

## 2020-07-20 PROCEDURE — 97140 MANUAL THERAPY 1/> REGIONS: CPT | Mod: HCNC

## 2020-07-20 NOTE — PROGRESS NOTES
Physical Therapy Treatment Note     Name: Karli Hameed  Clinic Number: [id number]    Therapy Diagnosis:   Encounter Diagnoses   Name Primary?    Chronic right shoulder pain     Decreased ROM of right shoulder     Shoulder weakness      Physician: Katherine Hutchinson MD    Visit Date: 7/20/2020    Physician Orders: PT Eval and Treat   Medical Diagnosis from Referral: M75.41 (ICD-10-CM) - Impingement syndrome of right shoulder  Evaluation Date: 6/3/2020  Authorization Period Expiration: TBD  Plan of Care Expiration: 8/14/2020  Visit # / Visits authorized: 6/ TBD     Time In: 915  Time Out: 1058  Total Billable Time: 43 minutes     Visit: 90  Total: 532     Precautions: Standard    Subjective     Pt reports: she was sore and in pain this weekend and could barely move her R shoulder. Patient reports she feels better today.     Objective     Karli received therapeutic exercises to develop strength, endurance, ROM, flexibility, posture and core stabilization for 33 minutes including:      Pulleys flexion and abduction 3 min ea  Seated on 1/2 roll No Money w/o resistance 20x 5 sec  Wall Slides 20x 5 seconds NP due to pain  Isometric ER/IR/Flex 10 sec x 10  Supine Wand Flexion (180 - 45 degree range of motion)  Sidelying ER 15x NP due to pain  Ball Roll outs 10 sec x 15 (flex/abd)    Karli received the following manual therapy techniques: Joint mobilizations were applied to the: R Glenohumeral Joint for 10 minutes, including:  Gr III PA/Inferior mobs   Manual Stretching Flexion/Abduction         Home Exercises Provided and Patient Education Provided     Education provided:   - HEP    Written Home Exercises Provided: yes.  Exercises were reviewed and Karli was able to demonstrate them prior to the end of the session.  Karli demonstrated good  understanding of the education provided.     See EMR under Patient Instructions for exercises provided 7/6/2020.    Assessment   Patient had increased pain with wall slides and  sidelying ER today. Patient continues to report improved symptoms after manual therapy. Patient continues to have no pain with PROM. Continue to progress as tolerated.   Karli is progressing well towards her goals.   Pt prognosis is Good.     Pt will continue to benefit from skilled outpatient physical therapy to address the deficits listed in the problem list box on initial evaluation, provide pt/family education and to maximize pt's level of independence in the home and community environment.     Pt's spiritual, cultural and educational needs considered and pt agreeable to plan of care and goals.     Anticipated barriers to physical therapy: None    Goals:   Short Term Goals (3 Weeks):   1. Pt will be independent with HEP to supplement PT in improving functional use of R UE.  2. Pt will increase pain free R shoulder elevation/abduction PROM to >/= 160 deg to improve functional mobility of UE     Long Term Goals (6 Weeks):   1. Pt will improve FOTO score to </=30% limited to decrease perceived limitation with carrying, moving, and handling objects.  2. Pt will increase R shoulder PROM to WNL in all planes to improve functional use of R RUE.  3. Pt will increase R shoulder AROM to 170 in flexion/abduction planes to improve functional use of R RUE.  4. Pt will improve R shoulder MMTs by 2 scores to promote equal use of B UEs in performing functional tasks.  5. Pt to report pain </= 1/10 with ADLs and IADLs using R UE to improve functional QOL.        Plan   Plan of care Certification: 6/3/2020 to 8/14/2020.     Outpatient Physical Therapy 2 times weekly for 10 weeks to include the following interventions: Manual Therapy, Moist Heat/ Ice, Neuromuscular Re-ed, Patient Education, Self Care, Therapeutic Activites and Therapeutic Exercise.     Maldonado Lantigua, PT

## 2020-07-23 ENCOUNTER — CLINICAL SUPPORT (OUTPATIENT)
Dept: REHABILITATION | Facility: HOSPITAL | Age: 85
End: 2020-07-23
Attending: INTERNAL MEDICINE
Payer: MEDICARE

## 2020-07-23 DIAGNOSIS — R29.898 SHOULDER WEAKNESS: ICD-10-CM

## 2020-07-23 DIAGNOSIS — M25.511 CHRONIC RIGHT SHOULDER PAIN: ICD-10-CM

## 2020-07-23 DIAGNOSIS — M25.611 DECREASED ROM OF RIGHT SHOULDER: ICD-10-CM

## 2020-07-23 DIAGNOSIS — G89.29 CHRONIC RIGHT SHOULDER PAIN: ICD-10-CM

## 2020-07-23 PROCEDURE — 97140 MANUAL THERAPY 1/> REGIONS: CPT | Mod: HCNC

## 2020-07-23 PROCEDURE — 97110 THERAPEUTIC EXERCISES: CPT | Mod: HCNC

## 2020-07-23 NOTE — PROGRESS NOTES
Physical Therapy Treatment Note     Name: Karli Hameed  Clinic Number: [id number]    Therapy Diagnosis:   Encounter Diagnoses   Name Primary?    Chronic right shoulder pain     Decreased ROM of right shoulder     Shoulder weakness      Physician: Katherine Hutchinson MD    Visit Date: 7/23/2020    Physician Orders: PT Eval and Treat   Medical Diagnosis from Referral: M75.41 (ICD-10-CM) - Impingement syndrome of right shoulder  Evaluation Date: 6/3/2020  Authorization Period Expiration: TBD  Plan of Care Expiration: 8/14/2020  Visit # / Visits authorized: 7/ TBD     Time In: 1000  Time Out: 1045  Total Billable Time: 45 minutes     Visit: 90  Total: 622     Precautions: Standard    Subjective     Pt reports: her shoulder is feeling better today but, her stomach is giving her problems and may have to leave early.     Objective     Karli received therapeutic exercises to develop strength, endurance, ROM, flexibility, posture and core stabilization for 35 minutes including:      Pulleys flexion and abduction 3 min ea  Seated on 1/2 roll No Money w/o resistance 20x 5 sec  Wall Slides 12x 5 seconds   Isometric ER/IR/Flex 10 sec x 10 NP  Supine Wand Flexion (180 - 45 degree range of motion)  Sidelying ER 15x NP   Ball Roll outs 10 sec x 15 (flex/abd)  ER Walkouts w/ [place] TB 10x    Karli received the following manual therapy techniques: Joint mobilizations were applied to the: R Glenohumeral Joint for 10 minutes, including:  Gr III PA/Inferior mobs   Manual Stretching Flexion/Abduction         Home Exercises Provided and Patient Education Provided     Education provided:   - HEP    Written Home Exercises Provided: yes.  Exercises were reviewed and Karli was able to demonstrate them prior to the end of the session.  Karli demonstrated good  understanding of the education provided.     See EMR under Patient Instructions for exercises provided 7/6/2020.    Assessment   Patient continues to have difficulty lifting overhead  without assistance or pain. Patient was able to perform ER Walkouts, but unable to maintain ER after two walkouts. Continue to progress as tolerated.   Karli is progressing well towards her goals.   Pt prognosis is Good.     Pt will continue to benefit from skilled outpatient physical therapy to address the deficits listed in the problem list box on initial evaluation, provide pt/family education and to maximize pt's level of independence in the home and community environment.     Pt's spiritual, cultural and educational needs considered and pt agreeable to plan of care and goals.     Anticipated barriers to physical therapy: None    Goals:   Short Term Goals (3 Weeks):   1. Pt will be independent with HEP to supplement PT in improving functional use of R UE.  2. Pt will increase pain free R shoulder elevation/abduction PROM to >/= 160 deg to improve functional mobility of UE     Long Term Goals (6 Weeks):   1. Pt will improve FOTO score to </=30% limited to decrease perceived limitation with carrying, moving, and handling objects.  2. Pt will increase R shoulder PROM to WNL in all planes to improve functional use of R RUE.  3. Pt will increase R shoulder AROM to 170 in flexion/abduction planes to improve functional use of R RUE.  4. Pt will improve R shoulder MMTs by 2 scores to promote equal use of B UEs in performing functional tasks.  5. Pt to report pain </= 1/10 with ADLs and IADLs using R UE to improve functional QOL.        Plan   Plan of care Certification: 6/3/2020 to 8/14/2020.     Outpatient Physical Therapy 2 times weekly for 10 weeks to include the following interventions: Manual Therapy, Moist Heat/ Ice, Neuromuscular Re-ed, Patient Education, Self Care, Therapeutic Activites and Therapeutic Exercise.     Maldonado Lantigua, PT

## 2020-07-27 ENCOUNTER — CLINICAL SUPPORT (OUTPATIENT)
Dept: REHABILITATION | Facility: HOSPITAL | Age: 85
End: 2020-07-27
Attending: INTERNAL MEDICINE
Payer: MEDICARE

## 2020-07-27 DIAGNOSIS — G89.29 CHRONIC RIGHT SHOULDER PAIN: ICD-10-CM

## 2020-07-27 DIAGNOSIS — M25.611 DECREASED ROM OF RIGHT SHOULDER: ICD-10-CM

## 2020-07-27 DIAGNOSIS — M25.511 CHRONIC RIGHT SHOULDER PAIN: ICD-10-CM

## 2020-07-27 DIAGNOSIS — R29.898 SHOULDER WEAKNESS: ICD-10-CM

## 2020-07-27 PROCEDURE — 97110 THERAPEUTIC EXERCISES: CPT | Mod: HCNC

## 2020-07-27 PROCEDURE — 97140 MANUAL THERAPY 1/> REGIONS: CPT | Mod: HCNC

## 2020-07-27 NOTE — PROGRESS NOTES
Physical Therapy Treatment Note     Name: Karli Hameed  Clinic Number: [id number]    Therapy Diagnosis:   Encounter Diagnoses   Name Primary?    Chronic right shoulder pain     Decreased ROM of right shoulder     Shoulder weakness      Physician: Katherine Hutchinson MD    Visit Date: 7/27/2020    Physician Orders: PT Eval and Treat   Medical Diagnosis from Referral: M75.41 (ICD-10-CM) - Impingement syndrome of right shoulder  Evaluation Date: 6/3/2020  Authorization Period Expiration: TBD  Plan of Care Expiration: 8/14/2020  Visit # / Visits authorized: 8/ TBD     Time In: 917  Time Out: 1007  Total Billable Time: 50 minutes     Visit: 90   Total: 712     Precautions: Standard    Subjective     Pt reports: she was sore over the weekend, but was able to do some of her exercises     Objective     Karli received therapeutic exercises to develop strength, endurance, ROM, flexibility, posture and core stabilization for 40 minutes including:      Pulleys flexion and abduction 3 min ea  Seated on 1/2 roll No Money w/o resistance 20x 5 sec  Wall Slides 15x 5 seconds   Isometric ER/IR/Flex 10 sec x 10 NP  Supine Wand Flexion (180 - 45 degree range of motion)  Sidelying ER 15x   Ball Roll outs 10 sec x 15 (flex/abd)  ER Walkouts w/ [place] TB 10x    Karli received the following manual therapy techniques: Joint mobilizations were applied to the: R Glenohumeral Joint for 10 minutes, including:  Gr III PA/Inferior mobs   Manual Stretching Flexion/Abduction   AAROM Flexion         Home Exercises Provided and Patient Education Provided     Education provided:   - HEP    Written Home Exercises Provided: yes.  Exercises were reviewed and Karli was able to demonstrate them prior to the end of the session.  Karli demonstrated good  understanding of the education provided.     See EMR under Patient Instructions for exercises provided 7/6/2020.    Assessment   Patient was able to tolerate progression of manual therapy. Patient  required assist with SL shoulder ER to achieve full ROM secondary to weakness. Patient did not have any adverse effects from therapy. Patient decreased frequency to 1x a week for therapy  Karli is progressing well towards her goals.   Pt prognosis is Good.     Pt will continue to benefit from skilled outpatient physical therapy to address the deficits listed in the problem list box on initial evaluation, provide pt/family education and to maximize pt's level of independence in the home and community environment.     Pt's spiritual, cultural and educational needs considered and pt agreeable to plan of care and goals.     Anticipated barriers to physical therapy: None    Goals:   Short Term Goals (3 Weeks):   1. Pt will be independent with HEP to supplement PT in improving functional use of R UE.  2. Pt will increase pain free R shoulder elevation/abduction PROM to >/= 160 deg to improve functional mobility of UE     Long Term Goals (6 Weeks):   1. Pt will improve FOTO score to </=30% limited to decrease perceived limitation with carrying, moving, and handling objects.  2. Pt will increase R shoulder PROM to WNL in all planes to improve functional use of R RUE.  3. Pt will increase R shoulder AROM to 170 in flexion/abduction planes to improve functional use of R RUE.  4. Pt will improve R shoulder MMTs by 2 scores to promote equal use of B UEs in performing functional tasks.  5. Pt to report pain </= 1/10 with ADLs and IADLs using R UE to improve functional QOL.        Plan   Plan of care Certification: 6/3/2020 to 8/14/2020.     Outpatient Physical Therapy 2 times weekly for 10 weeks to include the following interventions: Manual Therapy, Moist Heat/ Ice, Neuromuscular Re-ed, Patient Education, Self Care, Therapeutic Activites and Therapeutic Exercise.     Maldonado Lantigua, PT

## 2020-08-11 ENCOUNTER — CLINICAL SUPPORT (OUTPATIENT)
Dept: REHABILITATION | Facility: HOSPITAL | Age: 85
End: 2020-08-11
Attending: INTERNAL MEDICINE
Payer: MEDICARE

## 2020-08-11 DIAGNOSIS — R29.898 SHOULDER WEAKNESS: ICD-10-CM

## 2020-08-11 DIAGNOSIS — M25.611 DECREASED ROM OF RIGHT SHOULDER: ICD-10-CM

## 2020-08-11 DIAGNOSIS — M25.511 CHRONIC RIGHT SHOULDER PAIN: ICD-10-CM

## 2020-08-11 DIAGNOSIS — G89.29 CHRONIC RIGHT SHOULDER PAIN: ICD-10-CM

## 2020-08-11 PROCEDURE — 97110 THERAPEUTIC EXERCISES: CPT | Mod: HCNC

## 2020-08-11 PROCEDURE — 97140 MANUAL THERAPY 1/> REGIONS: CPT | Mod: HCNC

## 2020-08-11 NOTE — PROGRESS NOTES
Physical Therapy Treatment Note     Name: Karli Hameed  Clinic Number: [id number]    Therapy Diagnosis:   Encounter Diagnoses   Name Primary?    Chronic right shoulder pain     Decreased ROM of right shoulder     Shoulder weakness      Physician: Katherine Hutchinson MD    Visit Date: 8/11/2020    Physician Orders: PT Eval and Treat   Medical Diagnosis from Referral: M75.41 (ICD-10-CM) - Impingement syndrome of right shoulder  Evaluation Date: 6/3/2020  Authorization Period Expiration: TBD  Plan of Care Expiration: 8/14/2020  Visit # / Visits authorized: 9/ TBD     Time In: 1000  Time Out: 1044  Total Billable Time: 44 minutes     Visit: 90  Total: 802     Precautions: Standard    Subjective     Pt reports: she is sore today and wants to take it easy.     Objective     Karli received therapeutic exercises to develop strength, endurance, ROM, flexibility, posture and core stabilization for 15 minutes including:      Pulleys flexion and abduction 3 min ea  Seated on 1/2 roll No Money w/o resistance 20x 5 sec NP  Wall Slides 15x 5 seconds NP  Isometric ER/IR/Flex 10 sec x 10   Supine Wand Flexion (180 - 45 degree range of motion)  Sidelying ER 15x NP  Ball Roll outs 10 sec x 15 (flex/abd)  ER Walkouts w/ [place] TB 10x NP    Karli received the following manual therapy techniques: Joint mobilizations were applied to the: R Glenohumeral Joint for 29 minutes, including:  Gr III PA/Inferior mobs   Manual Stretching Flexion/Abduction   AAROM Flexion   Glenohumeral oscillations         Home Exercises Provided and Patient Education Provided     Education provided:   - HEP    Written Home Exercises Provided: yes.  Exercises were reviewed and Karli was able to demonstrate them prior to the end of the session.  Karli demonstrated good  understanding of the education provided.     See EMR under Patient Instructions for exercises provided 7/6/2020.    Assessment   Patient presented to therapy with increased pain, soreness, and  irritability with her R shoulder. Patient could not tolerate all therex, but did report improvements with manual therapy.   Karli is progressing well towards her goals.   Pt prognosis is Good.     Pt will continue to benefit from skilled outpatient physical therapy to address the deficits listed in the problem list box on initial evaluation, provide pt/family education and to maximize pt's level of independence in the home and community environment.     Pt's spiritual, cultural and educational needs considered and pt agreeable to plan of care and goals.     Anticipated barriers to physical therapy: None    Goals:   Short Term Goals (3 Weeks):   1. Pt will be independent with HEP to supplement PT in improving functional use of R UE.  2. Pt will increase pain free R shoulder elevation/abduction PROM to >/= 160 deg to improve functional mobility of UE     Long Term Goals (6 Weeks):   1. Pt will improve FOTO score to </=30% limited to decrease perceived limitation with carrying, moving, and handling objects.  2. Pt will increase R shoulder PROM to WNL in all planes to improve functional use of R RUE.  3. Pt will increase R shoulder AROM to 170 in flexion/abduction planes to improve functional use of R RUE.  4. Pt will improve R shoulder MMTs by 2 scores to promote equal use of B UEs in performing functional tasks.  5. Pt to report pain </= 1/10 with ADLs and IADLs using R UE to improve functional QOL.        Plan   Plan of care Certification: 6/3/2020 to 8/14/2020.     Outpatient Physical Therapy 2 times weekly for 10 weeks to include the following interventions: Manual Therapy, Moist Heat/ Ice, Neuromuscular Re-ed, Patient Education, Self Care, Therapeutic Activites and Therapeutic Exercise.     Maldonado Lantigua, PT

## 2020-08-13 ENCOUNTER — OFFICE VISIT (OUTPATIENT)
Dept: RHEUMATOLOGY | Facility: CLINIC | Age: 85
End: 2020-08-13
Payer: MEDICARE

## 2020-08-13 VITALS
HEART RATE: 76 BPM | HEIGHT: 61 IN | WEIGHT: 101.38 LBS | SYSTOLIC BLOOD PRESSURE: 147 MMHG | DIASTOLIC BLOOD PRESSURE: 90 MMHG | BODY MASS INDEX: 19.14 KG/M2

## 2020-08-13 DIAGNOSIS — M12.811 RIGHT ROTATOR CUFF TEAR ARTHROPATHY: ICD-10-CM

## 2020-08-13 DIAGNOSIS — M81.0 OSTEOPOROSIS, UNSPECIFIED OSTEOPOROSIS TYPE, UNSPECIFIED PATHOLOGICAL FRACTURE PRESENCE: ICD-10-CM

## 2020-08-13 DIAGNOSIS — M25.511 ACUTE PAIN OF RIGHT SHOULDER: Primary | ICD-10-CM

## 2020-08-13 DIAGNOSIS — M75.101 ROTATOR CUFF SYNDROME, RIGHT: ICD-10-CM

## 2020-08-13 DIAGNOSIS — M85.80 OSTEOPENIA, UNSPECIFIED LOCATION: ICD-10-CM

## 2020-08-13 DIAGNOSIS — M75.101 RIGHT ROTATOR CUFF TEAR ARTHROPATHY: ICD-10-CM

## 2020-08-13 LAB
APPEARANCE FLD: NORMAL
BODY FLD TYPE: NORMAL
BODY FLD TYPE: NORMAL
COLOR FLD: YELLOW
CRYSTALS FLD MICRO: NEGATIVE
LYMPHOCYTES NFR FLD MANUAL: 57 %
MONOS+MACROS NFR FLD MANUAL: 22 %
NEUTROPHILS NFR FLD MANUAL: 21 %
PATH INTERP FLD-IMP: NORMAL
WBC # FLD: 324 /CU MM

## 2020-08-13 PROCEDURE — 99999 PR PBB SHADOW E&M-EST. PATIENT-LVL IV: ICD-10-PCS | Mod: PBBFAC,HCNC,, | Performed by: INTERNAL MEDICINE

## 2020-08-13 PROCEDURE — 89060 EXAM SYNOVIAL FLUID CRYSTALS: CPT | Mod: HCNC

## 2020-08-13 PROCEDURE — 20610 DRAIN/INJ JOINT/BURSA W/O US: CPT | Mod: HCNC,RT,S$GLB, | Performed by: INTERNAL MEDICINE

## 2020-08-13 PROCEDURE — 1101F PT FALLS ASSESS-DOCD LE1/YR: CPT | Mod: HCNC,CPTII,S$GLB, | Performed by: INTERNAL MEDICINE

## 2020-08-13 PROCEDURE — 99213 OFFICE O/P EST LOW 20 MIN: CPT | Mod: 25,HCNC,S$GLB, | Performed by: INTERNAL MEDICINE

## 2020-08-13 PROCEDURE — 1159F PR MEDICATION LIST DOCUMENTED IN MEDICAL RECORD: ICD-10-PCS | Mod: HCNC,S$GLB,, | Performed by: INTERNAL MEDICINE

## 2020-08-13 PROCEDURE — 1101F PR PT FALLS ASSESS DOC 0-1 FALLS W/OUT INJ PAST YR: ICD-10-PCS | Mod: HCNC,CPTII,S$GLB, | Performed by: INTERNAL MEDICINE

## 2020-08-13 PROCEDURE — 20610 PR DRAIN/INJECT LARGE JOINT/BURSA: ICD-10-PCS | Mod: HCNC,RT,S$GLB, | Performed by: INTERNAL MEDICINE

## 2020-08-13 PROCEDURE — 1159F MED LIST DOCD IN RCRD: CPT | Mod: HCNC,S$GLB,, | Performed by: INTERNAL MEDICINE

## 2020-08-13 PROCEDURE — 89051 BODY FLUID CELL COUNT: CPT | Mod: HCNC

## 2020-08-13 PROCEDURE — 99999 PR PBB SHADOW E&M-EST. PATIENT-LVL IV: CPT | Mod: PBBFAC,HCNC,, | Performed by: INTERNAL MEDICINE

## 2020-08-13 PROCEDURE — 99213 PR OFFICE/OUTPT VISIT, EST, LEVL III, 20-29 MIN: ICD-10-PCS | Mod: 25,HCNC,S$GLB, | Performed by: INTERNAL MEDICINE

## 2020-08-13 PROCEDURE — 1125F AMNT PAIN NOTED PAIN PRSNT: CPT | Mod: HCNC,S$GLB,, | Performed by: INTERNAL MEDICINE

## 2020-08-13 PROCEDURE — 1125F PR PAIN SEVERITY QUANTIFIED, PAIN PRESENT: ICD-10-PCS | Mod: HCNC,S$GLB,, | Performed by: INTERNAL MEDICINE

## 2020-08-13 RX ORDER — TRIAMCINOLONE ACETONIDE 40 MG/ML
40 INJECTION, SUSPENSION INTRA-ARTICULAR; INTRAMUSCULAR
Status: DISCONTINUED | OUTPATIENT
Start: 2020-08-13 | End: 2020-08-13 | Stop reason: HOSPADM

## 2020-08-13 RX ADMIN — TRIAMCINOLONE ACETONIDE 40 MG: 40 INJECTION, SUSPENSION INTRA-ARTICULAR; INTRAMUSCULAR at 11:08

## 2020-08-13 NOTE — PROGRESS NOTES
Answers for HPI/ROS submitted by the patient on 8/13/2020   fever: No  eye redness: No  headaches: No  shortness of breath: No  chest pain: No  trouble swallowing: No  diarrhea: No  constipation: No  unexpected weight change: No  genital sore: No  dysuria: No  During the last 3 days, have you had a skin rash?: No  Bruises or bleeds easily: No  cough: No

## 2020-08-13 NOTE — PROGRESS NOTES
"I have personally taken the history and examined the patient and agree with the resident,s note as stated above     EXAMINATION:   XR SHOULDER COMPLETE 2 OR MORE VIEWS RIGHT   CLINICAL HISTORY:   Impingement syndrome of right shoulder   TECHNIQUE:   Two or three views of the right shoulder were performed.   COMPARISON:   Shoulder radiograph 08/22/2018, 02/04/2016.   FINDINGS:   Bones: No acute fracture. No lytic or blastic lesion.   Joints: No evidence for glenohumeral dislocation. There is mild AC joint hypertrophy. No subacromial spurring.   Soft tissues: Unremarkable.   Impression:   No acute fracture or dislocation.   Electronically signed by resident: Edilson Crawford   Date: 05/19/2020   Time: 11:06   Electronically signed by: Malcolm Churchill MD   Date: 05/19/2020   Time: 11:16        Right shoulder: mild swelling subdeltoid bursa, mild warmth, no erythema.  Active abduction 45 degrees with pain. Pain and  Weakness resisted abduction and external rotation only. + empty can + Neer + Speed + Mace-Fernando                Right rotator cuff tear/tendinitis since Jan 2020, no trauma or precipitating event. Has been attending PT x 5 wks without improvement. R/o chronic CPPD but no articular calcification. 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 OA right ACJ and GHJ  Osteopenia with DXA 3/21/18: FRAX major 17%   Hip  11% The bone density shows osteopenia with elevated fracture risk with 8% improvement in the hip bone density compared with 2015.   CKD stage 5, ESRD overdue for f/u Dr. Alonzo in Nephrology  Chondrocalcinosis and h/o acute CPPD right knee      * After verbal consent and cleansing with Chloraprep the right shoulder aspirated via the subdeltoid bursal approach with removal of 3 ml tan viscous clear SF, sent for WBC with diff and crystals and then  injected with Kenalog 40mg with 1 ml 1 % lidocaine. Patient tolerated procedure well.  Rest right " arm x 72 hrs  Re-ordered PT  Salon-Pas lidocaine 4% patch 12 hrs on/off  DXA   denosumab 60mg sc q 6 months administered 5/7/20. Schedule renal function one week prior to next  11/7/20. F/u as scheduled with Dr. Alonzo for  CKD stage 5, ESRD  F/u Dr. Alonzo in Nephrology  F/u Dr. Hutchinson IM  RTC 3 months        Answers for HPI/ROS submitted by the patient on 8/13/2020   fever: No  eye redness: No  headaches: No  shortness of breath: No  chest pain: No  trouble swallowing: No  diarrhea: No  constipation: No  unexpected weight change: No  genital sore: No  dysuria: No  During the last 3 days, have you had a skin rash?: No  Bruises or bleeds easily: No  cough: No

## 2020-08-13 NOTE — PROGRESS NOTES
"Subjective:       Patient ID: Karli Hameed is a 88 y.o. female.    Chief Complaint: Osteopenia, right shoulder pain    HPI   88 year old female PMH hx of rectal cancer s/p resection and radiation, OA, chrondocalcinosis, CPPD, CKD 5 (eGFR 8.9 5/4/2020), and ostepenia    Last seen on 1/13/2020. Today patient complains of right shoulder pain. Pain present since January. Denies trauma. Patient was unable to see physician 2/2 COVID-19 precautions. Eventually received prescriptions for PT. Patient has been in PT for 6 weeks with minimal relief. Patient not using topical medications. Using tylenol 1x/week. Denies NSAID use. Hand pain is not bothersome today.  Osteopenia managed on denosumab (Prolia). Most recent dose on 5/7/2020. Most recent DEXA 3/21/2018.      Review of Systems   Constitutional: Negative for fever and unexpected weight change.   HENT: Negative for trouble swallowing.    Eyes: Negative for redness.   Respiratory: Negative for cough and shortness of breath.    Cardiovascular: Negative for chest pain.   Gastrointestinal: Negative for constipation and diarrhea.   Genitourinary: Negative for dysuria and genital sores.   Skin: Negative for rash.   Neurological: Negative for headaches.   Hematological: Does not bruise/bleed easily.         Objective:   BP (!) 147/90   Pulse 76   Ht 5' 1.2" (1.554 m)   Wt 46 kg (101 lb 6.4 oz)   BMI 19.03 kg/m²      Physical Exam       Right Side Rheumatological Exam     Examination finds the wrist, knee, 1st PIP, 1st MCP, 2nd PIP, 2nd MCP, 3rd PIP, 3rd MCP, 4th PIP, 4th MCP, 5th PIP and 5th MCP normal.    The patient is tender to palpation of the shoulder, elbow and AC joint    She has swelling of the shoulder and AC joint    The patient has an enlarged 1st CMC, 2nd DIP, 3rd DIP, 4th DIP and 5th DIP    Muscle Strength (0-5 scale):  Deltoid:  4  Biceps: 4.8/5   Triceps:  4.8  : 5/5   Iliopsoas: 5  Quadriceps:  5     Left Side Rheumatological Exam     Examination finds " the shoulder, elbow, wrist, knee, 1st PIP, 1st MCP, 2nd PIP, 2nd MCP, 3rd PIP, 3rd MCP, 4th PIP, 4th MCP, 5th PIP, 5th MCP, 2nd DIP, 3rd DIP, 4th DIP and 5th DIP normal.    The patient has an enlarged 1st CMC.    Muscle Strength (0-5 scale):  Deltoid:  4  Biceps: 4.8/5   Triceps:  4.8  :  5/5   Iliopsoas: 5  Quadriceps:  5            No data to display     Assessment:       1. Acute pain of right shoulder    2. Rotator cuff syndrome, right    3. Osteopenia, unspecified location    4. Osteoporosis, unspecified osteoporosis type, unspecified pathological fracture presence            Plan:       Problem List Items Addressed This Visit        Orthopedic    Osteopenia    Shoulder pain, right - Primary      Other Visit Diagnoses     Rotator cuff syndrome, right        Relevant Orders    Body fluid crystal Joint Fluid, Right Shoulder    WBC & Diff,Body Fluid Joint Fluid, Right Shoulder    Ambulatory referral/consult to Physical/Occupational Therapy    Osteoporosis, unspecified osteoporosis type, unspecified pathological fracture presence        Relevant Orders    DXA Bone Density Spine And Hip      1. OTC Salonpas 4% lidocaine patch. Apply to painful joints as needed.  2. Prolia due in November. Most recent dose on 5/7/2020  3. Overdue for DEXA bone density scan. Most recent DEXA 3/21/2018.   4. Continue PT for right shoulder for an additional 6 weeks.  5. Return to clinic in 3 months    Aquilino Barrett MD  [employer] [facility] PGY1

## 2020-08-13 NOTE — PATIENT INSTRUCTIONS
over the counter Salonpas 4% lidocaine patch. Apply to painful joints as needed for 12 hours.  Prolia due in November

## 2020-08-13 NOTE — PROCEDURES
Large Joint Aspiration/Injection    Date/Time: 8/13/2020 11:00 AM  Performed by: Jacob Max MD  Authorized by: Jacob Max MD     Consent Done?:  Yes (Verbal)  Indications:  Pain, joint swelling, diagnostic evaluation and arthritis  Site marked: the procedure site was marked    Timeout: prior to procedure the correct patient, procedure, and site was verified    Prep: patient was prepped and draped in usual sterile fashion      Local anesthesia used?: Yes    Local anesthetic:  Lidocaine 1% without epinephrine and topical anesthetic  Anesthetic total (ml):  1      Details:  Needle Size:  22 G  Ultrasonic Guidance for needle placement?: No    Approach:  Lateral  Medications:  40 mg triamcinolone acetonide 40 mg/mL  Aspirate amount (mL):  3  Aspirate:  Clear and yellow  Lab: fluid sent for laboratory analysis    Patient tolerance:  Patient tolerated the procedure well with no immediate complications

## 2020-08-21 ENCOUNTER — CLINICAL SUPPORT (OUTPATIENT)
Dept: REHABILITATION | Facility: HOSPITAL | Age: 85
End: 2020-08-21
Attending: INTERNAL MEDICINE
Payer: MEDICARE

## 2020-08-21 DIAGNOSIS — R29.898 SHOULDER WEAKNESS: ICD-10-CM

## 2020-08-21 DIAGNOSIS — G89.29 CHRONIC RIGHT SHOULDER PAIN: ICD-10-CM

## 2020-08-21 DIAGNOSIS — M25.611 DECREASED ROM OF RIGHT SHOULDER: ICD-10-CM

## 2020-08-21 DIAGNOSIS — M25.511 CHRONIC RIGHT SHOULDER PAIN: ICD-10-CM

## 2020-08-21 PROCEDURE — 97140 MANUAL THERAPY 1/> REGIONS: CPT | Mod: HCNC

## 2020-08-21 PROCEDURE — 97110 THERAPEUTIC EXERCISES: CPT | Mod: HCNC

## 2020-08-21 NOTE — PROGRESS NOTES
Physical Therapy Treatment Note/Updated POC     Name: Karli Hameed  Clinic Number: [id number]    Therapy Diagnosis:   Encounter Diagnoses   Name Primary?    Chronic right shoulder pain     Decreased ROM of right shoulder     Shoulder weakness      Physician: Katherine Hutchinson MD    Visit Date: 8/21/2020    Physician Orders: PT Eval and Treat   Medical Diagnosis from Referral: M75.41 (ICD-10-CM) - Impingement syndrome of right shoulder  Evaluation Date: 6/3/2020  Authorization Period Expiration: TBD  Plan of Care Expiration: 10/30/2020  Visit # / Visits authorized: 10/ TBD     Time In: 745  Time Out: 830  Total Billable Time: 45 minutes     Visit: 90  Total: 892     Precautions: Standard    Subjective     Pt reports: she is feeling better because she had fluid removed from her shoulder     Objective     Karli received therapeutic exercises to develop strength, endurance, ROM, flexibility, posture and core stabilization for 35 minutes including:      Shoulder Right Right Right Left Left Pain/Dysfunction with Movement     AROM PROM MMT AROM MMT     Flexion 150 155 2+/5 WNL 4/5     Abduction 115 130 2+/5 WNL 4/5     Internal rotation WNL WNL 3+/5 WNL 4/5     External Rotation 40 WNL 3/5 WNL 4/5                  Pulleys flexion and abduction 3 min ea  Seated on 1/2 roll No Money w/o resistance 20x 5 sec NP  Wall Slides 15x 5 seconds   Isometric ER/IR/Flex 10 sec x 10   Supine Wand Flexion (180 - 45 degree range of motion)  Sidelying ER 15x NP  Ball Roll outs 10 sec x 15 (flex/abd)  ER Walkouts w/ [place] TB 10x NP    Karli received the following manual therapy techniques: Joint mobilizations were applied to the: R Glenohumeral Joint for 10 minutes, including:  Gr III PA/Inferior mobs   Manual Stretching Flexion/Abduction   AAROM Flexion   Glenohumeral oscillations         Home Exercises Provided and Patient Education Provided     Education provided:   - HEP    Written Home Exercises Provided: yes.  Exercises were  reviewed and Karli was able to demonstrate them prior to the end of the session.  Karli demonstrated good  understanding of the education provided.     See EMR under Patient Instructions for exercises provided 7/6/2020.    Assessment   Patient is showing improvement with shoulder range of motion and decreased pain from the joint aspiration. Patient to be progressed with therapy next week if symptoms remain the same or better.   Karli is progressing well towards her goals.   Pt prognosis is Good.     Pt will continue to benefit from skilled outpatient physical therapy to address the deficits listed in the problem list box on initial evaluation, provide pt/family education and to maximize pt's level of independence in the home and community environment.     Pt's spiritual, cultural and educational needs considered and pt agreeable to plan of care and goals.     Anticipated barriers to physical therapy: None    Goals:   Short Term Goals (3 Weeks):   1. Pt will be independent with HEP to supplement PT in improving functional use of R UE.  2. Pt will increase pain free R shoulder elevation/abduction PROM to >/= 160 deg to improve functional mobility of UE     Long Term Goals (6 Weeks):   1. Pt will improve FOTO score to </=30% limited to decrease perceived limitation with carrying, moving, and handling objects.  2. Pt will increase R shoulder PROM to WNL in all planes to improve functional use of R RUE.  3. Pt will increase R shoulder AROM to 170 in flexion/abduction planes to improve functional use of R RUE.  4. Pt will improve R shoulder MMTs by 2 scores to promote equal use of B UEs in performing functional tasks.  5. Pt to report pain </= 1/10 with ADLs and IADLs using R UE to improve functional QOL.        Plan   Plan of care Certification: extend to 10/30/2020.     Outpatient Physical Therapy 2 times weekly for 10 weeks to include the following interventions: Manual Therapy, Moist Heat/ Ice, Neuromuscular Re-ed,  Patient Education, Self Care, Therapeutic Activites and Therapeutic Exercise.     Maldonado Lantigua, PT

## 2020-08-21 NOTE — PLAN OF CARE
Physical Therapy Treatment Note/Updated POC     Name: Karli Hameed  Clinic Number: [id number]    Therapy Diagnosis:   Encounter Diagnoses   Name Primary?    Chronic right shoulder pain     Decreased ROM of right shoulder     Shoulder weakness      Physician: Katherine Hutchinson MD    Visit Date: 8/21/2020    Physician Orders: PT Eval and Treat   Medical Diagnosis from Referral: M75.41 (ICD-10-CM) - Impingement syndrome of right shoulder  Evaluation Date: 6/3/2020  Authorization Period Expiration: TBD  Plan of Care Expiration: 10/30/2020  Visit # / Visits authorized: 10/ TBD     Time In: 745  Time Out: 830  Total Billable Time: 45 minutes     Visit: 90  Total: 892     Precautions: Standard    Subjective     Pt reports: she is feeling better because she had fluid removed from her shoulder     Objective     Karli received therapeutic exercises to develop strength, endurance, ROM, flexibility, posture and core stabilization for 35 minutes including:      Shoulder Right Right Right Left Left Pain/Dysfunction with Movement     AROM PROM MMT AROM MMT     Flexion 150 155 2+/5 WNL 4/5     Abduction 115 130 2+/5 WNL 4/5     Internal rotation WNL WNL 3+/5 WNL 4/5     External Rotation 40 WNL 3/5 WNL 4/5                  Pulleys flexion and abduction 3 min ea  Seated on 1/2 roll No Money w/o resistance 20x 5 sec NP  Wall Slides 15x 5 seconds   Isometric ER/IR/Flex 10 sec x 10   Supine Wand Flexion (180 - 45 degree range of motion)  Sidelying ER 15x NP  Ball Roll outs 10 sec x 15 (flex/abd)  ER Walkouts w/ [place] TB 10x NP    Karli received the following manual therapy techniques: Joint mobilizations were applied to the: R Glenohumeral Joint for 10 minutes, including:  Gr III PA/Inferior mobs   Manual Stretching Flexion/Abduction   AAROM Flexion   Glenohumeral oscillations         Home Exercises Provided and Patient Education Provided     Education provided:   - HEP    Written Home Exercises Provided: yes.  Exercises were  reviewed and Karli was able to demonstrate them prior to the end of the session.  Karli demonstrated good  understanding of the education provided.     See EMR under Patient Instructions for exercises provided 7/6/2020.    Assessment   Patient is showing improvement with shoulder range of motion and decreased pain from the joint aspiration. Patient to be progressed with therapy next week if symptoms remain the same or better.   Karli is progressing well towards her goals.   Pt prognosis is Good.     Pt will continue to benefit from skilled outpatient physical therapy to address the deficits listed in the problem list box on initial evaluation, provide pt/family education and to maximize pt's level of independence in the home and community environment.     Pt's spiritual, cultural and educational needs considered and pt agreeable to plan of care and goals.     Anticipated barriers to physical therapy: None    Goals:   Short Term Goals (3 Weeks):   1. Pt will be independent with HEP to supplement PT in improving functional use of R UE.  2. Pt will increase pain free R shoulder elevation/abduction PROM to >/= 160 deg to improve functional mobility of UE     Long Term Goals (6 Weeks):   1. Pt will improve FOTO score to </=30% limited to decrease perceived limitation with carrying, moving, and handling objects.  2. Pt will increase R shoulder PROM to WNL in all planes to improve functional use of R RUE.  3. Pt will increase R shoulder AROM to 170 in flexion/abduction planes to improve functional use of R RUE.  4. Pt will improve R shoulder MMTs by 2 scores to promote equal use of B UEs in performing functional tasks.  5. Pt to report pain </= 1/10 with ADLs and IADLs using R UE to improve functional QOL.        Plan   Plan of care Certification: extend to 10/30/2020.     Outpatient Physical Therapy 2 times weekly for 10 weeks to include the following interventions: Manual Therapy, Moist Heat/ Ice, Neuromuscular Re-ed,  Patient Education, Self Care, Therapeutic Activites and Therapeutic Exercise.     Maldonado Lantigua, PT

## 2020-08-28 ENCOUNTER — CLINICAL SUPPORT (OUTPATIENT)
Dept: REHABILITATION | Facility: HOSPITAL | Age: 85
End: 2020-08-28
Attending: INTERNAL MEDICINE
Payer: MEDICARE

## 2020-08-28 DIAGNOSIS — M25.511 CHRONIC RIGHT SHOULDER PAIN: ICD-10-CM

## 2020-08-28 DIAGNOSIS — M25.611 DECREASED ROM OF RIGHT SHOULDER: ICD-10-CM

## 2020-08-28 DIAGNOSIS — G89.29 CHRONIC RIGHT SHOULDER PAIN: ICD-10-CM

## 2020-08-28 DIAGNOSIS — R29.898 SHOULDER WEAKNESS: ICD-10-CM

## 2020-08-28 PROCEDURE — 97110 THERAPEUTIC EXERCISES: CPT | Mod: HCNC

## 2020-08-28 PROCEDURE — 97140 MANUAL THERAPY 1/> REGIONS: CPT | Mod: HCNC

## 2020-08-28 NOTE — PROGRESS NOTES
Physical Therapy Treatment Note     Name: Karli Hameed  Clinic Number: [id number]    Therapy Diagnosis:   Encounter Diagnoses   Name Primary?    Chronic right shoulder pain     Decreased ROM of right shoulder     Shoulder weakness      Physician: Katherine Hutchinson MD    Visit Date: 8/28/2020    Physician Orders: PT Eval and Treat   Medical Diagnosis from Referral: M75.41 (ICD-10-CM) - Impingement syndrome of right shoulder  Evaluation Date: 6/3/2020  Authorization Period Expiration: TBD  Plan of Care Expiration: 10/30/2020  Visit # / Visits authorized: 11/ TBD     Time In: 918  Time Out: 1000  Total Billable Time: 42 minutes     Visit: 90  Total: 982     Precautions: Standard    Subjective     Pt reports: she is feeling good still. Patient reports he pain is still less because of her joint aspiration    Objective     Karli received therapeutic exercises to develop strength, endurance, ROM, flexibility, posture and core stabilization for 32 minutes including:        Pulleys flexion and abduction 3 min ea  Seated on 1/2 roll No Money w/o resistance 20x 5 sec NP  Wall Slides 15x 5 seconds   Isometric ER/IR/Flex 10 sec x 10   Supine Wand Flexion 30x  Sidelying ER 30x  Ball Roll outs 10 sec x 15 (flex/abd)  ER Walkouts w/ [place] TB 10x NP  Standing Rows Blue TB 30x    Krali received the following manual therapy techniques: Joint mobilizations were applied to the: R Glenohumeral Joint for 10 minutes, including:  Gr III PA/Inferior mobs   Manual Stretching Flexion/Abduction   AAROM Flexion   Glenohumeral oscillations         Home Exercises Provided and Patient Education Provided     Education provided:   - HEP    Written Home Exercises Provided: yes.  Exercises were reviewed and Karli was able to demonstrate them prior to the end of the session.  Karli demonstrated good  understanding of the education provided.     See EMR under Patient Instructions for exercises provided 7/6/2020.    Assessment   Patient is  progressing with number of repetitions with wand flexion and sidelying ER. Patient is unable to maintain proper form when trying shoulder walkouts. Continue to strengthen rotator cuff without increased symptoms.     Karli is progressing well towards her goals.   Pt prognosis is Good.     Pt will continue to benefit from skilled outpatient physical therapy to address the deficits listed in the problem list box on initial evaluation, provide pt/family education and to maximize pt's level of independence in the home and community environment.     Pt's spiritual, cultural and educational needs considered and pt agreeable to plan of care and goals.     Anticipated barriers to physical therapy: None    Goals:   Short Term Goals (3 Weeks):   1. Pt will be independent with HEP to supplement PT in improving functional use of R UE.  2. Pt will increase pain free R shoulder elevation/abduction PROM to >/= 160 deg to improve functional mobility of UE     Long Term Goals (6 Weeks):   1. Pt will improve FOTO score to </=30% limited to decrease perceived limitation with carrying, moving, and handling objects.  2. Pt will increase R shoulder PROM to WNL in all planes to improve functional use of R RUE.  3. Pt will increase R shoulder AROM to 170 in flexion/abduction planes to improve functional use of R RUE.  4. Pt will improve R shoulder MMTs by 2 scores to promote equal use of B UEs in performing functional tasks.  5. Pt to report pain </= 1/10 with ADLs and IADLs using R UE to improve functional QOL.        Plan   Plan of care Certification: extend to 10/30/2020.     Outpatient Physical Therapy 2 times weekly for 10 weeks to include the following interventions: Manual Therapy, Moist Heat/ Ice, Neuromuscular Re-ed, Patient Education, Self Care, Therapeutic Activites and Therapeutic Exercise.     Maldonado Lantigua, PT

## 2020-09-04 ENCOUNTER — CLINICAL SUPPORT (OUTPATIENT)
Dept: REHABILITATION | Facility: HOSPITAL | Age: 85
End: 2020-09-04
Attending: INTERNAL MEDICINE
Payer: MEDICARE

## 2020-09-04 DIAGNOSIS — M25.511 CHRONIC RIGHT SHOULDER PAIN: ICD-10-CM

## 2020-09-04 DIAGNOSIS — R29.898 SHOULDER WEAKNESS: ICD-10-CM

## 2020-09-04 DIAGNOSIS — G89.29 CHRONIC RIGHT SHOULDER PAIN: ICD-10-CM

## 2020-09-04 DIAGNOSIS — M25.611 DECREASED ROM OF RIGHT SHOULDER: ICD-10-CM

## 2020-09-04 PROCEDURE — 97110 THERAPEUTIC EXERCISES: CPT | Mod: HCNC

## 2020-09-04 NOTE — PROGRESS NOTES
Physical Therapy Treatment Note     Name: Karli Hameed  Clinic Number: [id number]    Therapy Diagnosis:   Encounter Diagnoses   Name Primary?    Chronic right shoulder pain     Decreased ROM of right shoulder     Shoulder weakness      Physician: Katherine Hutchinson MD    Visit Date: 9/4/2020    Physician Orders: PT Eval and Treat   Medical Diagnosis from Referral: M75.41 (ICD-10-CM) - Impingement syndrome of right shoulder  Evaluation Date: 6/3/2020  Authorization Period Expiration: TBD  Plan of Care Expiration: 10/30/2020  Visit # / Visits authorized: 12/ TBD     Time In: 832  Time Out: 814  Total Billable Time: 42 minutes     Visit: 90  Total: 1072     Precautions: Standard    Subjective     Pt reports: she is able to lift up her R arm, but it hurts when she has to slowly lower her arm down    Objective     Karli received therapeutic exercises to develop strength, endurance, ROM, flexibility, posture and core stabilization for 42 minutes including:        Pulleys flexion and abduction 3 min ea  Seated on 1/2 roll No Money w/o resistance 20x 5 sec   Wall Slides 20 x 5 seconds   Isometric ER/IR/Flex 10 sec x 10 NP  Supine Wand Flexion 30x  Sidelying ER 30x  Ball Roll outs 10 sec x 15 (flex/abd)  ER Walkouts w/ [place] TB 10x NP  Standing Rows Blue TB 30x    Karli received the following manual therapy techniques: Joint mobilizations were applied to the: R Glenohumeral Joint for 0 minutes, including:  Gr III PA/Inferior mobs   Manual Stretching Flexion/Abduction   AAROM Flexion   Glenohumeral oscillations         Home Exercises Provided and Patient Education Provided     Education provided:   - HEP    Written Home Exercises Provided: yes.  Exercises were reviewed and Karli was able to demonstrate them prior to the end of the session.  Karli demonstrated good  understanding of the education provided.     See EMR under Patient Instructions for exercises provided 7/6/2020.    Assessment   Patient is starting to  plateau with therapy and continues to have difficulty lifting shoulder. Patient unable to ER fully against gravity. Cont to progress if able     Karli is progressing well towards her goals.   Pt prognosis is Good.     Pt will continue to benefit from skilled outpatient physical therapy to address the deficits listed in the problem list box on initial evaluation, provide pt/family education and to maximize pt's level of independence in the home and community environment.     Pt's spiritual, cultural and educational needs considered and pt agreeable to plan of care and goals.     Anticipated barriers to physical therapy: None    Goals:   Short Term Goals (3 Weeks):   1. Pt will be independent with HEP to supplement PT in improving functional use of R UE.  2. Pt will increase pain free R shoulder elevation/abduction PROM to >/= 160 deg to improve functional mobility of UE     Long Term Goals (6 Weeks):   1. Pt will improve FOTO score to </=30% limited to decrease perceived limitation with carrying, moving, and handling objects.  2. Pt will increase R shoulder PROM to WNL in all planes to improve functional use of R RUE.  3. Pt will increase R shoulder AROM to 170 in flexion/abduction planes to improve functional use of R RUE.  4. Pt will improve R shoulder MMTs by 2 scores to promote equal use of B UEs in performing functional tasks.  5. Pt to report pain </= 1/10 with ADLs and IADLs using R UE to improve functional QOL.        Plan   Plan of care Certification: extend to 10/30/2020.     Outpatient Physical Therapy 2 times weekly for 10 weeks to include the following interventions: Manual Therapy, Moist Heat/ Ice, Neuromuscular Re-ed, Patient Education, Self Care, Therapeutic Activites and Therapeutic Exercise.     Maldonado Lantigua, PT

## 2020-09-09 ENCOUNTER — CLINICAL SUPPORT (OUTPATIENT)
Dept: REHABILITATION | Facility: HOSPITAL | Age: 85
End: 2020-09-09
Attending: INTERNAL MEDICINE
Payer: MEDICARE

## 2020-09-09 ENCOUNTER — PATIENT OUTREACH (OUTPATIENT)
Dept: ADMINISTRATIVE | Facility: OTHER | Age: 85
End: 2020-09-09

## 2020-09-09 DIAGNOSIS — R29.898 SHOULDER WEAKNESS: ICD-10-CM

## 2020-09-09 DIAGNOSIS — M25.511 CHRONIC RIGHT SHOULDER PAIN: ICD-10-CM

## 2020-09-09 DIAGNOSIS — G89.29 CHRONIC RIGHT SHOULDER PAIN: ICD-10-CM

## 2020-09-09 DIAGNOSIS — M25.611 DECREASED ROM OF RIGHT SHOULDER: ICD-10-CM

## 2020-09-09 PROCEDURE — 97110 THERAPEUTIC EXERCISES: CPT | Mod: HCNC

## 2020-09-09 NOTE — PROGRESS NOTES
Physical Therapy Treatment Note     Name: Karli Hameed  Clinic Number: [id number]    Therapy Diagnosis:   Encounter Diagnoses   Name Primary?    Chronic right shoulder pain     Decreased ROM of right shoulder     Shoulder weakness      Physician: Katherine Hutchinson MD    Visit Date: 9/9/2020    Physician Orders: PT Eval and Treat   Medical Diagnosis from Referral: M75.41 (ICD-10-CM) - Impingement syndrome of right shoulder  Evaluation Date: 6/3/2020  Authorization Period Expiration: TBD  Plan of Care Expiration: 10/30/2020  Visit # / Visits authorized: 13/ TBD     Time In: 1000  Time Out: 1042  Total Billable Time: 42 minutes     Visit: 90  Total: 1162     Precautions: Standard    Subjective     Pt reports: the past two days have been slightly painful. Patient reports she woke up in the middle of the night two nights ago and was in pain in her R shoulder. Patient reports it has improved since then, but is still irritated.   Objective     Karli received therapeutic exercises to develop strength, endurance, ROM, flexibility, posture and core stabilization for 32  minutes including:        Pulleys flexion and abduction 3 min ea  Seated on 1/2 roll No Money w/o resistance 20x 5 sec   Wall Slides 20 x 5 seconds   Isometric ER/IR/Flex 10 sec x 10 NP  Supine Wand Flexion 30x  Sidelying ER 30x  Ball Roll outs 10 sec x 15 (flex/abd)  ER Walkouts w/ [place] TB 10x NP  Standing Rows Blue TB 30x    Karli received the following manual therapy techniques: Joint mobilizations were applied to the: R Glenohumeral Joint for 10 minutes, including:  Gr III PA/Inferior mobs   Manual Stretching Flexion/Abduction   AAROM Flexion   Glenohumeral oscillations         Home Exercises Provided and Patient Education Provided     Education provided:   - HEP    Written Home Exercises Provided: yes.  Exercises were reviewed and Karli was able to demonstrate them prior to the end of the session.  Karli demonstrated good  understanding of the  education provided.     See EMR under Patient Instructions for exercises provided 7/6/2020.    Assessment   Patient continues to plateau with therapy and continues to have difficulty lifting shoulder. Patient unable to ER fully against gravity. Patient does report improvement of symptoms after leaving therapy session. Discharging patient in the next few visits  Karli is progressing well towards her goals.   Pt prognosis is Good.     Pt will continue to benefit from skilled outpatient physical therapy to address the deficits listed in the problem list box on initial evaluation, provide pt/family education and to maximize pt's level of independence in the home and community environment.     Pt's spiritual, cultural and educational needs considered and pt agreeable to plan of care and goals.     Anticipated barriers to physical therapy: None    Goals:   Short Term Goals (3 Weeks):   1. Pt will be independent with HEP to supplement PT in improving functional use of R UE.  2. Pt will increase pain free R shoulder elevation/abduction PROM to >/= 160 deg to improve functional mobility of UE     Long Term Goals (6 Weeks):   1. Pt will improve FOTO score to </=30% limited to decrease perceived limitation with carrying, moving, and handling objects.  2. Pt will increase R shoulder PROM to WNL in all planes to improve functional use of R RUE.  3. Pt will increase R shoulder AROM to 170 in flexion/abduction planes to improve functional use of R RUE.  4. Pt will improve R shoulder MMTs by 2 scores to promote equal use of B UEs in performing functional tasks.  5. Pt to report pain </= 1/10 with ADLs and IADLs using R UE to improve functional QOL.        Plan   Plan of care Certification: extend to 10/30/2020.     Outpatient Physical Therapy 2 times weekly for 10 weeks to include the following interventions: Manual Therapy, Moist Heat/ Ice, Neuromuscular Re-ed, Patient Education, Self Care, Therapeutic Activites and Therapeutic  Exercise.     Maldonado Lantigua, PT

## 2020-09-10 ENCOUNTER — HOSPITAL ENCOUNTER (OUTPATIENT)
Dept: RADIOLOGY | Facility: CLINIC | Age: 85
Discharge: HOME | End: 2020-09-10
Attending: STUDENT IN AN ORGANIZED HEALTH CARE EDUCATION/TRAINING PROGRAM
Payer: MEDICARE

## 2020-09-10 DIAGNOSIS — M81.0 OSTEOPOROSIS, UNSPECIFIED OSTEOPOROSIS TYPE, UNSPECIFIED PATHOLOGICAL FRACTURE PRESENCE: ICD-10-CM

## 2020-09-10 PROCEDURE — 77080 DEXA BONE DENSITY SPINE HIP: ICD-10-PCS | Mod: 26,HCNC,, | Performed by: INTERNAL MEDICINE

## 2020-09-10 PROCEDURE — 77080 DXA BONE DENSITY AXIAL: CPT | Mod: TC,HCNC

## 2020-09-10 PROCEDURE — 77080 DXA BONE DENSITY AXIAL: CPT | Mod: 26,HCNC,, | Performed by: INTERNAL MEDICINE

## 2020-09-11 ENCOUNTER — TELEPHONE (OUTPATIENT)
Dept: INTERNAL MEDICINE | Facility: CLINIC | Age: 85
End: 2020-09-11

## 2020-09-11 ENCOUNTER — OFFICE VISIT (OUTPATIENT)
Dept: NEPHROLOGY | Facility: CLINIC | Age: 85
End: 2020-09-11
Payer: MEDICARE

## 2020-09-11 VITALS
HEIGHT: 61 IN | DIASTOLIC BLOOD PRESSURE: 80 MMHG | SYSTOLIC BLOOD PRESSURE: 140 MMHG | OXYGEN SATURATION: 87 % | BODY MASS INDEX: 19.02 KG/M2 | WEIGHT: 100.75 LBS | HEART RATE: 95 BPM

## 2020-09-11 DIAGNOSIS — N18.5 CHRONIC KIDNEY DISEASE, STAGE V: Primary | ICD-10-CM

## 2020-09-11 PROCEDURE — 99999 PR PBB SHADOW E&M-EST. PATIENT-LVL III: ICD-10-PCS | Mod: PBBFAC,HCNC,, | Performed by: INTERNAL MEDICINE

## 2020-09-11 PROCEDURE — 1159F PR MEDICATION LIST DOCUMENTED IN MEDICAL RECORD: ICD-10-PCS | Mod: HCNC,S$GLB,, | Performed by: INTERNAL MEDICINE

## 2020-09-11 PROCEDURE — 1126F AMNT PAIN NOTED NONE PRSNT: CPT | Mod: HCNC,S$GLB,, | Performed by: INTERNAL MEDICINE

## 2020-09-11 PROCEDURE — 1101F PR PT FALLS ASSESS DOC 0-1 FALLS W/OUT INJ PAST YR: ICD-10-PCS | Mod: HCNC,CPTII,S$GLB, | Performed by: INTERNAL MEDICINE

## 2020-09-11 PROCEDURE — 1101F PT FALLS ASSESS-DOCD LE1/YR: CPT | Mod: HCNC,CPTII,S$GLB, | Performed by: INTERNAL MEDICINE

## 2020-09-11 PROCEDURE — 99214 OFFICE O/P EST MOD 30 MIN: CPT | Mod: HCNC,S$GLB,, | Performed by: INTERNAL MEDICINE

## 2020-09-11 PROCEDURE — 1126F PR PAIN SEVERITY QUANTIFIED, NO PAIN PRESENT: ICD-10-PCS | Mod: HCNC,S$GLB,, | Performed by: INTERNAL MEDICINE

## 2020-09-11 PROCEDURE — 99214 PR OFFICE/OUTPT VISIT, EST, LEVL IV, 30-39 MIN: ICD-10-PCS | Mod: HCNC,S$GLB,, | Performed by: INTERNAL MEDICINE

## 2020-09-11 PROCEDURE — 1159F MED LIST DOCD IN RCRD: CPT | Mod: HCNC,S$GLB,, | Performed by: INTERNAL MEDICINE

## 2020-09-11 PROCEDURE — 99999 PR PBB SHADOW E&M-EST. PATIENT-LVL III: CPT | Mod: PBBFAC,HCNC,, | Performed by: INTERNAL MEDICINE

## 2020-09-15 NOTE — PROGRESS NOTES
Subjective:       Patient ID: Karli Hameed is a 88 y.o. White female who presents for follow-up evaluation of Chronic Kidney Disease    HPI  Ms. Hameed is an 88 year old woman with past medical history of hypertension, polycystic kidney disease, recently diagnosed rectal cancer, presenting for follow up of chronic kidney disease.  Patient completed radiation therapy for rectal cancer after excision per Colorectal surgery, she reports more adequate intake since last visit, though still not back to her baseline.  She otherwise denies any fatigue, fever, chest pain, shortness of breath, abdominal pain, diarrhea, dysuria/hematuria.      Review of Systems   Constitutional: Positive for appetite change. Negative for fatigue and fever.   HENT: Negative for congestion.    Respiratory: Negative for cough, chest tightness and shortness of breath.    Cardiovascular: Negative for chest pain and leg swelling.   Gastrointestinal: Negative for abdominal pain, constipation, diarrhea, nausea and vomiting.   Genitourinary: Negative for difficulty urinating, dysuria, flank pain, frequency, hematuria and urgency.   Musculoskeletal: Negative for arthralgias, joint swelling and myalgias.   Skin: Negative for rash and wound.   Neurological: Negative for dizziness, weakness and light-headedness.   All other systems reviewed and are negative.      Objective:      Physical Exam  Vitals signs reviewed.   Constitutional:       Appearance: She is well-developed.   Pulmonary:      Effort: Pulmonary effort is normal. No respiratory distress.   Musculoskeletal:         General: No swelling.   Neurological:      Mental Status: She is alert.         Assessment:       1. Chronic kidney disease, stage V        Plan:      Ms. Hameed is an 88 year old woman with past medical history of hypertension, polycystic kidney disease, recently diagnosed with rectal cancer (excision, XRT) presenting for follow up of chronic kidney disease stage IV/V.   Patient creatinine slightly above baseline, previously stable since February 2018 (previous acute kidney injury due to UTI, v. secondary to prior URI), will repeat renal panel to trend, reviewed renal US 7.18 with doppler to rule out obstructive/vascular etiology, further recommendations pending results.  Encouraged fluid intake, stressed importance of blood pressure control to prevent any further progression of kidney disease, patient voiced understanding.  Discussed signs/symptoms of uremia, along with indications for renal replacement therapy; will have patient attend [facility] education on dialysis modalities now that she is willing (patient reluctant to discuss access, declining any type of dialysis at this time, understands risks/benefits).  Further recommendations pending results of above.    - Anemia: Hgb below goal, will consider erythropoetin therapy, will trend CBC/iron studies  - Bone/mineral metabolism: patient with secondary hyperparathyroidism, PTH previously at goal for stage CKD, will trend    Return to clinic in 6-8 weeks pending renal panel, then with renal/heme panel, iron/TIBC/ferritin, urinalysis/culture, urine protein/creatinine ratio prior to next visit

## 2020-09-24 ENCOUNTER — OFFICE VISIT (OUTPATIENT)
Dept: INTERNAL MEDICINE | Facility: CLINIC | Age: 85
End: 2020-09-24
Payer: MEDICARE

## 2020-09-24 ENCOUNTER — IMMUNIZATION (OUTPATIENT)
Dept: PHARMACY | Facility: CLINIC | Age: 85
End: 2020-09-24
Payer: MEDICARE

## 2020-09-24 ENCOUNTER — TELEPHONE (OUTPATIENT)
Dept: INTERNAL MEDICINE | Facility: CLINIC | Age: 85
End: 2020-09-24

## 2020-09-24 VITALS
DIASTOLIC BLOOD PRESSURE: 77 MMHG | BODY MASS INDEX: 18.69 KG/M2 | HEART RATE: 70 BPM | SYSTOLIC BLOOD PRESSURE: 163 MMHG | WEIGHT: 99 LBS | HEIGHT: 61 IN

## 2020-09-24 DIAGNOSIS — M25.511 CHRONIC RIGHT SHOULDER PAIN: ICD-10-CM

## 2020-09-24 DIAGNOSIS — C20 RECTAL CANCER: ICD-10-CM

## 2020-09-24 DIAGNOSIS — M12.811 RIGHT ROTATOR CUFF TEAR ARTHROPATHY: ICD-10-CM

## 2020-09-24 DIAGNOSIS — H61.23 BILATERAL IMPACTED CERUMEN: ICD-10-CM

## 2020-09-24 DIAGNOSIS — Z23 NEEDS FLU SHOT: ICD-10-CM

## 2020-09-24 DIAGNOSIS — I10 ESSENTIAL HYPERTENSION: ICD-10-CM

## 2020-09-24 DIAGNOSIS — N18.5 STAGE 5 CHRONIC KIDNEY DISEASE NOT ON CHRONIC DIALYSIS: Primary | ICD-10-CM

## 2020-09-24 DIAGNOSIS — R09.81 NASAL CONGESTION: ICD-10-CM

## 2020-09-24 DIAGNOSIS — G89.29 CHRONIC RIGHT SHOULDER PAIN: ICD-10-CM

## 2020-09-24 DIAGNOSIS — M75.101 RIGHT ROTATOR CUFF TEAR ARTHROPATHY: ICD-10-CM

## 2020-09-24 PROCEDURE — 1159F PR MEDICATION LIST DOCUMENTED IN MEDICAL RECORD: ICD-10-PCS | Mod: HCNC,S$GLB,, | Performed by: INTERNAL MEDICINE

## 2020-09-24 PROCEDURE — 1101F PR PT FALLS ASSESS DOC 0-1 FALLS W/OUT INJ PAST YR: ICD-10-PCS | Mod: HCNC,CPTII,S$GLB, | Performed by: INTERNAL MEDICINE

## 2020-09-24 PROCEDURE — 1159F MED LIST DOCD IN RCRD: CPT | Mod: HCNC,S$GLB,, | Performed by: INTERNAL MEDICINE

## 2020-09-24 PROCEDURE — 1101F PT FALLS ASSESS-DOCD LE1/YR: CPT | Mod: HCNC,CPTII,S$GLB, | Performed by: INTERNAL MEDICINE

## 2020-09-24 PROCEDURE — 99499 UNLISTED E&M SERVICE: CPT | Mod: S$GLB,,, | Performed by: INTERNAL MEDICINE

## 2020-09-24 PROCEDURE — 99999 PR PBB SHADOW E&M-EST. PATIENT-LVL V: CPT | Mod: PBBFAC,HCNC,, | Performed by: INTERNAL MEDICINE

## 2020-09-24 PROCEDURE — 99214 PR OFFICE/OUTPT VISIT, EST, LEVL IV, 30-39 MIN: ICD-10-PCS | Mod: HCNC,S$GLB,, | Performed by: INTERNAL MEDICINE

## 2020-09-24 PROCEDURE — 1126F PR PAIN SEVERITY QUANTIFIED, NO PAIN PRESENT: ICD-10-PCS | Mod: HCNC,S$GLB,, | Performed by: INTERNAL MEDICINE

## 2020-09-24 PROCEDURE — 99214 OFFICE O/P EST MOD 30 MIN: CPT | Mod: HCNC,S$GLB,, | Performed by: INTERNAL MEDICINE

## 2020-09-24 PROCEDURE — 99999 PR PBB SHADOW E&M-EST. PATIENT-LVL V: ICD-10-PCS | Mod: PBBFAC,HCNC,, | Performed by: INTERNAL MEDICINE

## 2020-09-24 PROCEDURE — 1126F AMNT PAIN NOTED NONE PRSNT: CPT | Mod: HCNC,S$GLB,, | Performed by: INTERNAL MEDICINE

## 2020-09-24 PROCEDURE — 99499 RISK ADDL DX/OHS AUDIT: ICD-10-PCS | Mod: S$GLB,,, | Performed by: INTERNAL MEDICINE

## 2020-09-24 RX ORDER — FLUTICASONE PROPIONATE 50 MCG
2 SPRAY, SUSPENSION (ML) NASAL DAILY
Qty: 16 G | Refills: 12 | Status: SHIPPED | OUTPATIENT
Start: 2020-09-24 | End: 2020-10-24

## 2020-09-24 NOTE — PROGRESS NOTES
Subjective:       Patient ID: Karli Hameed is a 88 y.o. female.    Chief Complaint: Follow-up    This dictation was performed using using [employer].     She has been trying to gain weight  Less trouble with diarrhea  Most recent MRI look like the rectal cancer is under control    She continues to have trouble with her right shoulder    Sometimes her 3rd and 4th finger of her right hand become stiffened and frozen    She is experiencing a noise in her right ear that is annoying    HPI  Review of Systems   Constitutional: Negative for activity change, appetite change, chills, fatigue, fever and unexpected weight change.   HENT: Negative for hearing loss.    Eyes: Negative for visual disturbance.   Respiratory: Negative for cough, chest tightness, shortness of breath and wheezing.    Cardiovascular: Negative for chest pain, palpitations and leg swelling.   Gastrointestinal: Negative for abdominal pain, constipation, nausea and vomiting.   Genitourinary: Negative for dysuria, frequency and urgency.   Musculoskeletal: Negative for arthralgias, back pain, gait problem, joint swelling and myalgias.   Skin: Negative for rash.   Neurological: Negative for light-headedness and headaches.   Psychiatric/Behavioral: Negative for dysphoric mood and sleep disturbance. The patient is not nervous/anxious.      Review of systems is negative unless noted.  Objective:      Physical Exam  Vitals signs reviewed.   Constitutional:       General: She is not in acute distress.     Appearance: She is well-developed.   HENT:      Head: Normocephalic and atraumatic.      Right Ear: External ear normal.      Left Ear: External ear normal.      Nose: Nose normal.      Mouth/Throat:      Pharynx: No oropharyngeal exudate.   Eyes:      General: No scleral icterus.        Right eye: No discharge.      Conjunctiva/sclera: Conjunctivae normal.      Pupils: Pupils are equal, round, and reactive to light.   Neck:      Musculoskeletal: Full passive  range of motion without pain, normal range of motion and neck supple. No spinous process tenderness or muscular tenderness.      Thyroid: No thyromegaly.      Vascular: No carotid bruit.   Cardiovascular:      Rate and Rhythm: Normal rate and regular rhythm.      Heart sounds: Normal heart sounds, S1 normal and S2 normal. No murmur. No friction rub. No gallop.    Pulmonary:      Effort: Pulmonary effort is normal. No respiratory distress.      Breath sounds: Normal breath sounds. No wheezing or rales.   Chest:      Chest wall: No tenderness.   Abdominal:      General: Bowel sounds are normal. There is no distension.      Palpations: Abdomen is soft. There is no mass.      Tenderness: There is no abdominal tenderness. There is no guarding or rebound.   Genitourinary:     Exam position: Supine.      Cervix: No cervical motion tenderness, discharge or friability.      Uterus: Not deviated, not enlarged, not fixed and not tender.       Adnexa:         Right: No mass, tenderness or fullness.          Left: No mass, tenderness or fullness.     Musculoskeletal: Normal range of motion.         General: No tenderness.   Lymphadenopathy:      Head:      Right side of head: No submental or submandibular adenopathy.      Left side of head: No submental or submandibular adenopathy.      Cervical: No cervical adenopathy.      Right cervical: No superficial, deep or posterior cervical adenopathy.     Left cervical: No superficial, deep or posterior cervical adenopathy.      Upper Body:      Right upper body: No supraclavicular or pectoral adenopathy.      Left upper body: No supraclavicular or pectoral adenopathy.   Skin:     General: Skin is warm and dry.      Findings: No rash.   Neurological:      Mental Status: She is alert and oriented to person, place, and time.      Cranial Nerves: No cranial nerve deficit.      Motor: No abnormal muscle tone.      Coordination: Coordination normal.      Deep Tendon Reflexes: Reflexes are  normal and symmetric.   Psychiatric:         Mood and Affect: Mood is not anxious or depressed.         Speech: Speech is not rapid and pressured.         Behavior: Behavior normal. Behavior is not agitated.      Comments: Normal behavior, thought content, insight and judgement.         Assessment:       1. Stage 5 chronic kidney disease not on chronic dialysis    2. Rectal cancer    3. Right rotator cuff tear arthropathy    4. Chronic right shoulder pain    5. Essential hypertension    6. Bilateral impacted cerumen    7. Nasal congestion    8. Needs flu shot        Plan:   Karli was seen today for follow-up.    Diagnoses and all orders for this visit:    Stage 5 chronic kidney disease not on chronic dialysis, the following labs her scheduled for December 8th.  Overall, she is slightly declining.  She is trying to gain weight and she will try to get out and walk every morning weather permitting.  She does not have any uremic symptoms and no peripheral edema.  It is concerning that her blood pressure is trending up  -     Comprehensive metabolic panel; Future  -     CBC auto differential; Future  -     Magnesium; Future  -     PHOSPHORUS; Future    Rectal cancer, followed closely in Colorectal surgery and doing well in this regard.    Right rotator cuff tear arthropathy  Chronic right shoulder pain, she is trying to improve and continuing in physical therapy    Essential hypertension, her blood pressure is elevated today and she will monitor closely at home.    Bilateral impacted cerumen  -     Ambulatory referral/consult to ENT; Future    Nasal congestion, in the past she had relief with Flonase we will try this again    Needs flu shot, today.    Other orders  -     fluticasone propionate (FLONASE) 50 mcg/actuation nasal spray; 2 sprays (100 mcg total) by Each Nostril route once daily.    Follow up in about 12 weeks (around 12/17/2020).    I will ask Darlyn Del Real if olive oil can be used as a calorie source to help  her gain weight

## 2020-09-24 NOTE — Clinical Note
Can she have unlimited olive oil to increase calories and gain weight?  Dr. Alonzo do you need any other labs in Dec? I've ordered cbc, cmp, mag and phos

## 2020-09-25 NOTE — PROGRESS NOTES
Patient Karli Hameed, MRN [id number], was dependent on dialysis (ICD10 Z99.2) at the time of this visit on 9/24/20. This addendum is made to the medical record on 09/25/2020.

## 2020-10-01 ENCOUNTER — PATIENT MESSAGE (OUTPATIENT)
Dept: INTERNAL MEDICINE | Facility: CLINIC | Age: 85
End: 2020-10-01

## 2020-10-06 ENCOUNTER — CLINICAL SUPPORT (OUTPATIENT)
Dept: REHABILITATION | Facility: HOSPITAL | Age: 85
End: 2020-10-06
Attending: INTERNAL MEDICINE
Payer: MEDICARE

## 2020-10-06 DIAGNOSIS — M25.511 CHRONIC RIGHT SHOULDER PAIN: ICD-10-CM

## 2020-10-06 DIAGNOSIS — M25.611 DECREASED ROM OF RIGHT SHOULDER: ICD-10-CM

## 2020-10-06 DIAGNOSIS — R29.898 SHOULDER WEAKNESS: ICD-10-CM

## 2020-10-06 DIAGNOSIS — G89.29 CHRONIC RIGHT SHOULDER PAIN: ICD-10-CM

## 2020-10-06 PROCEDURE — 97110 THERAPEUTIC EXERCISES: CPT | Mod: HCNC

## 2020-10-06 NOTE — PROGRESS NOTES
Physical Therapy Treatment Note     Name: Karli Hameed  Clinic Number: [id number]    Therapy Diagnosis:   Encounter Diagnoses   Name Primary?    Chronic right shoulder pain     Decreased ROM of right shoulder     Shoulder weakness      Physician: Katherine Hutchinson MD    Visit Date: 10/6/2020    Physician Orders: PT Eval and Treat   Medical Diagnosis from Referral: M75.41 (ICD-10-CM) - Impingement syndrome of right shoulder  Evaluation Date: 6/3/2020  Authorization Period Expiration: TBD  Plan of Care Expiration: 10/30/2020  Visit # / Visits authorized: 14/ TBD     Time In: 1045  Time Out: 1130  Total Billable Time: 45 minutes     Visit: 90  Total: 1252     Precautions: Standard    Subjective     Pt reports: she has been feeling better with her shoulder. Patient reports her main problem she was having at home is sleeping on her R shoulder. Patient reports she is able to use her R shoulder more than she has been able to in the past  Objective     Karli received therapeutic exercises to develop strength, endurance, ROM, flexibility, posture and core stabilization for 45 minutes including:      UBE 3/3  Pulleys flexion and abduction 3 min ea  Seated Wand Flexion 2x10   Seated on 1/2 roll No Money w/o resistance 20x 5 sec   Seated Rows   Wall Slides 20 x 5 seconds   Ball Roll Outs 15x        Isometric ER/IR/Flex 10 sec x 10 NP  Supine Wand Flexion 30x NP  Sidelying ER 30x NP  ER Walkouts w/ [place] TB 10x NP      Karli received the following manual therapy techniques: Joint mobilizations were applied to the: R Glenohumeral Joint for 0 minutes, including:  Gr III PA/Inferior mobs   Manual Stretching Flexion/Abduction   AAROM Flexion   Glenohumeral oscillations         Home Exercises Provided and Patient Education Provided     Education provided:   - HEP    Written Home Exercises Provided: yes.  Exercises were reviewed and Karli was able to demonstrate them prior to the end of the session.  Karli demonstrated good   understanding of the education provided.     See EMR under Patient Instructions for exercises provided 7/6/2020.    Assessment   Patient returns to physical therapy today from a few weeks of absence. Patient was able to tolerate all therex with minimal pain. Patients pain free range of motion is improving with therapy. Patient would like to schedule a few more visits for check ups in the future.  Karli is progressing well towards her goals.   Pt prognosis is Good.     Pt will continue to benefit from skilled outpatient physical therapy to address the deficits listed in the problem list box on initial evaluation, provide pt/family education and to maximize pt's level of independence in the home and community environment.     Pt's spiritual, cultural and educational needs considered and pt agreeable to plan of care and goals.     Anticipated barriers to physical therapy: None    Goals:   Short Term Goals (3 Weeks):   1. Pt will be independent with HEP to supplement PT in improving functional use of R UE.  2. Pt will increase pain free R shoulder elevation/abduction PROM to >/= 160 deg to improve functional mobility of UE     Long Term Goals (6 Weeks):   1. Pt will improve FOTO score to </=30% limited to decrease perceived limitation with carrying, moving, and handling objects.  2. Pt will increase R shoulder PROM to WNL in all planes to improve functional use of R RUE.  3. Pt will increase R shoulder AROM to 170 in flexion/abduction planes to improve functional use of R RUE.  4. Pt will improve R shoulder MMTs by 2 scores to promote equal use of B UEs in performing functional tasks.  5. Pt to report pain </= 1/10 with ADLs and IADLs using R UE to improve functional QOL.        Plan   Plan of care Certification: extend to 10/30/2020.     Outpatient Physical Therapy 2 times weekly for 10 weeks to include the following interventions: Manual Therapy, Moist Heat/ Ice, Neuromuscular Re-ed, Patient Education, Self Care,  Therapeutic Activites and Therapeutic Exercise.     Maldonado Lantigua, PT

## 2020-10-08 ENCOUNTER — TELEPHONE (OUTPATIENT)
Dept: SURGERY | Facility: CLINIC | Age: 85
End: 2020-10-08

## 2020-10-08 ENCOUNTER — OFFICE VISIT (OUTPATIENT)
Dept: OTOLARYNGOLOGY | Facility: CLINIC | Age: 85
End: 2020-10-08
Payer: MEDICARE

## 2020-10-08 ENCOUNTER — CLINICAL SUPPORT (OUTPATIENT)
Dept: AUDIOLOGY | Facility: CLINIC | Age: 85
End: 2020-10-08
Payer: MEDICARE

## 2020-10-08 VITALS — SYSTOLIC BLOOD PRESSURE: 178 MMHG | DIASTOLIC BLOOD PRESSURE: 81 MMHG | HEART RATE: 84 BPM

## 2020-10-08 DIAGNOSIS — J34.2 NASAL SEPTAL DEVIATION: ICD-10-CM

## 2020-10-08 DIAGNOSIS — H90.3 SENSORINEURAL HEARING LOSS, BILATERAL: Primary | ICD-10-CM

## 2020-10-08 DIAGNOSIS — H61.21 HEARING LOSS DUE TO CERUMEN IMPACTION, RIGHT: ICD-10-CM

## 2020-10-08 DIAGNOSIS — H90.3 HEARING LOSS, SENSORINEURAL, HIGH FREQUENCY, BILATERAL: Primary | ICD-10-CM

## 2020-10-08 DIAGNOSIS — H61.22 IMPACTED CERUMEN OF LEFT EAR: ICD-10-CM

## 2020-10-08 DIAGNOSIS — R06.7 SNEEZING: ICD-10-CM

## 2020-10-08 DIAGNOSIS — R09.81 NASAL CONGESTION: ICD-10-CM

## 2020-10-08 DIAGNOSIS — L70.0 OPEN COMEDONE: ICD-10-CM

## 2020-10-08 DIAGNOSIS — H61.23 BILATERAL IMPACTED CERUMEN: ICD-10-CM

## 2020-10-08 DIAGNOSIS — H61.893 DRYNESS OF BOTH EAR CANALS: ICD-10-CM

## 2020-10-08 PROCEDURE — 92557 COMPREHENSIVE HEARING TEST: CPT | Mod: HCNC,S$GLB,, | Performed by: PHYSICIAN ASSISTANT

## 2020-10-08 PROCEDURE — 1159F PR MEDICATION LIST DOCUMENTED IN MEDICAL RECORD: ICD-10-PCS | Mod: HCNC,S$GLB,, | Performed by: OTOLARYNGOLOGY

## 2020-10-08 PROCEDURE — 99999 PR PBB SHADOW E&M-EST. PATIENT-LVL IV: ICD-10-PCS | Mod: PBBFAC,HCNC,, | Performed by: OTOLARYNGOLOGY

## 2020-10-08 PROCEDURE — 92557 PR COMPREHENSIVE HEARING TEST: ICD-10-PCS | Mod: HCNC,S$GLB,, | Performed by: PHYSICIAN ASSISTANT

## 2020-10-08 PROCEDURE — 99202 PR OFFICE/OUTPT VISIT, NEW, LEVL II, 15-29 MIN: ICD-10-PCS | Mod: 25,HCNC,S$GLB, | Performed by: OTOLARYNGOLOGY

## 2020-10-08 PROCEDURE — 1126F AMNT PAIN NOTED NONE PRSNT: CPT | Mod: HCNC,S$GLB,, | Performed by: OTOLARYNGOLOGY

## 2020-10-08 PROCEDURE — G0268 PR REMOVAL OF IMPACTED WAX MD: ICD-10-PCS | Mod: HCNC,S$GLB,, | Performed by: OTOLARYNGOLOGY

## 2020-10-08 PROCEDURE — 99999 PR PBB SHADOW E&M-EST. PATIENT-LVL I: CPT | Mod: PBBFAC,HCNC,,

## 2020-10-08 PROCEDURE — G0268 REMOVAL OF IMPACTED WAX MD: HCPCS | Mod: HCNC,S$GLB,, | Performed by: OTOLARYNGOLOGY

## 2020-10-08 PROCEDURE — 99999 PR PBB SHADOW E&M-EST. PATIENT-LVL I: ICD-10-PCS | Mod: PBBFAC,HCNC,,

## 2020-10-08 PROCEDURE — 1126F PR PAIN SEVERITY QUANTIFIED, NO PAIN PRESENT: ICD-10-PCS | Mod: HCNC,S$GLB,, | Performed by: OTOLARYNGOLOGY

## 2020-10-08 PROCEDURE — 1101F PT FALLS ASSESS-DOCD LE1/YR: CPT | Mod: HCNC,CPTII,S$GLB, | Performed by: OTOLARYNGOLOGY

## 2020-10-08 PROCEDURE — 99202 OFFICE O/P NEW SF 15 MIN: CPT | Mod: 25,HCNC,S$GLB, | Performed by: OTOLARYNGOLOGY

## 2020-10-08 PROCEDURE — 99999 PR PBB SHADOW E&M-EST. PATIENT-LVL IV: CPT | Mod: PBBFAC,HCNC,, | Performed by: OTOLARYNGOLOGY

## 2020-10-08 PROCEDURE — 1159F MED LIST DOCD IN RCRD: CPT | Mod: HCNC,S$GLB,, | Performed by: OTOLARYNGOLOGY

## 2020-10-08 PROCEDURE — 1101F PR PT FALLS ASSESS DOC 0-1 FALLS W/OUT INJ PAST YR: ICD-10-PCS | Mod: HCNC,CPTII,S$GLB, | Performed by: OTOLARYNGOLOGY

## 2020-10-08 NOTE — PROGRESS NOTES
Subjective:       Patient ID: Karli Hameed is a 88 y.o. female.    Chief Complaint: Cerumen Impaction    HPI: Ms. Hameed is an 88 year CF who is here for ear cleaning procedures.   She also c/o recent palpation of a lump in her right ear which swells at times. It is flat now.  She also c/o head congestion, nasal congestion and sneezing sx, parenthetically.  She was prescribed Flonase NSS recently.  She completed a visit with Dr. Katherine Hutchinson 09/24/2020 for a follow-up visit.  She was diagnosed with stage 5 chronic kidney disease not on dialysis, rectal cancer, right rotator cuff tear arthropathy, chronic right shoulder pain, essential hypertension, nasal congestion (for which Flonase was prescribed) and a bilateral cerumen impaction for which she was referred to the ENT service today for extraction procedures.  She completed a VNG workup for dizziness in March 2013 which indicated a right peripheral vestibular abnormality and a  statistically significant 6 3% right ear unilateral caloric weakness.    Audiometry indicated a sloping mild to moderate high-frequency bilateral sensorineural hearing loss then, the 10 decibel bilateral SRT scores and bilateral 100% discrimination scores..      Past Medical History:   Diagnosis Date    Allergy     Anxiety     Arthritis     Back pain     Basal cell carcinoma 6/2011    R nasal ala, excised via Mohs    Chronic kidney disease, stage II (mild) 8/25/2012    cyst since 1975    Flank pain 8/21/2012    HTN (hypertension) 8/21/2012    Inflammatory bowel disease     Joint pain     Kidney stone     left     Lactose disaccharidase deficiency     Rectal cancer 7/24/2019    Senile osteoporosis 4/11/2016    Small bowel obstruction, 06-, resolved witj\[facility]\""t surgery 7/1/2014    Trace cataracts     Ulcer     hx    Urinary tract infection      Past Surgical History:   Procedure Laterality Date    ABDOMINAL SURGERY      APPENDECTOMY      CATARACT EXTRACTION W/   INTRAOCULAR LENS IMPLANT Right 02/12/2015    Dr. Sahni    CATARACT EXTRACTION W/  INTRAOCULAR LENS IMPLANT Left 04/09/2015    CHOLECYSTECTOMY      COLONOSCOPY N/A 6/19/2019    Procedure: COLONOSCOPY;  Surgeon: Beto Rivera MD;  Location: [facility] (4TH FLR);  Service: Endoscopy;  Laterality: N/A;  to be scheudled on 6/19/19 with Dr. Rivera per JACY Fischer NP    EYE SURGERY      HERNIA REPAIR  2017    Open rih with mesh    HYSTERECTOMY      KIDNEY SURGERY      drained cyst x 2    lysis of abdominal adhesions      OOPHORECTOMY      TONSILLECTOMY, ADENOIDECTOMY      TRANSANAL RECTAL RESECTION N/A 8/15/2019    Procedure: transanal EXCISION, LESION, RECTUM, ANAL APPROACH full thickness;  Surgeon: JULIUS Fritz MD;  Location: [facility] OR 2ND FLR;  Service: Colon and Rectal;  Laterality: N/A;     Current Outpatient Medications on File Prior to Visit   Medication Sig Dispense Refill    acetaminophen (TYLENOL) 325 MG tablet Take by mouth. 1 Tablet Oral .  Tylenol.To take as needed for arthritis pain.      amLODIPine (NORVASC) 5 MG tablet Take 1 tablet (5 mg total) by mouth every morning. 90 tablet 3    amoxicillin-clavulanate 500-125mg (AUGMENTIN) 500-125 mg Tab One tablet after a meal once daily dose adjusted for kidney function 7 tablet 0    cholecalciferol, vitamin D3, 1,000 unit capsule Take 2 capsules (2,000 Units total) by mouth once daily.  0    clotrimazole-betamethasone 1-0.05% (LOTRISONE) cream Apply topically 2 (two) times daily. 15 g 0    fluticasone propionate (FLONASE) 50 mcg/actuation nasal spray 2 sprays (100 mcg total) by Each Nostril route once daily. 16 g 12    HYDROcodone-acetaminophen (NORCO) 5-325 mg per tablet Take 1 tablet by mouth every 8 (eight) hours as needed for Pain. 30 tablet 0    hydrocortisone 2.5 % cream Apply topically 2 (two) times daily. 28 g 5    losartan (COZAAR) 50 MG tablet Take 1 tablet (50 mg total) by mouth once daily. 90 tablet 3    mesalamine (ROWASA) 4  gram/60 mL Enem Place 60 mLs (4 g total) rectally every evening. 30 enema 0    methylcellulose (CITRUCEL ORAL) Take by mouth once daily.      ondansetron (ZOFRAN) 4 MG tablet Take 1 tablet (4 mg total) by mouth every 8 (eight) hours as needed for Nausea. 30 tablet 1    pramoxine-hydrocortisone cream Apply topically 3 (three) times daily. 57 g 1    promethazine (PHENERGAN) 12.5 MG Tab Take 1 tablet (12.5 mg total) by mouth every 6 (six) hours as needed. 30 tablet 0    varicella-zoster gE-AS01B, PF, (SHINGRIX, PF,) 50 mcg/0.5 mL injection Inject into the muscle. 0.5 mL 0     No current facility-administered medications on file prior to visit.        Review of Systems      The patient completed an audiometric study performed by the [facility] audiology service today after her ears were cleaned .  The study is duplicated below and the results are reviewed with her .  Objective:                Gen; Alert and oriented CF in no acute distress.  Both ear are examined under the microscope in the microprocedure room.  Procedures:  A large firm cerumen impaction is carefully extracted from the dry right ear canal with an angle pick.  Her hearing is improved immediately after the procedure.  A smaller volume of dry cerumen is removed from the dry left ear canal.    Physical Exam  Constitutional:       Appearance: She is well-developed.   HENT:      Head: Normocephalic.      Jaw: No trismus.      Right Ear: Tympanic membrane and external ear normal. No decreased hearing noted. No drainage. No foreign body. No mastoid tenderness. Tympanic membrane is not perforated.      Left Ear: Tympanic membrane and external ear normal. No decreased hearing noted. No drainage. No foreign body. No mastoid tenderness. Tympanic membrane is not perforated.      Ears:        Nose: Septal deviation present. No nasal deformity.      Right Sinus: No maxillary sinus tenderness or frontal sinus tenderness.      Left Sinus: No maxillary sinus tenderness or  frontal sinus tenderness.        Mouth/Throat:      Mouth: No oral lesions.      Pharynx: Uvula midline. No oropharyngeal exudate or uvula swelling.      Tonsils: No tonsillar abscesses.     Neck:      Musculoskeletal: Neck supple.      Thyroid: No thyromegaly.      Trachea: No tracheal deviation.   Pulmonary:      Effort: Pulmonary effort is normal.      Breath sounds: No stridor.   Lymphadenopathy:      Cervical: No cervical adenopathy.   Skin:     Findings: No rash.   Neurological:      Mental Status: She is alert and oriented to person, place, and time.         Assessment:       1. Hearing loss, sensorineural, high frequency, bilateral     2. Bilateral impacted cerumen  Ambulatory referral/consult to ENT   3. Nasal congestion     4. Sneezing     5. Nasal septal deviation      left anterior   6. Hearing loss due to cerumen impaction, right     7. Impacted cerumen of left ear     8. Open comedone      right conchal bowl   9. Dryness of both ear canals             Plan:    Cerumen impactions removed from both eacs; AD CI > AS CI; hearing improved  Audiometry reviewed  RTC yearly for ear cleaning   May use previously prescribed Flonase NSS for allergy sx

## 2020-10-08 NOTE — PROGRESS NOTES
Karli Hameed was seen today in the clinic for an audiologic evaluation.  Patients main complaint was wax buildup in both ear.  Her ears were cleaned today by  prior to the audiological exam.    Audiogram results revealed a moderate, sensorineural hearing loss bilaterally, in the high frequencies.  Speech reception thresholds were noted at 15 dB in the right ear and 10 dB in the left ear.  Speech discrimination scores were 80% in the right ear and 88% in the left ear.    Recommendations:  1. Annual audiogram  2. Noise protection when in noise  3. Hearing aid consultation when patient is ready

## 2020-10-08 NOTE — PATIENT INSTRUCTIONS
Cerumen impactions removed from both eacs; AD C.I. > AS C.I.; AD hearing improved  Audiometry reviewed  RTC yearly for ear cleaning   May use previously prescribed Flonase NSS for allergy sx

## 2020-10-14 ENCOUNTER — OFFICE VISIT (OUTPATIENT)
Dept: SURGERY | Facility: CLINIC | Age: 85
End: 2020-10-14
Payer: MEDICARE

## 2020-10-14 VITALS
BODY MASS INDEX: 17.42 KG/M2 | DIASTOLIC BLOOD PRESSURE: 86 MMHG | SYSTOLIC BLOOD PRESSURE: 140 MMHG | HEIGHT: 64 IN | WEIGHT: 102 LBS

## 2020-10-14 DIAGNOSIS — C26.9 MALIGNANT NEOPLASM OF ILL-DEFINED SITES WITHIN THE DIGESTIVE SYSTEM: ICD-10-CM

## 2020-10-14 DIAGNOSIS — Z08 ENCOUNTER FOR FOLLOW-UP SURVEILLANCE OF RECTAL CANCER: Primary | ICD-10-CM

## 2020-10-14 DIAGNOSIS — Z85.048 ENCOUNTER FOR FOLLOW-UP SURVEILLANCE OF RECTAL CANCER: Primary | ICD-10-CM

## 2020-10-14 PROCEDURE — 1101F PR PT FALLS ASSESS DOC 0-1 FALLS W/OUT INJ PAST YR: ICD-10-PCS | Mod: HCNC,CPTII,S$GLB, | Performed by: COLON & RECTAL SURGERY

## 2020-10-14 PROCEDURE — 99999 PR PBB SHADOW E&M-EST. PATIENT-LVL III: CPT | Mod: PBBFAC,HCNC,, | Performed by: COLON & RECTAL SURGERY

## 2020-10-14 PROCEDURE — 1159F PR MEDICATION LIST DOCUMENTED IN MEDICAL RECORD: ICD-10-PCS | Mod: HCNC,S$GLB,, | Performed by: COLON & RECTAL SURGERY

## 2020-10-14 PROCEDURE — 1159F MED LIST DOCD IN RCRD: CPT | Mod: HCNC,S$GLB,, | Performed by: COLON & RECTAL SURGERY

## 2020-10-14 PROCEDURE — 1126F PR PAIN SEVERITY QUANTIFIED, NO PAIN PRESENT: ICD-10-PCS | Mod: HCNC,S$GLB,, | Performed by: COLON & RECTAL SURGERY

## 2020-10-14 PROCEDURE — 99999 PR PBB SHADOW E&M-EST. PATIENT-LVL III: ICD-10-PCS | Mod: PBBFAC,HCNC,, | Performed by: COLON & RECTAL SURGERY

## 2020-10-14 PROCEDURE — 1126F AMNT PAIN NOTED NONE PRSNT: CPT | Mod: HCNC,S$GLB,, | Performed by: COLON & RECTAL SURGERY

## 2020-10-14 PROCEDURE — 99213 OFFICE O/P EST LOW 20 MIN: CPT | Mod: HCNC,S$GLB,, | Performed by: COLON & RECTAL SURGERY

## 2020-10-14 PROCEDURE — 1101F PT FALLS ASSESS-DOCD LE1/YR: CPT | Mod: HCNC,CPTII,S$GLB, | Performed by: COLON & RECTAL SURGERY

## 2020-10-14 PROCEDURE — 99213 PR OFFICE/OUTPT VISIT, EST, LEVL III, 20-29 MIN: ICD-10-PCS | Mod: HCNC,S$GLB,, | Performed by: COLON & RECTAL SURGERY

## 2020-10-14 NOTE — PROGRESS NOTES
"HPI:  Karli Hameed is a 88 y.o. female with history of low rectal cancer status post transanal excision 8- followed by radiation therapy (short course).        SPECIMEN  1) Rectal tumor; long black proximal, long white left, short right white, short black distal.  FINAL PATHOLOGIC DIAGNOSIS  Rectum, transanal excision:  -INVASIVE ADENOCARCINOMA, MODERATELY DIFFERENTIATED, 2.4 CM (pT2), see synoptic report  -SURGICAL MARGINS ARE NEGATIVE FOR MALIGNANCY  SYNOPTIC REPORT (COLON AND RECTUM):  Procedure: Transanal excision  Tumor site: Rectum  Tumor size: 2.4 x 1.2 cm  Histologic type: Adenocarcinoma  Histologic grade: Moderately differentiated, G2  Tumor extension: Tumor invades the superficial muscularis propria  Margins: All margins are uninvolved by invasive carcinoma, high-grade dysplasia, intramucosal adenocarcinoma,  and adenoma  Margins examined: Proximal, distal, left, right, and deep  Lymphovascular invasion: Not identified  Perineural invasion: Not identified  Regional lymph nodes: No lymph nodes submitted or found  Pathologic stage classification (pTNM, AJCC 8th Edition): [id number]  Diagnosed by: Feroz Tejeda  (Electronically Signed: 2019-08-26 08:55:43)    Interval history  Having rectal spasms and incontinence of flatus.  Rare blood.  Multiple BMs, generally continent.  No abd pain.  Wt up 1 lb.    "I have no life"...she is despondent.  BMs are the issue.  Frequency    She has refused colonoscopy.  She states that her head is not right, feels foggy      Past Medical History:   Diagnosis Date    Allergy     Anxiety     Arthritis     Back pain     Basal cell carcinoma 6/2011    R nasal ala, excised via Mohs    Chronic kidney disease, stage II (mild) 8/25/2012    cyst since 1975    Flank pain 8/21/2012    HTN (hypertension) 8/21/2012    Inflammatory bowel disease     Joint pain     Kidney stone     left     Lactose disaccharidase deficiency     Rectal cancer 7/24/2019    Senile " osteoporosis 4/11/2016    Small bowel obstruction, 06-, resolved witjhout surgery 7/1/2014    Trace cataracts     Ulcer     hx    Urinary tract infection         Past Surgical History:   Procedure Laterality Date    ABDOMINAL SURGERY      APPENDECTOMY      CATARACT EXTRACTION W/  INTRAOCULAR LENS IMPLANT Right 02/12/2015    Dr. Sahni    CATARACT EXTRACTION W/  INTRAOCULAR LENS IMPLANT Left 04/09/2015    CHOLECYSTECTOMY      COLONOSCOPY N/A 6/19/2019    Procedure: COLONOSCOPY;  Surgeon: Beto Rivera MD;  Location: [facility] (4TH FLR);  Service: Endoscopy;  Laterality: N/A;  to be scheudled on 6/19/19 with Dr. Rivera per JACY Fischer NP    EYE SURGERY      HERNIA REPAIR  2017    Open rih with mesh    HYSTERECTOMY      KIDNEY SURGERY      drained cyst x 2    lysis of abdominal adhesions      OOPHORECTOMY      TONSILLECTOMY, ADENOIDECTOMY      TRANSANAL RECTAL RESECTION N/A 8/15/2019    Procedure: transanal EXCISION, LESION, RECTUM, ANAL APPROACH full thickness;  Surgeon: JULIUS Fritz MD;  Location: [facility] OR 2ND FLR;  Service: Colon and Rectal;  Laterality: N/A;       Review of patient's allergies indicates:   Allergen Reactions    Advil [ibuprofen] Nausea Only    Parafon forte      Other reaction(s): Hallucinations    Calcitriol (bulk) Nausea Only    Lisinopril      cough       Family History   Problem Relation Age of Onset    Cancer Father         throat - was a tobacco smoker    Cataracts Father     Kidney disease Father     Cancer Paternal Grandmother         was a tobacco smoker    No Known Problems Mother     No Known Problems Sister     No Known Problems Brother     No Known Problems Maternal Aunt     No Known Problems Maternal Uncle     No Known Problems Paternal Aunt     No Known Problems Paternal Uncle     No Known Problems Maternal Grandmother     No Known Problems Maternal Grandfather     No Known Problems Paternal Grandfather     Cirrhosis Neg Hx      Celiac disease Neg Hx     Colon polyps Neg Hx     Crohn's disease Neg Hx     Esophageal cancer Neg Hx     Inflammatory bowel disease Neg Hx     Liver cancer Neg Hx     Liver disease Neg Hx     Rectal cancer Neg Hx     Stomach cancer Neg Hx     Ulcerative colitis Neg Hx     Amblyopia Neg Hx     Blindness Neg Hx     Diabetes Neg Hx     Glaucoma Neg Hx     Hypertension Neg Hx     Macular degeneration Neg Hx     Retinal detachment Neg Hx     Strabismus Neg Hx     Stroke Neg Hx     Thyroid disease Neg Hx     Melanoma Neg Hx        Social History     Socioeconomic History    Marital status: Single     Spouse name: Not on file    Number of children: Not on file    Years of education: Not on file    Highest education level: Not on file   Occupational History    Not on file   Social Needs    Financial resource strain: Not on file    Food insecurity     Worry: Not on file     Inability: Not on file    Transportation needs     Medical: Not on file     Non-medical: Not on file   Tobacco Use    Smoking status: Never Smoker    Smokeless tobacco: Never Used   Substance and Sexual Activity    Alcohol use: Not Currently     Comment: none    Drug use: No    Sexual activity: Never   Lifestyle    Physical activity     Days per week: Not on file     Minutes per session: Not on file    Stress: Not on file   Relationships    Social connections     Talks on phone: Not on file     Gets together: Not on file     Attends [employer] service: Not on file     Active member of club or organization: Not on file     Attends meetings of clubs or organizations: Not on file     Relationship status: Not on file   Other Topics Concern    Are you pregnant or think you may be? No    Breast-feeding No   Social History Narrative    Lives alone.    Great neighbors.    Many friends.    Walks the neighborhood daily with her dog.    Active social life.    Has her own home, takes care of everything and all of her affairs.      "From [facility] [place] , came to [place]    2 level house          Stays active     Walks every day , does home exercises     Takes stairs at the house            ROS:  GENERAL: No fever, chills, fatigability or weight loss.  Integument: No rashes, redness, icterus  CHEST: Denies DOMINGUEZ, cyanosis, wheezing, cough and sputum production.  CARDIOVASCULAR: Denies chest pain, PND, orthopnea or reduced exercise tolerance.  GI: Denies abd pain, dysphagia, nausea, vomiting, no hematemesis   : Denies burning on urination, no hematuria, no bacteriuria  MSK: No deformities, swelling, joint pain swelling  Neurologic: No HAs, seizures, weakness, paresthesias, gait problems    PE:  General appearance well  BP (!) 140/86 (BP Location: Left arm, Patient Position: Sitting, BP Method: Large (Manual))   Ht 5' 4" (1.626 m)   Wt 46.3 kg (102 lb)   BMI 17.51 kg/m² no  Sclera/ Skin anicteric  AT NC EOMI  Neck supple trachea midline   Chest symmetric, nl excursion, no retractions, breathing comfortably  Abdomen  ND soft NT.  no masses, no organomegaly  EXT - no CCE  Neuro:  Mood/ affect nl, alert and oriented x 3, moves all ext's, gait nl    Rectal  Inspection nl  LATISHA no mass, mucosa smooth.        Assessment:  Poor function following local excision and post op radiotherapy  No evidence of recurrent rectal cancer    Plan:  Discussed at length with pt.  She still refuses colonoscopy for surveillance.    I offered her colostomy - this would relieve her of her issues with frequency and spasms and I believe would be an improvement in quality of life.  She wants to do it but not now   RTC 3 months      Addendum  Elevated CEA level  Check CT scan chest abdomen pelvis  Office visit following this  "

## 2020-10-19 ENCOUNTER — LAB VISIT (OUTPATIENT)
Dept: LAB | Facility: HOSPITAL | Age: 85
End: 2020-10-19
Attending: COLON & RECTAL SURGERY
Payer: MEDICARE

## 2020-10-19 DIAGNOSIS — Z85.048 ENCOUNTER FOR FOLLOW-UP SURVEILLANCE OF RECTAL CANCER: ICD-10-CM

## 2020-10-19 DIAGNOSIS — Z08 ENCOUNTER FOR FOLLOW-UP SURVEILLANCE OF RECTAL CANCER: ICD-10-CM

## 2020-10-19 LAB — CEA SERPL-MCNC: 8.4 NG/ML (ref 0–5)

## 2020-10-19 PROCEDURE — 82378 CARCINOEMBRYONIC ANTIGEN: CPT | Mod: HCNC

## 2020-10-19 PROCEDURE — 36415 COLL VENOUS BLD VENIPUNCTURE: CPT | Mod: HCNC

## 2020-10-22 ENCOUNTER — PATIENT MESSAGE (OUTPATIENT)
Dept: INTERNAL MEDICINE | Facility: CLINIC | Age: 85
End: 2020-10-22

## 2020-10-23 RX ORDER — AMLODIPINE BESYLATE 5 MG/1
5 TABLET ORAL EVERY MORNING
Qty: 90 TABLET | Refills: 3 | Status: SHIPPED | OUTPATIENT
Start: 2020-10-23 | End: 2021-02-07 | Stop reason: SDUPTHER

## 2020-10-23 RX ORDER — LOSARTAN POTASSIUM 50 MG/1
50 TABLET ORAL DAILY
Qty: 90 TABLET | Refills: 3 | Status: SHIPPED | OUTPATIENT
Start: 2020-10-23 | End: 2021-02-07 | Stop reason: SDUPTHER

## 2020-10-23 NOTE — TELEPHONE ENCOUNTER
No new care gaps identified.  Powered by [employer]. Reference number: [id number]. 10/23/2020 10:10:42 AM   FRAN

## 2020-10-27 NOTE — HPI
An order was received for a Colonoscopy Screening. This appears to be a recall, last performed  7/31/17 by DR COPE. Will route message to RECALL Nurse pool to review.     History of present illness- I had the pleasure of meeting this pleasant 87 y.o. lady in the pre op clinic prior to her elective rectal  surgery. The patient is new to me .     I have obtained the history by speaking to the patient and by reviewing the electronic health records.    Events leading up to surgery / History of presenting illness -    Adenocarcinoma  April 16 th 2019 , noticed noticed fresh blood on toilet paper after wiping after bowel movement   Did not have it for 2 weeks  at which time ir recurred  Had eveluation in [place] rectal clinic   Colonoscopy 6/19 showed - large mass was found at the        anus.   Noticed difficult with passing stool since around around June 2019   Biopsy showed  Cancer    Has occasional mild rectal bleeding   Keeping bowels moving Citrucel      She has been troubled with  mild  Anal area pain as she has harder bowel movements  . Pain decreases with passing soft stools     Relevant health conditions of significance for the perioperative period/ History of presenting illness -    Health conditions of significance for the perioperative period - HTN, CKD 5, PLT low , anemia    Not known to have heart disease , Diabetes Mellitus, Lung disease     From [facility] , [place]  Lives with a dog  2 level house   Plans on recovering down stairs   Friend going to help post op   No children       Stays active   Walks every day , does home exercises   Takes stairs at the house

## 2020-11-10 ENCOUNTER — INFUSION (OUTPATIENT)
Dept: INFECTIOUS DISEASES | Facility: HOSPITAL | Age: 85
End: 2020-11-10
Attending: INTERNAL MEDICINE
Payer: MEDICARE

## 2020-11-10 VITALS
BODY MASS INDEX: 17.42 KG/M2 | WEIGHT: 102.06 LBS | HEIGHT: 64 IN | DIASTOLIC BLOOD PRESSURE: 80 MMHG | SYSTOLIC BLOOD PRESSURE: 155 MMHG | HEART RATE: 74 BPM

## 2020-11-10 DIAGNOSIS — M85.80 OSTEOPENIA, UNSPECIFIED LOCATION: Primary | ICD-10-CM

## 2020-11-10 PROCEDURE — 63600175 PHARM REV CODE 636 W HCPCS: Mod: JG,HCNC | Performed by: INTERNAL MEDICINE

## 2020-11-10 PROCEDURE — 96372 THER/PROPH/DIAG INJ SC/IM: CPT | Mod: HCNC

## 2020-11-10 RX ADMIN — DENOSUMAB 60 MG: 60 INJECTION SUBCUTANEOUS at 09:11

## 2020-11-10 NOTE — PROGRESS NOTES
Patient received Prolia 60 mg injection sub Q in the left arm.  Tolerated well and left in NAD.    Patient is taking vit D

## 2020-11-18 ENCOUNTER — TELEPHONE (OUTPATIENT)
Dept: NEPHROLOGY | Facility: CLINIC | Age: 85
End: 2020-11-18

## 2020-11-18 DIAGNOSIS — N18.4 CHRONIC KIDNEY DISEASE, STAGE IV (SEVERE): Primary | ICD-10-CM

## 2020-12-08 ENCOUNTER — LAB VISIT (OUTPATIENT)
Dept: LAB | Facility: HOSPITAL | Age: 85
End: 2020-12-08
Attending: INTERNAL MEDICINE
Payer: MEDICARE

## 2020-12-08 DIAGNOSIS — N18.4 CHRONIC KIDNEY DISEASE, STAGE IV (SEVERE): ICD-10-CM

## 2020-12-08 DIAGNOSIS — N18.5 STAGE 5 CHRONIC KIDNEY DISEASE NOT ON CHRONIC DIALYSIS: ICD-10-CM

## 2020-12-08 LAB
ALBUMIN SERPL BCP-MCNC: 3.6 G/DL (ref 3.5–5.2)
ALP SERPL-CCNC: 32 U/L (ref 55–135)
ALT SERPL W/O P-5'-P-CCNC: 12 U/L (ref 10–44)
ANION GAP SERPL CALC-SCNC: 14 MMOL/L (ref 8–16)
AST SERPL-CCNC: 22 U/L (ref 10–40)
BASOPHILS # BLD AUTO: 0.01 K/UL (ref 0–0.2)
BASOPHILS NFR BLD: 0.2 % (ref 0–1.9)
BILIRUB SERPL-MCNC: 0.4 MG/DL (ref 0.1–1)
BUN SERPL-MCNC: 81 MG/DL (ref 8–23)
CALCIUM SERPL-MCNC: 7.9 MG/DL (ref 8.7–10.5)
CHLORIDE SERPL-SCNC: 112 MMOL/L (ref 95–110)
CO2 SERPL-SCNC: 18 MMOL/L (ref 23–29)
CREAT SERPL-MCNC: 5.4 MG/DL (ref 0.5–1.4)
DIFFERENTIAL METHOD: ABNORMAL
EOSINOPHIL # BLD AUTO: 0.1 K/UL (ref 0–0.5)
EOSINOPHIL NFR BLD: 1.2 % (ref 0–8)
ERYTHROCYTE [DISTWIDTH] IN BLOOD BY AUTOMATED COUNT: 14.8 % (ref 11.5–14.5)
EST. GFR  (AFRICAN AMERICAN): 7.5 ML/MIN/1.73 M^2
EST. GFR  (NON AFRICAN AMERICAN): 6.5 ML/MIN/1.73 M^2
FERRITIN SERPL-MCNC: 31 NG/ML (ref 20–300)
GLUCOSE SERPL-MCNC: 87 MG/DL (ref 70–110)
HCT VFR BLD AUTO: 26.9 % (ref 37–48.5)
HGB BLD-MCNC: 8.1 G/DL (ref 12–16)
IMM GRANULOCYTES # BLD AUTO: 0.01 K/UL (ref 0–0.04)
IMM GRANULOCYTES NFR BLD AUTO: 0.2 % (ref 0–0.5)
IRON SERPL-MCNC: 72 UG/DL (ref 30–160)
LYMPHOCYTES # BLD AUTO: 0.7 K/UL (ref 1–4.8)
LYMPHOCYTES NFR BLD: 16.7 % (ref 18–48)
MAGNESIUM SERPL-MCNC: 1.9 MG/DL (ref 1.6–2.6)
MCH RBC QN AUTO: 30.1 PG (ref 27–31)
MCHC RBC AUTO-ENTMCNC: 30.1 G/DL (ref 32–36)
MCV RBC AUTO: 100 FL (ref 82–98)
MONOCYTES # BLD AUTO: 0.3 K/UL (ref 0.3–1)
MONOCYTES NFR BLD: 6.5 % (ref 4–15)
NEUTROPHILS # BLD AUTO: 3.1 K/UL (ref 1.8–7.7)
NEUTROPHILS NFR BLD: 75.2 % (ref 38–73)
NRBC BLD-RTO: 0 /100 WBC
PHOSPHATE SERPL-MCNC: 4.9 MG/DL (ref 2.7–4.5)
PLATELET # BLD AUTO: 152 K/UL (ref 150–350)
PMV BLD AUTO: 10.9 FL (ref 9.2–12.9)
POTASSIUM SERPL-SCNC: 3.7 MMOL/L (ref 3.5–5.1)
PROT SERPL-MCNC: 6.3 G/DL (ref 6–8.4)
RBC # BLD AUTO: 2.69 M/UL (ref 4–5.4)
SATURATED IRON: 22 % (ref 20–50)
SODIUM SERPL-SCNC: 144 MMOL/L (ref 136–145)
TOTAL IRON BINDING CAPACITY: 330 UG/DL (ref 250–450)
TRANSFERRIN SERPL-MCNC: 223 MG/DL (ref 200–375)
WBC # BLD AUTO: 4.18 K/UL (ref 3.9–12.7)

## 2020-12-08 PROCEDURE — 83735 ASSAY OF MAGNESIUM: CPT | Mod: HCNC

## 2020-12-08 PROCEDURE — 36415 COLL VENOUS BLD VENIPUNCTURE: CPT | Mod: HCNC

## 2020-12-08 PROCEDURE — 80053 COMPREHEN METABOLIC PANEL: CPT | Mod: HCNC

## 2020-12-08 PROCEDURE — 85025 COMPLETE CBC W/AUTO DIFF WBC: CPT | Mod: HCNC

## 2020-12-08 PROCEDURE — 83540 ASSAY OF IRON: CPT | Mod: HCNC

## 2020-12-08 PROCEDURE — 84100 ASSAY OF PHOSPHORUS: CPT | Mod: HCNC

## 2020-12-08 PROCEDURE — 82728 ASSAY OF FERRITIN: CPT | Mod: HCNC

## 2020-12-10 ENCOUNTER — TELEPHONE (OUTPATIENT)
Dept: INTERNAL MEDICINE | Facility: CLINIC | Age: 85
End: 2020-12-10

## 2020-12-12 ENCOUNTER — PATIENT MESSAGE (OUTPATIENT)
Dept: INTERNAL MEDICINE | Facility: CLINIC | Age: 85
End: 2020-12-12

## 2020-12-15 ENCOUNTER — OFFICE VISIT (OUTPATIENT)
Dept: RHEUMATOLOGY | Facility: CLINIC | Age: 85
End: 2020-12-15
Payer: MEDICARE

## 2020-12-15 ENCOUNTER — OFFICE VISIT (OUTPATIENT)
Dept: INTERNAL MEDICINE | Facility: CLINIC | Age: 85
End: 2020-12-15
Payer: MEDICARE

## 2020-12-15 VITALS
DIASTOLIC BLOOD PRESSURE: 72 MMHG | WEIGHT: 105.38 LBS | HEART RATE: 101 BPM | OXYGEN SATURATION: 99 % | BODY MASS INDEX: 18.67 KG/M2 | HEIGHT: 63 IN | SYSTOLIC BLOOD PRESSURE: 134 MMHG

## 2020-12-15 VITALS
HEIGHT: 64 IN | BODY MASS INDEX: 17.58 KG/M2 | DIASTOLIC BLOOD PRESSURE: 81 MMHG | WEIGHT: 103 LBS | HEART RATE: 104 BPM | SYSTOLIC BLOOD PRESSURE: 130 MMHG

## 2020-12-15 DIAGNOSIS — N39.0 RECURRENT UTI: ICD-10-CM

## 2020-12-15 DIAGNOSIS — N18.5 STAGE 5 CHRONIC KIDNEY DISEASE NOT ON CHRONIC DIALYSIS: Primary | ICD-10-CM

## 2020-12-15 DIAGNOSIS — M25.561 PAIN AND SWELLING OF RIGHT KNEE: Primary | ICD-10-CM

## 2020-12-15 DIAGNOSIS — M25.461 PAIN AND SWELLING OF RIGHT KNEE: Primary | ICD-10-CM

## 2020-12-15 DIAGNOSIS — C20 RECTAL CANCER: ICD-10-CM

## 2020-12-15 LAB
APPEARANCE FLD: NORMAL
BODY FLD TYPE: NORMAL
BODY FLD TYPE: NORMAL
COLOR FLD: YELLOW
CRYSTALS FLD MICRO: NEGATIVE
GRAM STN SPEC: NORMAL
GRAM STN SPEC: NORMAL
LYMPHOCYTES NFR FLD MANUAL: 12 %
MONOS+MACROS NFR FLD MANUAL: 52 %
NEUTROPHILS NFR FLD MANUAL: 36 %
PATH INTERP FLD-IMP: NORMAL
WBC # FLD: 4244 /CU MM

## 2020-12-15 PROCEDURE — 99214 PR OFFICE/OUTPT VISIT, EST, LEVL IV, 30-39 MIN: ICD-10-PCS | Mod: HCNC,S$GLB,, | Performed by: INTERNAL MEDICINE

## 2020-12-15 PROCEDURE — 1126F PR PAIN SEVERITY QUANTIFIED, NO PAIN PRESENT: ICD-10-PCS | Mod: HCNC,S$GLB,, | Performed by: INTERNAL MEDICINE

## 2020-12-15 PROCEDURE — 99214 OFFICE O/P EST MOD 30 MIN: CPT | Mod: HCNC,25,GC,S$GLB | Performed by: INTERNAL MEDICINE

## 2020-12-15 PROCEDURE — 87075 CULTR BACTERIA EXCEPT BLOOD: CPT | Mod: HCNC

## 2020-12-15 PROCEDURE — 99999 PR PBB SHADOW E&M-EST. PATIENT-LVL IV: ICD-10-PCS | Mod: PBBFAC,HCNC,, | Performed by: INTERNAL MEDICINE

## 2020-12-15 PROCEDURE — 1159F MED LIST DOCD IN RCRD: CPT | Mod: HCNC,S$GLB,, | Performed by: INTERNAL MEDICINE

## 2020-12-15 PROCEDURE — 1159F PR MEDICATION LIST DOCUMENTED IN MEDICAL RECORD: ICD-10-PCS | Mod: HCNC,GC,S$GLB, | Performed by: INTERNAL MEDICINE

## 2020-12-15 PROCEDURE — 20610 LARGE JOINT ASPIRATION/INJECTION: R KNEE: ICD-10-PCS | Mod: HCNC,RT,GC,S$GLB | Performed by: INTERNAL MEDICINE

## 2020-12-15 PROCEDURE — 99214 PR OFFICE/OUTPT VISIT, EST, LEVL IV, 30-39 MIN: ICD-10-PCS | Mod: HCNC,25,GC,S$GLB | Performed by: INTERNAL MEDICINE

## 2020-12-15 PROCEDURE — 99999 PR PBB SHADOW E&M-EST. PATIENT-LVL III: CPT | Mod: PBBFAC,HCNC,GC,

## 2020-12-15 PROCEDURE — 99499 UNLISTED E&M SERVICE: CPT | Mod: S$GLB,,, | Performed by: INTERNAL MEDICINE

## 2020-12-15 PROCEDURE — 89051 BODY FLUID CELL COUNT: CPT | Mod: HCNC

## 2020-12-15 PROCEDURE — 1101F PT FALLS ASSESS-DOCD LE1/YR: CPT | Mod: HCNC,CPTII,S$GLB, | Performed by: INTERNAL MEDICINE

## 2020-12-15 PROCEDURE — 87205 SMEAR GRAM STAIN: CPT | Mod: HCNC

## 2020-12-15 PROCEDURE — 99999 PR PBB SHADOW E&M-EST. PATIENT-LVL III: ICD-10-PCS | Mod: PBBFAC,HCNC,GC,

## 2020-12-15 PROCEDURE — 1159F MED LIST DOCD IN RCRD: CPT | Mod: HCNC,GC,S$GLB, | Performed by: INTERNAL MEDICINE

## 2020-12-15 PROCEDURE — 1126F AMNT PAIN NOTED NONE PRSNT: CPT | Mod: HCNC,S$GLB,, | Performed by: INTERNAL MEDICINE

## 2020-12-15 PROCEDURE — 99214 OFFICE O/P EST MOD 30 MIN: CPT | Mod: HCNC,S$GLB,, | Performed by: INTERNAL MEDICINE

## 2020-12-15 PROCEDURE — 1159F PR MEDICATION LIST DOCUMENTED IN MEDICAL RECORD: ICD-10-PCS | Mod: HCNC,S$GLB,, | Performed by: INTERNAL MEDICINE

## 2020-12-15 PROCEDURE — 99499 RISK ADDL DX/OHS AUDIT: ICD-10-PCS | Mod: S$GLB,,, | Performed by: INTERNAL MEDICINE

## 2020-12-15 PROCEDURE — 89060 EXAM SYNOVIAL FLUID CRYSTALS: CPT | Mod: HCNC

## 2020-12-15 PROCEDURE — 3288F PR FALLS RISK ASSESSMENT DOCUMENTED: ICD-10-PCS | Mod: HCNC,CPTII,S$GLB, | Performed by: INTERNAL MEDICINE

## 2020-12-15 PROCEDURE — 20610 DRAIN/INJ JOINT/BURSA W/O US: CPT | Mod: HCNC,RT,GC,S$GLB | Performed by: INTERNAL MEDICINE

## 2020-12-15 PROCEDURE — 1101F PR PT FALLS ASSESS DOC 0-1 FALLS W/OUT INJ PAST YR: ICD-10-PCS | Mod: HCNC,CPTII,S$GLB, | Performed by: INTERNAL MEDICINE

## 2020-12-15 PROCEDURE — 99999 PR PBB SHADOW E&M-EST. PATIENT-LVL IV: CPT | Mod: PBBFAC,HCNC,, | Performed by: INTERNAL MEDICINE

## 2020-12-15 PROCEDURE — 3288F FALL RISK ASSESSMENT DOCD: CPT | Mod: HCNC,CPTII,S$GLB, | Performed by: INTERNAL MEDICINE

## 2020-12-15 RX ORDER — HYDROCORTISONE 25 MG/G
CREAM TOPICAL 2 TIMES DAILY
Qty: 28 G | Refills: 5 | Status: SHIPPED | OUTPATIENT
Start: 2020-12-15

## 2020-12-15 RX ADMIN — TRIAMCINOLONE ACETONIDE 40 MG: 40 INJECTION, SUSPENSION INTRA-ARTICULAR; INTRAMUSCULAR at 09:12

## 2020-12-15 RX ADMIN — LIDOCAINE HYDROCHLORIDE 1 ML: 10 INJECTION INFILTRATION; PERINEURAL at 09:12

## 2020-12-15 NOTE — Clinical Note
It looks like times running out for her and she still has not made a decision about dialysis.  Additionally, she does not have advanced directives in place.  I spoke with your staff Kristan and she said there is a  who could help her get advanced directives in place.

## 2020-12-15 NOTE — PROGRESS NOTES
The right knee arthrocentesis and injection performed by Dr. Capps personally assisted and witnessed. ARMANI

## 2020-12-16 ENCOUNTER — PATIENT MESSAGE (OUTPATIENT)
Dept: INTERNAL MEDICINE | Facility: CLINIC | Age: 85
End: 2020-12-16

## 2020-12-16 ENCOUNTER — PATIENT MESSAGE (OUTPATIENT)
Dept: RHEUMATOLOGY | Facility: CLINIC | Age: 85
End: 2020-12-16

## 2020-12-16 PROBLEM — M25.561 PAIN AND SWELLING OF RIGHT KNEE: Status: ACTIVE | Noted: 2020-12-16

## 2020-12-16 PROBLEM — M25.461 PAIN AND SWELLING OF RIGHT KNEE: Status: ACTIVE | Noted: 2020-12-16

## 2020-12-16 RX ORDER — LIDOCAINE HYDROCHLORIDE 10 MG/ML
1 INJECTION INFILTRATION; PERINEURAL
Status: DISCONTINUED | OUTPATIENT
Start: 2020-12-15 | End: 2020-12-16 | Stop reason: HOSPADM

## 2020-12-16 RX ORDER — TRIAMCINOLONE ACETONIDE 40 MG/ML
40 INJECTION, SUSPENSION INTRA-ARTICULAR; INTRAMUSCULAR
Status: DISCONTINUED | OUTPATIENT
Start: 2020-12-15 | End: 2020-12-16 | Stop reason: HOSPADM

## 2020-12-16 NOTE — PROCEDURES
Large Joint Aspiration/Injection: R knee    Date/Time: 12/15/2020 9:00 AM  Performed by: Elpidio Capps MD  Authorized by: Elpidio Capps MD     Consent Done?:  Yes (Verbal)  Indications:  Joint swelling and pain  Site marked: the procedure site was marked    Timeout: prior to procedure the correct patient, procedure, and site was verified    Prep: patient was prepped and draped in usual sterile fashion      Details:  Needle Size:  22 G  Approach:  Lateral  Location:  Knee  Site:  R knee  Medications:  1 mL lidocaine HCL 10 mg/ml (1%) 10 mg/mL (1 %); 40 mg triamcinolone acetonide 40 mg/mL  Aspirate amount (mL):  40  Lab: fluid sent for laboratory analysis

## 2020-12-16 NOTE — PROGRESS NOTES
Subjective:       Patient ID: Karli Hameed is a 89 y.o. female.    Chief Complaint: Follow-up    This dictation was performed using using MModal.   She is declining.  She does not have any uremic symptoms and she continues to make urine.  She denies nausea.  She has had no vomiting.  Her blood pressure is controlled and she has no significant edema in her lower extremities although she has mild periorbital edema today.    She reports that a woman came to her home to tell her that if she did not get a fistula placed in her arm that she would have to have a catheter put in her neck.    I have had discussions with her about advanced directives and she told me that she had everything in order but when I discussed this with her today it does not sound like she has a clear idea of what her wishes are.    She has not made a decision about going on hemodialysis.  She is concerned about transportation to and from the dialysis center.  She is concerned about the cost.  I do not know if she is a candidate for home peritoneal dialysis.    She has no family.  She has friends and good neighbors but she would never want to ask someone to take her and bring her home from dialysis on a regular basis 3 days a week.    She is concerned about the rectal cancer because she is having difficulty with constipation and passing a little bit of blood when she has a bowel movement and a little bit of soiling, like a brown spot on her underwear.    HPI  Review of Systems  Review of systems is negative unless noted.  Objective:      Physical Exam  Vitals signs reviewed.   Genitourinary:     Comments: The perianal area is inspected.  She may have a small tear like the anal fissure.  A digital rectal examination was not performed.        Assessment:       1. Stage 5 chronic kidney disease not on chronic dialysis    2. Rectal cancer    3. Recurrent UTI        Plan:   Karli was seen today for follow-up.    Diagnoses and all orders for this  visit:    Stage 5 chronic kidney disease not on chronic dialysis.  She needs to make up her mind if she wants to try hemodialysis.  She was under the impression that if she started hemodialysis she could not stop it.  She is reminded that she is always in control of her own body.  She needs to get more information on the cost of hemodialysis and the risks and she must make up her mind what she thinks is the best thing for her to do.  There is no need to make an appointment in vascular surgery until she makes up her mind.    Beyond the hemodialysis issue,  it is also important for her to consider her wishes regarding life support, nutritional support and get advanced directives in place.   -     Ambulatory referral/consult to Vascular Surgery; Future  -     Ambulatory referral/consult to Nephrology; Future    Rectal cancer, the constipation is very bothersome in the bleeding is worrisome.  In the past she responded to the application of hydrocortisone cream when she had some irritation in this area so will try that again.  She is going to increase Citrucel daily to address constipation.    Recurrent UTI, recheck urinalysis and urine culture  -     Urine culture; Future  -     Urinalysis; Future    Other orders  -     hydrocortisone 2.5 % cream; Apply topically 2 (two) times daily.

## 2020-12-16 NOTE — ASSESSMENT & PLAN NOTE
Few day acute onset of swelling, warmth and pain of right knee  - No trauma  - Previous diagnosis of pseudogout with aspirated crystals  - Will aspirate and provide steroid injection today

## 2020-12-21 ENCOUNTER — PATIENT MESSAGE (OUTPATIENT)
Dept: INTERNAL MEDICINE | Facility: CLINIC | Age: 85
End: 2020-12-21

## 2020-12-21 ENCOUNTER — LAB VISIT (OUTPATIENT)
Dept: LAB | Facility: HOSPITAL | Age: 85
End: 2020-12-21
Attending: INTERNAL MEDICINE
Payer: MEDICARE

## 2020-12-21 DIAGNOSIS — N39.0 RECURRENT UTI: Primary | ICD-10-CM

## 2020-12-21 DIAGNOSIS — Z01.818 PRE-OPERATIVE EXAMINATION: Primary | ICD-10-CM

## 2020-12-21 DIAGNOSIS — N18.5 STAGE 5 CHRONIC KIDNEY DISEASE NOT ON CHRONIC DIALYSIS: ICD-10-CM

## 2020-12-21 DIAGNOSIS — N39.0 RECURRENT UTI: ICD-10-CM

## 2020-12-21 LAB
BILIRUB UR QL STRIP: NEGATIVE
CLARITY UR REFRACT.AUTO: ABNORMAL
COLOR UR AUTO: YELLOW
GLUCOSE UR QL STRIP: NEGATIVE
HGB UR QL STRIP: ABNORMAL
KETONES UR QL STRIP: NEGATIVE
LEUKOCYTE ESTERASE UR QL STRIP: ABNORMAL
NITRITE UR QL STRIP: NEGATIVE
PH UR STRIP: 5 [PH] (ref 5–8)
PROT UR QL STRIP: ABNORMAL
SP GR UR STRIP: 1.01 (ref 1–1.03)
URN SPEC COLLECT METH UR: ABNORMAL

## 2020-12-21 PROCEDURE — 87077 CULTURE AEROBIC IDENTIFY: CPT | Mod: HCNC

## 2020-12-21 PROCEDURE — 87186 SC STD MICRODIL/AGAR DIL: CPT | Mod: HCNC

## 2020-12-21 PROCEDURE — 87088 URINE BACTERIA CULTURE: CPT | Mod: HCNC

## 2020-12-21 PROCEDURE — 87086 URINE CULTURE/COLONY COUNT: CPT | Mod: HCNC

## 2020-12-21 PROCEDURE — 81001 URINALYSIS AUTO W/SCOPE: CPT | Mod: HCNC

## 2020-12-21 RX ORDER — AMOXICILLIN AND CLAVULANATE POTASSIUM 500; 125 MG/1; MG/1
TABLET, FILM COATED ORAL
Qty: 7 TABLET | Refills: 0 | Status: SHIPPED | OUTPATIENT
Start: 2020-12-21 | End: 2021-03-05

## 2020-12-22 LAB
BACTERIA #/AREA URNS AUTO: ABNORMAL /HPF
BACTERIA SPEC ANAEROBE CULT: NORMAL
HYALINE CASTS UR QL AUTO: 0 /LPF
MICROSCOPIC COMMENT: ABNORMAL
RBC #/AREA URNS AUTO: 5 /HPF (ref 0–4)
WBC #/AREA URNS AUTO: >100 /HPF (ref 0–5)
WBC CLUMPS UR QL AUTO: ABNORMAL

## 2020-12-24 LAB — BACTERIA UR CULT: ABNORMAL

## 2020-12-30 ENCOUNTER — TELEPHONE (OUTPATIENT)
Dept: SURGERY | Facility: CLINIC | Age: 85
End: 2020-12-30

## 2020-12-30 NOTE — TELEPHONE ENCOUNTER
----- Message from Deedee Alvares sent at 12/30/2020  8:57 AM CST -----  Contact: Pt  Pt's requesting a call from Katherine. Please follow up with Pt for further assistance.    Confirmed patient's contact info below:     Patient Name: Karli Hameed     Phone Number: [phone]

## 2020-12-30 NOTE — TELEPHONE ENCOUNTER
Spoke with patient, CT appt made. Patient wondering if CT is better than MRI, patient only wants one or the other. No MRI ordered so made CT scan appointment but will verify with Dr Fritz/ Katherine to see if he would want the CT over the MRI.

## 2021-01-11 ENCOUNTER — PATIENT MESSAGE (OUTPATIENT)
Dept: INTERNAL MEDICINE | Facility: CLINIC | Age: 86
End: 2021-01-11

## 2021-01-12 ENCOUNTER — HOSPITAL ENCOUNTER (OUTPATIENT)
Dept: RADIOLOGY | Facility: HOSPITAL | Age: 86
Discharge: HOME | End: 2021-01-12
Attending: COLON & RECTAL SURGERY
Payer: MEDICARE

## 2021-01-12 DIAGNOSIS — C26.9 MALIGNANT NEOPLASM OF ILL-DEFINED SITES WITHIN THE DIGESTIVE SYSTEM: ICD-10-CM

## 2021-01-12 PROCEDURE — 71250 CT THORAX DX C-: CPT | Mod: 26,HCNC,, | Performed by: RADIOLOGY

## 2021-01-12 PROCEDURE — 25500020 PHARM REV CODE 255: Mod: HCNC | Performed by: COLON & RECTAL SURGERY

## 2021-01-12 PROCEDURE — 71250 CT THORAX DX C-: CPT | Mod: TC,HCNC

## 2021-01-12 PROCEDURE — 71250 CT CHEST ABDOMEN PELVIS WITHOUT CONTRAST(XPD): ICD-10-PCS | Mod: 26,HCNC,, | Performed by: RADIOLOGY

## 2021-01-12 PROCEDURE — 74176 CT CHEST ABDOMEN PELVIS WITHOUT CONTRAST(XPD): ICD-10-PCS | Mod: 26,HCNC,, | Performed by: RADIOLOGY

## 2021-01-12 PROCEDURE — 74176 CT ABD & PELVIS W/O CONTRAST: CPT | Mod: 26,HCNC,, | Performed by: RADIOLOGY

## 2021-01-12 PROCEDURE — 74176 CT ABD & PELVIS W/O CONTRAST: CPT | Mod: TC,HCNC

## 2021-01-12 RX ADMIN — IOHEXOL 15 ML: 350 INJECTION, SOLUTION INTRAVENOUS at 09:01

## 2021-01-13 ENCOUNTER — INITIAL CONSULT (OUTPATIENT)
Dept: VASCULAR SURGERY | Facility: CLINIC | Age: 86
End: 2021-01-13
Attending: SURGERY
Payer: MEDICARE

## 2021-01-13 ENCOUNTER — HOSPITAL ENCOUNTER (OUTPATIENT)
Dept: VASCULAR SURGERY | Facility: CLINIC | Age: 86
Discharge: HOME | End: 2021-01-13
Attending: SURGERY
Payer: MEDICARE

## 2021-01-13 VITALS
SYSTOLIC BLOOD PRESSURE: 166 MMHG | DIASTOLIC BLOOD PRESSURE: 81 MMHG | HEIGHT: 63 IN | HEART RATE: 71 BPM | TEMPERATURE: 98 F | BODY MASS INDEX: 18.64 KG/M2 | WEIGHT: 105.19 LBS

## 2021-01-13 DIAGNOSIS — N18.5 STAGE 5 CHRONIC KIDNEY DISEASE NOT ON CHRONIC DIALYSIS: ICD-10-CM

## 2021-01-13 DIAGNOSIS — Z01.818 PRE-OPERATIVE EXAMINATION: ICD-10-CM

## 2021-01-13 PROCEDURE — 93970 PR US DUPLEX, UPPER OR LOWER EXT VENOUS,COMPLETE BILAT: ICD-10-PCS | Mod: HCNC,S$GLB,, | Performed by: SURGERY

## 2021-01-13 PROCEDURE — 1159F PR MEDICATION LIST DOCUMENTED IN MEDICAL RECORD: ICD-10-PCS | Mod: HCNC,S$GLB,, | Performed by: SURGERY

## 2021-01-13 PROCEDURE — 99999 PR PBB SHADOW E&M-EST. PATIENT-LVL IV: ICD-10-PCS | Mod: PBBFAC,HCNC,, | Performed by: SURGERY

## 2021-01-13 PROCEDURE — 99499 RISK ADDL DX/OHS AUDIT: ICD-10-PCS | Mod: S$GLB,,, | Performed by: SURGERY

## 2021-01-13 PROCEDURE — 99499 UNLISTED E&M SERVICE: CPT | Mod: S$GLB,,, | Performed by: SURGERY

## 2021-01-13 PROCEDURE — 99204 OFFICE O/P NEW MOD 45 MIN: CPT | Mod: HCNC,S$GLB,, | Performed by: SURGERY

## 2021-01-13 PROCEDURE — 99204 PR OFFICE/OUTPT VISIT, NEW, LEVL IV, 45-59 MIN: ICD-10-PCS | Mod: HCNC,S$GLB,, | Performed by: SURGERY

## 2021-01-13 PROCEDURE — 93970 EXTREMITY STUDY: CPT | Mod: HCNC,S$GLB,, | Performed by: SURGERY

## 2021-01-13 PROCEDURE — 99999 PR PBB SHADOW E&M-EST. PATIENT-LVL IV: CPT | Mod: PBBFAC,HCNC,, | Performed by: SURGERY

## 2021-01-13 PROCEDURE — 1159F MED LIST DOCD IN RCRD: CPT | Mod: HCNC,S$GLB,, | Performed by: SURGERY

## 2021-01-19 ENCOUNTER — TELEPHONE (OUTPATIENT)
Dept: VASCULAR SURGERY | Facility: CLINIC | Age: 86
End: 2021-01-19

## 2021-01-20 ENCOUNTER — OFFICE VISIT (OUTPATIENT)
Dept: SURGERY | Facility: CLINIC | Age: 86
End: 2021-01-20
Payer: MEDICARE

## 2021-01-20 ENCOUNTER — TELEPHONE (OUTPATIENT)
Dept: HEMATOLOGY/ONCOLOGY | Facility: CLINIC | Age: 86
End: 2021-01-20

## 2021-01-20 VITALS
WEIGHT: 100.19 LBS | SYSTOLIC BLOOD PRESSURE: 163 MMHG | HEIGHT: 64 IN | HEART RATE: 75 BPM | BODY MASS INDEX: 17.11 KG/M2 | DIASTOLIC BLOOD PRESSURE: 79 MMHG

## 2021-01-20 DIAGNOSIS — Z08 ENCOUNTER FOR FOLLOW-UP SURVEILLANCE OF RECTAL CANCER: ICD-10-CM

## 2021-01-20 DIAGNOSIS — C20 MALIGNANT NEOPLASM OF RECTUM: ICD-10-CM

## 2021-01-20 DIAGNOSIS — C78.00 MALIGNANT NEOPLASM METASTATIC TO LUNG, UNSPECIFIED LATERALITY: Primary | ICD-10-CM

## 2021-01-20 DIAGNOSIS — Z85.048 ENCOUNTER FOR FOLLOW-UP SURVEILLANCE OF RECTAL CANCER: ICD-10-CM

## 2021-01-20 DIAGNOSIS — Z71.9 ENCOUNTER FOR CONSULTATION: Primary | ICD-10-CM

## 2021-01-20 DIAGNOSIS — C78.7 HEPATIC METASTASIS: ICD-10-CM

## 2021-01-20 PROCEDURE — 1126F AMNT PAIN NOTED NONE PRSNT: CPT | Mod: HCNC,S$GLB,, | Performed by: COLON & RECTAL SURGERY

## 2021-01-20 PROCEDURE — 1101F PT FALLS ASSESS-DOCD LE1/YR: CPT | Mod: HCNC,CPTII,S$GLB, | Performed by: COLON & RECTAL SURGERY

## 2021-01-20 PROCEDURE — 1126F PR PAIN SEVERITY QUANTIFIED, NO PAIN PRESENT: ICD-10-PCS | Mod: HCNC,S$GLB,, | Performed by: COLON & RECTAL SURGERY

## 2021-01-20 PROCEDURE — 3288F FALL RISK ASSESSMENT DOCD: CPT | Mod: HCNC,CPTII,S$GLB, | Performed by: COLON & RECTAL SURGERY

## 2021-01-20 PROCEDURE — 99214 OFFICE O/P EST MOD 30 MIN: CPT | Mod: HCNC,S$GLB,, | Performed by: COLON & RECTAL SURGERY

## 2021-01-20 PROCEDURE — 99999 PR PBB SHADOW E&M-EST. PATIENT-LVL IV: ICD-10-PCS | Mod: PBBFAC,HCNC,, | Performed by: COLON & RECTAL SURGERY

## 2021-01-20 PROCEDURE — 1101F PR PT FALLS ASSESS DOC 0-1 FALLS W/OUT INJ PAST YR: ICD-10-PCS | Mod: HCNC,CPTII,S$GLB, | Performed by: COLON & RECTAL SURGERY

## 2021-01-20 PROCEDURE — 1159F MED LIST DOCD IN RCRD: CPT | Mod: HCNC,S$GLB,, | Performed by: COLON & RECTAL SURGERY

## 2021-01-20 PROCEDURE — 99999 PR PBB SHADOW E&M-EST. PATIENT-LVL IV: CPT | Mod: PBBFAC,HCNC,, | Performed by: COLON & RECTAL SURGERY

## 2021-01-20 PROCEDURE — 1159F PR MEDICATION LIST DOCUMENTED IN MEDICAL RECORD: ICD-10-PCS | Mod: HCNC,S$GLB,, | Performed by: COLON & RECTAL SURGERY

## 2021-01-20 PROCEDURE — 3288F PR FALLS RISK ASSESSMENT DOCUMENTED: ICD-10-PCS | Mod: HCNC,CPTII,S$GLB, | Performed by: COLON & RECTAL SURGERY

## 2021-01-20 PROCEDURE — 99214 PR OFFICE/OUTPT VISIT, EST, LEVL IV, 30-39 MIN: ICD-10-PCS | Mod: HCNC,S$GLB,, | Performed by: COLON & RECTAL SURGERY

## 2021-01-27 ENCOUNTER — DOCUMENTATION ONLY (OUTPATIENT)
Dept: SURGERY | Facility: HOSPITAL | Age: 86
End: 2021-01-27

## 2021-02-01 ENCOUNTER — LAB VISIT (OUTPATIENT)
Dept: LAB | Facility: HOSPITAL | Age: 86
End: 2021-02-01
Payer: MEDICARE

## 2021-02-01 ENCOUNTER — OFFICE VISIT (OUTPATIENT)
Dept: HEMATOLOGY/ONCOLOGY | Facility: CLINIC | Age: 86
End: 2021-02-01
Payer: MEDICARE

## 2021-02-01 VITALS
BODY MASS INDEX: 17.58 KG/M2 | WEIGHT: 103 LBS | HEART RATE: 79 BPM | TEMPERATURE: 98 F | OXYGEN SATURATION: 100 % | HEIGHT: 64 IN | DIASTOLIC BLOOD PRESSURE: 69 MMHG | SYSTOLIC BLOOD PRESSURE: 147 MMHG | RESPIRATION RATE: 19 BRPM

## 2021-02-01 DIAGNOSIS — C78.00 MALIGNANT NEOPLASM METASTATIC TO LUNG, UNSPECIFIED LATERALITY: ICD-10-CM

## 2021-02-01 DIAGNOSIS — Z85.048 ENCOUNTER FOR FOLLOW-UP SURVEILLANCE OF RECTAL CANCER: ICD-10-CM

## 2021-02-01 DIAGNOSIS — C78.00 MALIGNANT NEOPLASM METASTATIC TO LUNG, UNSPECIFIED LATERALITY: Primary | ICD-10-CM

## 2021-02-01 DIAGNOSIS — C78.7 HEPATIC METASTASIS: ICD-10-CM

## 2021-02-01 DIAGNOSIS — Z08 ENCOUNTER FOR FOLLOW-UP SURVEILLANCE OF RECTAL CANCER: ICD-10-CM

## 2021-02-01 DIAGNOSIS — C20 MALIGNANT NEOPLASM OF RECTUM: ICD-10-CM

## 2021-02-01 DIAGNOSIS — D69.6 THROMBOCYTOPENIA: ICD-10-CM

## 2021-02-01 DIAGNOSIS — C20 RECTAL CANCER: Primary | ICD-10-CM

## 2021-02-01 LAB
ALBUMIN SERPL BCP-MCNC: 3.5 G/DL (ref 3.5–5.2)
ALP SERPL-CCNC: 34 U/L (ref 55–135)
ALT SERPL W/O P-5'-P-CCNC: 15 U/L (ref 10–44)
ANION GAP SERPL CALC-SCNC: 14 MMOL/L (ref 8–16)
AST SERPL-CCNC: 22 U/L (ref 10–40)
BASOPHILS # BLD AUTO: 0.01 K/UL (ref 0–0.2)
BASOPHILS NFR BLD: 0.2 % (ref 0–1.9)
BILIRUB SERPL-MCNC: 0.4 MG/DL (ref 0.1–1)
BUN SERPL-MCNC: 83 MG/DL (ref 8–23)
CALCIUM SERPL-MCNC: 7.4 MG/DL (ref 8.7–10.5)
CEA SERPL-MCNC: 10.7 NG/ML (ref 0–5)
CHLORIDE SERPL-SCNC: 112 MMOL/L (ref 95–110)
CO2 SERPL-SCNC: 17 MMOL/L (ref 23–29)
CREAT SERPL-MCNC: 4.8 MG/DL (ref 0.5–1.4)
DIFFERENTIAL METHOD: ABNORMAL
EOSINOPHIL # BLD AUTO: 0.1 K/UL (ref 0–0.5)
EOSINOPHIL NFR BLD: 2.1 % (ref 0–8)
ERYTHROCYTE [DISTWIDTH] IN BLOOD BY AUTOMATED COUNT: 15 % (ref 11.5–14.5)
EST. GFR  (AFRICAN AMERICAN): 8.7 ML/MIN/1.73 M^2
EST. GFR  (NON AFRICAN AMERICAN): 7.5 ML/MIN/1.73 M^2
GLUCOSE SERPL-MCNC: 91 MG/DL (ref 70–110)
HCT VFR BLD AUTO: 27 % (ref 37–48.5)
HGB BLD-MCNC: 8.1 G/DL (ref 12–16)
IMM GRANULOCYTES # BLD AUTO: 0.01 K/UL (ref 0–0.04)
IMM GRANULOCYTES NFR BLD AUTO: 0.2 % (ref 0–0.5)
LYMPHOCYTES # BLD AUTO: 0.8 K/UL (ref 1–4.8)
LYMPHOCYTES NFR BLD: 17.2 % (ref 18–48)
MCH RBC QN AUTO: 29.8 PG (ref 27–31)
MCHC RBC AUTO-ENTMCNC: 30 G/DL (ref 32–36)
MCV RBC AUTO: 99 FL (ref 82–98)
MONOCYTES # BLD AUTO: 0.3 K/UL (ref 0.3–1)
MONOCYTES NFR BLD: 6.7 % (ref 4–15)
NEUTROPHILS # BLD AUTO: 3.2 K/UL (ref 1.8–7.7)
NEUTROPHILS NFR BLD: 73.6 % (ref 38–73)
NRBC BLD-RTO: 0 /100 WBC
PLATELET # BLD AUTO: 145 K/UL (ref 150–350)
PMV BLD AUTO: 10.6 FL (ref 9.2–12.9)
POTASSIUM SERPL-SCNC: 4.1 MMOL/L (ref 3.5–5.1)
PROT SERPL-MCNC: 6.3 G/DL (ref 6–8.4)
RBC # BLD AUTO: 2.72 M/UL (ref 4–5.4)
SODIUM SERPL-SCNC: 143 MMOL/L (ref 136–145)
WBC # BLD AUTO: 4.35 K/UL (ref 3.9–12.7)

## 2021-02-01 PROCEDURE — 3288F PR FALLS RISK ASSESSMENT DOCUMENTED: ICD-10-PCS | Mod: CPTII,S$GLB,, | Performed by: INTERNAL MEDICINE

## 2021-02-01 PROCEDURE — 99205 PR OFFICE/OUTPT VISIT, NEW, LEVL V, 60-74 MIN: ICD-10-PCS | Mod: S$GLB,,, | Performed by: INTERNAL MEDICINE

## 2021-02-01 PROCEDURE — 99205 OFFICE O/P NEW HI 60 MIN: CPT | Mod: S$GLB,,, | Performed by: INTERNAL MEDICINE

## 2021-02-01 PROCEDURE — 85025 COMPLETE CBC W/AUTO DIFF WBC: CPT

## 2021-02-01 PROCEDURE — 1126F AMNT PAIN NOTED NONE PRSNT: CPT | Mod: S$GLB,,, | Performed by: INTERNAL MEDICINE

## 2021-02-01 PROCEDURE — 36415 COLL VENOUS BLD VENIPUNCTURE: CPT

## 2021-02-01 PROCEDURE — 3288F FALL RISK ASSESSMENT DOCD: CPT | Mod: CPTII,S$GLB,, | Performed by: INTERNAL MEDICINE

## 2021-02-01 PROCEDURE — 1159F PR MEDICATION LIST DOCUMENTED IN MEDICAL RECORD: ICD-10-PCS | Mod: S$GLB,,, | Performed by: INTERNAL MEDICINE

## 2021-02-01 PROCEDURE — 82378 CARCINOEMBRYONIC ANTIGEN: CPT

## 2021-02-01 PROCEDURE — 99499 RISK ADDL DX/OHS AUDIT: ICD-10-PCS | Mod: S$GLB,,, | Performed by: INTERNAL MEDICINE

## 2021-02-01 PROCEDURE — 99499 UNLISTED E&M SERVICE: CPT | Mod: S$GLB,,, | Performed by: INTERNAL MEDICINE

## 2021-02-01 PROCEDURE — 1101F PR PT FALLS ASSESS DOC 0-1 FALLS W/OUT INJ PAST YR: ICD-10-PCS | Mod: CPTII,S$GLB,, | Performed by: INTERNAL MEDICINE

## 2021-02-01 PROCEDURE — 99999 PR PBB SHADOW E&M-EST. PATIENT-LVL V: CPT | Mod: PBBFAC,,, | Performed by: INTERNAL MEDICINE

## 2021-02-01 PROCEDURE — 99999 PR PBB SHADOW E&M-EST. PATIENT-LVL V: ICD-10-PCS | Mod: PBBFAC,,, | Performed by: INTERNAL MEDICINE

## 2021-02-01 PROCEDURE — 1101F PT FALLS ASSESS-DOCD LE1/YR: CPT | Mod: CPTII,S$GLB,, | Performed by: INTERNAL MEDICINE

## 2021-02-01 PROCEDURE — 1126F PR PAIN SEVERITY QUANTIFIED, NO PAIN PRESENT: ICD-10-PCS | Mod: S$GLB,,, | Performed by: INTERNAL MEDICINE

## 2021-02-01 PROCEDURE — 1159F MED LIST DOCD IN RCRD: CPT | Mod: S$GLB,,, | Performed by: INTERNAL MEDICINE

## 2021-02-01 PROCEDURE — 80053 COMPREHEN METABOLIC PANEL: CPT

## 2021-02-04 ENCOUNTER — PATIENT MESSAGE (OUTPATIENT)
Dept: INTERNAL MEDICINE | Facility: CLINIC | Age: 86
End: 2021-02-04

## 2021-02-07 RX ORDER — LOSARTAN POTASSIUM 50 MG/1
50 TABLET ORAL DAILY
Qty: 90 TABLET | Refills: 3 | Status: ON HOLD | OUTPATIENT
Start: 2021-02-07 | End: 2021-04-01 | Stop reason: HOSPADM

## 2021-02-07 RX ORDER — AMLODIPINE BESYLATE 5 MG/1
5 TABLET ORAL EVERY MORNING
Qty: 90 TABLET | Refills: 3 | Status: SHIPPED | OUTPATIENT
Start: 2021-02-07 | End: 2022-02-07

## 2021-02-11 ENCOUNTER — TELEPHONE (OUTPATIENT)
Dept: PALLIATIVE MEDICINE | Facility: CLINIC | Age: 86
End: 2021-02-11

## 2021-02-12 ENCOUNTER — OFFICE VISIT (OUTPATIENT)
Dept: PALLIATIVE MEDICINE | Facility: CLINIC | Age: 86
End: 2021-02-12
Payer: MEDICARE

## 2021-02-12 VITALS — DIASTOLIC BLOOD PRESSURE: 83 MMHG | SYSTOLIC BLOOD PRESSURE: 147 MMHG | HEART RATE: 81 BPM

## 2021-02-12 DIAGNOSIS — R53.0 NEOPLASTIC (MALIGNANT) RELATED FATIGUE: ICD-10-CM

## 2021-02-12 DIAGNOSIS — Z71.89 ADVANCED CARE PLANNING/COUNSELING DISCUSSION: ICD-10-CM

## 2021-02-12 DIAGNOSIS — R19.7 DIARRHEA, UNSPECIFIED TYPE: ICD-10-CM

## 2021-02-12 DIAGNOSIS — G47.00 INSOMNIA, UNSPECIFIED TYPE: ICD-10-CM

## 2021-02-12 DIAGNOSIS — Z51.5 ENCOUNTER FOR PALLIATIVE CARE: ICD-10-CM

## 2021-02-12 DIAGNOSIS — F43.23 ADJUSTMENT DISORDER WITH MIXED ANXIETY AND DEPRESSED MOOD: ICD-10-CM

## 2021-02-12 DIAGNOSIS — C20 RECTAL CANCER: Primary | ICD-10-CM

## 2021-02-12 PROCEDURE — 99497 ADVNCD CARE PLAN 30 MIN: CPT | Mod: S$GLB,,, | Performed by: STUDENT IN AN ORGANIZED HEALTH CARE EDUCATION/TRAINING PROGRAM

## 2021-02-12 PROCEDURE — 99205 OFFICE O/P NEW HI 60 MIN: CPT | Mod: S$GLB,,, | Performed by: STUDENT IN AN ORGANIZED HEALTH CARE EDUCATION/TRAINING PROGRAM

## 2021-02-12 PROCEDURE — 1159F MED LIST DOCD IN RCRD: CPT | Mod: S$GLB,,, | Performed by: STUDENT IN AN ORGANIZED HEALTH CARE EDUCATION/TRAINING PROGRAM

## 2021-02-12 PROCEDURE — 3288F PR FALLS RISK ASSESSMENT DOCUMENTED: ICD-10-PCS | Mod: CPTII,S$GLB,, | Performed by: STUDENT IN AN ORGANIZED HEALTH CARE EDUCATION/TRAINING PROGRAM

## 2021-02-12 PROCEDURE — 1101F PT FALLS ASSESS-DOCD LE1/YR: CPT | Mod: CPTII,S$GLB,, | Performed by: STUDENT IN AN ORGANIZED HEALTH CARE EDUCATION/TRAINING PROGRAM

## 2021-02-12 PROCEDURE — 99999 PR PBB SHADOW E&M-EST. PATIENT-LVL III: ICD-10-PCS | Mod: PBBFAC,,, | Performed by: STUDENT IN AN ORGANIZED HEALTH CARE EDUCATION/TRAINING PROGRAM

## 2021-02-12 PROCEDURE — 1126F AMNT PAIN NOTED NONE PRSNT: CPT | Mod: S$GLB,,, | Performed by: STUDENT IN AN ORGANIZED HEALTH CARE EDUCATION/TRAINING PROGRAM

## 2021-02-12 PROCEDURE — 99999 PR PBB SHADOW E&M-EST. PATIENT-LVL III: CPT | Mod: PBBFAC,,, | Performed by: STUDENT IN AN ORGANIZED HEALTH CARE EDUCATION/TRAINING PROGRAM

## 2021-02-12 PROCEDURE — 99205 PR OFFICE/OUTPT VISIT, NEW, LEVL V, 60-74 MIN: ICD-10-PCS | Mod: S$GLB,,, | Performed by: STUDENT IN AN ORGANIZED HEALTH CARE EDUCATION/TRAINING PROGRAM

## 2021-02-12 PROCEDURE — 1101F PR PT FALLS ASSESS DOC 0-1 FALLS W/OUT INJ PAST YR: ICD-10-PCS | Mod: CPTII,S$GLB,, | Performed by: STUDENT IN AN ORGANIZED HEALTH CARE EDUCATION/TRAINING PROGRAM

## 2021-02-12 PROCEDURE — 99497 PR ADVNCD CARE PLAN 30 MIN: ICD-10-PCS | Mod: S$GLB,,, | Performed by: STUDENT IN AN ORGANIZED HEALTH CARE EDUCATION/TRAINING PROGRAM

## 2021-02-12 PROCEDURE — 3288F FALL RISK ASSESSMENT DOCD: CPT | Mod: CPTII,S$GLB,, | Performed by: STUDENT IN AN ORGANIZED HEALTH CARE EDUCATION/TRAINING PROGRAM

## 2021-02-12 PROCEDURE — 1159F PR MEDICATION LIST DOCUMENTED IN MEDICAL RECORD: ICD-10-PCS | Mod: S$GLB,,, | Performed by: STUDENT IN AN ORGANIZED HEALTH CARE EDUCATION/TRAINING PROGRAM

## 2021-02-12 PROCEDURE — 1126F PR PAIN SEVERITY QUANTIFIED, NO PAIN PRESENT: ICD-10-PCS | Mod: S$GLB,,, | Performed by: STUDENT IN AN ORGANIZED HEALTH CARE EDUCATION/TRAINING PROGRAM

## 2021-02-12 RX ORDER — DIPHENOXYLATE HYDROCHLORIDE AND ATROPINE SULFATE 2.5; .025 MG/1; MG/1
1 TABLET ORAL 4 TIMES DAILY PRN
Qty: 120 TABLET | Refills: 0 | Status: SHIPPED | OUTPATIENT
Start: 2021-02-12 | End: 2021-03-14

## 2021-02-12 RX ORDER — HYDROXYZINE HYDROCHLORIDE 25 MG/1
25 TABLET, FILM COATED ORAL 3 TIMES DAILY PRN
Qty: 90 TABLET | Refills: 0 | Status: SHIPPED | OUTPATIENT
Start: 2021-02-12

## 2021-02-23 ENCOUNTER — PATIENT MESSAGE (OUTPATIENT)
Dept: RHEUMATOLOGY | Facility: CLINIC | Age: 86
End: 2021-02-23

## 2021-03-05 ENCOUNTER — OFFICE VISIT (OUTPATIENT)
Dept: NEPHROLOGY | Facility: CLINIC | Age: 86
End: 2021-03-05
Payer: MEDICARE

## 2021-03-05 VITALS
HEART RATE: 74 BPM | DIASTOLIC BLOOD PRESSURE: 80 MMHG | WEIGHT: 100.75 LBS | OXYGEN SATURATION: 98 % | HEIGHT: 64 IN | BODY MASS INDEX: 17.2 KG/M2 | SYSTOLIC BLOOD PRESSURE: 160 MMHG

## 2021-03-05 DIAGNOSIS — N18.5 CHRONIC KIDNEY DISEASE, STAGE V: Primary | ICD-10-CM

## 2021-03-05 DIAGNOSIS — C20 RECTAL CANCER: ICD-10-CM

## 2021-03-05 PROCEDURE — 99214 PR OFFICE/OUTPT VISIT, EST, LEVL IV, 30-39 MIN: ICD-10-PCS | Mod: S$GLB,,, | Performed by: INTERNAL MEDICINE

## 2021-03-05 PROCEDURE — 1126F PR PAIN SEVERITY QUANTIFIED, NO PAIN PRESENT: ICD-10-PCS | Mod: S$GLB,,, | Performed by: INTERNAL MEDICINE

## 2021-03-05 PROCEDURE — 3288F FALL RISK ASSESSMENT DOCD: CPT | Mod: CPTII,S$GLB,, | Performed by: INTERNAL MEDICINE

## 2021-03-05 PROCEDURE — 99999 PR PBB SHADOW E&M-EST. PATIENT-LVL III: ICD-10-PCS | Mod: PBBFAC,,, | Performed by: INTERNAL MEDICINE

## 2021-03-05 PROCEDURE — 99999 PR PBB SHADOW E&M-EST. PATIENT-LVL III: CPT | Mod: PBBFAC,,, | Performed by: INTERNAL MEDICINE

## 2021-03-05 PROCEDURE — 1101F PT FALLS ASSESS-DOCD LE1/YR: CPT | Mod: CPTII,S$GLB,, | Performed by: INTERNAL MEDICINE

## 2021-03-05 PROCEDURE — 1159F MED LIST DOCD IN RCRD: CPT | Mod: S$GLB,,, | Performed by: INTERNAL MEDICINE

## 2021-03-05 PROCEDURE — 3288F PR FALLS RISK ASSESSMENT DOCUMENTED: ICD-10-PCS | Mod: CPTII,S$GLB,, | Performed by: INTERNAL MEDICINE

## 2021-03-05 PROCEDURE — 99214 OFFICE O/P EST MOD 30 MIN: CPT | Mod: S$GLB,,, | Performed by: INTERNAL MEDICINE

## 2021-03-05 PROCEDURE — 1126F AMNT PAIN NOTED NONE PRSNT: CPT | Mod: S$GLB,,, | Performed by: INTERNAL MEDICINE

## 2021-03-05 PROCEDURE — 1101F PR PT FALLS ASSESS DOC 0-1 FALLS W/OUT INJ PAST YR: ICD-10-PCS | Mod: CPTII,S$GLB,, | Performed by: INTERNAL MEDICINE

## 2021-03-05 PROCEDURE — 1159F PR MEDICATION LIST DOCUMENTED IN MEDICAL RECORD: ICD-10-PCS | Mod: S$GLB,,, | Performed by: INTERNAL MEDICINE

## 2021-03-05 RX ORDER — DOCUSATE SODIUM 100 MG/1
100 CAPSULE, LIQUID FILLED ORAL 2 TIMES DAILY
Qty: 60 CAPSULE | Refills: 5 | Status: SHIPPED | OUTPATIENT
Start: 2021-03-05

## 2021-03-05 RX ORDER — ONDANSETRON 4 MG/1
4 TABLET, FILM COATED ORAL EVERY 8 HOURS PRN
Qty: 30 TABLET | Refills: 0 | Status: SHIPPED | OUTPATIENT
Start: 2021-03-05

## 2021-03-05 RX ORDER — TRAMADOL HYDROCHLORIDE 50 MG/1
50 TABLET ORAL 2 TIMES DAILY PRN
Qty: 30 TABLET | Refills: 0 | Status: ON HOLD | OUTPATIENT
Start: 2021-03-05 | End: 2021-04-01 | Stop reason: HOSPADM

## 2021-03-16 ENCOUNTER — TELEPHONE (OUTPATIENT)
Dept: PALLIATIVE MEDICINE | Facility: CLINIC | Age: 86
End: 2021-03-16

## 2021-03-17 ENCOUNTER — OFFICE VISIT (OUTPATIENT)
Dept: RHEUMATOLOGY | Facility: CLINIC | Age: 86
End: 2021-03-17
Payer: MEDICARE

## 2021-03-17 VITALS — HEART RATE: 86 BPM | SYSTOLIC BLOOD PRESSURE: 145 MMHG | DIASTOLIC BLOOD PRESSURE: 73 MMHG

## 2021-03-17 DIAGNOSIS — M11.20 CHONDROCALCINOSIS ARTICULARIS: ICD-10-CM

## 2021-03-17 DIAGNOSIS — M25.562 PAIN AND SWELLING OF KNEE, LEFT: Primary | ICD-10-CM

## 2021-03-17 DIAGNOSIS — M25.462 PAIN AND SWELLING OF KNEE, LEFT: Primary | ICD-10-CM

## 2021-03-17 LAB
APPEARANCE FLD: NORMAL
BODY FLD TYPE: ABNORMAL
BODY FLD TYPE: NORMAL
COLOR FLD: YELLOW
CRYSTALS FLD MICRO: POSITIVE
LYMPHOCYTES NFR FLD MANUAL: 11 %
MONOS+MACROS NFR FLD MANUAL: 19 %
NEUTROPHILS NFR FLD MANUAL: 70 %
WBC # FLD: 8508 /CU MM

## 2021-03-17 PROCEDURE — 89060 EXAM SYNOVIAL FLUID CRYSTALS: CPT

## 2021-03-17 PROCEDURE — 89051 BODY FLUID CELL COUNT: CPT

## 2021-03-17 PROCEDURE — 99214 OFFICE O/P EST MOD 30 MIN: CPT | Mod: 25,GC,S$GLB,

## 2021-03-17 PROCEDURE — 99214 PR OFFICE/OUTPT VISIT, EST, LEVL IV, 30-39 MIN: ICD-10-PCS | Mod: 25,GC,S$GLB,

## 2021-03-17 PROCEDURE — 99999 PR PBB SHADOW E&M-EST. PATIENT-LVL I: ICD-10-PCS | Mod: PBBFAC,GC,,

## 2021-03-17 PROCEDURE — 1159F MED LIST DOCD IN RCRD: CPT | Mod: GC,S$GLB,,

## 2021-03-17 PROCEDURE — 20610 LARGE JOINT ASPIRATION/INJECTION: L KNEE: ICD-10-PCS | Mod: LT,GC,S$GLB,

## 2021-03-17 PROCEDURE — 87070 CULTURE OTHR SPECIMN AEROBIC: CPT

## 2021-03-17 PROCEDURE — 87075 CULTR BACTERIA EXCEPT BLOOD: CPT

## 2021-03-17 PROCEDURE — 20610 DRAIN/INJ JOINT/BURSA W/O US: CPT | Mod: LT,GC,S$GLB,

## 2021-03-17 PROCEDURE — 1159F PR MEDICATION LIST DOCUMENTED IN MEDICAL RECORD: ICD-10-PCS | Mod: GC,S$GLB,,

## 2021-03-17 PROCEDURE — 99999 PR PBB SHADOW E&M-EST. PATIENT-LVL I: CPT | Mod: PBBFAC,GC,,

## 2021-03-18 LAB — PATH INTERP FLD-IMP: NORMAL

## 2021-03-19 ENCOUNTER — TELEPHONE (OUTPATIENT)
Dept: PALLIATIVE MEDICINE | Facility: CLINIC | Age: 86
End: 2021-03-19

## 2021-03-20 ENCOUNTER — TELEPHONE (OUTPATIENT)
Dept: INTERNAL MEDICINE | Facility: CLINIC | Age: 86
End: 2021-03-20

## 2021-03-20 LAB — BACTERIA SPEC AEROBE CULT: NO GROWTH

## 2021-03-22 ENCOUNTER — TELEPHONE (OUTPATIENT)
Dept: INTERNAL MEDICINE | Facility: CLINIC | Age: 86
End: 2021-03-22

## 2021-03-22 ENCOUNTER — OFFICE VISIT (OUTPATIENT)
Dept: PALLIATIVE MEDICINE | Facility: CLINIC | Age: 86
End: 2021-03-22
Payer: MEDICARE

## 2021-03-22 VITALS — DIASTOLIC BLOOD PRESSURE: 74 MMHG | HEART RATE: 93 BPM | SYSTOLIC BLOOD PRESSURE: 165 MMHG

## 2021-03-22 DIAGNOSIS — R19.7 DIARRHEA, UNSPECIFIED TYPE: ICD-10-CM

## 2021-03-22 DIAGNOSIS — M79.641 RIGHT HAND PAIN: ICD-10-CM

## 2021-03-22 DIAGNOSIS — F43.23 ADJUSTMENT DISORDER WITH MIXED ANXIETY AND DEPRESSED MOOD: ICD-10-CM

## 2021-03-22 DIAGNOSIS — Z71.89 ADVANCED CARE PLANNING/COUNSELING DISCUSSION: ICD-10-CM

## 2021-03-22 DIAGNOSIS — C20 RECTAL CANCER: Primary | ICD-10-CM

## 2021-03-22 DIAGNOSIS — Z51.5 ENCOUNTER FOR PALLIATIVE CARE: ICD-10-CM

## 2021-03-22 DIAGNOSIS — R60.0 EDEMA OF LEFT LOWER EXTREMITY: ICD-10-CM

## 2021-03-22 DIAGNOSIS — R53.0 NEOPLASTIC (MALIGNANT) RELATED FATIGUE: ICD-10-CM

## 2021-03-22 DIAGNOSIS — G47.00 INSOMNIA, UNSPECIFIED TYPE: ICD-10-CM

## 2021-03-22 PROCEDURE — 99999 PR PBB SHADOW E&M-EST. PATIENT-LVL III: ICD-10-PCS | Mod: PBBFAC,,, | Performed by: STUDENT IN AN ORGANIZED HEALTH CARE EDUCATION/TRAINING PROGRAM

## 2021-03-22 PROCEDURE — 3288F PR FALLS RISK ASSESSMENT DOCUMENTED: ICD-10-PCS | Mod: CPTII,S$GLB,, | Performed by: STUDENT IN AN ORGANIZED HEALTH CARE EDUCATION/TRAINING PROGRAM

## 2021-03-22 PROCEDURE — 99214 PR OFFICE/OUTPT VISIT, EST, LEVL IV, 30-39 MIN: ICD-10-PCS | Mod: S$GLB,,, | Performed by: STUDENT IN AN ORGANIZED HEALTH CARE EDUCATION/TRAINING PROGRAM

## 2021-03-22 PROCEDURE — 1101F PR PT FALLS ASSESS DOC 0-1 FALLS W/OUT INJ PAST YR: ICD-10-PCS | Mod: CPTII,S$GLB,, | Performed by: STUDENT IN AN ORGANIZED HEALTH CARE EDUCATION/TRAINING PROGRAM

## 2021-03-22 PROCEDURE — 1101F PT FALLS ASSESS-DOCD LE1/YR: CPT | Mod: CPTII,S$GLB,, | Performed by: STUDENT IN AN ORGANIZED HEALTH CARE EDUCATION/TRAINING PROGRAM

## 2021-03-22 PROCEDURE — 1159F MED LIST DOCD IN RCRD: CPT | Mod: S$GLB,,, | Performed by: STUDENT IN AN ORGANIZED HEALTH CARE EDUCATION/TRAINING PROGRAM

## 2021-03-22 PROCEDURE — 1159F PR MEDICATION LIST DOCUMENTED IN MEDICAL RECORD: ICD-10-PCS | Mod: S$GLB,,, | Performed by: STUDENT IN AN ORGANIZED HEALTH CARE EDUCATION/TRAINING PROGRAM

## 2021-03-22 PROCEDURE — 3288F FALL RISK ASSESSMENT DOCD: CPT | Mod: CPTII,S$GLB,, | Performed by: STUDENT IN AN ORGANIZED HEALTH CARE EDUCATION/TRAINING PROGRAM

## 2021-03-22 PROCEDURE — 1125F PR PAIN SEVERITY QUANTIFIED, PAIN PRESENT: ICD-10-PCS | Mod: S$GLB,,, | Performed by: STUDENT IN AN ORGANIZED HEALTH CARE EDUCATION/TRAINING PROGRAM

## 2021-03-22 PROCEDURE — 99999 PR PBB SHADOW E&M-EST. PATIENT-LVL III: CPT | Mod: PBBFAC,,, | Performed by: STUDENT IN AN ORGANIZED HEALTH CARE EDUCATION/TRAINING PROGRAM

## 2021-03-22 PROCEDURE — 1125F AMNT PAIN NOTED PAIN PRSNT: CPT | Mod: S$GLB,,, | Performed by: STUDENT IN AN ORGANIZED HEALTH CARE EDUCATION/TRAINING PROGRAM

## 2021-03-22 PROCEDURE — 99214 OFFICE O/P EST MOD 30 MIN: CPT | Mod: S$GLB,,, | Performed by: STUDENT IN AN ORGANIZED HEALTH CARE EDUCATION/TRAINING PROGRAM

## 2021-03-23 ENCOUNTER — TELEPHONE (OUTPATIENT)
Dept: INTERNAL MEDICINE | Facility: CLINIC | Age: 86
End: 2021-03-23

## 2021-03-23 DIAGNOSIS — D68.59 HYPERCOAGULABLE STATE: ICD-10-CM

## 2021-03-23 DIAGNOSIS — R60.0 LOWER EXTREMITY EDEMA: Primary | ICD-10-CM

## 2021-03-24 LAB — BACTERIA SPEC ANAEROBE CULT: NORMAL

## 2021-03-29 ENCOUNTER — TELEPHONE (OUTPATIENT)
Dept: PALLIATIVE MEDICINE | Facility: CLINIC | Age: 86
End: 2021-03-29

## 2021-03-30 ENCOUNTER — TELEPHONE (OUTPATIENT)
Dept: INTERNAL MEDICINE | Facility: CLINIC | Age: 86
End: 2021-03-30

## 2021-03-30 ENCOUNTER — HOSPITAL ENCOUNTER (INPATIENT)
Facility: HOSPITAL | Age: 86
LOS: 6 days | Discharge: HOSPICE | DRG: 054 | End: 2021-04-05
Attending: EMERGENCY MEDICINE | Admitting: STUDENT IN AN ORGANIZED HEALTH CARE EDUCATION/TRAINING PROGRAM
Payer: MEDICARE

## 2021-03-30 ENCOUNTER — NURSE TRIAGE (OUTPATIENT)
Dept: ADMINISTRATIVE | Facility: CLINIC | Age: 86
End: 2021-03-30

## 2021-03-30 DIAGNOSIS — Z71.89 GOALS OF CARE, COUNSELING/DISCUSSION: ICD-10-CM
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
## 2021-03-30 DIAGNOSIS — R53.81 DEBILITY: ICD-10-CM

## 2021-03-30 DIAGNOSIS — Z51.5 PALLIATIVE CARE ENCOUNTER: ICD-10-CM

## 2021-03-30 DIAGNOSIS — C79.31 METASTATIC CANCER TO BRAIN: ICD-10-CM

## 2021-03-30 DIAGNOSIS — G93.6 VASOGENIC CEREBRAL EDEMA: Primary | ICD-10-CM

## 2021-03-30 DIAGNOSIS — C79.31 SECONDARY MALIGNANT NEOPLASM OF BRAIN: ICD-10-CM

## 2021-03-30 DIAGNOSIS — Z71.89 ADVANCE CARE PLANNING: ICD-10-CM

## 2021-03-30 DIAGNOSIS — R07.9 CHEST PAIN: ICD-10-CM

## 2021-03-30 DIAGNOSIS — R52 PAIN: ICD-10-CM

## 2021-03-30 PROBLEM — E87.1 HYPONATREMIA: Status: ACTIVE | Noted: 2021-03-30

## 2021-03-30 PROBLEM — Z75.8 DISCHARGE PLANNING ISSUES: Status: ACTIVE | Noted: 2021-03-30

## 2021-03-30 PROBLEM — R79.89 ELEVATED TROPONIN: Status: ACTIVE | Noted: 2021-03-30

## 2021-03-30 LAB
ALBUMIN SERPL BCP-MCNC: 3.8 G/DL (ref 3.5–5.2)
ALP SERPL-CCNC: 36 U/L (ref 55–135)
ALT SERPL W/O P-5'-P-CCNC: 18 U/L (ref 10–44)
ANION GAP SERPL CALC-SCNC: 14 MMOL/L (ref 8–16)
AST SERPL-CCNC: 35 U/L (ref 10–40)
BACTERIA #/AREA URNS AUTO: ABNORMAL /HPF
BASOPHILS # BLD AUTO: 0.01 K/UL (ref 0–0.2)
BASOPHILS NFR BLD: 0.1 % (ref 0–1.9)
BILIRUB SERPL-MCNC: 0.3 MG/DL (ref 0.1–1)
BILIRUB UR QL STRIP: NEGATIVE
BUN SERPL-MCNC: 112 MG/DL (ref 8–23)
CALCIUM SERPL-MCNC: 7.7 MG/DL (ref 8.7–10.5)
CHLORIDE SERPL-SCNC: 115 MMOL/L (ref 95–110)
CK SERPL-CCNC: 1035 U/L (ref 20–180)
CLARITY UR REFRACT.AUTO: CLEAR
CO2 SERPL-SCNC: 14 MMOL/L (ref 23–29)
COLOR UR AUTO: ABNORMAL
CREAT SERPL-MCNC: 5.4 MG/DL (ref 0.5–1.4)
CTP QC/QA: YES
DIFFERENTIAL METHOD: ABNORMAL
EOSINOPHIL # BLD AUTO: 0 K/UL (ref 0–0.5)
EOSINOPHIL NFR BLD: 0 % (ref 0–8)
ERYTHROCYTE [DISTWIDTH] IN BLOOD BY AUTOMATED COUNT: 14.6 % (ref 11.5–14.5)
EST. GFR  (AFRICAN AMERICAN): 7.5 ML/MIN/1.73 M^2
EST. GFR  (NON AFRICAN AMERICAN): 6.5 ML/MIN/1.73 M^2
GLUCOSE SERPL-MCNC: 99 MG/DL (ref 70–110)
GLUCOSE UR QL STRIP: NEGATIVE
HCT VFR BLD AUTO: 26 % (ref 37–48.5)
HGB BLD-MCNC: 8.5 G/DL (ref 12–16)
HGB UR QL STRIP: ABNORMAL
HYALINE CASTS UR QL AUTO: 0 /LPF
IMM GRANULOCYTES # BLD AUTO: 0.03 K/UL (ref 0–0.04)
IMM GRANULOCYTES NFR BLD AUTO: 0.4 % (ref 0–0.5)
INR PPP: 1 (ref 0.8–1.2)
KETONES UR QL STRIP: NEGATIVE
LEUKOCYTE ESTERASE UR QL STRIP: ABNORMAL
LYMPHOCYTES # BLD AUTO: 0.5 K/UL (ref 1–4.8)
LYMPHOCYTES NFR BLD: 7.6 % (ref 18–48)
MAGNESIUM SERPL-MCNC: 1.6 MG/DL (ref 1.6–2.6)
MCH RBC QN AUTO: 31.1 PG (ref 27–31)
MCHC RBC AUTO-ENTMCNC: 32.7 G/DL (ref 32–36)
MCV RBC AUTO: 95 FL (ref 82–98)
MICROSCOPIC COMMENT: ABNORMAL
MONOCYTES # BLD AUTO: 0.4 K/UL (ref 0.3–1)
MONOCYTES NFR BLD: 5.2 % (ref 4–15)
NEUTROPHILS # BLD AUTO: 6 K/UL (ref 1.8–7.7)
NEUTROPHILS NFR BLD: 86.7 % (ref 38–73)
NITRITE UR QL STRIP: NEGATIVE
NRBC BLD-RTO: 0 /100 WBC
PH UR STRIP: 6 [PH] (ref 5–8)
PLATELET # BLD AUTO: 153 K/UL (ref 150–450)
PMV BLD AUTO: 10.4 FL (ref 9.2–12.9)
POCT GLUCOSE: 110 MG/DL (ref 70–110)
POTASSIUM SERPL-SCNC: 5.1 MMOL/L (ref 3.5–5.1)
PROT SERPL-MCNC: 6.9 G/DL (ref 6–8.4)
PROT UR QL STRIP: ABNORMAL
PROTHROMBIN TIME: 10.9 SEC (ref 9–12.5)
RBC # BLD AUTO: 2.73 M/UL (ref 4–5.4)
RBC #/AREA URNS AUTO: 4 /HPF (ref 0–4)
SARS-COV-2 RDRP RESP QL NAA+PROBE: NEGATIVE
SODIUM SERPL-SCNC: 143 MMOL/L (ref 136–145)
SP GR UR STRIP: 1.01 (ref 1–1.03)
SQUAMOUS #/AREA URNS AUTO: 0 /HPF
TROPONIN I SERPL DL<=0.01 NG/ML-MCNC: 0.04 NG/ML (ref 0–0.03)
URN SPEC COLLECT METH UR: ABNORMAL
WBC # BLD AUTO: 6.87 K/UL (ref 3.9–12.7)
WBC #/AREA URNS AUTO: 48 /HPF (ref 0–5)

## 2021-03-30 PROCEDURE — 96374 THER/PROPH/DIAG INJ IV PUSH: CPT

## 2021-03-30 PROCEDURE — 93010 ELECTROCARDIOGRAM REPORT: CPT | Mod: ,,, | Performed by: INTERNAL MEDICINE

## 2021-03-30 PROCEDURE — 25000003 PHARM REV CODE 250: Performed by: STUDENT IN AN ORGANIZED HEALTH CARE EDUCATION/TRAINING PROGRAM

## 2021-03-30 PROCEDURE — 82962 GLUCOSE BLOOD TEST: CPT

## 2021-03-30 PROCEDURE — U0002 COVID-19 LAB TEST NON-CDC: HCPCS | Performed by: STUDENT IN AN ORGANIZED HEALTH CARE EDUCATION/TRAINING PROGRAM

## 2021-03-30 PROCEDURE — 93010 EKG 12-LEAD: ICD-10-PCS | Mod: ,,, | Performed by: INTERNAL MEDICINE

## 2021-03-30 PROCEDURE — 85025 COMPLETE CBC W/AUTO DIFF WBC: CPT | Performed by: NURSE PRACTITIONER

## 2021-03-30 PROCEDURE — 99223 PR INITIAL HOSPITAL CARE,LEVL III: ICD-10-PCS | Mod: AI,GC,, | Performed by: STUDENT IN AN ORGANIZED HEALTH CARE EDUCATION/TRAINING PROGRAM

## 2021-03-30 PROCEDURE — 99223 1ST HOSP IP/OBS HIGH 75: CPT | Mod: AI,GC,, | Performed by: STUDENT IN AN ORGANIZED HEALTH CARE EDUCATION/TRAINING PROGRAM

## 2021-03-30 PROCEDURE — 99285 EMERGENCY DEPT VISIT HI MDM: CPT | Mod: 25

## 2021-03-30 PROCEDURE — 93005 ELECTROCARDIOGRAM TRACING: CPT

## 2021-03-30 PROCEDURE — 85610 PROTHROMBIN TIME: CPT | Performed by: NURSE PRACTITIONER

## 2021-03-30 PROCEDURE — 99223 PR INITIAL HOSPITAL CARE,LEVL III: ICD-10-PCS | Mod: ,,, | Performed by: PHYSICIAN ASSISTANT

## 2021-03-30 PROCEDURE — 87077 CULTURE AEROBIC IDENTIFY: CPT | Performed by: NURSE PRACTITIONER

## 2021-03-30 PROCEDURE — 81001 URINALYSIS AUTO W/SCOPE: CPT | Performed by: NURSE PRACTITIONER

## 2021-03-30 PROCEDURE — 87186 SC STD MICRODIL/AGAR DIL: CPT | Performed by: NURSE PRACTITIONER

## 2021-03-30 PROCEDURE — 84484 ASSAY OF TROPONIN QUANT: CPT | Performed by: STUDENT IN AN ORGANIZED HEALTH CARE EDUCATION/TRAINING PROGRAM

## 2021-03-30 PROCEDURE — 63600175 PHARM REV CODE 636 W HCPCS: Performed by: STUDENT IN AN ORGANIZED HEALTH CARE EDUCATION/TRAINING PROGRAM

## 2021-03-30 PROCEDURE — 83735 ASSAY OF MAGNESIUM: CPT | Performed by: NURSE PRACTITIONER

## 2021-03-30 PROCEDURE — 99285 EMERGENCY DEPT VISIT HI MDM: CPT | Mod: CS,,, | Performed by: EMERGENCY MEDICINE

## 2021-03-30 PROCEDURE — 96376 TX/PRO/DX INJ SAME DRUG ADON: CPT

## 2021-03-30 PROCEDURE — 96375 TX/PRO/DX INJ NEW DRUG ADDON: CPT

## 2021-03-30 PROCEDURE — 12000002 HC ACUTE/MED SURGE SEMI-PRIVATE ROOM

## 2021-03-30 PROCEDURE — 82550 ASSAY OF CK (CPK): CPT | Performed by: NURSE PRACTITIONER

## 2021-03-30 PROCEDURE — 87088 URINE BACTERIA CULTURE: CPT | Performed by: NURSE PRACTITIONER

## 2021-03-30 PROCEDURE — 87086 URINE CULTURE/COLONY COUNT: CPT | Performed by: NURSE PRACTITIONER

## 2021-03-30 PROCEDURE — 99285 PR EMERGENCY DEPT VISIT,LEVEL V: ICD-10-PCS | Mod: CS,,, | Performed by: EMERGENCY MEDICINE

## 2021-03-30 PROCEDURE — 80053 COMPREHEN METABOLIC PANEL: CPT | Performed by: NURSE PRACTITIONER

## 2021-03-30 PROCEDURE — 99223 1ST HOSP IP/OBS HIGH 75: CPT | Mod: ,,, | Performed by: PHYSICIAN ASSISTANT

## 2021-03-30 RX ORDER — IBUPROFEN 200 MG
24 TABLET ORAL
Status: DISCONTINUED | OUTPATIENT
Start: 2021-03-30 | End: 2021-04-05 | Stop reason: HOSPADM

## 2021-03-30 RX ORDER — GLUCAGON 1 MG
1 KIT INJECTION
Status: DISCONTINUED | OUTPATIENT
Start: 2021-03-30 | End: 2021-04-05 | Stop reason: HOSPADM

## 2021-03-30 RX ORDER — HYDROCODONE BITARTRATE AND ACETAMINOPHEN 10; 325 MG/1; MG/1
1 TABLET ORAL EVERY 6 HOURS PRN
Status: DISCONTINUED | OUTPATIENT
Start: 2021-03-30 | End: 2021-04-02

## 2021-03-30 RX ORDER — TALC
6 POWDER (GRAM) TOPICAL NIGHTLY PRN
Status: DISCONTINUED | OUTPATIENT
Start: 2021-03-30 | End: 2021-04-05 | Stop reason: HOSPADM

## 2021-03-30 RX ORDER — LEVETIRACETAM 5 MG/ML
500 INJECTION INTRAVASCULAR
Status: COMPLETED | OUTPATIENT
Start: 2021-03-30 | End: 2021-03-30

## 2021-03-30 RX ORDER — DEXAMETHASONE SODIUM PHOSPHATE 4 MG/ML
4 INJECTION, SOLUTION INTRA-ARTICULAR; INTRALESIONAL; INTRAMUSCULAR; INTRAVENOUS; SOFT TISSUE ONCE
Status: COMPLETED | OUTPATIENT
Start: 2021-03-30 | End: 2021-03-30

## 2021-03-30 RX ORDER — SODIUM CHLORIDE 0.9 % (FLUSH) 0.9 %
10 SYRINGE (ML) INJECTION
Status: DISCONTINUED | OUTPATIENT
Start: 2021-03-30 | End: 2021-04-05 | Stop reason: HOSPADM

## 2021-03-30 RX ORDER — HYDROCODONE BITARTRATE AND ACETAMINOPHEN 5; 325 MG/1; MG/1
1 TABLET ORAL EVERY 8 HOURS PRN
Status: DISCONTINUED | OUTPATIENT
Start: 2021-03-30 | End: 2021-04-02

## 2021-03-30 RX ORDER — LIDOCAINE 50 MG/G
1 PATCH TOPICAL
Status: DISCONTINUED | OUTPATIENT
Start: 2021-03-31 | End: 2021-04-05 | Stop reason: HOSPADM

## 2021-03-30 RX ORDER — DEXAMETHASONE SODIUM PHOSPHATE 4 MG/ML
4 INJECTION, SOLUTION INTRA-ARTICULAR; INTRALESIONAL; INTRAMUSCULAR; INTRAVENOUS; SOFT TISSUE EVERY 6 HOURS
Status: DISCONTINUED | OUTPATIENT
Start: 2021-03-31 | End: 2021-04-01

## 2021-03-30 RX ORDER — IBUPROFEN 200 MG
16 TABLET ORAL
Status: DISCONTINUED | OUTPATIENT
Start: 2021-03-30 | End: 2021-04-05 | Stop reason: HOSPADM

## 2021-03-30 RX ORDER — DOCUSATE SODIUM 100 MG/1
100 CAPSULE, LIQUID FILLED ORAL 2 TIMES DAILY
Status: DISCONTINUED | OUTPATIENT
Start: 2021-03-30 | End: 2021-04-05 | Stop reason: HOSPADM

## 2021-03-30 RX ORDER — MORPHINE SULFATE 2 MG/ML
2 INJECTION, SOLUTION INTRAMUSCULAR; INTRAVENOUS
Status: COMPLETED | OUTPATIENT
Start: 2021-03-30 | End: 2021-03-30

## 2021-03-30 RX ORDER — ONDANSETRON 8 MG/1
8 TABLET, ORALLY DISINTEGRATING ORAL EVERY 8 HOURS PRN
Status: DISCONTINUED | OUTPATIENT
Start: 2021-03-30 | End: 2021-04-05 | Stop reason: HOSPADM

## 2021-03-30 RX ORDER — HEPARIN SODIUM 5000 [USP'U]/ML
5000 INJECTION, SOLUTION INTRAVENOUS; SUBCUTANEOUS EVERY 8 HOURS
Status: DISCONTINUED | OUTPATIENT
Start: 2021-03-30 | End: 2021-04-05 | Stop reason: HOSPADM

## 2021-03-30 RX ORDER — LOSARTAN POTASSIUM 50 MG/1
50 TABLET ORAL DAILY
Status: DISCONTINUED | OUTPATIENT
Start: 2021-03-31 | End: 2021-04-01

## 2021-03-30 RX ORDER — ACETAMINOPHEN 325 MG/1
650 TABLET ORAL EVERY 4 HOURS PRN
Status: DISCONTINUED | OUTPATIENT
Start: 2021-03-30 | End: 2021-04-02

## 2021-03-30 RX ORDER — LIDOCAINE 50 MG/G
1 PATCH TOPICAL
Status: COMPLETED | OUTPATIENT
Start: 2021-03-30 | End: 2021-03-31

## 2021-03-30 RX ORDER — PANTOPRAZOLE SODIUM 40 MG/10ML
40 INJECTION, POWDER, LYOPHILIZED, FOR SOLUTION INTRAVENOUS DAILY
Status: DISCONTINUED | OUTPATIENT
Start: 2021-03-31 | End: 2021-04-01

## 2021-03-30 RX ORDER — ACETAMINOPHEN 325 MG/1
650 TABLET ORAL EVERY 8 HOURS PRN
Status: DISCONTINUED | OUTPATIENT
Start: 2021-03-30 | End: 2021-04-02

## 2021-03-30 RX ORDER — AMLODIPINE BESYLATE 5 MG/1
5 TABLET ORAL EVERY MORNING
Status: DISCONTINUED | OUTPATIENT
Start: 2021-03-31 | End: 2021-04-05 | Stop reason: HOSPADM

## 2021-03-30 RX ADMIN — LEVETIRACETAM 500 MG: 5 INJECTION INTRAVENOUS at 07:03

## 2021-03-30 RX ADMIN — LIDOCAINE 1 PATCH: 50 PATCH TOPICAL at 04:03

## 2021-03-30 RX ADMIN — HYDROCODONE BITARTRATE AND ACETAMINOPHEN 1 TABLET: 10; 325 TABLET ORAL at 09:03

## 2021-03-30 RX ADMIN — MORPHINE SULFATE 2 MG: 2 INJECTION, SOLUTION INTRAMUSCULAR; INTRAVENOUS at 05:03

## 2021-03-30 RX ADMIN — MORPHINE SULFATE 2 MG: 2 INJECTION, SOLUTION INTRAMUSCULAR; INTRAVENOUS at 03:03

## 2021-03-30 RX ADMIN — HEPARIN SODIUM 5000 UNITS: 5000 INJECTION INTRAVENOUS; SUBCUTANEOUS at 09:03

## 2021-03-30 RX ADMIN — DEXAMETHASONE SODIUM PHOSPHATE 4 MG: 4 INJECTION INTRA-ARTICULAR; INTRALESIONAL; INTRAMUSCULAR; INTRAVENOUS; SOFT TISSUE at 05:03

## 2021-03-30 RX ADMIN — DOCUSATE SODIUM 100 MG: 100 CAPSULE, LIQUID FILLED ORAL at 09:03

## 2021-03-31 PROBLEM — C79.31 SECONDARY MALIGNANT NEOPLASM OF BRAIN: Status: ACTIVE | Noted: 2021-03-31

## 2021-03-31 PROBLEM — E83.51 HYPOCALCEMIA: Status: ACTIVE | Noted: 2021-03-31

## 2021-03-31 LAB
ALBUMIN SERPL BCP-MCNC: 3.4 G/DL (ref 3.5–5.2)
ALP SERPL-CCNC: 36 U/L (ref 55–135)
ALT SERPL W/O P-5'-P-CCNC: 21 U/L (ref 10–44)
ANION GAP SERPL CALC-SCNC: 14 MMOL/L (ref 8–16)
AST SERPL-CCNC: 36 U/L (ref 10–40)
BASOPHILS # BLD AUTO: 0 K/UL (ref 0–0.2)
BASOPHILS NFR BLD: 0 % (ref 0–1.9)
BILIRUB SERPL-MCNC: 0.3 MG/DL (ref 0.1–1)
BUN SERPL-MCNC: 107 MG/DL (ref 8–23)
CA-I BLDV-SCNC: 1 MMOL/L (ref 1.06–1.42)
CALCIUM SERPL-MCNC: 6.9 MG/DL (ref 8.7–10.5)
CHLORIDE SERPL-SCNC: 117 MMOL/L (ref 95–110)
CO2 SERPL-SCNC: 13 MMOL/L (ref 23–29)
CREAT SERPL-MCNC: 5.2 MG/DL (ref 0.5–1.4)
DIFFERENTIAL METHOD: ABNORMAL
EOSINOPHIL # BLD AUTO: 0 K/UL (ref 0–0.5)
EOSINOPHIL NFR BLD: 0 % (ref 0–8)
ERYTHROCYTE [DISTWIDTH] IN BLOOD BY AUTOMATED COUNT: 14.9 % (ref 11.5–14.5)
EST. GFR  (AFRICAN AMERICAN): 7.9 ML/MIN/1.73 M^2
EST. GFR  (NON AFRICAN AMERICAN): 6.8 ML/MIN/1.73 M^2
GLUCOSE SERPL-MCNC: 134 MG/DL (ref 70–110)
HCT VFR BLD AUTO: 24 % (ref 37–48.5)
HGB BLD-MCNC: 7.8 G/DL (ref 12–16)
IMM GRANULOCYTES # BLD AUTO: 0.01 K/UL (ref 0–0.04)
IMM GRANULOCYTES NFR BLD AUTO: 0.2 % (ref 0–0.5)
LYMPHOCYTES # BLD AUTO: 0.3 K/UL (ref 1–4.8)
LYMPHOCYTES NFR BLD: 7.2 % (ref 18–48)
MAGNESIUM SERPL-MCNC: 1.6 MG/DL (ref 1.6–2.6)
MCH RBC QN AUTO: 30.8 PG (ref 27–31)
MCHC RBC AUTO-ENTMCNC: 32.5 G/DL (ref 32–36)
MCV RBC AUTO: 95 FL (ref 82–98)
MONOCYTES # BLD AUTO: 0 K/UL (ref 0.3–1)
MONOCYTES NFR BLD: 0.6 % (ref 4–15)
NEUTROPHILS # BLD AUTO: 4.4 K/UL (ref 1.8–7.7)
NEUTROPHILS NFR BLD: 92 % (ref 38–73)
NRBC BLD-RTO: 0 /100 WBC
PHOSPHATE SERPL-MCNC: 5.9 MG/DL (ref 2.7–4.5)
PLATELET # BLD AUTO: 146 K/UL (ref 150–450)
PMV BLD AUTO: 10.5 FL (ref 9.2–12.9)
POTASSIUM SERPL-SCNC: 4.5 MMOL/L (ref 3.5–5.1)
PROT SERPL-MCNC: 6.2 G/DL (ref 6–8.4)
RBC # BLD AUTO: 2.53 M/UL (ref 4–5.4)
SODIUM SERPL-SCNC: 144 MMOL/L (ref 136–145)
WBC # BLD AUTO: 4.74 K/UL (ref 3.9–12.7)

## 2021-03-31 PROCEDURE — 25000003 PHARM REV CODE 250: Performed by: STUDENT IN AN ORGANIZED HEALTH CARE EDUCATION/TRAINING PROGRAM

## 2021-03-31 PROCEDURE — 83735 ASSAY OF MAGNESIUM: CPT | Performed by: STUDENT IN AN ORGANIZED HEALTH CARE EDUCATION/TRAINING PROGRAM

## 2021-03-31 PROCEDURE — 99497 ADVNCD CARE PLAN 30 MIN: CPT | Mod: 25,,, | Performed by: CLINICAL NURSE SPECIALIST

## 2021-03-31 PROCEDURE — 97162 PT EVAL MOD COMPLEX 30 MIN: CPT

## 2021-03-31 PROCEDURE — 11000001 HC ACUTE MED/SURG PRIVATE ROOM

## 2021-03-31 PROCEDURE — 63600175 PHARM REV CODE 636 W HCPCS: Performed by: STUDENT IN AN ORGANIZED HEALTH CARE EDUCATION/TRAINING PROGRAM

## 2021-03-31 PROCEDURE — 99497 PR ADVNCD CARE PLAN 30 MIN: ICD-10-PCS | Mod: 25,,, | Performed by: CLINICAL NURSE SPECIALIST

## 2021-03-31 PROCEDURE — 99232 PR SUBSEQUENT HOSPITAL CARE,LEVL II: ICD-10-PCS | Mod: GC,,, | Performed by: STUDENT IN AN ORGANIZED HEALTH CARE EDUCATION/TRAINING PROGRAM

## 2021-03-31 PROCEDURE — 99232 SBSQ HOSP IP/OBS MODERATE 35: CPT | Mod: GC,,, | Performed by: STUDENT IN AN ORGANIZED HEALTH CARE EDUCATION/TRAINING PROGRAM

## 2021-03-31 PROCEDURE — 82330 ASSAY OF CALCIUM: CPT | Performed by: STUDENT IN AN ORGANIZED HEALTH CARE EDUCATION/TRAINING PROGRAM

## 2021-03-31 PROCEDURE — C9113 INJ PANTOPRAZOLE SODIUM, VIA: HCPCS | Performed by: STUDENT IN AN ORGANIZED HEALTH CARE EDUCATION/TRAINING PROGRAM

## 2021-03-31 PROCEDURE — 84100 ASSAY OF PHOSPHORUS: CPT | Performed by: STUDENT IN AN ORGANIZED HEALTH CARE EDUCATION/TRAINING PROGRAM

## 2021-03-31 PROCEDURE — 85025 COMPLETE CBC W/AUTO DIFF WBC: CPT | Performed by: STUDENT IN AN ORGANIZED HEALTH CARE EDUCATION/TRAINING PROGRAM

## 2021-03-31 PROCEDURE — 80053 COMPREHEN METABOLIC PANEL: CPT | Performed by: STUDENT IN AN ORGANIZED HEALTH CARE EDUCATION/TRAINING PROGRAM

## 2021-03-31 PROCEDURE — 36415 COLL VENOUS BLD VENIPUNCTURE: CPT | Performed by: STUDENT IN AN ORGANIZED HEALTH CARE EDUCATION/TRAINING PROGRAM

## 2021-03-31 PROCEDURE — 97530 THERAPEUTIC ACTIVITIES: CPT

## 2021-03-31 PROCEDURE — 99223 1ST HOSP IP/OBS HIGH 75: CPT | Mod: ,,, | Performed by: CLINICAL NURSE SPECIALIST

## 2021-03-31 PROCEDURE — 99223 PR INITIAL HOSPITAL CARE,LEVL III: ICD-10-PCS | Mod: ,,, | Performed by: CLINICAL NURSE SPECIALIST

## 2021-03-31 RX ORDER — LEVETIRACETAM 500 MG/1
500 TABLET ORAL 2 TIMES DAILY
Status: DISCONTINUED | OUTPATIENT
Start: 2021-03-31 | End: 2021-03-31

## 2021-03-31 RX ORDER — SODIUM CHLORIDE, SODIUM LACTATE, POTASSIUM CHLORIDE, CALCIUM CHLORIDE 600; 310; 30; 20 MG/100ML; MG/100ML; MG/100ML; MG/100ML
INJECTION, SOLUTION INTRAVENOUS CONTINUOUS
Status: ACTIVE | OUTPATIENT
Start: 2021-03-31 | End: 2021-03-31

## 2021-03-31 RX ADMIN — DEXAMETHASONE SODIUM PHOSPHATE 4 MG: 4 INJECTION INTRA-ARTICULAR; INTRALESIONAL; INTRAMUSCULAR; INTRAVENOUS; SOFT TISSUE at 11:03

## 2021-03-31 RX ADMIN — DOCUSATE SODIUM 100 MG: 100 CAPSULE, LIQUID FILLED ORAL at 08:03

## 2021-03-31 RX ADMIN — CALCIUM GLUCONATE 1000 MG: 98 INJECTION, SOLUTION INTRAVENOUS at 08:03

## 2021-03-31 RX ADMIN — LIDOCAINE 1 PATCH: 50 PATCH CUTANEOUS at 06:03

## 2021-03-31 RX ADMIN — SODIUM CHLORIDE, SODIUM LACTATE, POTASSIUM CHLORIDE, AND CALCIUM CHLORIDE: .6; .31; .03; .02 INJECTION, SOLUTION INTRAVENOUS at 07:03

## 2021-03-31 RX ADMIN — AMLODIPINE BESYLATE 5 MG: 5 TABLET ORAL at 06:03

## 2021-03-31 RX ADMIN — CALCIUM GLUCONATE 1000 MG: 94 INJECTION, SOLUTION INTRAVENOUS at 06:03

## 2021-03-31 RX ADMIN — LEVETIRACETAM 500 MG: 500 TABLET ORAL at 09:03

## 2021-03-31 RX ADMIN — PANTOPRAZOLE SODIUM 40 MG: 40 INJECTION, POWDER, FOR SOLUTION INTRAVENOUS at 09:03

## 2021-03-31 RX ADMIN — DEXAMETHASONE SODIUM PHOSPHATE 4 MG: 4 INJECTION INTRA-ARTICULAR; INTRALESIONAL; INTRAMUSCULAR; INTRAVENOUS; SOFT TISSUE at 06:03

## 2021-03-31 RX ADMIN — CALCIUM GLUCONATE 1000 MG: 98 INJECTION, SOLUTION INTRAVENOUS at 10:03

## 2021-03-31 RX ADMIN — LOSARTAN POTASSIUM 50 MG: 50 TABLET, FILM COATED ORAL at 09:03

## 2021-03-31 RX ADMIN — HEPARIN SODIUM 5000 UNITS: 5000 INJECTION INTRAVENOUS; SUBCUTANEOUS at 06:03

## 2021-03-31 RX ADMIN — HEPARIN SODIUM 5000 UNITS: 5000 INJECTION INTRAVENOUS; SUBCUTANEOUS at 09:03

## 2021-03-31 RX ADMIN — DEXAMETHASONE SODIUM PHOSPHATE 4 MG: 4 INJECTION INTRA-ARTICULAR; INTRALESIONAL; INTRAMUSCULAR; INTRAVENOUS; SOFT TISSUE at 12:03

## 2021-03-31 RX ADMIN — HEPARIN SODIUM 5000 UNITS: 5000 INJECTION INTRAVENOUS; SUBCUTANEOUS at 02:03

## 2021-04-01 PROBLEM — E44.0 MODERATE MALNUTRITION: Status: ACTIVE | Noted: 2021-04-01

## 2021-04-01 LAB
BACTERIA #/AREA URNS AUTO: ABNORMAL /HPF
BACTERIA UR CULT: ABNORMAL
BILIRUB UR QL STRIP: NEGATIVE
CLARITY UR REFRACT.AUTO: ABNORMAL
COLOR UR AUTO: YELLOW
GLUCOSE UR QL STRIP: NEGATIVE
HGB UR QL STRIP: ABNORMAL
HYALINE CASTS UR QL AUTO: 0 /LPF
KETONES UR QL STRIP: NEGATIVE
LEUKOCYTE ESTERASE UR QL STRIP: ABNORMAL
MICROSCOPIC COMMENT: ABNORMAL
NITRITE UR QL STRIP: NEGATIVE
PH UR STRIP: 5 [PH] (ref 5–8)
PROT UR QL STRIP: ABNORMAL
RBC #/AREA URNS AUTO: 15 /HPF (ref 0–4)
SP GR UR STRIP: 1.01 (ref 1–1.03)
SQUAMOUS #/AREA URNS AUTO: 0 /HPF
URN SPEC COLLECT METH UR: ABNORMAL
WBC #/AREA URNS AUTO: >100 /HPF (ref 0–5)

## 2021-04-01 PROCEDURE — 97165 OT EVAL LOW COMPLEX 30 MIN: CPT

## 2021-04-01 PROCEDURE — 11000001 HC ACUTE MED/SURG PRIVATE ROOM

## 2021-04-01 PROCEDURE — 87086 URINE CULTURE/COLONY COUNT: CPT | Performed by: STUDENT IN AN ORGANIZED HEALTH CARE EDUCATION/TRAINING PROGRAM

## 2021-04-01 PROCEDURE — 97116 GAIT TRAINING THERAPY: CPT

## 2021-04-01 PROCEDURE — 99497 PR ADVNCD CARE PLAN 30 MIN: ICD-10-PCS | Mod: ,,, | Performed by: CLINICAL NURSE SPECIALIST

## 2021-04-01 PROCEDURE — 97530 THERAPEUTIC ACTIVITIES: CPT

## 2021-04-01 PROCEDURE — 87088 URINE BACTERIA CULTURE: CPT | Performed by: STUDENT IN AN ORGANIZED HEALTH CARE EDUCATION/TRAINING PROGRAM

## 2021-04-01 PROCEDURE — 87186 SC STD MICRODIL/AGAR DIL: CPT | Performed by: STUDENT IN AN ORGANIZED HEALTH CARE EDUCATION/TRAINING PROGRAM

## 2021-04-01 PROCEDURE — 63600175 PHARM REV CODE 636 W HCPCS: Performed by: STUDENT IN AN ORGANIZED HEALTH CARE EDUCATION/TRAINING PROGRAM

## 2021-04-01 PROCEDURE — C9113 INJ PANTOPRAZOLE SODIUM, VIA: HCPCS | Performed by: STUDENT IN AN ORGANIZED HEALTH CARE EDUCATION/TRAINING PROGRAM

## 2021-04-01 PROCEDURE — 25000003 PHARM REV CODE 250: Performed by: STUDENT IN AN ORGANIZED HEALTH CARE EDUCATION/TRAINING PROGRAM

## 2021-04-01 PROCEDURE — 63600175 PHARM REV CODE 636 W HCPCS: Performed by: HOSPITALIST

## 2021-04-01 PROCEDURE — 81001 URINALYSIS AUTO W/SCOPE: CPT | Performed by: STUDENT IN AN ORGANIZED HEALTH CARE EDUCATION/TRAINING PROGRAM

## 2021-04-01 PROCEDURE — 99233 SBSQ HOSP IP/OBS HIGH 50: CPT | Mod: ,,, | Performed by: CLINICAL NURSE SPECIALIST

## 2021-04-01 PROCEDURE — 99497 ADVNCD CARE PLAN 30 MIN: CPT | Mod: ,,, | Performed by: CLINICAL NURSE SPECIALIST

## 2021-04-01 PROCEDURE — 87077 CULTURE AEROBIC IDENTIFY: CPT | Performed by: STUDENT IN AN ORGANIZED HEALTH CARE EDUCATION/TRAINING PROGRAM

## 2021-04-01 PROCEDURE — 99232 PR SUBSEQUENT HOSPITAL CARE,LEVL II: ICD-10-PCS | Mod: GC,,, | Performed by: HOSPITALIST

## 2021-04-01 PROCEDURE — 99232 SBSQ HOSP IP/OBS MODERATE 35: CPT | Mod: GC,,, | Performed by: HOSPITALIST

## 2021-04-01 PROCEDURE — 97535 SELF CARE MNGMENT TRAINING: CPT

## 2021-04-01 PROCEDURE — 99233 PR SUBSEQUENT HOSPITAL CARE,LEVL III: ICD-10-PCS | Mod: ,,, | Performed by: CLINICAL NURSE SPECIALIST

## 2021-04-01 RX ORDER — ONDANSETRON 8 MG/1
8 TABLET, ORALLY DISINTEGRATING ORAL EVERY 8 HOURS PRN
Qty: 21 TABLET | Refills: 0 | Status: SHIPPED | OUTPATIENT
Start: 2021-04-01 | End: 2021-04-08

## 2021-04-01 RX ORDER — PANTOPRAZOLE SODIUM 40 MG/1
40 TABLET, DELAYED RELEASE ORAL DAILY
Qty: 30 TABLET | Refills: 3 | Status: SHIPPED | OUTPATIENT
Start: 2021-04-02 | End: 2022-04-02

## 2021-04-01 RX ORDER — DEXAMETHASONE 4 MG/1
4 TABLET ORAL EVERY 6 HOURS
Status: DISCONTINUED | OUTPATIENT
Start: 2021-04-01 | End: 2021-04-04

## 2021-04-01 RX ORDER — DEXAMETHASONE 4 MG/1
TABLET ORAL
Qty: 70 TABLET | Refills: 0
Start: 2021-04-01 | End: 2021-04-05

## 2021-04-01 RX ORDER — LEVETIRACETAM 250 MG/1
250 TABLET ORAL DAILY
Qty: 30 TABLET | Refills: 11
Start: 2021-04-02 | End: 2021-04-01 | Stop reason: HOSPADM

## 2021-04-01 RX ORDER — LEVETIRACETAM 250 MG/1
250 TABLET ORAL DAILY
Status: DISCONTINUED | OUTPATIENT
Start: 2021-04-01 | End: 2021-04-02

## 2021-04-01 RX ORDER — PANTOPRAZOLE SODIUM 40 MG/1
40 TABLET, DELAYED RELEASE ORAL DAILY
Status: DISCONTINUED | OUTPATIENT
Start: 2021-04-02 | End: 2021-04-05 | Stop reason: HOSPADM

## 2021-04-01 RX ADMIN — HEPARIN SODIUM 5000 UNITS: 5000 INJECTION INTRAVENOUS; SUBCUTANEOUS at 01:04

## 2021-04-01 RX ADMIN — LIDOCAINE 1 PATCH: 50 PATCH CUTANEOUS at 04:04

## 2021-04-01 RX ADMIN — ACETAMINOPHEN 650 MG: 325 TABLET ORAL at 10:04

## 2021-04-01 RX ADMIN — LEVETIRACETAM 250 MG: 250 TABLET ORAL at 01:04

## 2021-04-01 RX ADMIN — HEPARIN SODIUM 5000 UNITS: 5000 INJECTION INTRAVENOUS; SUBCUTANEOUS at 09:04

## 2021-04-01 RX ADMIN — HEPARIN SODIUM 5000 UNITS: 5000 INJECTION INTRAVENOUS; SUBCUTANEOUS at 06:04

## 2021-04-01 RX ADMIN — HYDROCODONE BITARTRATE AND ACETAMINOPHEN 1 TABLET: 5; 325 TABLET ORAL at 01:04

## 2021-04-01 RX ADMIN — PANTOPRAZOLE SODIUM 40 MG: 40 INJECTION, POWDER, FOR SOLUTION INTRAVENOUS at 08:04

## 2021-04-01 RX ADMIN — DEXAMETHASONE SODIUM PHOSPHATE 4 MG: 4 INJECTION INTRA-ARTICULAR; INTRALESIONAL; INTRAMUSCULAR; INTRAVENOUS; SOFT TISSUE at 06:04

## 2021-04-01 RX ADMIN — DEXAMETHASONE SODIUM PHOSPHATE 4 MG: 4 INJECTION INTRA-ARTICULAR; INTRALESIONAL; INTRAMUSCULAR; INTRAVENOUS; SOFT TISSUE at 01:04

## 2021-04-01 RX ADMIN — DEXAMETHASONE SODIUM PHOSPHATE 4 MG: 4 INJECTION INTRA-ARTICULAR; INTRALESIONAL; INTRAMUSCULAR; INTRAVENOUS; SOFT TISSUE at 12:04

## 2021-04-01 RX ADMIN — DOCUSATE SODIUM 100 MG: 100 CAPSULE, LIQUID FILLED ORAL at 08:04

## 2021-04-01 RX ADMIN — AMLODIPINE BESYLATE 5 MG: 5 TABLET ORAL at 06:04

## 2021-04-01 RX ADMIN — LOSARTAN POTASSIUM 50 MG: 50 TABLET, FILM COATED ORAL at 08:04

## 2021-04-01 RX ADMIN — DEXAMETHASONE 4 MG: 4 TABLET ORAL at 05:04

## 2021-04-02 PROBLEM — G89.3 PAIN OF METASTATIC MALIGNANCY: Status: ACTIVE | Noted: 2021-04-02

## 2021-04-02 PROBLEM — R82.71 BACTERIURIA: Status: ACTIVE | Noted: 2021-04-02

## 2021-04-02 PROCEDURE — 99232 SBSQ HOSP IP/OBS MODERATE 35: CPT | Mod: GC,,, | Performed by: HOSPITALIST

## 2021-04-02 PROCEDURE — 25000003 PHARM REV CODE 250: Performed by: HOSPITALIST

## 2021-04-02 PROCEDURE — 25000003 PHARM REV CODE 250: Performed by: STUDENT IN AN ORGANIZED HEALTH CARE EDUCATION/TRAINING PROGRAM

## 2021-04-02 PROCEDURE — 99232 PR SUBSEQUENT HOSPITAL CARE,LEVL II: ICD-10-PCS | Mod: GC,,, | Performed by: HOSPITALIST

## 2021-04-02 PROCEDURE — 63600175 PHARM REV CODE 636 W HCPCS: Performed by: STUDENT IN AN ORGANIZED HEALTH CARE EDUCATION/TRAINING PROGRAM

## 2021-04-02 PROCEDURE — 11000001 HC ACUTE MED/SURG PRIVATE ROOM

## 2021-04-02 PROCEDURE — 63600175 PHARM REV CODE 636 W HCPCS: Performed by: HOSPITALIST

## 2021-04-02 RX ORDER — ACETAMINOPHEN 325 MG/1
650 TABLET ORAL EVERY 8 HOURS PRN
Status: DISCONTINUED | OUTPATIENT
Start: 2021-04-02 | End: 2021-04-05 | Stop reason: HOSPADM

## 2021-04-02 RX ORDER — OXYCODONE HYDROCHLORIDE 10 MG/1
10 TABLET ORAL EVERY 6 HOURS PRN
Status: DISCONTINUED | OUTPATIENT
Start: 2021-04-02 | End: 2021-04-05

## 2021-04-02 RX ORDER — OXYCODONE HYDROCHLORIDE 5 MG/1
5 TABLET ORAL EVERY 6 HOURS PRN
Status: DISCONTINUED | OUTPATIENT
Start: 2021-04-02 | End: 2021-04-05 | Stop reason: HOSPADM

## 2021-04-02 RX ORDER — AMOXICILLIN AND CLAVULANATE POTASSIUM 250; 125 MG/1; MG/1
1 TABLET, FILM COATED ORAL EVERY 24 HOURS
Status: DISCONTINUED | OUTPATIENT
Start: 2021-04-02 | End: 2021-04-02

## 2021-04-02 RX ORDER — AMOXICILLIN AND CLAVULANATE POTASSIUM 250; 125 MG/1; MG/1
1 TABLET, FILM COATED ORAL EVERY 24 HOURS
Status: DISCONTINUED | OUTPATIENT
Start: 2021-04-03 | End: 2021-04-03

## 2021-04-02 RX ADMIN — DEXAMETHASONE 4 MG: 4 TABLET ORAL at 06:04

## 2021-04-02 RX ADMIN — LIDOCAINE 1 PATCH: 50 PATCH CUTANEOUS at 04:04

## 2021-04-02 RX ADMIN — MELATONIN TAB 3 MG 6 MG: 3 TAB at 09:04

## 2021-04-02 RX ADMIN — DEXAMETHASONE 4 MG: 4 TABLET ORAL at 11:04

## 2021-04-02 RX ADMIN — DEXAMETHASONE 4 MG: 4 TABLET ORAL at 12:04

## 2021-04-02 RX ADMIN — HEPARIN SODIUM 5000 UNITS: 5000 INJECTION INTRAVENOUS; SUBCUTANEOUS at 02:04

## 2021-04-02 RX ADMIN — LEVETIRACETAM 250 MG: 250 TABLET ORAL at 09:04

## 2021-04-02 RX ADMIN — PANTOPRAZOLE SODIUM 40 MG: 40 TABLET, DELAYED RELEASE ORAL at 09:04

## 2021-04-02 RX ADMIN — AMOXICILLIN AND CLAVULANATE POTASSIUM 1 TABLET: 250; 125 TABLET, FILM COATED ORAL at 09:04

## 2021-04-02 RX ADMIN — AMLODIPINE BESYLATE 5 MG: 5 TABLET ORAL at 06:04

## 2021-04-02 RX ADMIN — ACETAMINOPHEN 650 MG: 325 TABLET ORAL at 09:04

## 2021-04-02 RX ADMIN — DOCUSATE SODIUM 100 MG: 100 CAPSULE, LIQUID FILLED ORAL at 09:04

## 2021-04-02 RX ADMIN — HEPARIN SODIUM 5000 UNITS: 5000 INJECTION INTRAVENOUS; SUBCUTANEOUS at 06:04

## 2021-04-02 RX ADMIN — HEPARIN SODIUM 5000 UNITS: 5000 INJECTION INTRAVENOUS; SUBCUTANEOUS at 09:04

## 2021-04-03 LAB — BACTERIA UR CULT: ABNORMAL

## 2021-04-03 PROCEDURE — 99232 PR SUBSEQUENT HOSPITAL CARE,LEVL II: ICD-10-PCS | Mod: GC,,, | Performed by: HOSPITALIST

## 2021-04-03 PROCEDURE — 63600175 PHARM REV CODE 636 W HCPCS: Performed by: STUDENT IN AN ORGANIZED HEALTH CARE EDUCATION/TRAINING PROGRAM

## 2021-04-03 PROCEDURE — 99232 SBSQ HOSP IP/OBS MODERATE 35: CPT | Mod: GC,,, | Performed by: HOSPITALIST

## 2021-04-03 PROCEDURE — 25000003 PHARM REV CODE 250: Performed by: HOSPITALIST

## 2021-04-03 PROCEDURE — 11000001 HC ACUTE MED/SURG PRIVATE ROOM

## 2021-04-03 PROCEDURE — 25000003 PHARM REV CODE 250: Performed by: STUDENT IN AN ORGANIZED HEALTH CARE EDUCATION/TRAINING PROGRAM

## 2021-04-03 PROCEDURE — 63600175 PHARM REV CODE 636 W HCPCS: Performed by: HOSPITALIST

## 2021-04-03 RX ORDER — HYDROCODONE BITARTRATE AND ACETAMINOPHEN 5; 325 MG/1; MG/1
1 TABLET ORAL EVERY 6 HOURS PRN
Qty: 28 TABLET | Refills: 0 | Status: SHIPPED | OUTPATIENT
Start: 2021-04-03 | End: 2021-04-10

## 2021-04-03 RX ORDER — AMOXICILLIN AND CLAVULANATE POTASSIUM 250; 125 MG/1; MG/1
TABLET, FILM COATED ORAL EVERY 24 HOURS
Status: COMPLETED | OUTPATIENT
Start: 2021-04-04 | End: 2021-04-05

## 2021-04-03 RX ORDER — AMOXICILLIN AND CLAVULANATE POTASSIUM 250; 125 MG/1; MG/1
1 TABLET, FILM COATED ORAL
Qty: 3 TABLET | Refills: 1 | Status: SHIPPED | OUTPATIENT
Start: 2021-04-03

## 2021-04-03 RX ORDER — AMOXICILLIN AND CLAVULANATE POTASSIUM 250; 125 MG/1; MG/1
1 TABLET, FILM COATED ORAL
Qty: 1 TABLET | Refills: 0 | Status: SHIPPED | OUTPATIENT
Start: 2021-04-03 | End: 2021-04-05 | Stop reason: HOSPADM

## 2021-04-03 RX ORDER — AMOXICILLIN AND CLAVULANATE POTASSIUM 250; 125 MG/1; MG/1
1 TABLET, FILM COATED ORAL ONCE
Status: COMPLETED | OUTPATIENT
Start: 2021-04-03 | End: 2021-04-03

## 2021-04-03 RX ADMIN — DEXAMETHASONE 4 MG: 4 TABLET ORAL at 12:04

## 2021-04-03 RX ADMIN — DOCUSATE SODIUM 100 MG: 100 CAPSULE, LIQUID FILLED ORAL at 07:04

## 2021-04-03 RX ADMIN — ACETAMINOPHEN 650 MG: 325 TABLET ORAL at 06:04

## 2021-04-03 RX ADMIN — DEXAMETHASONE 4 MG: 4 TABLET ORAL at 06:04

## 2021-04-03 RX ADMIN — DEXAMETHASONE 4 MG: 4 TABLET ORAL at 05:04

## 2021-04-03 RX ADMIN — HEPARIN SODIUM 5000 UNITS: 5000 INJECTION INTRAVENOUS; SUBCUTANEOUS at 10:04

## 2021-04-03 RX ADMIN — AMLODIPINE BESYLATE 5 MG: 5 TABLET ORAL at 05:04

## 2021-04-03 RX ADMIN — DOCUSATE SODIUM 100 MG: 100 CAPSULE, LIQUID FILLED ORAL at 09:04

## 2021-04-03 RX ADMIN — OXYCODONE HYDROCHLORIDE 10 MG: 10 TABLET ORAL at 07:04

## 2021-04-03 RX ADMIN — AMOXICILLIN AND CLAVULANATE POTASSIUM 1 TABLET: 250; 125 TABLET, FILM COATED ORAL at 09:04

## 2021-04-03 RX ADMIN — HEPARIN SODIUM 5000 UNITS: 5000 INJECTION INTRAVENOUS; SUBCUTANEOUS at 05:04

## 2021-04-03 RX ADMIN — PANTOPRAZOLE SODIUM 40 MG: 40 TABLET, DELAYED RELEASE ORAL at 09:04

## 2021-04-03 RX ADMIN — MELATONIN TAB 3 MG 6 MG: 3 TAB at 07:04

## 2021-04-03 RX ADMIN — HEPARIN SODIUM 5000 UNITS: 5000 INJECTION INTRAVENOUS; SUBCUTANEOUS at 02:04

## 2021-04-03 RX ADMIN — LIDOCAINE 1 PATCH: 50 PATCH CUTANEOUS at 04:04

## 2021-04-03 RX ADMIN — ACETAMINOPHEN 650 MG: 325 TABLET ORAL at 11:04

## 2021-04-03 RX ADMIN — ACETAMINOPHEN 650 MG: 325 TABLET ORAL at 05:04

## 2021-04-04 PROCEDURE — 25000003 PHARM REV CODE 250: Performed by: HOSPITALIST

## 2021-04-04 PROCEDURE — 25000003 PHARM REV CODE 250: Performed by: STUDENT IN AN ORGANIZED HEALTH CARE EDUCATION/TRAINING PROGRAM

## 2021-04-04 PROCEDURE — 63600175 PHARM REV CODE 636 W HCPCS: Performed by: HOSPITALIST

## 2021-04-04 PROCEDURE — 99232 PR SUBSEQUENT HOSPITAL CARE,LEVL II: ICD-10-PCS | Mod: GC,,, | Performed by: HOSPITALIST

## 2021-04-04 PROCEDURE — 94761 N-INVAS EAR/PLS OXIMETRY MLT: CPT

## 2021-04-04 PROCEDURE — 99232 SBSQ HOSP IP/OBS MODERATE 35: CPT | Mod: GC,,, | Performed by: HOSPITALIST

## 2021-04-04 PROCEDURE — 63600175 PHARM REV CODE 636 W HCPCS: Performed by: STUDENT IN AN ORGANIZED HEALTH CARE EDUCATION/TRAINING PROGRAM

## 2021-04-04 PROCEDURE — 11000001 HC ACUTE MED/SURG PRIVATE ROOM

## 2021-04-04 RX ORDER — MORPHINE SULFATE 2 MG/ML
2 INJECTION, SOLUTION INTRAMUSCULAR; INTRAVENOUS ONCE
Status: COMPLETED | OUTPATIENT
Start: 2021-04-04 | End: 2021-04-04

## 2021-04-04 RX ORDER — ONDANSETRON 2 MG/ML
INJECTION INTRAMUSCULAR; INTRAVENOUS
Status: DISPENSED
Start: 2021-04-04 | End: 2021-04-05

## 2021-04-04 RX ORDER — ONDANSETRON 4 MG/1
4 TABLET, FILM COATED ORAL ONCE
Status: COMPLETED | OUTPATIENT
Start: 2021-04-04 | End: 2021-04-04

## 2021-04-04 RX ORDER — ONDANSETRON 2 MG/ML
4 INJECTION INTRAMUSCULAR; INTRAVENOUS ONCE
Status: COMPLETED | OUTPATIENT
Start: 2021-04-04 | End: 2021-04-04

## 2021-04-04 RX ORDER — DEXAMETHASONE 4 MG/1
4 TABLET ORAL 3 TIMES DAILY
Status: DISCONTINUED | OUTPATIENT
Start: 2021-04-04 | End: 2021-04-05

## 2021-04-04 RX ADMIN — DEXAMETHASONE 4 MG: 4 TABLET ORAL at 09:04

## 2021-04-04 RX ADMIN — MORPHINE SULFATE 2 MG: 2 INJECTION, SOLUTION INTRAMUSCULAR; INTRAVENOUS at 09:04

## 2021-04-04 RX ADMIN — DEXAMETHASONE 4 MG: 4 TABLET ORAL at 05:04

## 2021-04-04 RX ADMIN — OXYCODONE HYDROCHLORIDE 10 MG: 10 TABLET ORAL at 10:04

## 2021-04-04 RX ADMIN — DEXAMETHASONE 4 MG: 4 TABLET ORAL at 06:04

## 2021-04-04 RX ADMIN — DEXAMETHASONE 4 MG: 4 TABLET ORAL at 12:04

## 2021-04-04 RX ADMIN — ONDANSETRON 8 MG: 8 TABLET, ORALLY DISINTEGRATING ORAL at 05:04

## 2021-04-04 RX ADMIN — HEPARIN SODIUM 5000 UNITS: 5000 INJECTION INTRAVENOUS; SUBCUTANEOUS at 06:04

## 2021-04-04 RX ADMIN — DEXAMETHASONE 4 MG: 4 TABLET ORAL at 10:04

## 2021-04-04 RX ADMIN — AMOXICILLIN AND CLAVULANATE POTASSIUM 1 TABLET: 250; 125 TABLET, FILM COATED ORAL at 09:04

## 2021-04-04 RX ADMIN — AMLODIPINE BESYLATE 5 MG: 5 TABLET ORAL at 06:04

## 2021-04-04 RX ADMIN — HEPARIN SODIUM 5000 UNITS: 5000 INJECTION INTRAVENOUS; SUBCUTANEOUS at 09:04

## 2021-04-04 RX ADMIN — ONDANSETRON HYDROCHLORIDE 4 MG: 4 TABLET, FILM COATED ORAL at 09:04

## 2021-04-04 RX ADMIN — MORPHINE SULFATE 2 MG: 2 INJECTION, SOLUTION INTRAMUSCULAR; INTRAVENOUS at 02:04

## 2021-04-04 RX ADMIN — LIDOCAINE 1 PATCH: 50 PATCH CUTANEOUS at 05:04

## 2021-04-04 RX ADMIN — HEPARIN SODIUM 5000 UNITS: 5000 INJECTION INTRAVENOUS; SUBCUTANEOUS at 02:04

## 2021-04-04 RX ADMIN — DOCUSATE SODIUM 100 MG: 100 CAPSULE, LIQUID FILLED ORAL at 09:04

## 2021-04-04 RX ADMIN — ONDANSETRON 4 MG: 2 INJECTION, SOLUTION INTRAMUSCULAR; INTRAVENOUS at 02:04

## 2021-04-04 RX ADMIN — PANTOPRAZOLE SODIUM 40 MG: 40 TABLET, DELAYED RELEASE ORAL at 09:04

## 2021-04-05 VITALS
HEIGHT: 64 IN | WEIGHT: 100.06 LBS | TEMPERATURE: 98 F | HEART RATE: 83 BPM | RESPIRATION RATE: 17 BRPM | SYSTOLIC BLOOD PRESSURE: 102 MMHG | DIASTOLIC BLOOD PRESSURE: 70 MMHG | OXYGEN SATURATION: 96 % | BODY MASS INDEX: 17.08 KG/M2

## 2021-04-05 LAB — SARS-COV-2 RNA RESP QL NAA+PROBE: NOT DETECTED

## 2021-04-05 PROCEDURE — 63600175 PHARM REV CODE 636 W HCPCS: Performed by: STUDENT IN AN ORGANIZED HEALTH CARE EDUCATION/TRAINING PROGRAM

## 2021-04-05 PROCEDURE — 99239 PR HOSPITAL DISCHARGE DAY,>30 MIN: ICD-10-PCS | Mod: GC,,, | Performed by: HOSPITALIST

## 2021-04-05 PROCEDURE — 25000003 PHARM REV CODE 250: Performed by: HOSPITALIST

## 2021-04-05 PROCEDURE — 99239 HOSP IP/OBS DSCHRG MGMT >30: CPT | Mod: GC,,, | Performed by: HOSPITALIST

## 2021-04-05 PROCEDURE — 99233 PR SUBSEQUENT HOSPITAL CARE,LEVL III: ICD-10-PCS | Mod: ,,, | Performed by: CLINICAL NURSE SPECIALIST

## 2021-04-05 PROCEDURE — U0005 INFEC AGEN DETEC AMPLI PROBE: HCPCS | Performed by: STUDENT IN AN ORGANIZED HEALTH CARE EDUCATION/TRAINING PROGRAM

## 2021-04-05 PROCEDURE — 1111F DSCHRG MED/CURRENT MED MERGE: CPT | Mod: CPTII,GC,, | Performed by: HOSPITALIST

## 2021-04-05 PROCEDURE — U0003 INFECTIOUS AGENT DETECTION BY NUCLEIC ACID (DNA OR RNA); SEVERE ACUTE RESPIRATORY SYNDROME CORONAVIRUS 2 (SARS-COV-2) (CORONAVIRUS DISEASE [COVID-19]), AMPLIFIED PROBE TECHNIQUE, MAKING USE OF HIGH THROUGHPUT TECHNOLOGIES AS DESCRIBED BY CMS-2020-01-R: HCPCS | Performed by: STUDENT IN AN ORGANIZED HEALTH CARE EDUCATION/TRAINING PROGRAM

## 2021-04-05 PROCEDURE — 1111F PR DISCHARGE MEDS RECONCILED W/ CURRENT OUTPATIENT MED LIST: ICD-10-PCS | Mod: CPTII,GC,, | Performed by: HOSPITALIST

## 2021-04-05 PROCEDURE — 25000003 PHARM REV CODE 250: Performed by: STUDENT IN AN ORGANIZED HEALTH CARE EDUCATION/TRAINING PROGRAM

## 2021-04-05 PROCEDURE — 99233 SBSQ HOSP IP/OBS HIGH 50: CPT | Mod: ,,, | Performed by: CLINICAL NURSE SPECIALIST

## 2021-04-05 RX ORDER — MORPHINE SULFATE 2 MG/ML
4 INJECTION, SOLUTION INTRAMUSCULAR; INTRAVENOUS EVERY 4 HOURS PRN
Status: DISCONTINUED | OUTPATIENT
Start: 2021-04-05 | End: 2021-04-05 | Stop reason: HOSPADM

## 2021-04-05 RX ORDER — PROCHLORPERAZINE EDISYLATE 5 MG/ML
2.5 INJECTION INTRAMUSCULAR; INTRAVENOUS EVERY 6 HOURS PRN
Start: 2021-04-05 | End: 2021-04-15

## 2021-04-05 RX ORDER — DEXAMETHASONE 4 MG/1
4 TABLET ORAL EVERY 12 HOURS
Status: DISCONTINUED | OUTPATIENT
Start: 2021-04-05 | End: 2021-04-05 | Stop reason: HOSPADM

## 2021-04-05 RX ORDER — HYDROXYZINE HYDROCHLORIDE 25 MG/1
25 TABLET, FILM COATED ORAL 3 TIMES DAILY PRN
Status: DISCONTINUED | OUTPATIENT
Start: 2021-04-05 | End: 2021-04-05 | Stop reason: HOSPADM

## 2021-04-05 RX ORDER — OXYCODONE HCL 10 MG/1
10 TABLET, FILM COATED, EXTENDED RELEASE ORAL EVERY 12 HOURS
Status: DISCONTINUED | OUTPATIENT
Start: 2021-04-05 | End: 2021-04-05 | Stop reason: HOSPADM

## 2021-04-05 RX ORDER — DEXAMETHASONE 4 MG/1
TABLET ORAL
Qty: 70 TABLET | Refills: 0
Start: 2021-04-05 | End: 2021-05-12

## 2021-04-05 RX ORDER — ONDANSETRON 2 MG/ML
4 INJECTION INTRAMUSCULAR; INTRAVENOUS ONCE
Status: COMPLETED | OUTPATIENT
Start: 2021-04-05 | End: 2021-04-05

## 2021-04-05 RX ORDER — PROCHLORPERAZINE EDISYLATE 5 MG/ML
2.5 INJECTION INTRAMUSCULAR; INTRAVENOUS EVERY 6 HOURS PRN
Status: DISCONTINUED | OUTPATIENT
Start: 2021-04-05 | End: 2021-04-05 | Stop reason: HOSPADM

## 2021-04-05 RX ORDER — OXYCODONE HCL 10 MG/1
10 TABLET, FILM COATED, EXTENDED RELEASE ORAL EVERY 12 HOURS PRN
Refills: 0
Start: 2021-04-05

## 2021-04-05 RX ORDER — MORPHINE SULFATE 2 MG/ML
2 INJECTION, SOLUTION INTRAMUSCULAR; INTRAVENOUS ONCE
Status: COMPLETED | OUTPATIENT
Start: 2021-04-05 | End: 2021-04-05

## 2021-04-05 RX ORDER — OXYCODONE HCL 10 MG/1
10 TABLET, FILM COATED, EXTENDED RELEASE ORAL EVERY 12 HOURS PRN
Status: DISCONTINUED | OUTPATIENT
Start: 2021-04-05 | End: 2021-04-05

## 2021-04-05 RX ORDER — ONDANSETRON 8 MG/1
8 TABLET, ORALLY DISINTEGRATING ORAL ONCE
Status: COMPLETED | OUTPATIENT
Start: 2021-04-05 | End: 2021-04-05

## 2021-04-05 RX ORDER — HYDROMORPHONE HYDROCHLORIDE 1 MG/ML
0.5 INJECTION, SOLUTION INTRAMUSCULAR; INTRAVENOUS; SUBCUTANEOUS ONCE
Status: COMPLETED | OUTPATIENT
Start: 2021-04-05 | End: 2021-04-05

## 2021-04-05 RX ADMIN — AMLODIPINE BESYLATE 5 MG: 5 TABLET ORAL at 06:04

## 2021-04-05 RX ADMIN — DOCUSATE SODIUM 100 MG: 100 CAPSULE, LIQUID FILLED ORAL at 09:04

## 2021-04-05 RX ADMIN — MORPHINE SULFATE 2 MG: 2 INJECTION, SOLUTION INTRAMUSCULAR; INTRAVENOUS at 09:04

## 2021-04-05 RX ADMIN — AMOXICILLIN AND CLAVULANATE POTASSIUM 1 TABLET: 250; 125 TABLET, FILM COATED ORAL at 01:04

## 2021-04-05 RX ADMIN — ONDANSETRON 8 MG: 8 TABLET, ORALLY DISINTEGRATING ORAL at 03:04

## 2021-04-05 RX ADMIN — OXYCODONE HYDROCHLORIDE 10 MG: 10 TABLET ORAL at 04:04

## 2021-04-05 RX ADMIN — PANTOPRAZOLE SODIUM 40 MG: 40 TABLET, DELAYED RELEASE ORAL at 09:04

## 2021-04-05 RX ADMIN — DEXAMETHASONE 4 MG: 4 TABLET ORAL at 09:04

## 2021-04-05 RX ADMIN — OXYCODONE HYDROCHLORIDE 10 MG: 10 TABLET, FILM COATED, EXTENDED RELEASE ORAL at 04:04

## 2021-04-05 RX ADMIN — HEPARIN SODIUM 5000 UNITS: 5000 INJECTION INTRAVENOUS; SUBCUTANEOUS at 06:04

## 2021-04-05 RX ADMIN — ONDANSETRON 4 MG: 2 INJECTION INTRAMUSCULAR; INTRAVENOUS at 02:04

## 2021-04-05 RX ADMIN — PROCHLORPERAZINE EDISYLATE 2.5 MG: 5 INJECTION INTRAMUSCULAR; INTRAVENOUS at 01:04

## 2021-04-05 RX ADMIN — MORPHINE SULFATE 4 MG: 2 INJECTION, SOLUTION INTRAMUSCULAR; INTRAVENOUS at 02:04

## 2021-04-05 RX ADMIN — LIDOCAINE 1 PATCH: 50 PATCH CUTANEOUS at 01:04

## 2021-04-05 RX ADMIN — HYDROMORPHONE HYDROCHLORIDE 0.5 MG: 1 INJECTION, SOLUTION INTRAMUSCULAR; INTRAVENOUS; SUBCUTANEOUS at 04:04

## 2021-04-05 RX ADMIN — ONDANSETRON 8 MG: 8 TABLET, ORALLY DISINTEGRATING ORAL at 09:04

## 2021-04-05 RX ADMIN — HYDROXYZINE HYDROCHLORIDE 25 MG: 25 TABLET, FILM COATED ORAL at 01:04

## 2021-04-26 ENCOUNTER — PES CALL (OUTPATIENT)
Dept: ADMINISTRATIVE | Facility: CLINIC | Age: 86
End: 2021-04-26

## 2021-04-27 ENCOUNTER — TELEPHONE (OUTPATIENT)
Dept: INTERNAL MEDICINE | Facility: CLINIC | Age: 86
End: 2021-04-27

## 2021-05-04 ENCOUNTER — TELEPHONE (OUTPATIENT)
Dept: RHEUMATOLOGY | Facility: CLINIC | Age: 86
End: 2021-05-04

## 2021-05-04 DIAGNOSIS — N18.4 CKD (CHRONIC KIDNEY DISEASE) STAGE 4, GFR 15-29 ML/MIN: Primary | ICD-10-CM

## 2022-07-19 ENCOUNTER — PATIENT MESSAGE (OUTPATIENT)
Dept: RESEARCH | Facility: CLINIC | Age: 87
End: 2022-07-19
Payer: MEDICARE

## 2022-10-31 ENCOUNTER — PES CALL (OUTPATIENT)
Dept: ADMINISTRATIVE | Facility: CLINIC | Age: 87
End: 2022-10-31
Payer: MEDICARE

## 2023-01-17 ENCOUNTER — PES CALL (OUTPATIENT)
Dept: ADMINISTRATIVE | Facility: OTHER | Age: 88
End: 2023-01-17
Payer: MEDICARE

## 2023-04-20 NOTE — PROGRESS NOTES
Patient received Prolia 60 mg injection sub Q.  Tolerated well and left in NAD.    
Patient/Caregiver provided printed discharge information.

## 2024-12-25 NOTE — ASSESSMENT & PLAN NOTE
For Transanal excision    [Time Spent: ___ minutes] : I have spent [unfilled] minutes of time on the encounter which excludes teaching and separately reported services.

## (undated) DEVICE — TRAY MINOR GEN SURG

## (undated) DEVICE — TAPE SILK 3IN

## (undated) DEVICE — SYR 10CC LUER LOCK

## (undated) DEVICE — APPLICATOR CHLORAPREP ORN 26ML

## (undated) DEVICE — ADHESIVE MASTISOL VIAL 48/BX

## (undated) DEVICE — SEE MEDLINE ITEM 157148

## (undated) DEVICE — SEE MEDLINE ITEM 146417

## (undated) DEVICE — Device

## (undated) DEVICE — DRESSING TELFA N ADH 3X8

## (undated) DEVICE — HANDLE SCISSORS HS ACE 23CM

## (undated) DEVICE — SUT VICRYL 3-0 27 SH

## (undated) DEVICE — SUT VICRYL PLUS 4-0 P3 18IN

## (undated) DEVICE — NDL HYPO REG 25G X 1 1/2

## (undated) DEVICE — ELECTRODE BLADE INSULATED 1 IN

## (undated) DEVICE — SEE MEDLINE ITEM 152622

## (undated) DEVICE — SYR ONLY LUER LOCK 20CC

## (undated) DEVICE — PANTIES FEMININE NAPKIN LG/XLG

## (undated) DEVICE — SUT 2-0 VICRYL / SH (J417)

## (undated) DEVICE — DRESSING TRANS 4X4 TEGADERM

## (undated) DEVICE — CLOSURE SKIN STERI STRIP 1/4X3

## (undated) DEVICE — DRAIN PENROSE XRAY 12 X 1/4 ST

## (undated) DEVICE — HOOK STAY ELAS 5MM 8EA/PK

## (undated) DEVICE — RETRACTOR LONE STAR 14.1X14.1

## (undated) DEVICE — BRIEF MESH LARGE

## (undated) DEVICE — NDL 22GA X1 1/2 REG BEVEL

## (undated) DEVICE — ELECTRODE REM PLYHSV RETURN 9

## (undated) DEVICE — LUBRICANT SURGILUBE 2 OZ

## (undated) DEVICE — GOWN SURGICAL X-LARGE

## (undated) DEVICE — SEE MEDLINE ITEM 154981

## (undated) DEVICE — SUT SILK 3-0 SH 18IN BLACK

## (undated) DEVICE — SEE MEDLINE ITEM 157117

## (undated) DEVICE — DRESSING TELFA STRL 4X3 LF